# Patient Record
Sex: FEMALE | Race: WHITE | NOT HISPANIC OR LATINO | Employment: OTHER | URBAN - NONMETROPOLITAN AREA
[De-identification: names, ages, dates, MRNs, and addresses within clinical notes are randomized per-mention and may not be internally consistent; named-entity substitution may affect disease eponyms.]

---

## 2017-01-04 ENCOUNTER — HOSPITAL ENCOUNTER (OUTPATIENT)
Dept: PHYSICAL THERAPY | Facility: HOSPITAL | Age: 67
Setting detail: THERAPIES SERIES
Discharge: HOME | End: 2017-01-04

## 2017-01-04 DIAGNOSIS — N81.11 CYSTOCELE, MIDLINE: ICD-10-CM

## 2017-01-04 DIAGNOSIS — N39.3 FEMALE STRESS INCONTINENCE: Primary | ICD-10-CM

## 2017-01-04 PROCEDURE — G8978 MOBILITY CURRENT STATUS: HCPCS | Performed by: PHYSICAL THERAPIST

## 2017-01-04 PROCEDURE — G8979 MOBILITY GOAL STATUS: HCPCS | Performed by: PHYSICAL THERAPIST

## 2017-01-04 PROCEDURE — 97110 THERAPEUTIC EXERCISES: CPT | Performed by: PHYSICAL THERAPIST

## 2017-01-04 NOTE — PROGRESS NOTES
Outpatient Physical Therapy Women's Health Progress Note  [facility]     Patient Name: Cindi Hill  : 1950  MRN: [id number]  Today's Date: 2017        Visit Date: 2017    Visit Dx:    ICD-10-CM ICD-9-CM   1. Female stress incontinence N39.3 625.6   2. Cystocele, midline N81.11 618.01       Patient Active Problem List   Diagnosis   • GERD (gastroesophageal reflux disease)   • Cough   • Deviated nasal septum   • Allergic rhinitis due to pollen   • Sicca laryngitis   • Mixed hyperlipidemia   • Breast cancer   • Osteoarthritis of cervical spine                               PT Assessment/Plan       17 1030          PT Assessment    Functional Limitations Impaired gait;Limitation in home management;Limitations in community activities;Performance in self-care ADL  -KR      Impairments Balance;Gait;Muscle strength;Impaired postural alignment;Endurance;Posture  -KR      Assessment Comments She has partially met her pelvic floor goals. She has done excellent with her supine/sidelying pelvic floor contractions. She is limited with isolation and breathing in sitting. She was unable to be consistent with her HEP the last couple weeks secondary to the holidays. She is reporting less urinary incontience, but still having with activity.   -KR      Please refer to paper survey for additional self-reported information Yes  -KR      Rehab Potential Good  -KR      Patient/caregiver participated in establishment of treatment plan and goals Yes  -KR      Patient would benefit from skilled therapy intervention Yes  -KR      PT Plan    PT Frequency --   1-4 times per ont  -KR      Predicted Duration of Therapy Intervention (days/wks) 2 months  -KR      Planned CPT's? PT THER PROC EA 15 MIN: 02993;PT THER ACT EA 15 MIN: 29949;PT MANUAL THERAPY EA 15 MIN: 71734;PT NEUROMUSC RE-EDUCATION EA 15 MIN: 46437;PT GAIT TRAINING EA 15 MIN: 29696;PT ELECTRICAL STIM UNATTEND: ;PT ELECTRICAL STIM ATTD EA 15 MIN:  74312  -KR      PT Plan Comments We are going to work sitting and try to progress pelvic floor contractions in standing. I want to update her HEP to increase core and hip stregthening, then work towards decreasing frequency here.   -KR        User Key  (r) = Recorded By, (t) = Taken By, (c) = Cosigned By    Initials Name Provider Type    FANTA Pascual, PT Physical Therapist                      Exercises       01/04/17 1030          Subjective Comments    Subjective Comments She reports she has been working on her pelvic foor contraction, but unable to work on the rest secondary to having company in town. She reports decreasing leaking. She repots sometimes difficulty starting flow.   -KR      Subjective Pain    Able to rate subjective pain? yes  -KR      Pre-Treatment Pain Level 0  -KR      Post-Treatment Pain Level 0  -KR      Exercise 1    Exercise Name 1 assessed all goals for re-cert  -KR      Exercise 2    Exercise Name 2 sEMG in sidelying and able to hold 10 sec without crossover. worked ni sitting, but had to cue on breathing.   -KR        User Key  (r) = Recorded By, (t) = Taken By, (c) = Cosigned By    Initials Name Provider Type    FANTA Pascual, PT Physical Therapist                            PT OP Goals       01/04/17 1030          PT Short Term Goals    STG Date to Achieve 01/25/17  -KR      STG 1 Pt will I with HEP.   -KR      STG 1 Progress Ongoing  -KR      STG 1 Progress Comments working pelvic floor, but not consistent with others  -KR      STG 2 Pt will be able to isolate pelvic floor in order to progress with pelvic floor strengthening.   -KR      STG 2 Progress Ongoing;Partially Met  -KR      STG 2 Progress Comments still needing cues for breathing in sitting, able to complete in sidelying  -KR      Long Term Goals    LTG 1 Pt will perform PF conctraction of 3 to 3+/5 or greater in order to prevent leaking with coughing, sneezing and other ADLs.   -KR      LTG 1 Progress  Ongoing;Partially Met  -KR      LTG 1 Progress Comments met in sidelying, working in sitting and will progress towards standing  -KR      LTG 2 Pt will perform PF contraction and hold 5+sec in order to prevent leaking with coughing, sneezing and other ADLs  -KR      LTG 2 Progress Ongoing;Partially Met  -KR      LTG 2 Progress Comments met in sidelying, working in sitting and will progress towards standing  -KR      LTG 3 Pt will perform SLS for 10 sec or greater.  -KR      LTG 3 Progress Ongoing  -KR      LTG 3 Progress Comments not met  -KR      LTG 4 Pt will report using 50% less protective liners improved hygene.   -KR      LTG 4 Progress Ongoing  -KR      LTG 4 Progress Comments not met  -KR      Time Calculation    PT Goal Re-Cert Due Date 02/03/17  -KR        User Key  (r) = Recorded By, (t) = Taken By, (c) = Cosigned By    Initials Name Provider Type    FANTA Pascual PT Physical Therapist                 Therapy Education       01/04/17 1030    Therapy Education    Given HEP;Posture/body mechanics  -KR    Program Reinforced   Pf in sitting with breathing focus  -KR    How Provided Verbal  -KR    Provided to Patient  -KR    Level of Understanding Verbalized  -KR      User Key  (r) = Recorded By, (t) = Taken By, (c) = Cosigned By    Initials Name Provider Type    FANTA Pascual PT Physical Therapist                Outcome Measures       01/04/17 1030          Functional Assessment    Outcome Measure Options Other Outcome Measure   PFIQ-7  -KR        User Key  (r) = Recorded By, (t) = Taken By, (c) = Cosigned By    Initials Name Provider Type    FANTA Pascual PT Physical Therapist            Time Calculation:   Start Time: 1030  Stop Time: 1112  Time Calculation (min): 42 min  Total Timed Code Minutes- PT: 42 minute(s)    Therapy Charges for Today     Code Description Service Date Service Provider Modifiers Qty    44414962089 HC PT MOBILITY CURRENT 1/4/2017 Jeanne Pascual PT GP,   1    00290619558 HC PT MOBILITY PROJECTED 1/4/2017 Jeanne Pascual, PT GP, CI 1    88686182545 HC PT THER PROC EA 15 MIN 1/4/2017 Jeanne Pascual, PT GP 3                    Jeanne Pascual, PT  1/4/2017

## 2017-01-06 ENCOUNTER — OFFICE VISIT (OUTPATIENT)
Dept: FAMILY MEDICINE CLINIC | Facility: CLINIC | Age: 67
End: 2017-01-06

## 2017-01-06 VITALS
DIASTOLIC BLOOD PRESSURE: 74 MMHG | TEMPERATURE: 98.1 F | HEART RATE: 85 BPM | SYSTOLIC BLOOD PRESSURE: 126 MMHG | BODY MASS INDEX: 29.72 KG/M2 | HEIGHT: 61 IN | OXYGEN SATURATION: 98 % | WEIGHT: 157.4 LBS

## 2017-01-06 DIAGNOSIS — H60.93 OTITIS EXTERNA OF BOTH EARS, UNSPECIFIED CHRONICITY, UNSPECIFIED TYPE: Primary | ICD-10-CM

## 2017-01-06 PROCEDURE — 69210 REMOVE IMPACTED EAR WAX UNI: CPT | Performed by: FAMILY MEDICINE

## 2017-01-06 NOTE — MR AVS SNAPSHOT
Cindi Hill   1/6/2017 2:30 PM   Office Visit    Dept Phone:  [phone]   Encounter #:  [id number]    Provider:  Jose Wolff MD   Department:  [facility]                Your Full Care Plan              Today's Medication Changes          These changes are accurate as of: 1/6/17  2:41 PM.  If you have any questions, ask your nurse or doctor.               Stop taking medication(s)listed here:     guaiFENesin 600 MG 12 hr tablet   Commonly known as:  MUCINEX   Stopped by:  Jose Wolff MD           montelukast 10 MG tablet   Commonly known as:  SINGULAIR   Stopped by:  Jose Wolff MD           olopatadine 0.6 % solution nasal solution   Commonly known as:  PATANASE   Stopped by:  Jose Wolff MD                      Your Updated Medication List          This list is accurate as of: 1/6/17  2:41 PM.  Always use your most recent med list.                aspirin 81 MG tablet       benzonatate 200 MG capsule   Commonly known as:  TESSALON       CALCIUM 600 600 MG tablet   Generic drug:  calcium carbonate       fluticasone 50 MCG/ACT nasal spray   Commonly known as:  FLONASE   2 sprays into each nostril daily.       losartan-hydrochlorothiazide 50-12.5 MG per tablet   Commonly known as:  HYZAAR       lovastatin 10 MG tablet   Commonly known as:  MEVACOR       omeprazole 20 MG capsule   Commonly known as:  priLOSEC       Vitamin D (Cholecalciferol) 1000 UNITS capsule               You Were Diagnosed With        Codes Comments    Otitis externa of both ears, unspecified chronicity, unspecified type    -  Primary ICD-10-CM: H60.93  ICD-9-CM: 380.10       Instructions     None    Patient Instructions History      Upcoming Appointments     Visit Type Date Time Department    OFFICE VISIT 1/6/2017  2:30 PM [facility]    NEW PATIENT 1/10/2017 10:15 AM [facility]    TREATMENT - PELVIC FLOOR 1/11/2017 11:15 AM   [facility] OP PHYS [facility]    TREATMENT - PELVIC FLOOR 2017 11:15 AM  [facility] OP PHYS [facility]    TREATMENT - PELVIC FLOOR 2017 11:15 AM  [facility] OP PHYS [facility]    FOLLOW UP 2017  1:30 PM [facility]    TREATMENT - PELVIC FLOOR 2017 11:15 AM  [facility] OP PHYS [facility]    OFFICE VISIT 2/10/2017 10:00 AM [facility]      MyChart Signup     Our records indicate that you have declined [facility] signup. If you would like to sign up for [employer], please email [email or web address] or call [phone] to obtain an activation code.             Other Info from Your Visit           Your Appointments     Justen 10, 2017 10:15 AM CST   New Patient with Anuj Johnson MD   [facility] (--)    [address]  [facility] [address] [phone]           Bring all previous medical records and films, along with current medications and insurance information.            2017 11:15 AM CST   Treatment - Pelvic Floor with Jeanne Pascual, PT   [facility] OP PHYS [facility] ([place])    [address] [facility] [place] [id number] [phone]            2017 11:15 AM CST   Treatment - Pelvic Floor with Jeanne Pascual, PT   [facility] OP [facility] ([place])    [address] [place] [id number] [phone]            2017 11:15 AM CST   Treatment - Pelvic Floor with Jeanne Pascual, PT   [facility] OP PHYS [facility] ([place])    [address] [facility] [place] [address]   [phone]            2017  1:30 PM CST   Follow Up with CRISTIANE Aguilar   [facility] (--)    [address]   [address]  [place] [address] [phone]           Arrive 15 minutes prior to appointment.              Allergies     Lisinopril  Cough    Lortab [Hydrocodone-acetaminophen]  Nausea Only    Percocet [Oxycodone-acetaminophen]  Nausea Only      Reason for Visit     Cerumen Impaction Wants ears irrigated     "Numbness Started approx. one month ago      Vital Signs     Blood Pressure Pulse Temperature Height Weight Last Menstrual Period    126/74 85 98.1 °F (36.7 °C) 61\" (154.9 cm) 157 lb 6.4 oz (71.4 kg) (LMP Unknown)    Oxygen Saturation Body Mass Index Smoking Status             98% 29.74 kg/m2 Never Smoker         Problems and Diagnoses Noted     Bilateral external ear infections    -  Primary        "

## 2017-01-06 NOTE — PROGRESS NOTES
Procedure   Ear Cerumen Removal Instrumentation  Date/Time: 1/6/2017 2:41 PM  Performed by: BERNA DOMINGUEZ  Authorized by: BERNA DOMINGUEZ  Local anesthetic: none  Location details: bilateral ears  Procedure type: curette  Patient sedated: no  Patient tolerance: Patient tolerated the procedure well with no immediate complications  Comments: Wax removed both ears without complications

## 2017-01-10 ENCOUNTER — OFFICE VISIT (OUTPATIENT)
Dept: CARDIOLOGY | Facility: CLINIC | Age: 67
End: 2017-01-10

## 2017-01-10 VITALS
SYSTOLIC BLOOD PRESSURE: 128 MMHG | HEIGHT: 61 IN | DIASTOLIC BLOOD PRESSURE: 70 MMHG | HEART RATE: 76 BPM | BODY MASS INDEX: 29.45 KG/M2 | WEIGHT: 156 LBS

## 2017-01-10 DIAGNOSIS — I10 ESSENTIAL HYPERTENSION: ICD-10-CM

## 2017-01-10 DIAGNOSIS — I44.7 LBBB (LEFT BUNDLE BRANCH BLOCK): Primary | ICD-10-CM

## 2017-01-10 DIAGNOSIS — Z01.810 PREOP CARDIOVASCULAR EXAM: ICD-10-CM

## 2017-01-10 DIAGNOSIS — I20.8 ANGINAL EQUIVALENT (HCC): ICD-10-CM

## 2017-01-10 PROBLEM — R06.09 DYSPNEA ON EXERTION: Status: ACTIVE | Noted: 2017-01-10

## 2017-01-10 PROCEDURE — 99214 OFFICE O/P EST MOD 30 MIN: CPT | Performed by: INTERNAL MEDICINE

## 2017-01-10 PROCEDURE — 93000 ELECTROCARDIOGRAM COMPLETE: CPT | Performed by: INTERNAL MEDICINE

## 2017-01-10 NOTE — LETTER
January 10, 2017     Jose Wolff MD  [address] [facility] [address] [facility] [address]    Patient: Cindi iHll   YOB: 1950   Date of Visit: 1/10/2017       Dear Dr. Mariella MD:    Thank you for referring Cindi Hill to me for evaluation. Below are the relevant portions of my assessment and plan of care.    If you have questions, please do not hesitate to call me. I look forward to following Cindi along with you.         Sincerely,        Anuj Johnson MD        CC: No Recipients  Anuj Johnson MD  1/10/2017 11:36 AM  Signed  Cindi Hill  [id number]  1950  66 y.o.  female    Referring Provider: Jose Wolff MD    Reason for  Visit: Preop CV exam for hysterectomy and bladder sling       Overall doing well  Denies any chest pain. Has exertional shortness of breath  Compliant with medications    History of present illness:  Cindi Hill is a 66 y.o. yo female with history of preop CV exam who presents today for   Chief Complaint   Patient presents with   • Surgical Clearance     NEW PT-ABNORMAL EKG   .    History  Past Medical History   Diagnosis Date   • Arthritis    • Breast cancer      1997, right breast mastectomy   • Drug therapy    • GERD (gastroesophageal reflux disease)    • Hypercholesteremia    • KARUNA (stress urinary incontinence, female)    • Urge incontinence    • Uterovaginal prolapse    • Vaginal atrophy    ,   Past Surgical History   Procedure Laterality Date   • Rotator cuff repair Right 2012   • Mastectomy Bilateral    • Colonoscopy  2014   • Laminectomy  01/2016   • Breast biopsy     ,   Family History   Problem Relation Age of Onset   • Dementia Mother    • Heart failure Mother    • Cancer Father      liver   • Cancer Brother      thyroid   ,   Social History   Substance Use Topics   • Smoking status: Never Smoker   • Smokeless tobacco: Never Used   • Alcohol use No   ,     Medications  Current Outpatient Prescriptions   Medication Sig Dispense Refill   •  "aspirin 81 MG tablet Take 81 mg by mouth daily.     • benzonatate (TESSALON) 200 MG capsule Take 200 mg by mouth As Needed for cough.     • calcium carbonate (CALCIUM 600) 600 MG tablet Take 600 mg by mouth daily.     • fluticasone (FLONASE) 50 MCG/ACT nasal spray 2 sprays into each nostril daily. 1 bottle 6   • losartan-hydrochlorothiazide (HYZAAR) 50-12.5 MG per tablet Take 1 tablet by mouth daily.     • lovastatin (MEVACOR) 10 MG tablet Take 10 mg by mouth every night.     • omeprazole (PriLOSEC) 20 MG capsule Take 20 mg by mouth daily.     • Vitamin D, Cholecalciferol, 1000 UNITS capsule Take 2,000 Units by mouth Daily.       No current facility-administered medications for this visit.        Allergies:  Lisinopril; Lortab [hydrocodone-acetaminophen]; and Percocet [oxycodone-acetaminophen]    Review of Systems  Review of Systems   Constitution: Negative.   HENT: Negative.    Eyes: Negative.    Cardiovascular: Positive for dyspnea on exertion. Negative for chest pain, claudication, cyanosis, irregular heartbeat, leg swelling, near-syncope, orthopnea, palpitations, paroxysmal nocturnal dyspnea and syncope.   Respiratory: Negative.    Endocrine: Negative.    Hematologic/Lymphatic: Negative.    Skin: Negative.    Gastrointestinal: Negative for anorexia.   Genitourinary: Negative.    Neurological: Negative.    Psychiatric/Behavioral: Negative.        Objective     Physical Exam:  Visit Vitals   • /70   • Pulse 76   • Ht 61\" (154.9 cm)   • Wt 156 lb (70.8 kg)   • LMP  (LMP Unknown)   • BMI 29.48 kg/m2     Physical Exam   Constitutional: She appears well-developed.   HENT:   Head: Normocephalic.   Neck: Normal carotid pulses and no JVD present. No tracheal tenderness present. Carotid bruit is not present. No tracheal deviation and no edema present.   Cardiovascular: Regular rhythm, normal heart sounds and normal pulses.    Pulmonary/Chest: Effort normal. No stridor.   Abdominal: Soft.   Neurological: She is " alert. She has normal strength. No cranial nerve deficit or sensory deficit.   Skin: Skin is warm.   Psychiatric: She has a normal mood and affect. Her speech is normal and behavior is normal.       Results Review:       ECG 12 Lead  Date/Time: 1/10/2017 11:32 AM  Performed by: DALE PRICE  Authorized by: DALE PRICE   Comparison: not compared with previous ECG   Rhythm: sinus rhythm  Conduction: complete LBBB  Clinical impression: abnormal ECG            Assessment/Plan   Patient Active Problem List   Diagnosis   • GERD (gastroesophageal reflux disease)   • Cough   • Deviated nasal septum   • Allergic rhinitis due to pollen   • Sicca laryngitis   • Mixed hyperlipidemia   • Breast cancer   • Osteoarthritis of cervical spine   • Preop cardiovascular exam   • LBBB (left bundle branch block)   • Dyspnea on exertion       Stable doing well. No exertional chest pain or excessive dyspnea. No palpitations. No significant pedal edema. Compliant with medications and diet. Latest labs and medications reviewed.    Plan:  Lexiscan   Close follow up with you as scheduled.  Intensive factor modifications.  See order list.    Counseled regarding disease appropriate diet, fluid, caffeine, stimulants and sodium intake as well as importance of compliance to diet, exercise and regular follow up.    No Follow-up on file.            '

## 2017-01-10 NOTE — MR AVS SNAPSHOT
Cindi Hill   1/10/2017 10:15 AM   Office Visit    Dept Phone:  [phone]   Encounter #:  [id number]    Provider:  Anuj Johnson MD   Department:  [facility]                Your Full Care Plan              Your Updated Medication List          This list is accurate as of: 1/10/17 11:42 AM.  Always use your most recent med list.                aspirin 81 MG tablet       benzonatate 200 MG capsule   Commonly known as:  TESSALON       CALCIUM 600 600 MG tablet   Generic drug:  calcium carbonate       fluticasone 50 MCG/ACT nasal spray   Commonly known as:  FLONASE   2 sprays into each nostril daily.       losartan-hydrochlorothiazide 50-12.5 MG per tablet   Commonly known as:  HYZAAR       lovastatin 10 MG tablet   Commonly known as:  MEVACOR       omeprazole 20 MG capsule   Commonly known as:  priLOSEC       Vitamin D (Cholecalciferol) 1000 UNITS capsule               We Performed the Following     ECG 12 Lead       You Were Diagnosed With        Codes Comments    LBBB (left bundle branch block)    -  Primary ICD-10-CM: I44.7  ICD-9-CM: 426.3     Preop cardiovascular exam     ICD-10-CM: Z01.810  ICD-9-CM: V72.81     Essential hypertension     ICD-10-CM: I10  ICD-9-CM: 401.9     Anginal equivalent     ICD-10-CM: I20.8  ICD-9-CM: 413.9       Instructions     None    Patient Instructions History      Upcoming Appointments     Visit Type Date Time Department    NEW PATIENT 1/10/2017 10:15 AM [facility]    TREATMENT - PELVIC FLOOR 1/11/2017 11:15 AM  [facility] OP PHYS [facility]    TREATMENT - PELVIC FLOOR 1/18/2017 11:15 AM  [facility] OP PHYS [facility]    TREATMENT - PELVIC FLOOR 1/25/2017 11:15 AM [facility] OP PHYS [facility]    FOLLOW UP 1/27/2017  1:30 PM [facility]    TREATMENT - PELVIC FLOOR 2/1/2017 11:15 AM [facility] OP PHYS [facility]    OFFICE VISIT 2/10/2017 10:00 AM [facility]    FOLLOW UP 2/10/2017  8:00 AM [facility]      MyChart Signup     Our records  "ind[facility] that you have [facility]. If you would like to sign up for [employer], please email [email or web address] or call [phone] to obtain an activation code.             Other Info from Your Visit           Your Appointments     2017 11:15 AM CST   Treatment - Pelvic Floor with Jeanne Pascual, PT   [facility] OP [facility]YS [facility] ([place])    [address] [place] [address] [phone]            2017 11:15 AM CST   Treatment - Pelvic Floor with Jeanne Pascual, PT   [facility] OP PH[facility] ([place])    [address] [place] [address] [phone]            2017 11:15 AM CST   Treatment - Pelvic Floor with Jeanne Pascual, PT   [facility] OP PH[facility] ([place])    [address] [place] [address] [phone]            2017  1:30 PM CST   Follow Up with CRISTIANE Aguilar   [facility] (--)    [address] [place] [address]   [address]  [facility] [address] [phone]           Arrive 15 minutes prior to appointment.            2017 11:15 AM CST   Treatment - Pelvic Floor with Jeanne Pascual, PT   [facility] OP PH[facility] ([place])    [address] [place] [address] [phone]              Allergies     Lisinopril  Cough    Lortab [Hydrocodone-acetaminophen]  Nausea Only    Percocet [Oxycodone-acetaminophen]  Nausea Only      Reason for Visit     Surgical Clearance NEW PT-ABNORMAL EKG      Vital Signs     Blood Pressure Pulse Height Weight Last Menstrual Period Body Mass Index    128/70 76 61\" (154.9 cm) 156 lb (70.8 kg) (LMP Unknown) 29.48 kg/m2    Smoking Status                   Never Smoker           Problems and Diagnoses Noted     Breathlessness on exertion    LBBB (left bundle branch block)    Pre-operative cardiovascular examination    High blood pressure        Anginal equivalent          Results     ECG 12 Lead               "

## 2017-01-10 NOTE — PROGRESS NOTES
Cindi Hill  [id number]  1950  66 y.o.  female    Referring Provider: Jose Wolff MD    Reason for  Visit: Preop CV exam for hysterectomy and bladder sling       Overall doing well  Denies any chest pain. Has exertional shortness of breath  Compliant with medications    History of present illness:  Cindi Hill is a 66 y.o. yo female with history of preop CV exam who presents today for   Chief Complaint   Patient presents with   • Surgical Clearance     NEW PT-ABNORMAL EKG   .    History  Past Medical History   Diagnosis Date   • Arthritis    • Breast cancer      1997, right breast mastectomy   • Drug therapy    • GERD (gastroesophageal reflux disease)    • Hypercholesteremia    • KARUNA (stress urinary incontinence, female)    • Urge incontinence    • Uterovaginal prolapse    • Vaginal atrophy    ,   Past Surgical History   Procedure Laterality Date   • Rotator cuff repair Right 2012   • Mastectomy Bilateral    • Colonoscopy  2014   • Laminectomy  01/2016   • Breast biopsy     ,   Family History   Problem Relation Age of Onset   • Dementia Mother    • Heart failure Mother    • Cancer Father      liver   • Cancer Brother      thyroid   ,   Social History   Substance Use Topics   • Smoking status: Never Smoker   • Smokeless tobacco: Never Used   • Alcohol use No   ,     Medications  Current Outpatient Prescriptions   Medication Sig Dispense Refill   • aspirin 81 MG tablet Take 81 mg by mouth daily.     • benzonatate (TESSALON) 200 MG capsule Take 200 mg by mouth As Needed for cough.     • calcium carbonate (CALCIUM 600) 600 MG tablet Take 600 mg by mouth daily.     • fluticasone (FLONASE) 50 MCG/ACT nasal spray 2 sprays into each nostril daily. 1 bottle 6   • losartan-hydrochlorothiazide (HYZAAR) 50-12.5 MG per tablet Take 1 tablet by mouth daily.     • lovastatin (MEVACOR) 10 MG tablet Take 10 mg by mouth every night.     • omeprazole (PriLOSEC) 20 MG capsule Take 20 mg by mouth daily.     •  "Vitamin D, Cholecalciferol, 1000 UNITS capsule Take 2,000 Units by mouth Daily.       No current facility-administered medications for this visit.        Allergies:  Lisinopril; Lortab [hydrocodone-acetaminophen]; and Percocet [oxycodone-acetaminophen]    Review of Systems  Review of Systems   Constitution: Negative.   HENT: Negative.    Eyes: Negative.    Cardiovascular: Positive for dyspnea on exertion. Negative for chest pain, claudication, cyanosis, irregular heartbeat, leg swelling, near-syncope, orthopnea, palpitations, paroxysmal nocturnal dyspnea and syncope.   Respiratory: Negative.    Endocrine: Negative.    Hematologic/Lymphatic: Negative.    Skin: Negative.    Gastrointestinal: Negative for anorexia.   Genitourinary: Negative.    Neurological: Negative.    Psychiatric/Behavioral: Negative.        Objective     Physical Exam:  Visit Vitals   • /70   • Pulse 76   • Ht 61\" (154.9 cm)   • Wt 156 lb (70.8 kg)   • LMP  (LMP Unknown)   • BMI 29.48 kg/m2     Physical Exam   Constitutional: She appears well-developed.   HENT:   Head: Normocephalic.   Neck: Normal carotid pulses and no JVD present. No tracheal tenderness present. Carotid bruit is not present. No tracheal deviation and no edema present.   Cardiovascular: Regular rhythm, normal heart sounds and normal pulses.    Pulmonary/Chest: Effort normal. No stridor.   Abdominal: Soft.   Neurological: She is alert. She has normal strength. No cranial nerve deficit or sensory deficit.   Skin: Skin is warm.   Psychiatric: She has a normal mood and affect. Her speech is normal and behavior is normal.       Results Review:       ECG 12 Lead  Date/Time: 1/10/2017 11:32 AM  Performed by: DALE PRICE  Authorized by: DALE PRICE   Comparison: not compared with previous ECG   Rhythm: sinus rhythm  Conduction: complete LBBB  Clinical impression: abnormal ECG            Assessment/Plan   Patient Active Problem List   Diagnosis   • GERD (gastroesophageal reflux " disease)   • Cough   • Deviated nasal septum   • Allergic rhinitis due to pollen   • Sicca laryngitis   • Mixed hyperlipidemia   • Breast cancer   • Osteoarthritis of cervical spine   • Preop cardiovascular exam   • LBBB (left bundle branch block)   • Dyspnea on exertion       Stable doing well. No exertional chest pain or excessive dyspnea. No palpitations. No significant pedal edema. Compliant with medications and diet. Latest labs and medications reviewed.    Plan:  Lexiscan   Close follow up with you as scheduled.  Intensive factor modifications.  See order list.    Counseled regarding disease appropriate diet, fluid, caffeine, stimulants and sodium intake as well as importance of compliance to diet, exercise and regular follow up.    No Follow-up on file.            '

## 2017-01-11 ENCOUNTER — HOSPITAL ENCOUNTER (OUTPATIENT)
Dept: PHYSICAL THERAPY | Facility: HOSPITAL | Age: 67
Setting detail: THERAPIES SERIES
Discharge: HOME | End: 2017-01-11

## 2017-01-11 ENCOUNTER — TELEPHONE (OUTPATIENT)
Dept: CARDIOLOGY | Facility: CLINIC | Age: 67
End: 2017-01-11

## 2017-01-11 DIAGNOSIS — N39.3 FEMALE STRESS INCONTINENCE: Primary | ICD-10-CM

## 2017-01-11 DIAGNOSIS — N81.11 CYSTOCELE, MIDLINE: ICD-10-CM

## 2017-01-11 PROCEDURE — 97110 THERAPEUTIC EXERCISES: CPT | Performed by: PHYSICAL THERAPIST

## 2017-01-11 NOTE — PROGRESS NOTES
Outpatient Physical Therapy Women's Health Treatment Note  [facility]     Patient Name: Cindi Hill  : 1950  MRN: [id number]  Today's Date: 2017        Visit Date: 2017    Visit Dx:    ICD-10-CM ICD-9-CM   1. Female stress incontinence N39.3 625.6   2. Cystocele, midline N81.11 618.01       Patient Active Problem List   Diagnosis   • GERD (gastroesophageal reflux disease)   • Cough   • Deviated nasal septum   • Allergic rhinitis due to pollen   • Sicca laryngitis   • Mixed hyperlipidemia   • Breast cancer   • Osteoarthritis of cervical spine   • Preop cardiovascular exam   • LBBB (left bundle branch block)   • Dyspnea on exertion                               PT Assessment/Plan       17 1108          PT Assessment    Assessment Comments Her breathing with sitting pelvic floor conctractions has improved, but she had increased glut activation, continuing to cause urinary incontience with activity. I updated her HEP to progress hip/core strength.   -KR      PT Plan    PT Plan Comments Assess pelvlic floor contractions in sitting and progress to standing, as toleated. Continue to work core/hip strengthening.   -KR        User Key  (r) = Recorded By, (t) = Taken By, (c) = Cosigned By    Initials Name Provider Type    FANTA Psacual, PT Physical Therapist                      Exercises       17 1108          Subjective Comments    Subjective Comments She reports working on her pelvic floor contractions in sitting.   -KR      Subjective Pain    Able to rate subjective pain? yes  -KR      Pre-Treatment Pain Level 0  -KR      Post-Treatment Pain Level 0  -KR      Exercise 1    Exercise Name 1 bridge with red TB   -KR      Sets 1 2  -KR      Reps 1 10  -KR      Exercise 2    Exercise Name 2 PPT   -KR      Sets 2 2  -KR      Reps 2 10  -KR      Time (Seconds) 2 5  -KR      Exercise 3    Exercise Name 3 sEMG sitting, needed cues to isolate PF.   -KR        User Key  (r) = Recorded  By, (t) = Taken By, (c) = Cosigned By    Initials Name Provider Type    FANTA Pascual, PT Physical Therapist                            PT OP Goals       01/11/17 1108 01/04/17 1030       PT Short Term Goals    STG Date to Achieve 01/25/17  -KR 01/25/17  -KR     STG 1 Pt will I with HEP.   -KR Pt will I with HEP.   -KR     STG 1 Progress Ongoing  -KR Ongoing  -KR     STG 1 Progress Comments updated with PPT and bridge with TB   -KR working pelvic floor, but not consistent with others  -KR     STG 2 Pt will be able to isolate pelvic floor in order to progress with pelvic floor strengthening.   -KR Pt will be able to isolate pelvic floor in order to progress with pelvic floor strengthening.   -KR     STG 2 Progress Ongoing;Partially Met  -KR Ongoing;Partially Met  -KR     STG 2 Progress Comments increased glut activation with sitting pelvic floor contraction; improved with breathing.   -KR still needing cues for breathing in sitting, able to complete in sidelying  -KR     Long Term Goals    LTG 1 Pt will perform PF conctraction of 3 to 3+/5 or greater in order to prevent leaking with coughing, sneezing and other ADLs.   -KR Pt will perform PF conctraction of 3 to 3+/5 or greater in order to prevent leaking with coughing, sneezing and other ADLs.   -KR     LTG 1 Progress Ongoing;Partially Met  -KR Ongoing;Partially Met  -KR     LTG 1 Progress Comments  met in sidelying, working in sitting and will progress towards standing  -KR     LTG 2 Pt will perform PF contraction and hold 5+sec in order to prevent leaking with coughing, sneezing and other ADLs  -KR Pt will perform PF contraction and hold 5+sec in order to prevent leaking with coughing, sneezing and other ADLs  -KR     LTG 2 Progress Ongoing;Partially Met  -KR Ongoing;Partially Met  -KR     LTG 2 Progress Comments  met in sidelying, working in sitting and will progress towards standing  -KR     LTG 3 Pt will perform SLS for 10 sec or greater.  -KR Pt will  perform SLS for 10 sec or greater.  -KR     LTG 3 Progress Ongoing  -KR Ongoing  -KR     LTG 3 Progress Comments  not met  -KR     LTG 4 Pt will report using 50% less protective liners improved hygene.   -KR Pt will report using 50% less protective liners improved hygene.   -KR     LTG 4 Progress Ongoing  -KR Ongoing  -KR     LTG 4 Progress Comments  not met  -KR     Time Calculation    PT Goal Re-Cert Due Date 02/03/17  -KR 02/03/17  -KR       User Key  (r) = Recorded By, (t) = Taken By, (c) = Cosigned By    Initials Name Provider Type    FANTA Pascual PT Physical Therapist                 Therapy Education       01/11/17 1108    Therapy Education    Given HEP;Symptoms/condition management;Posture/body mechanics  -KR    Program New   PPT and bridge with band  -KR    How Provided Verbal  -KR    Provided to Patient  -KR    Level of Understanding Verbalized  -KR      User Key  (r) = Recorded By, (t) = Taken By, (c) = Cosigned By    Initials Name Provider Type    FANTA Pascual PT Physical Therapist                Time Calculation:   Start Time: 1108  Stop Time: 1155  Time Calculation (min): 47 min  Total Timed Code Minutes- PT: 47 minute(s)    Therapy Charges for Today     Code Description Service Date Service Provider Modifiers Qty    52874993186 HC PT THER PROC EA 15 MIN 1/11/2017 Jeanne Pascual, PT GP 3                    Jeanne Pascual, PT  1/11/2017

## 2017-01-18 ENCOUNTER — HOSPITAL ENCOUNTER (OUTPATIENT)
Dept: PHYSICAL THERAPY | Facility: HOSPITAL | Age: 67
Setting detail: THERAPIES SERIES
Discharge: HOME | End: 2017-01-18

## 2017-01-18 DIAGNOSIS — N39.3 FEMALE STRESS INCONTINENCE: Primary | ICD-10-CM

## 2017-01-18 DIAGNOSIS — N81.11 CYSTOCELE, MIDLINE: ICD-10-CM

## 2017-01-18 PROCEDURE — 97110 THERAPEUTIC EXERCISES: CPT | Performed by: PHYSICAL THERAPIST

## 2017-01-18 NOTE — PROGRESS NOTES
Outpatient Physical Therapy Women's Health Treatment Note  [facility]     Patient Name: Cindi Hill  : 1950  MRN: [id number]  Today's Date: 2017        Visit Date: 2017    Visit Dx:    ICD-10-CM ICD-9-CM   1. Female stress incontinence N39.3 625.6   2. Cystocele, midline N81.11 618.01       Patient Active Problem List   Diagnosis   • GERD (gastroesophageal reflux disease)   • Cough   • Deviated nasal septum   • Allergic rhinitis due to pollen   • Sicca laryngitis   • Mixed hyperlipidemia   • Breast cancer   • Osteoarthritis of cervical spine   • Preop cardiovascular exam   • LBBB (left bundle branch block)   • Dyspnea on exertion                               PT Assessment/Plan       17 1113          PT Assessment    Assessment Comments Her pelvic floor strength and endurance has improved with sitting. She still needed minimal cues with breathing. She is still reporting decreased strength when coughing/sneezing causing leaking and pelvic organ prolapse  -KR      PT Plan    PT Plan Comments Progress pelvic floor contraction in standing and progress as tolearted with this.   -KR        User Key  (r) = Recorded By, (t) = Taken By, (c) = Cosigned By    Initials Name Provider Type    FANTA Pascual, PT Physical Therapist                      Exercises       17 1113          Subjective Comments    Subjective Comments She reports feeling as if she is candice at her gluts and mostly legs with pelvic floor contraction in sitting.   -KR      Subjective Pain    Able to rate subjective pain? yes  -KR      Pre-Treatment Pain Level 0  -KR      Post-Treatment Pain Level 0  -KR      Exercise 1    Exercise Name 1 sEMG in sitting; 6.0 mV and able to hold 8+ seeconds. minimal cues for breathing   -KR      Cueing 1 Verbal   visual  -KR      Exercise 2    Exercise Name 2 PPT with mini bridge   -KR      Cueing 2 Verbal  -KR      Sets 2 2  -KR      Reps 2 15  -KR      Exercise 3     Exercise Name 3 bridge on SB   -KR      Sets 3 2  -KR      Reps 3 10  -KR      Exercise 4    Exercise Name 4 sidelying hip abd/exy  -KR      Sets 4 2  -KR      Reps 4 10  -KR        User Key  (r) = Recorded By, (t) = Taken By, (c) = Cosigned By    Initials Name Provider Type    FANTA Pascual, PT Physical Therapist                            PT OP Goals       01/18/17 1113 01/11/17 1108       PT Short Term Goals    STG Date to Achieve 01/25/17  -KR 01/25/17  -KR     STG 1 Pt will I with HEP.   -KR Pt will I with HEP.   -KR     STG 1 Progress Ongoing  -KR Ongoing  -KR     STG 1 Progress Comments  updated with PPT and bridge with TB   -KR     STG 2 Pt will be able to isolate pelvic floor in order to progress with pelvic floor strengthening.   -KR Pt will be able to isolate pelvic floor in order to progress with pelvic floor strengthening.   -KR     STG 2 Progress Ongoing;Partially Met  -KR Ongoing;Partially Met  -KR     STG 2 Progress Comments able to isolate, but needing cues for breathing today.   -KR increased glut activation with sitting pelvic floor contraction; improved with breathing.   -KR     Long Term Goals    LTG 1 Pt will perform PF conctraction of 3 to 3+/5 or greater in order to prevent leaking with coughing, sneezing and other ADLs.   -KR Pt will perform PF conctraction of 3 to 3+/5 or greater in order to prevent leaking with coughing, sneezing and other ADLs.   -KR     LTG 1 Progress Ongoing;Partially Met  -KR Ongoing;Partially Met  -KR     LTG 1 Progress Comments 6.0 mV or higher with holding 8+sec in sitting.   -KR      LTG 2 Pt will perform PF contraction and hold 5+sec in order to prevent leaking with coughing, sneezing and other ADLs  -KR Pt will perform PF contraction and hold 5+sec in order to prevent leaking with coughing, sneezing and other ADLs  -KR     LTG 2 Progress Ongoing;Partially Met  -KR Ongoing;Partially Met  -KR     LTG 3 Pt will perform SLS for 10 sec or greater.  -KR Pt will  perform SLS for 10 sec or greater.  -KR     LTG 3 Progress Ongoing  -KR Ongoing  -KR     LTG 4 Pt will report using 50% less protective liners improved hygene.   -KR Pt will report using 50% less protective liners improved hygene.   -KR     LTG 4 Progress Ongoing  -KR Ongoing  -KR     Time Calculation    PT Goal Re-Cert Due Date 02/03/17  -KR 02/03/17  -KR       User Key  (r) = Recorded By, (t) = Taken By, (c) = Cosigned By    Initials Name Provider Type    FANTA Pascual PT Physical Therapist                 Therapy Education       01/18/17 1113    Therapy Education    Given HEP;Posture/body mechanics   sitting holding 10 seconds working on breathing  -KR    Program Reinforced  -KR    How Provided Verbal  -KR    Provided to Patient  -KR    Level of Understanding Verbalized  -KR      User Key  (r) = Recorded By, (t) = Taken By, (c) = Cosigned By    Initials Name Provider Type    FANTA Pascual PT Physical Therapist                Time Calculation:   Start Time: 1113  Stop Time: 1201  Time Calculation (min): 48 min  Total Timed Code Minutes- PT: 45 minute(s)    Therapy Charges for Today     Code Description Service Date Service Provider Modifiers Qty    14870226115 HC PT THER PROC EA 15 MIN 1/18/2017 Jeanne Pascual, PT GP 3                    Jeanne Pascual PT  1/18/2017

## 2017-01-19 ENCOUNTER — HOSPITAL ENCOUNTER (OUTPATIENT)
Dept: CARDIOLOGY | Facility: HOSPITAL | Age: 67
Discharge: HOME | End: 2017-01-19
Attending: INTERNAL MEDICINE

## 2017-01-19 ENCOUNTER — HOSPITAL ENCOUNTER (OUTPATIENT)
Dept: CARDIOLOGY | Facility: HOSPITAL | Age: 67
Discharge: HOME | End: 2017-01-19
Attending: INTERNAL MEDICINE | Admitting: INTERNAL MEDICINE

## 2017-01-19 VITALS — DIASTOLIC BLOOD PRESSURE: 62 MMHG | SYSTOLIC BLOOD PRESSURE: 124 MMHG | HEART RATE: 73 BPM

## 2017-01-19 DIAGNOSIS — I20.8 ANGINAL EQUIVALENT (HCC): ICD-10-CM

## 2017-01-19 PROCEDURE — 93018 CV STRESS TEST I&R ONLY: CPT | Performed by: INTERNAL MEDICINE

## 2017-01-19 PROCEDURE — 93017 CV STRESS TEST TRACING ONLY: CPT

## 2017-01-19 PROCEDURE — 25010000002 REGADENOSON 0.4 MG/5ML SOLUTION: Performed by: INTERNAL MEDICINE

## 2017-01-19 PROCEDURE — 0 TECHNETIUM SESTAMIBI: Performed by: INTERNAL MEDICINE

## 2017-01-19 PROCEDURE — 78452 HT MUSCLE IMAGE SPECT MULT: CPT | Performed by: INTERNAL MEDICINE

## 2017-01-19 PROCEDURE — A9500 TC99M SESTAMIBI: HCPCS | Performed by: INTERNAL MEDICINE

## 2017-01-19 PROCEDURE — 78452 HT MUSCLE IMAGE SPECT MULT: CPT

## 2017-01-19 RX ADMIN — Medication 1 DOSE: at 08:38

## 2017-01-19 RX ADMIN — REGADENOSON 0.4 MG: 0.08 INJECTION, SOLUTION INTRAVENOUS at 10:19

## 2017-01-22 LAB
BH CV STRESS COMMENTS STAGE 1: NORMAL
BH CV STRESS DOSE REGADENOSON STAGE 1: 0.4
BH CV STRESS DURATION MIN STAGE 1: 0
BH CV STRESS DURATION SEC STAGE 1: 10
BH CV STRESS PROTOCOL 1: NORMAL
BH CV STRESS RECOVERY BP: NORMAL MMHG
BH CV STRESS RECOVERY HR: 86 BPM
BH CV STRESS STAGE 1: 1
LV EF NUC BP: 69 %
MAXIMAL PREDICTED HEART RATE: 154 BPM
PERCENT MAX PREDICTED HR: 72.73 %
STRESS BASELINE BP: NORMAL MMHG
STRESS BASELINE HR: 73 BPM
STRESS PERCENT HR: 86 %
STRESS POST EXERCISE DUR SEC: 10 SEC
STRESS POST PEAK BP: NORMAL MMHG
STRESS POST PEAK HR: 112 BPM
STRESS TARGET HR: 131 BPM

## 2017-01-25 ENCOUNTER — HOSPITAL ENCOUNTER (OUTPATIENT)
Dept: PHYSICAL THERAPY | Facility: HOSPITAL | Age: 67
Setting detail: THERAPIES SERIES
Discharge: HOME | End: 2017-01-25

## 2017-01-25 DIAGNOSIS — N39.3 FEMALE STRESS INCONTINENCE: Primary | ICD-10-CM

## 2017-01-25 DIAGNOSIS — N81.11 CYSTOCELE, MIDLINE: ICD-10-CM

## 2017-01-25 PROCEDURE — 97110 THERAPEUTIC EXERCISES: CPT | Performed by: PHYSICAL THERAPIST

## 2017-01-25 NOTE — PROGRESS NOTES
Outpatient Physical Therapy Women's Health Progress Note  [facility]     Patient Name: Cindi Hill  : 1950  MRN: [id number]  Today's Date: 2017        Visit Date: 2017    Visit Dx:    ICD-10-CM ICD-9-CM   1. Female stress incontinence N39.3 625.6   2. Cystocele, midline N81.11 618.01       Patient Active Problem List   Diagnosis   • GERD (gastroesophageal reflux disease)   • Cough   • Deviated nasal septum   • Allergic rhinitis due to pollen   • Sicca laryngitis   • Mixed hyperlipidemia   • Breast cancer   • Osteoarthritis of cervical spine   • Preop cardiovascular exam   • LBBB (left bundle branch block)   • Dyspnea on exertion                               PT Assessment/Plan       17 1111          PT Assessment    Functional Limitations Limitation in home management;Limitations in community activities;Performance in self-care ADL  -KR      Impairments Balance;Gait;Endurance;Muscle strength  -KR      Assessment Comments She is going to be out of town for the next couple weeks. I updated her HEP to continue to work on pelvic floor endurance. I have progressed to standing activities, but she is still lacking endurance. She is still having urinary incontience, especially stress. Overall, she has made improvements. She has met one and working well towards others.   -KR      Please refer to paper survey for additional self-reported information Yes  -KR      Rehab Potential Good  -KR      Patient/caregiver participated in establishment of treatment plan and goals Yes  -KR      Patient would benefit from skilled therapy intervention Yes  -KR      PT Plan    PT Frequency --   1-4 times per month  -KR      Predicted Duration of Therapy Intervention (days/wks) 2 months  -KR      Planned CPT's? PT THER ACT EA 15 MIN: 00093;PT THER PROC EA 15 MIN: 59031;PT MANUAL THERAPY EA 15 MIN: 51099  -KR      PT Plan Comments We will assess pelvic floor contraction in statnding and look towards discharge  soon.   -KR        User Key  (r) = Recorded By, (t) = Taken By, (c) = Cosigned By    Initials Name Provider Type    FANTA Pascual, PT Physical Therapist                      Exercises       01/25/17 1111          Subjective Comments    Subjective Comments She reports L lower back/hip was hurting after leaving here and sore for a couple days, but went away. She reports still leaking some with coughing, sneezing or urgency, but overal better. She reports now just struggling with starting the flow, but not when it is urgent to go. She reports not feeling like she is always emptying either. Getting up 1-2 times per night to use the restroom.   -KR      Subjective Pain    Able to rate subjective pain? yes  -KR      Pre-Treatment Pain Level 0  -KR      Post-Treatment Pain Level 0  -KR      Exercise 1    Exercise Name 1 sEMG sitting 15mV and able to hold 8-10 sec, breathing was improved; then in standing able to hold 2-3 sec   -KR      Exercise 2    Exercise Name 2 PPT with mini bridge and green TB around  knees s  -KR      Sets 2 3  -KR      Reps 2 10  -KR        User Key  (r) = Recorded By, (t) = Taken By, (c) = Cosigned By    Initials Name Provider Type    FANTA Pascual, PT Physical Therapist                            PT OP Goals       01/25/17 1111 01/18/17 1113       PT Short Term Goals    STG Date to Achieve 02/15/17  -KR 01/25/17  -KR     STG 1 Pt will I with HEP.   -KR Pt will I with HEP.   -KR     STG 1 Progress Ongoing  -KR Ongoing  -KR     STG 1 Progress Comments discussed progression and continuation while out of town  -KR      STG 2 Pt will be able to isolate pelvic floor in order to progress with pelvic floor strengthening.   -KR Pt will be able to isolate pelvic floor in order to progress with pelvic floor strengthening.   -KR     STG 2 Progress Met  -KR Ongoing;Partially Met  -KR     STG 2 Progress Comments  able to isolate, but needing cues for breathing today.   -KR     Long Term Goals     LTG Date to Achieve 03/08/17  -KR      LTG 1 Pt will perform PF conctraction of 3 to 3+/5 or greater in order to prevent leaking with coughing, sneezing and other ADLs.   -KR Pt will perform PF conctraction of 3 to 3+/5 or greater in order to prevent leaking with coughing, sneezing and other ADLs.   -KR     LTG 1 Progress Ongoing;Partially Met  -KR Ongoing;Partially Met  -KR     LTG 1 Progress Comments 15V in sitting and able to hold 10 sec; only holding 2-3 seconds in standing  -KR 6.0 mV or higher with holding 8+sec in sitting.   -KR     LTG 2 Pt will perform PF contraction and hold 5+sec in order to prevent leaking with coughing, sneezing and other ADLs  -KR Pt will perform PF contraction and hold 5+sec in order to prevent leaking with coughing, sneezing and other ADLs  -KR     LTG 2 Progress Ongoing;Partially Met  -KR Ongoing;Partially Met  -KR     LTG 2 Progress Comments met in sitting, unable in standing  -KR      LTG 3 Pt will perform SLS for 10 sec or greater.  -KR Pt will perform SLS for 10 sec or greater.  -KR     LTG 3 Progress Ongoing  -KR Ongoing  -KR     LTG 3 Progress Comments 15 sec R 7 sec L   -KR      LTG 4 Pt will report using 50% less protective liners improved hygene.   -KR Pt will report using 50% less protective liners improved hygene.   -KR     LTG 4 Progress Ongoing  -KR Ongoing  -KR     LTG 4 Progress Comments still leaking with coughing, sneezing  -KR      Time Calculation    PT Goal Re-Cert Due Date 02/24/17  -KR 02/03/17  -KR       User Key  (r) = Recorded By, (t) = Taken By, (c) = Cosigned By    Initials Name Provider Type    FANTA Pascual PT Physical Therapist                 Therapy Education       01/25/17 1111    Therapy Education    Given HEP;Symptoms/condition management;Posture/body mechanics  -KR    Program Reinforced  -KR    How Provided Verbal  -KR    Provided to Patient  -KR    Level of Understanding Verbalized;Demonstrated;Teach back education performed  -KR       User Key  (r) = Recorded By, (t) = Taken By, (c) = Cosigned By    Initials Name Provider Type    FANTA Pascual, PT Physical Therapist                Time Calculation:   Start Time: 1111  Stop Time: 1200  Time Calculation (min): 49 min  Total Timed Code Minutes- PT: 45 minute(s)    Therapy Charges for Today     Code Description Service Date Service Provider Modifiers Qty    36395505444 HC PT THER PROC EA 15 MIN 1/25/2017 Jeanne Pascual, PT GP 3                    Jeanne Pascual, PT  1/25/2017

## 2017-02-01 ENCOUNTER — APPOINTMENT (OUTPATIENT)
Dept: PHYSICAL THERAPY | Facility: HOSPITAL | Age: 67
End: 2017-02-01

## 2017-02-09 ENCOUNTER — HOSPITAL ENCOUNTER (OUTPATIENT)
Dept: PHYSICAL THERAPY | Facility: HOSPITAL | Age: 67
Setting detail: THERAPIES SERIES
Discharge: HOME | End: 2017-02-09

## 2017-02-09 DIAGNOSIS — N39.3 FEMALE STRESS INCONTINENCE: Primary | ICD-10-CM

## 2017-02-09 DIAGNOSIS — N81.11 CYSTOCELE, MIDLINE: ICD-10-CM

## 2017-02-09 PROCEDURE — G8979 MOBILITY GOAL STATUS: HCPCS | Performed by: PHYSICAL THERAPIST

## 2017-02-09 PROCEDURE — 97110 THERAPEUTIC EXERCISES: CPT | Performed by: PHYSICAL THERAPIST

## 2017-02-09 PROCEDURE — G8980 MOBILITY D/C STATUS: HCPCS | Performed by: PHYSICAL THERAPIST

## 2017-02-09 NOTE — THERAPY DISCHARGE NOTE
Outpatient Physical Therapy Women's Health Treatment Note/Discharge Summary   [place]     Patient Name: Cindi Hill  : 1950  MRN: [id number]  Today's Date: 2017        Visit Date: 2017    Visit Dx:    ICD-10-CM ICD-9-CM   1. Female stress incontinence N39.3 625.6   2. Cystocele, midline N81.11 618.01       Patient Active Problem List   Diagnosis   • GERD (gastroesophageal reflux disease)   • Cough   • Deviated nasal septum   • Allergic rhinitis due to pollen   • Sicca laryngitis   • Mixed hyperlipidemia   • Breast cancer   • Osteoarthritis of cervical spine   • Preop cardiovascular exam   • LBBB (left bundle branch block)   • Dyspnea on exertion                               PT Assessment/Plan       17 0940          PT Assessment    Assessment Comments Pt has met 5 goals and progressing well towards other. She only had one urinary incontience episod in the last two weeks. Her pelvic floor strength and endurance has improved greatly. She has an updated HEP and we have discussed progression at home. She is to call with any questions or concerns.   -KR      PT Plan    PT Plan Comments We will discharge her for now. She is to progress with her pelvic floor contractions, specifically with stading activity.   -KR        User Key  (r) = Recorded By, (t) = Taken By, (c) = Cosigned By    Initials Name Provider Type    FANTA Pascual, PT Physical Therapist                    Exercises       17 0940          Subjective Comments    Subjective Comments She reports not doing the exercises as much as she should while on vacation. Only had 1 incontience episode in last 2 weeks.   -KR      Subjective Pain    Able to rate subjective pain? yes  -KR      Pre-Treatment Pain Level 0  -KR      Post-Treatment Pain Level 0  -KR      Exercise 1    Exercise Name 1 sEMG sitting 5-7 mV holding 10 sec; standing 10-15 mv holding 5-8 sec  -KR      Cueing 1 Verbal   visual   -KR      Exercise 2     Exercise Name 2 discussed progression at home and working pelvic floor contractions in standing with activity.   -KR        User Key  (r) = Recorded By, (t) = Taken By, (c) = Cosigned By    Initials Name Provider Type    FANTA Pascual PT Physical Therapist                            PT OP Goals       02/09/17 0940          PT Short Term Goals    STG Date to Achieve 02/15/17  -KR      STG 1 Pt will I with HEP.   -KR      STG 1 Progress Met  -KR      STG 2 Pt will be able to isolate pelvic floor in order to progress with pelvic floor strengthening.   -KR      STG 2 Progress Met  -KR      Long Term Goals    LTG Date to Achieve 03/08/17  -KR      LTG 1 Pt will perform PF conctraction of 3 to 3+/5 or greater in order to prevent leaking with coughing, sneezing and other ADLs.   -KR      LTG 1 Progress Met  -KR      LTG 2 Pt will perform PF contraction and hold 5+sec in order to prevent leaking with coughing, sneezing and other ADLs  -KR      LTG 2 Progress Met  -KR      LTG 3 Pt will perform SLS for 10 sec or greater.  -KR      LTG 3 Progress Met  -KR      LTG 4 Pt will report using 50% less protective liners improved hygene.   -KR      LTG 4 Progress Partially Met  -KR      LTG 4 Progress Comments only one incontience issue in last two weeks.   -KR      Time Calculation    PT Goal Re-Cert Due Date 02/24/17  -KR        User Key  (r) = Recorded By, (t) = Taken By, (c) = Cosigned By    Initials Name Provider Type    FANTA Pascual PT Physical Therapist                 Therapy Education       02/09/17 0940    Therapy Education    Given HEP;Symptoms/condition management  -KR    Program Reinforced  -KR    How Provided Verbal;Demonstration  -KR    Provided to Patient  -KR    Level of Understanding Teach back education performed;Verbalized;Demonstrated  -KR      User Key  (r) = Recorded By, (t) = Taken By, (c) = Cosigned By    Initials Name Provider Type    FANTA Pascual PT Physical Therapist               Time Calculation:   Start Time: 0940  Stop Time: 1028  Time Calculation (min): 48 min  Total Timed Code Minutes- PT: 40 minute(s)    Therapy Charges for Today     Code Description Service Date Service Provider Modifiers Qty    42049596095 HC PT MOBILITY PROJECTED 2/9/2017 Jeanne Pascual, PT GP, CI 1    87887311614 HC PT MOBILITY DISCHARGE 2/9/2017 Jeanne Pascual, PT GP, CI 1    07282713088 HC PT THER PROC EA 15 MIN 2/9/2017 Jeanne Pascual, PT GP 3          PT G-Codes  PT Professional Judgement Used?: Yes  Functional Limitation: Mobility: Walking and moving around  Mobility: Walking and Moving Around Goal Status (): At least 1 percent but less than 20 percent impaired, limited or restricted  Mobility: Walking and Moving Around Discharge Status (): At least 1 percent but less than 20 percent impaired, limited or restricted           Jeanne Pascual, PT  2/9/2017

## 2017-02-10 ENCOUNTER — OFFICE VISIT (OUTPATIENT)
Dept: CARDIOLOGY | Facility: CLINIC | Age: 67
End: 2017-02-10

## 2017-02-10 ENCOUNTER — OFFICE VISIT (OUTPATIENT)
Dept: OBSTETRICS AND GYNECOLOGY | Facility: CLINIC | Age: 67
End: 2017-02-10

## 2017-02-10 VITALS
DIASTOLIC BLOOD PRESSURE: 82 MMHG | HEIGHT: 61 IN | BODY MASS INDEX: 29.27 KG/M2 | SYSTOLIC BLOOD PRESSURE: 126 MMHG | WEIGHT: 155 LBS

## 2017-02-10 VITALS
WEIGHT: 159 LBS | SYSTOLIC BLOOD PRESSURE: 118 MMHG | DIASTOLIC BLOOD PRESSURE: 78 MMHG | OXYGEN SATURATION: 95 % | HEIGHT: 62 IN | BODY MASS INDEX: 29.26 KG/M2 | HEART RATE: 89 BPM

## 2017-02-10 DIAGNOSIS — R39.15 URINARY URGENCY: Primary | ICD-10-CM

## 2017-02-10 DIAGNOSIS — R06.09 DYSPNEA ON EXERTION: ICD-10-CM

## 2017-02-10 DIAGNOSIS — I44.7 LBBB (LEFT BUNDLE BRANCH BLOCK): ICD-10-CM

## 2017-02-10 DIAGNOSIS — Z01.810 PREOP CARDIOVASCULAR EXAM: ICD-10-CM

## 2017-02-10 DIAGNOSIS — I25.9 ISCHEMIC HEART DISEASE: ICD-10-CM

## 2017-02-10 DIAGNOSIS — M47.812 OSTEOARTHRITIS OF CERVICAL SPINE, UNSPECIFIED SPINAL OSTEOARTHRITIS COMPLICATION STATUS: ICD-10-CM

## 2017-02-10 DIAGNOSIS — N81.11 MIDLINE CYSTOCELE: ICD-10-CM

## 2017-02-10 DIAGNOSIS — E78.2 MIXED HYPERLIPIDEMIA: Primary | ICD-10-CM

## 2017-02-10 DIAGNOSIS — Z78.9 NONSMOKER: ICD-10-CM

## 2017-02-10 DIAGNOSIS — R35.0 FREQUENCY OF MICTURITION: ICD-10-CM

## 2017-02-10 DIAGNOSIS — K21.9 GASTROESOPHAGEAL REFLUX DISEASE, ESOPHAGITIS PRESENCE NOT SPECIFIED: ICD-10-CM

## 2017-02-10 PROCEDURE — 99213 OFFICE O/P EST LOW 20 MIN: CPT | Performed by: OBSTETRICS & GYNECOLOGY

## 2017-02-10 PROCEDURE — 99214 OFFICE O/P EST MOD 30 MIN: CPT | Performed by: INTERNAL MEDICINE

## 2017-02-10 RX ORDER — SODIUM CHLORIDE 9 MG/ML
3 INJECTION, SOLUTION INTRAVENOUS ONCE
Status: CANCELLED | OUTPATIENT
Start: 2017-02-10 | End: 2017-02-10

## 2017-02-10 RX ORDER — LOSARTAN POTASSIUM 50 MG/1
50 TABLET ORAL DAILY
COMMUNITY
Start: 2016-12-02 | End: 2017-09-25 | Stop reason: SDUPTHER

## 2017-02-10 RX ORDER — SODIUM CHLORIDE 0.9 % (FLUSH) 0.9 %
1-10 SYRINGE (ML) INJECTION AS NEEDED
Status: CANCELLED | OUTPATIENT
Start: 2017-02-10

## 2017-02-10 NOTE — PROGRESS NOTES
Cindi Hill  [id number]  1950  66 y.o.  female    Referring Provider: Veronica Denis MD    Reason for Follow-up Visit: Preop CV exam for bladder surgery    Moderate chest pain  Moderate shortness of breath  Compliant with medications    History of present illness:  Cindi Hill is a 66 y.o. yo female with history of preop CV exam with moderate shortness of breath and chest pain who presents today for   Chief Complaint   Patient presents with   • Follow-up     2 week  f/u on dar    .    History  Past Medical History   Diagnosis Date   • Arthritis    • Breast cancer      1997, right breast mastectomy   • Drug therapy    • GERD (gastroesophageal reflux disease)    • Hypercholesteremia    • KARUNA (stress urinary incontinence, female)    • Urge incontinence    • Uterovaginal prolapse    • Vaginal atrophy    ,   Past Surgical History   Procedure Laterality Date   • Rotator cuff repair Right 2012   • Mastectomy Bilateral    • Colonoscopy  2014   • Laminectomy  01/2016   • Breast biopsy     ,   Family History   Problem Relation Age of Onset   • Dementia Mother    • Heart failure Mother    • Cancer Father      liver   • Cancer Brother      thyroid   ,   Social History   Substance Use Topics   • Smoking status: Never Smoker   • Smokeless tobacco: Never Used   • Alcohol use No   ,     Medications  Current Outpatient Prescriptions   Medication Sig Dispense Refill   • aspirin 81 MG tablet Take 81 mg by mouth daily.     • benzonatate (TESSALON) 200 MG capsule Take 200 mg by mouth As Needed for cough.     • fluticasone (FLONASE) 50 MCG/ACT nasal spray 2 sprays into each nostril daily. 1 bottle 6   • losartan (COZAAR) 50 MG tablet      • lovastatin (MEVACOR) 10 MG tablet Take 10 mg by mouth every night.     • omeprazole (PriLOSEC) 20 MG capsule Take 20 mg by mouth daily.     • Vitamin D, Cholecalciferol, 1000 UNITS capsule Take 2,000 Units by mouth Daily.     • calcium carbonate (CALCIUM 600) 600 MG tablet Take 600 mg  "by mouth daily.     • losartan-hydrochlorothiazide (HYZAAR) 50-12.5 MG per tablet Take 1 tablet by mouth daily.       No current facility-administered medications for this visit.        Allergies:  Lisinopril; Lortab [hydrocodone-acetaminophen]; and Percocet [oxycodone-acetaminophen]    Review of Systems  Review of Systems   Constitution: Negative.   HENT: Negative.    Eyes: Negative.    Cardiovascular: Positive for chest pain and dyspnea on exertion. Negative for claudication, cyanosis, irregular heartbeat, leg swelling, near-syncope, orthopnea, palpitations, paroxysmal nocturnal dyspnea and syncope.   Respiratory: Negative.    Endocrine: Negative.    Hematologic/Lymphatic: Negative.    Skin: Negative.    Gastrointestinal: Negative for anorexia.   Genitourinary: Negative.    Neurological: Negative.    Psychiatric/Behavioral: Negative.        Objective     Physical Exam:  Visit Vitals   • /78 (BP Location: Left arm, Patient Position: Sitting, Cuff Size: Adult)   • Pulse 89   • Ht 61.5\" (156.2 cm)   • Wt 159 lb (72.1 kg)   • LMP  (LMP Unknown)   • SpO2 95%   • BMI 29.56 kg/m2     Physical Exam   Constitutional: She appears well-developed.   HENT:   Head: Normocephalic.   Neck: Normal carotid pulses and no JVD present. No tracheal tenderness present. Carotid bruit is not present. No tracheal deviation and no edema present.   Cardiovascular: Regular rhythm, normal heart sounds and normal pulses.    Pulmonary/Chest: Effort normal. No stridor.   Abdominal: Soft.   Neurological: She is alert. She has normal strength. No cranial nerve deficit or sensory deficit.   Skin: Skin is warm.   Psychiatric: She has a normal mood and affect. Her speech is normal and behavior is normal.       Results Review:     Procedures    Assessment/Plan   Patient Active Problem List   Diagnosis   • GERD (gastroesophageal reflux disease)   • Cough   • Deviated nasal septum   • Allergic rhinitis due to pollen   • Sicca laryngitis   • Mixed " hyperlipidemia   • Breast cancer   • Osteoarthritis of cervical spine   • Preop cardiovascular exam   • LBBB (left bundle branch block)   • Dyspnea on exertion       No palpitations. No significant pedal edema. Compliant with medications and diet. Latest labs and medications reviewed.  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  Intensive factor modifications.  See order list.    Counseled regarding disease appropriate diet, fluid, caffeine, stimulants and sodium intake as well as importance of compliance to diet, exercise and regular follow up.  Avoid NSAIDS and COX2 inhibitors. Use Acetaminophen PRN.    Return in about 3 months (around 5/10/2017).

## 2017-02-10 NOTE — PROGRESS NOTES
"Subjective   Chief Complaint   Patient presents with   • Bladder Prolapse     Pt here to discuss her physical therapy she had for her prolapse     Cindi Hill is a 66 y.o. year old .  No LMP recorded (lmp unknown). Patient is postmenopausal.  She presents to be seen for prolapse follow-up.  Yesterday was her last appointment and she has been doing pelvic PT for past two months at [facility].  The patient does not feel like PT was very helpful.  She still reports incomplete bladder emptying    The following portions of the patient's history were reviewed and updated as appropriate:current medications, allergies and past surgical history    Smoking status: Never Smoker                                                              Smokeless status: Never Used                        Review of Systems + incomplete emptying, + frequency, + prolapse pressure.             Neg chest pain, neg SOB, neg abd pain, neg vaginal bleeding, neg discharge      Objective   Visit Vitals   • /82   • Ht 61\" (154.9 cm)   • Wt 155 lb (70.3 kg)   • LMP  (LMP Unknown)   • Breastfeeding No   • BMI 29.29 kg/m2       Physical Exam Pelvic exam not repeated today.    No acute distress    Normal ROM and ambulation    Normal respiratory efforts    Abd soft/non-tender    No edema    Lab Review   No data reviewed    Imaging   No data reviewed     Assessment & Plan    Abnormal urodynamic testing: Patient with significantly abnormal urodynamic testing.  The patient feels a sensation of fullness at only 22 mL's of filling.  The patient has a strong urge to void far below a volume that would be normal.  I feel that this contributes to the patient's urinary frequency and urgency much more than her small to moderate cystocele.  The patient has 30 done pelvic physical therapy, and found to be minimally helpful.  Several months ago, we also discussed at great length bladder retraining.  The patient at that time was instructed to keep a voiding diary " and used timed voids to help to space out the frequency of her bathroom visits.  In order to get around this, the patient simply drank less so that she would not have to wait as often.  I have concerns that reduction of the patient's cystocele might further restrict the compliance of her bladder, and potentially make her bladder dysfunction worse.  For this reason I will refer the patient to a uro-gynecologist for an expert opinion.    Frequency:  The patient reports frequent voiding, often multiple times in an hour.  On urodynamic testing, this appears to be more a function of the patient's poor bladder compliance then to detrusor overactivity.    Cystocele:  The patient does have a very small to moderate cystocele, but her exam does not appear consistent with a cystocele that would cause incomplete emptying.  In addition, as described above, the patient's bladder compliance is abnormally small.  The concern is that an anterior repair to reduce the patient's cystocele defect could actually reduce her bladder compliance even further.  Uterine prolapse: Mild prolapse of the uterus is present on exam    This note was electronically signed.    Veronica Denis MD  February 10, 2017  10:28 AM    Total time spent today with iCndi  was 25 minutes (level 2).  Greater than 50% of the time was spent coordinating care, answering her questions and counseling regarding pathophysiology of her presenting problem along with plans for any diagnositc work-up and treatment.

## 2017-02-17 ENCOUNTER — HOSPITAL ENCOUNTER (OUTPATIENT)
Dept: GENERAL RADIOLOGY | Facility: HOSPITAL | Age: 67
Discharge: HOME | End: 2017-02-17

## 2017-02-17 ENCOUNTER — HOSPITAL ENCOUNTER (OUTPATIENT)
Dept: CARDIOLOGY | Facility: HOSPITAL | Age: 67
Discharge: HOME | End: 2017-02-17
Attending: INTERNAL MEDICINE | Admitting: INTERNAL MEDICINE

## 2017-02-17 ENCOUNTER — HOSPITAL ENCOUNTER (OUTPATIENT)
Dept: CARDIOLOGY | Facility: HOSPITAL | Age: 67
Discharge: HOME | End: 2017-02-17

## 2017-02-17 VITALS
HEIGHT: 61 IN | DIASTOLIC BLOOD PRESSURE: 67 MMHG | WEIGHT: 155 LBS | BODY MASS INDEX: 29.27 KG/M2 | SYSTOLIC BLOOD PRESSURE: 108 MMHG

## 2017-02-17 DIAGNOSIS — I25.9 ISCHEMIC HEART DISEASE: ICD-10-CM

## 2017-02-17 DIAGNOSIS — R06.09 DYSPNEA ON EXERTION: ICD-10-CM

## 2017-02-17 LAB
ALBUMIN SERPL-MCNC: 4.6 G/DL (ref 3.5–5)
ALBUMIN/GLOB SERPL: 1.5 G/DL (ref 1.1–2.5)
ALP SERPL-CCNC: 96 U/L (ref 24–120)
ALT SERPL W P-5'-P-CCNC: 37 U/L (ref 0–54)
ANION GAP SERPL CALCULATED.3IONS-SCNC: 12 MMOL/L (ref 4–13)
AST SERPL-CCNC: 30 U/L (ref 7–45)
BASOPHILS # BLD AUTO: 0.01 10*3/MM3 (ref 0–0.2)
BASOPHILS NFR BLD AUTO: 0.2 % (ref 0–2)
BILIRUB SERPL-MCNC: 0.4 MG/DL (ref 0.1–1)
BUN BLD-MCNC: 16 MG/DL (ref 5–21)
BUN/CREAT SERPL: 23.5 (ref 7–25)
CALCIUM SPEC-SCNC: 10 MG/DL (ref 8.4–10.4)
CHLORIDE SERPL-SCNC: 102 MMOL/L (ref 98–110)
CO2 SERPL-SCNC: 31 MMOL/L (ref 24–31)
CREAT BLD-MCNC: 0.68 MG/DL (ref 0.5–1.4)
DEPRECATED RDW RBC AUTO: 41.9 FL (ref 40–54)
EOSINOPHIL # BLD AUTO: 0.28 10*3/MM3 (ref 0–0.7)
EOSINOPHIL NFR BLD AUTO: 6 % (ref 0–4)
ERYTHROCYTE [DISTWIDTH] IN BLOOD BY AUTOMATED COUNT: 13.2 % (ref 12–15)
GFR SERPL CREATININE-BSD FRML MDRD: 87 ML/MIN/1.73
GLOBULIN UR ELPH-MCNC: 3.1 GM/DL
GLUCOSE BLD-MCNC: 73 MG/DL (ref 70–100)
HCT VFR BLD AUTO: 41.1 % (ref 37–47)
HGB BLD-MCNC: 13.4 G/DL (ref 12–16)
IMM GRANULOCYTES # BLD: 0.01 10*3/MM3 (ref 0–0.03)
IMM GRANULOCYTES NFR BLD: 0.2 % (ref 0–5)
INR PPP: 0.93 (ref 0.91–1.09)
LYMPHOCYTES # BLD AUTO: 1.07 10*3/MM3 (ref 0.72–4.86)
LYMPHOCYTES NFR BLD AUTO: 23.1 % (ref 15–45)
MCH RBC QN AUTO: 28.6 PG (ref 28–32)
MCHC RBC AUTO-ENTMCNC: 32.6 G/DL (ref 33–36)
MCV RBC AUTO: 87.6 FL (ref 82–98)
MONOCYTES # BLD AUTO: 0.36 10*3/MM3 (ref 0.19–1.3)
MONOCYTES NFR BLD AUTO: 7.8 % (ref 4–12)
NEUTROPHILS # BLD AUTO: 2.91 10*3/MM3 (ref 1.87–8.4)
NEUTROPHILS NFR BLD AUTO: 62.7 % (ref 39–78)
PLATELET # BLD AUTO: 209 10*3/MM3 (ref 130–400)
PMV BLD AUTO: 8.9 FL (ref 6–12)
POTASSIUM BLD-SCNC: 4 MMOL/L (ref 3.5–5.3)
PROT SERPL-MCNC: 7.7 G/DL (ref 6.3–8.7)
PROTHROMBIN TIME: 12.7 SECONDS (ref 11.9–14.6)
RBC # BLD AUTO: 4.69 10*6/MM3 (ref 4.2–5.4)
SODIUM BLD-SCNC: 145 MMOL/L (ref 135–145)
WBC NRBC COR # BLD: 4.64 10*3/MM3 (ref 4.8–10.8)

## 2017-02-17 PROCEDURE — 93306 TTE W/DOPPLER COMPLETE: CPT | Performed by: INTERNAL MEDICINE

## 2017-02-17 PROCEDURE — 36415 COLL VENOUS BLD VENIPUNCTURE: CPT

## 2017-02-17 PROCEDURE — 85610 PROTHROMBIN TIME: CPT | Performed by: INTERNAL MEDICINE

## 2017-02-17 PROCEDURE — 85025 COMPLETE CBC W/AUTO DIFF WBC: CPT | Performed by: INTERNAL MEDICINE

## 2017-02-17 PROCEDURE — 80053 COMPREHEN METABOLIC PANEL: CPT | Performed by: INTERNAL MEDICINE

## 2017-02-17 PROCEDURE — 93306 TTE W/DOPPLER COMPLETE: CPT

## 2017-02-17 PROCEDURE — 71020 HC CHEST PA AND LATERAL: CPT

## 2017-02-19 LAB
BH CV ECHO MEAS - AI DEC SLOPE: 77.3 CM/SEC^2
BH CV ECHO MEAS - AI MAX PG: 45.2 MMHG
BH CV ECHO MEAS - AI MAX VEL: 336 CM/SEC
BH CV ECHO MEAS - AI P1/2T: 1274 MSEC
BH CV ECHO MEAS - AO MAX PG (FULL): 7.7 MMHG
BH CV ECHO MEAS - AO MAX PG: 11 MMHG
BH CV ECHO MEAS - AO MEAN PG (FULL): 3 MMHG
BH CV ECHO MEAS - AO MEAN PG: 5 MMHG
BH CV ECHO MEAS - AO ROOT AREA: 10.8 CM^2
BH CV ECHO MEAS - AO ROOT DIAM: 3.7 CM
BH CV ECHO MEAS - AO V2 MAX: 166 CM/SEC
BH CV ECHO MEAS - AO V2 MEAN: 101 CM/SEC
BH CV ECHO MEAS - AO V2 VTI: 32.8 CM
BH CV ECHO MEAS - AVA(I,A): 1.7 CM^2
BH CV ECHO MEAS - AVA(I,D): 1.7 CM^2
BH CV ECHO MEAS - AVA(V,A): 1.7 CM^2
BH CV ECHO MEAS - AVA(V,D): 1.7 CM^2
BH CV ECHO MEAS - CONTRAST EF 4CH: 56.5 ML/M^2
BH CV ECHO MEAS - EDV(CUBED): 65 ML
BH CV ECHO MEAS - EDV(MOD-SP4): 72 ML
BH CV ECHO MEAS - EDV(TEICH): 70.8 ML
BH CV ECHO MEAS - EF(CUBED): 70.4 %
BH CV ECHO MEAS - EF(MOD-SP4): 56.5 %
BH CV ECHO MEAS - EF(TEICH): 62.6 %
BH CV ECHO MEAS - ESV(CUBED): 19.2 ML
BH CV ECHO MEAS - ESV(MOD-SP4): 31.3 ML
BH CV ECHO MEAS - ESV(TEICH): 26.5 ML
BH CV ECHO MEAS - FS: 33.3 %
BH CV ECHO MEAS - IVS/LVPW: 1.1
BH CV ECHO MEAS - IVSD: 0.97 CM
BH CV ECHO MEAS - LA DIMENSION: 3 CM
BH CV ECHO MEAS - LA/AO: 0.81
BH CV ECHO MEAS - LAT PEAK E' VEL: 12 CM/SEC
BH CV ECHO MEAS - LV MASS(C)D: 116.8 GRAMS
BH CV ECHO MEAS - LV MAX PG: 3.4 MMHG
BH CV ECHO MEAS - LV MEAN PG: 2 MMHG
BH CV ECHO MEAS - LV V1 MAX: 91.6 CM/SEC
BH CV ECHO MEAS - LV V1 MEAN: 56.6 CM/SEC
BH CV ECHO MEAS - LV V1 VTI: 17.9 CM
BH CV ECHO MEAS - LVIDD: 4 CM
BH CV ECHO MEAS - LVIDS: 2.7 CM
BH CV ECHO MEAS - LVLD AP4: 7.8 CM
BH CV ECHO MEAS - LVLS AP4: 6.9 CM
BH CV ECHO MEAS - LVOT AREA (M): 3.1 CM^2
BH CV ECHO MEAS - LVOT AREA: 3.1 CM^2
BH CV ECHO MEAS - LVOT DIAM: 2 CM
BH CV ECHO MEAS - LVPWD: 0.9 CM
BH CV ECHO MEAS - MV A MAX VEL: 117 CM/SEC
BH CV ECHO MEAS - MV DEC TIME: 0.11 SEC
BH CV ECHO MEAS - MV E MAX VEL: 52.4 CM/SEC
BH CV ECHO MEAS - MV E/A: 0.45
BH CV ECHO MEAS - RAP SYSTOLE: 5 MMHG
BH CV ECHO MEAS - RVSP: 23.3 MMHG
BH CV ECHO MEAS - SV(AO): 352.7 ML
BH CV ECHO MEAS - SV(CUBED): 45.7 ML
BH CV ECHO MEAS - SV(LVOT): 56.2 ML
BH CV ECHO MEAS - SV(MOD-SP4): 40.7 ML
BH CV ECHO MEAS - SV(TEICH): 44.3 ML
BH CV ECHO MEAS - TR MAX VEL: 214 CM/SEC
E/E' RATIO: 12
LEFT ATRIUM VOLUME: 36 CM3
LV EF 2D ECHO EST: 55 %

## 2017-02-20 ENCOUNTER — OFFICE VISIT (OUTPATIENT)
Dept: OTOLARYNGOLOGY | Facility: CLINIC | Age: 67
End: 2017-02-20

## 2017-02-20 VITALS
HEIGHT: 61 IN | TEMPERATURE: 97.7 F | BODY MASS INDEX: 28.7 KG/M2 | DIASTOLIC BLOOD PRESSURE: 76 MMHG | SYSTOLIC BLOOD PRESSURE: 110 MMHG | HEART RATE: 80 BPM | WEIGHT: 152 LBS

## 2017-02-20 DIAGNOSIS — K21.9 GASTROESOPHAGEAL REFLUX DISEASE, ESOPHAGITIS PRESENCE NOT SPECIFIED: ICD-10-CM

## 2017-02-20 DIAGNOSIS — R05.9 COUGH: Primary | ICD-10-CM

## 2017-02-20 DIAGNOSIS — R06.09 DYSPNEA ON EXERTION: ICD-10-CM

## 2017-02-20 DIAGNOSIS — J37.0 SICCA LARYNGITIS: ICD-10-CM

## 2017-02-20 DIAGNOSIS — J30.1 ALLERGIC RHINITIS DUE TO POLLEN, UNSPECIFIED RHINITIS SEASONALITY: ICD-10-CM

## 2017-02-20 DIAGNOSIS — J34.2 DEVIATED NASAL SEPTUM: ICD-10-CM

## 2017-02-20 DIAGNOSIS — I44.7 LBBB (LEFT BUNDLE BRANCH BLOCK): ICD-10-CM

## 2017-02-20 PROCEDURE — 99213 OFFICE O/P EST LOW 20 MIN: CPT | Performed by: PHYSICIAN ASSISTANT

## 2017-02-20 RX ORDER — GUAIFENESIN 600 MG/1
1200 TABLET, EXTENDED RELEASE ORAL AS NEEDED
COMMUNITY
End: 2020-11-30

## 2017-02-20 NOTE — PATIENT INSTRUCTIONS
Think patient needs a bronchoscopy due to the possibility of Nonasthmatic eosinophilic bronchitis and a noted elevated eosinophil count on CBC. Advised to follow-up with Dr. Anderson after cardiac ablation.

## 2017-02-20 NOTE — ASSESSMENT & PLAN NOTE
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 Anderson for possible bronchoscopy.

## 2017-02-20 NOTE — PROGRESS NOTES
Patient Care Team:  Jose Wolff MD as PCP - General (Family Medicine)  Jose Wolff MD as PCP - Family Medicine  BAR Wong APRN John Randall Resser, MD as Consulting Physician (Otolaryngology)  Jose Wolff MD as Referring Physician (Family Medicine)  Anuj Johnson MD as Cardiologist (Cardiology)    Chief Complaint   Patient presents with   • Follow-up     cough, throat        Subjective     Cindi Hill is a 66 y.o. female who presents for evaluation.  Cough   This is a chronic problem. The current episode started more than 1 year ago. The problem has been unchanged. The problem occurs constantly. The cough is non-productive. Associated symptoms include shortness of breath. Pertinent negatives include no chest pain, chills, ear congestion, ear pain, fever, headaches, heartburn, hemoptysis, myalgias, nasal congestion, postnasal drip, rash, rhinorrhea, sore throat, sweats, weight loss or wheezing. The symptoms are aggravated by pollens and fumes. She has tried a beta-agonist inhaler, ipratropium inhaler, leukotriene antagonists and steroid inhaler for the symptoms. The treatment provided no relief. Her past medical history is significant for environmental allergies. There is no history of asthma, bronchiectasis, bronchitis, COPD, emphysema or pneumonia.     The patient has been scoped in the past and has been taking Flonase, Mucinex, and Prilosec daily, nothing has resolved the cough.     Review of Systems  Review of Systems   Constitutional: Negative for activity change, appetite change, chills, diaphoresis, fatigue, fever, unexpected weight change and weight loss.   HENT: Positive for congestion. Negative for dental problem, drooling, ear discharge, ear pain, facial swelling, hearing loss, mouth sores, nosebleeds, postnasal drip, rhinorrhea, sinus pressure, sneezing, sore throat, tinnitus, trouble swallowing and voice change.    Eyes: Negative.     Respiratory: Positive for cough and shortness of breath. Negative for hemoptysis and wheezing.    Cardiovascular: Negative.  Negative for chest pain.   Gastrointestinal: Negative.  Negative for heartburn.   Endocrine: Negative.    Musculoskeletal: Negative for myalgias.   Skin: Negative.  Negative for rash.   Allergic/Immunologic: Positive for environmental allergies. Negative for food allergies and immunocompromised state.   Neurological: Negative.  Negative for headaches.   Hematological: Negative.    Psychiatric/Behavioral: Negative.        History  Past Medical History   Diagnosis Date   • Arthritis    • Breast cancer      1997, right breast mastectomy   • Drug therapy    • GERD (gastroesophageal reflux disease)    • Hypercholesteremia    • KARUNA (stress urinary incontinence, female)    • Urge incontinence    • Uterovaginal prolapse    • Vaginal atrophy      Past Surgical History   Procedure Laterality Date   • Rotator cuff repair Right 2012   • Mastectomy Bilateral    • Colonoscopy  2014   • Laminectomy  01/2016   • Breast biopsy       Family History   Problem Relation Age of Onset   • Dementia Mother    • Heart failure Mother    • Cancer Father      liver   • Cancer Brother      thyroid     Social History   Substance Use Topics   • Smoking status: Never Smoker   • Smokeless tobacco: Never Used   • Alcohol use No       Current Outpatient Prescriptions:   •  aspirin 81 MG tablet, Take 81 mg by mouth daily., Disp: , Rfl:   •  benzonatate (TESSALON) 200 MG capsule, Take 200 mg by mouth As Needed for cough., Disp: , Rfl:   •  calcium carbonate (CALCIUM 600) 600 MG tablet, Take 600 mg by mouth daily., Disp: , Rfl:   •  fluticasone (FLONASE) 50 MCG/ACT nasal spray, 2 sprays into each nostril daily., Disp: 1 bottle, Rfl: 6  •  guaiFENesin (MUCINEX) 600 MG 12 hr tablet, Take 1,200 mg by mouth 2 (Two) Times a Day., Disp: , Rfl:   •  losartan (COZAAR) 50 MG tablet, , Disp: , Rfl:   •  lovastatin (MEVACOR) 10 MG tablet,  Take 10 mg by mouth every night., Disp: , Rfl:   •  omeprazole (PriLOSEC) 20 MG capsule, Take 20 mg by mouth daily., Disp: , Rfl:   •  Vitamin D, Cholecalciferol, 1000 UNITS capsule, Take 2,000 Units by mouth Daily., Disp: , Rfl:   Allergies:  Lisinopril; Lortab [hydrocodone-acetaminophen]; and Percocet [oxycodone-acetaminophen]    Objective     Vital Signs  Temp:  [97.7 °F (36.5 °C)] 97.7 °F (36.5 °C)  Heart Rate:  [80] 80  BP: (110)/(76) 110/76    Physical Exam:  Physical Exam   Constitutional: She is oriented to person, place, and time. She appears well-developed and well-nourished. No distress.   HENT:   Head: Normocephalic and atraumatic.   Right Ear: Hearing, tympanic membrane, external ear and ear canal normal.   Left Ear: Hearing, tympanic membrane, external ear and ear canal normal.   Nose: Nose normal. No mucosal edema, rhinorrhea, nasal deformity or septal deviation.   Mouth/Throat: Uvula is midline, oropharynx is clear and moist and mucous membranes are normal. No oral lesions. No trismus in the jaw. No dental abscesses or uvula swelling. No oropharyngeal exudate, posterior oropharyngeal edema, posterior oropharyngeal erythema or tonsillar abscesses.   Eyes: Conjunctivae and EOM are normal. Pupils are equal, round, and reactive to light. No scleral icterus.   Pulmonary/Chest: Effort normal.   Neurological: She is alert and oriented to person, place, and time. No cranial nerve deficit.   Skin: Skin is warm and dry. No rash noted. She is not diaphoretic. No erythema. No pallor.   Psychiatric: She has a normal mood and affect. Her behavior is normal. Judgment and thought content normal.   Vitals reviewed.      Results Review:   I reviewed the patient's new clinical results.    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 Anderson for possible bronchoscopy.         Relevant Medications    guaiFENesin (MUCINEX) 600 MG 12 hr tablet    Deviated nasal septum    Allergic rhinitis due to pollen    Relevant Medications    guaiFENesin (MUCINEX) 600 MG 12 hr tablet    Sicca laryngitis    Relevant Medications    guaiFENesin (MUCINEX) 600 MG 12 hr tablet    Dyspnea on exertion       Digestive    GERD (gastroesophageal reflux disease)          My findings and recommendations were discussed and questions were answered. Advised patient to follow-up with Dr. Anderson after Cardiac ablation with Dr. Johnson. Continue medications as directed.  Will consider lab work to evaluate for other autoimmune causes of cough if no diagnosis or improvement made at follow-up.    Return in about 6 months (around 8/20/2017) for Recheck cough.    CRISTIANE Aguilar  02/20/17  2:22 PM

## 2017-02-28 ENCOUNTER — HOSPITAL ENCOUNTER (OUTPATIENT)
Facility: HOSPITAL | Age: 67
Setting detail: HOSPITAL OUTPATIENT SURGERY
Discharge: HOME | End: 2017-02-28
Attending: INTERNAL MEDICINE | Admitting: INTERNAL MEDICINE

## 2017-02-28 VITALS
TEMPERATURE: 97.8 F | DIASTOLIC BLOOD PRESSURE: 78 MMHG | RESPIRATION RATE: 18 BRPM | BODY MASS INDEX: 29.83 KG/M2 | WEIGHT: 158 LBS | SYSTOLIC BLOOD PRESSURE: 135 MMHG | OXYGEN SATURATION: 100 % | HEART RATE: 74 BPM | HEIGHT: 61 IN

## 2017-02-28 DIAGNOSIS — R06.09 DYSPNEA ON EXERTION: ICD-10-CM

## 2017-02-28 PROCEDURE — C1894 INTRO/SHEATH, NON-LASER: HCPCS | Performed by: INTERNAL MEDICINE

## 2017-02-28 PROCEDURE — C1769 GUIDE WIRE: HCPCS | Performed by: INTERNAL MEDICINE

## 2017-02-28 PROCEDURE — 93458 L HRT ARTERY/VENTRICLE ANGIO: CPT | Performed by: INTERNAL MEDICINE

## 2017-02-28 PROCEDURE — 0 IOPAMIDOL PER 1 ML: Performed by: INTERNAL MEDICINE

## 2017-02-28 PROCEDURE — 25010000002 DIPHENHYDRAMINE PER 50 MG: Performed by: INTERNAL MEDICINE

## 2017-02-28 PROCEDURE — 25010000002 MIDAZOLAM PER 1 MG: Performed by: INTERNAL MEDICINE

## 2017-02-28 PROCEDURE — C1760 CLOSURE DEV, VASC: HCPCS | Performed by: INTERNAL MEDICINE

## 2017-02-28 PROCEDURE — S0260 H&P FOR SURGERY: HCPCS | Performed by: INTERNAL MEDICINE

## 2017-02-28 PROCEDURE — 25010000002 FENTANYL CITRATE (PF) 100 MCG/2ML SOLUTION: Performed by: INTERNAL MEDICINE

## 2017-02-28 RX ORDER — SODIUM CHLORIDE 9 MG/ML
100 INJECTION, SOLUTION INTRAVENOUS CONTINUOUS
Status: DISCONTINUED | OUTPATIENT
Start: 2017-02-28 | End: 2017-02-28 | Stop reason: HOSPADM

## 2017-02-28 RX ORDER — LIDOCAINE HYDROCHLORIDE 20 MG/ML
INJECTION, SOLUTION INFILTRATION; PERINEURAL AS NEEDED
Status: DISCONTINUED | OUTPATIENT
Start: 2017-02-28 | End: 2017-02-28 | Stop reason: HOSPADM

## 2017-02-28 RX ORDER — FENTANYL CITRATE 50 UG/ML
INJECTION, SOLUTION INTRAMUSCULAR; INTRAVENOUS AS NEEDED
Status: DISCONTINUED | OUTPATIENT
Start: 2017-02-28 | End: 2017-02-28 | Stop reason: HOSPADM

## 2017-02-28 RX ORDER — CETIRIZINE HYDROCHLORIDE 10 MG/1
10 TABLET ORAL AS NEEDED
COMMUNITY

## 2017-02-28 RX ORDER — SODIUM CHLORIDE 0.9 % (FLUSH) 0.9 %
1-10 SYRINGE (ML) INJECTION AS NEEDED
Status: DISCONTINUED | OUTPATIENT
Start: 2017-02-28 | End: 2017-02-28 | Stop reason: HOSPADM

## 2017-02-28 RX ORDER — DIPHENHYDRAMINE HYDROCHLORIDE 50 MG/ML
INJECTION INTRAMUSCULAR; INTRAVENOUS AS NEEDED
Status: DISCONTINUED | OUTPATIENT
Start: 2017-02-28 | End: 2017-02-28 | Stop reason: HOSPADM

## 2017-02-28 RX ORDER — MIDAZOLAM HYDROCHLORIDE 1 MG/ML
INJECTION INTRAMUSCULAR; INTRAVENOUS AS NEEDED
Status: DISCONTINUED | OUTPATIENT
Start: 2017-02-28 | End: 2017-02-28 | Stop reason: HOSPADM

## 2017-03-01 NOTE — H&P
Moderate chest pain  Moderate shortness of breath  Compliant with medications      History of present illness: Cindi Hill is a 66 y.o. yo female with history of preop CV exam with moderate shortness of breath and chest pain who presents today for           Chief Complaint   Patient presents with   • Follow-up         2 week f/u on dar    .      History         Medical History               Past Medical History   Diagnosis Date   • Arthritis      • Breast cancer            1997, right breast mastectomy   • Drug therapy      • GERD (gastroesophageal reflux disease)      • Hypercholesteremia      • KARUNA (stress urinary incontinence, female)      • Urge incontinence      • Uterovaginal prolapse      • Vaginal atrophy          ,          Surgical History               Past Surgical History   Procedure Laterality Date   • Rotator cuff repair Right 2012   • Mastectomy Bilateral      • Colonoscopy    2014   • Laminectomy    01/2016   • Breast biopsy             ,               Family History   Problem Relation Age of Onset   • Dementia Mother      • Heart failure Mother      • Cancer Father            liver   • Cancer Brother            thyroid   ,           Social History   Substance Use Topics   • Smoking status: Never Smoker   • Smokeless tobacco: Never Used   • Alcohol use No   ,       Medications         Current Medications                 Current Outpatient Prescriptions   Medication Sig Dispense Refill   • aspirin 81 MG tablet Take 81 mg by mouth daily.         • benzonatate (TESSALON) 200 MG capsule Take 200 mg by mouth As Needed for cough.         • fluticasone (FLONASE) 50 MCG/ACT nasal spray 2 sprays into each nostril daily. 1 bottle 6   • losartan (COZAAR) 50 MG tablet            • lovastatin (MEVACOR) 10 MG tablet Take 10 mg by mouth every night.         • omeprazole (PriLOSEC) 20 MG capsule Take 20 mg by mouth daily.         • Vitamin D, Cholecalciferol, 1000 UNITS capsule Take 2,000 Units by mouth Daily.  "        • calcium carbonate (CALCIUM 600) 600 MG tablet Take 600 mg by mouth daily.         • losartan-hydrochlorothiazide (HYZAAR) 50-12.5 MG per tablet Take 1 tablet by mouth daily.             No current facility-administered medications for this visit.               Allergies: Lisinopril; Lortab [hydrocodone-acetaminophen]; and Percocet [oxycodone-acetaminophen]      Review of Systems  Review of Systems   Constitution: Negative.   HENT: Negative.   Eyes: Negative.   Cardiovascular: Positive for chest pain and dyspnea on exertion. Negative for claudication, cyanosis, irregular heartbeat, leg swelling, near-syncope, orthopnea, palpitations, paroxysmal nocturnal dyspnea and syncope.   Respiratory: Negative.   Endocrine: Negative.   Hematologic/Lymphatic: Negative.   Skin: Negative.   Gastrointestinal: Negative for anorexia.   Genitourinary: Negative.   Neurological: Negative.   Psychiatric/Behavioral: Negative.           Objective          Physical Exam:          Visit Vitals   • /78 (BP Location: Left arm, Patient Position: Sitting, Cuff Size: Adult)   • Pulse 89   • Ht 61.5\" (156.2 cm)   • Wt 159 lb (72.1 kg)   • LMP (LMP Unknown)   • SpO2 95%   • BMI 29.56 kg/m2       Physical Exam   Constitutional: She appears well-developed.   HENT:   Head: Normocephalic.   Neck: Normal carotid pulses and no JVD present. No tracheal tenderness present. Carotid bruit is not present. No tracheal deviation and no edema present.   Cardiovascular: Regular rhythm, normal heart sounds and normal pulses.   Pulmonary/Chest: Effort normal. No stridor.   Abdominal: Soft.   Neurological: She is alert. She has normal strength. No cranial nerve deficit or sensory deficit.   Skin: Skin is warm.   Psychiatric: She has a normal mood and affect. Her speech is normal and behavior is normal.           Results Review:      Procedures      Assessment/Plan          Patient Active Problem List   Diagnosis   • GERD (gastroesophageal reflux " disease)   • Cough   • Deviated nasal septum   • Allergic rhinitis due to pollen   • Sicca laryngitis   • Mixed hyperlipidemia   • Breast cancer   • Osteoarthritis of cervical spine   • Preop cardiovascular exam   • LBBB (left bundle branch block)   • Dyspnea on exertion           No palpitations. No significant pedal edema. Compliant with medications and diet. Latest labs and medications reviewed.  Nuclear stress test shows moderate anterior infarction with small ischemia      Plan:  Suggest cardiac cath as moderate anterior infarction with small ischemia  Will get echo  Consider drug eluting stent as can wait for bladder surgery  Risks, benefits and alternatives explained. The patient understands and wishes to proceed.

## 2017-04-24 ENCOUNTER — ANESTHESIA EVENT (OUTPATIENT)
Dept: GASTROENTEROLOGY | Facility: HOSPITAL | Age: 67
End: 2017-04-24

## 2017-04-25 RX ORDER — LIDOCAINE HYDROCHLORIDE 20 MG/ML
1 INJECTION, SOLUTION EPIDURAL; INFILTRATION; INTRACAUDAL; PERINEURAL ONCE
Status: DISCONTINUED | OUTPATIENT
Start: 2017-04-25 | End: 2017-04-25

## 2017-04-26 ENCOUNTER — ANESTHESIA (OUTPATIENT)
Dept: GASTROENTEROLOGY | Facility: HOSPITAL | Age: 67
End: 2017-04-26

## 2017-04-26 ENCOUNTER — HOSPITAL ENCOUNTER (OUTPATIENT)
Facility: HOSPITAL | Age: 67
Setting detail: HOSPITAL OUTPATIENT SURGERY
Discharge: HOME | End: 2017-04-26
Attending: INTERNAL MEDICINE | Admitting: INTERNAL MEDICINE

## 2017-04-26 VITALS
HEIGHT: 62 IN | SYSTOLIC BLOOD PRESSURE: 123 MMHG | WEIGHT: 158 LBS | HEART RATE: 87 BPM | DIASTOLIC BLOOD PRESSURE: 71 MMHG | BODY MASS INDEX: 29.08 KG/M2 | TEMPERATURE: 97.1 F | RESPIRATION RATE: 17 BRPM | OXYGEN SATURATION: 95 %

## 2017-04-26 DIAGNOSIS — R05.9 COUGH: ICD-10-CM

## 2017-04-26 LAB — KOH PREP NAIL: NORMAL

## 2017-04-26 PROCEDURE — 87205 SMEAR GRAM STAIN: CPT | Performed by: INTERNAL MEDICINE

## 2017-04-26 PROCEDURE — 87070 CULTURE OTHR SPECIMN AEROBIC: CPT | Performed by: INTERNAL MEDICINE

## 2017-04-26 PROCEDURE — 87206 SMEAR FLUORESCENT/ACID STAI: CPT | Performed by: INTERNAL MEDICINE

## 2017-04-26 PROCEDURE — 88104 CYTOPATH FL NONGYN SMEARS: CPT | Performed by: INTERNAL MEDICINE

## 2017-04-26 PROCEDURE — 87116 MYCOBACTERIA CULTURE: CPT | Performed by: INTERNAL MEDICINE

## 2017-04-26 PROCEDURE — 88108 CYTOPATH CONCENTRATE TECH: CPT | Performed by: INTERNAL MEDICINE

## 2017-04-26 PROCEDURE — 87102 FUNGUS ISOLATION CULTURE: CPT | Performed by: INTERNAL MEDICINE

## 2017-04-26 PROCEDURE — 25010000002 PROPOFOL 10 MG/ML EMULSION: Performed by: NURSE ANESTHETIST, CERTIFIED REGISTERED

## 2017-04-26 PROCEDURE — 87220 TISSUE EXAM FOR FUNGI: CPT | Performed by: INTERNAL MEDICINE

## 2017-04-26 RX ORDER — SODIUM CHLORIDE 9 MG/ML
100 INJECTION, SOLUTION INTRAVENOUS CONTINUOUS
Status: DISCONTINUED | OUTPATIENT
Start: 2017-04-26 | End: 2017-04-26 | Stop reason: HOSPADM

## 2017-04-26 RX ORDER — PROPOFOL 10 MG/ML
VIAL (ML) INTRAVENOUS AS NEEDED
Status: DISCONTINUED | OUTPATIENT
Start: 2017-04-26 | End: 2017-04-26 | Stop reason: SURG

## 2017-04-26 RX ORDER — SODIUM CHLORIDE 0.9 % (FLUSH) 0.9 %
1-10 SYRINGE (ML) INJECTION AS NEEDED
Status: DISCONTINUED | OUTPATIENT
Start: 2017-04-26 | End: 2017-04-26 | Stop reason: HOSPADM

## 2017-04-26 RX ORDER — LIDOCAINE HYDROCHLORIDE 20 MG/ML
INJECTION, SOLUTION INFILTRATION; PERINEURAL AS NEEDED
Status: DISCONTINUED | OUTPATIENT
Start: 2017-04-26 | End: 2017-04-26 | Stop reason: SURG

## 2017-04-26 RX ORDER — LIDOCAINE HYDROCHLORIDE 20 MG/ML
5 INJECTION, SOLUTION EPIDURAL; INFILTRATION; INTRACAUDAL; PERINEURAL
Status: COMPLETED | OUTPATIENT
Start: 2017-04-26 | End: 2017-04-26

## 2017-04-26 RX ADMIN — SODIUM CHLORIDE 100 ML/HR: 9 INJECTION, SOLUTION INTRAVENOUS at 12:37

## 2017-04-26 RX ADMIN — PROPOFOL 50 MG: 10 INJECTION, EMULSION INTRAVENOUS at 13:08

## 2017-04-26 RX ADMIN — LIDOCAINE HYDROCHLORIDE 5 ML: 20 INJECTION, SOLUTION EPIDURAL; INFILTRATION; INTRACAUDAL; PERINEURAL at 12:43

## 2017-04-26 RX ADMIN — LIDOCAINE HYDROCHLORIDE 40 MG: 20 INJECTION, SOLUTION INFILTRATION; PERINEURAL at 13:08

## 2017-04-26 RX ADMIN — PROPOFOL 50 MG: 10 INJECTION, EMULSION INTRAVENOUS at 13:10

## 2017-04-26 NOTE — OP NOTE
Bronchoscopy Procedure Note    Date of Operation: 04/26/17    Pre-op Diagnosis: Cough    Post-op Diagnosis: Excessive dynamic airway collapse    Surgeon: Adi Anderson MD    Anesthesia:Monitor Anesthesia Care. Aerosolized 4% lidocaine, lidocaine gel to nares.  Topical 2% lidocaine to vocal cords and central airways via bronchoscope.    Operation: Flexible fiberoptic bronchoscopy, diagnostic without c-arm.  Bronchoscopy, Diagnostic was performed    Findings: vocal cords normal.  Excessive collapsibility of membranous trachea and lobar airways    Specimen: bronchial wash    Estimated Blood Loss: Minimal, from mild epistaxis in left naris, not from lower airways.    Complications: none    Indications and History:  The patient is a 66 y.o.  female with Cough.  The risks, benefits, complications, treatment options and expected outcomes were discussed with the patient.  The possibilities of reaction to medication, pulmonary aspiration, perforation of a viscus, bleeding, failure to diagnose a condition and creating a complication requiring transfusion or operation were discussed with the patient who freely signed the consent.  Time out was taken prior to the procedure.    Description of Procedure:    After the induction of topical nasopharyngeal anesthesia, the patient was positioned supine and the bronchoscope was passed through the left naris.  The nasal airway was narrow. The vocal cords were visualized and 2% plain lidocaine was topically placed onto the cords. The cords were normal. The scope was then passed into the trachea. 1% plain lidocaine 5 ml was used topically on the zenia.      The trachea, mainstem bronchi, RUL, RML, Bronchus Intermedius, RLL, RAMESH, RAMESH upper division and lingula, and LLL, and all primary segmental airways were examined and were normal except as described below:    Endobronchial findings: airway anatomy normal, but with excessive dynamic airway collapse visualized in trachea, mainstem  bronchi, lobar bronchi.  No other endobronchial lesions.  Washes collected.    The Patient was taken to the Endoscopy Recovery area in satisfactory condition.      Adi Anderson MD at 1:19 PM, 4/26/2017

## 2017-04-26 NOTE — PLAN OF CARE
Problem: Bronchoscopy (Adult)  Goal: Signs and Symptoms of Listed Potential Problems Will be Absent or Manageable (Bronchoscopy)  Outcome: Outcome(s) achieved Date Met:  04/26/17 04/26/17 1335   Bronchoscopy   Problems Assessed (Bronchoscopy) all   Problems Present (Bronchoscopy) none

## 2017-04-26 NOTE — ANESTHESIA POSTPROCEDURE EVALUATION
Patient: Cindi Hill    Procedure Summary     Date Anesthesia Start Anesthesia Stop Room / Location    04/26/17 1303 1324 [facility] ENDOSCOPY 4 / [facility]       Procedure Diagnosis Surgeon Provider    BRONCHOSCOPY BIOPSY WITH ANESTHESIA (N/A Bronchus) (R05 COUGH) MD Verónica Rodriguez, SHASHA          Anesthesia Type: general  Last vitals  BP      Temp      Pulse     Resp      SpO2        Post Anesthesia Care and Evaluation    Patient location during evaluation: PHASE II  Patient participation: complete - patient participated  Level of consciousness: awake and alert  Pain management: adequate  Airway patency: patent  Anesthetic complications: No anesthetic complications  PONV Status: none  Cardiovascular status: acceptable  Respiratory status: acceptable  Hydration status: acceptable

## 2017-04-26 NOTE — ANESTHESIA PREPROCEDURE EVALUATION
Anesthesia Evaluation     Patient summary reviewed and Nursing notes reviewed   history of anesthetic complications: PONV  NPO Status: > 8 hours   Airway   Mallampati: I  TM distance: >3 FB  Neck ROM: full  no difficulty expected  Dental      Pulmonary - negative pulmonary ROS    breath sounds clear to auscultation    ROS comment: Chronic cough, bronch to eval. Negative egd  Cardiovascular   Exercise tolerance: good (4-7 METS)    Rhythm: regular  Rate: normal    (+) hypertension,     ROS comment: Pt with h/o LBBB, +nuclear stress test, negative heart cath two month ago    Neuro/Psych- negative ROS  (-) seizures, neuromuscular disease, TIA, CVA  GI/Hepatic/Renal/Endo    (+)  GERD,   (-) liver disease, renal disease, diabetes    Musculoskeletal     Abdominal    Substance History - negative use     OB/GYN negative ob/gyn ROS         Other   (+) arthritis                                 Anesthesia Plan    ASA 2     general     intravenous induction   Anesthetic plan and risks discussed with patient.

## 2017-04-26 NOTE — PLAN OF CARE
Problem: Patient Care Overview (Adult)  Goal: Plan of Care Review  Outcome: Outcome(s) achieved Date Met:  04/26/17 04/26/17 7070   Coping/Psychosocial Response Interventions   Plan Of Care Reviewed With patient;spouse   Patient Care Overview   Progress improving   Outcome Evaluation   Outcome Summary/Follow up Plan discharge criteria met

## 2017-04-27 LAB
CYTO UR: NORMAL
LAB AP CASE REPORT: NORMAL
LAB AP CLINICAL INFORMATION: NORMAL
Lab: NORMAL
PATH REPORT.FINAL DX SPEC: NORMAL
PATH REPORT.GROSS SPEC: NORMAL

## 2017-04-28 LAB
BACTERIA SPEC RESP CULT: NORMAL
GRAM STN SPEC: NORMAL

## 2017-05-12 ENCOUNTER — OFFICE VISIT (OUTPATIENT)
Dept: CARDIOLOGY | Facility: CLINIC | Age: 67
End: 2017-05-12

## 2017-05-12 VITALS
WEIGHT: 160 LBS | BODY MASS INDEX: 29.44 KG/M2 | HEART RATE: 78 BPM | HEIGHT: 62 IN | DIASTOLIC BLOOD PRESSURE: 72 MMHG | SYSTOLIC BLOOD PRESSURE: 110 MMHG | OXYGEN SATURATION: 97 %

## 2017-05-12 DIAGNOSIS — Z01.810 PREOP CARDIOVASCULAR EXAM: ICD-10-CM

## 2017-05-12 DIAGNOSIS — R06.09 DYSPNEA ON EXERTION: ICD-10-CM

## 2017-05-12 DIAGNOSIS — I44.7 LBBB (LEFT BUNDLE BRANCH BLOCK): ICD-10-CM

## 2017-05-12 DIAGNOSIS — E78.2 MIXED HYPERLIPIDEMIA: Primary | ICD-10-CM

## 2017-05-12 PROCEDURE — 99214 OFFICE O/P EST MOD 30 MIN: CPT | Performed by: INTERNAL MEDICINE

## 2017-05-12 RX ORDER — CONJUGATED ESTROGENS 0.62 MG/G
CREAM VAGINAL
COMMUNITY
Start: 2017-03-30

## 2017-06-07 LAB
FUNGUS WND CULT: ABNORMAL
FUNGUS WND CULT: ABNORMAL

## 2017-06-27 LAB
MYCOBACTERIUM SPEC CULT: ABNORMAL
NIGHT BLUE STAIN TISS: ABNORMAL
NIGHT BLUE STAIN TISS: ABNORMAL

## 2017-08-22 RX ORDER — LOVASTATIN 10 MG/1
10 TABLET ORAL DAILY
Qty: 90 TABLET | Refills: 0 | Status: SHIPPED | OUTPATIENT
Start: 2017-08-22 | End: 2017-08-22 | Stop reason: SDUPTHER

## 2017-08-22 RX ORDER — LOVASTATIN 10 MG/1
10 TABLET ORAL NIGHTLY
Qty: 90 TABLET | Refills: 1 | Status: SHIPPED | OUTPATIENT
Start: 2017-08-22 | End: 2018-02-15 | Stop reason: SDUPTHER

## 2017-09-25 ENCOUNTER — OFFICE VISIT (OUTPATIENT)
Dept: FAMILY MEDICINE CLINIC | Facility: CLINIC | Age: 67
End: 2017-09-25

## 2017-09-25 VITALS
DIASTOLIC BLOOD PRESSURE: 64 MMHG | HEART RATE: 78 BPM | WEIGHT: 163.2 LBS | TEMPERATURE: 98.3 F | SYSTOLIC BLOOD PRESSURE: 122 MMHG | BODY MASS INDEX: 30.03 KG/M2 | OXYGEN SATURATION: 97 % | HEIGHT: 62 IN

## 2017-09-25 DIAGNOSIS — M54.42 ACUTE LEFT-SIDED LOW BACK PAIN WITH LEFT-SIDED SCIATICA: Primary | ICD-10-CM

## 2017-09-25 DIAGNOSIS — H61.20 WAX IN EAR: ICD-10-CM

## 2017-09-25 PROCEDURE — 96372 THER/PROPH/DIAG INJ SC/IM: CPT | Performed by: FAMILY MEDICINE

## 2017-09-25 PROCEDURE — 99213 OFFICE O/P EST LOW 20 MIN: CPT | Performed by: FAMILY MEDICINE

## 2017-09-25 PROCEDURE — 69209 REMOVE IMPACTED EAR WAX UNI: CPT | Performed by: FAMILY MEDICINE

## 2017-09-25 PROCEDURE — 69210 REMOVE IMPACTED EAR WAX UNI: CPT | Performed by: FAMILY MEDICINE

## 2017-09-25 RX ORDER — CHOLECALCIFEROL (VITAMIN D3) 125 MCG
2000 CAPSULE ORAL DAILY
COMMUNITY
End: 2017-11-06 | Stop reason: DRUGHIGH

## 2017-09-25 RX ORDER — PREDNISONE 20 MG/1
TABLET ORAL
Qty: 15 TABLET | Refills: 0 | Status: SHIPPED | OUTPATIENT
Start: 2017-09-25 | End: 2017-10-24

## 2017-09-25 RX ORDER — DEXTROMETHORPHAN POLISTIREX 30 MG/5ML
30 SUSPENSION ORAL 2 TIMES DAILY
COMMUNITY
End: 2018-05-11

## 2017-09-25 RX ORDER — METHYLPREDNISOLONE ACETATE 40 MG/ML
40 INJECTION, SUSPENSION INTRA-ARTICULAR; INTRALESIONAL; INTRAMUSCULAR; SOFT TISSUE ONCE
Status: COMPLETED | OUTPATIENT
Start: 2017-09-25 | End: 2017-09-25

## 2017-09-25 RX ADMIN — METHYLPREDNISOLONE ACETATE 40 MG: 40 INJECTION, SUSPENSION INTRA-ARTICULAR; INTRALESIONAL; INTRAMUSCULAR; SOFT TISSUE at 15:28

## 2017-09-25 NOTE — PROGRESS NOTES
Subjective   Cindi Hill is a 66 y.o. female.     Back Pain   This is a new problem. The current episode started 1 to 4 weeks ago. The problem occurs daily. The problem has been gradually worsening since onset. The pain is present in the sacro-iliac. The quality of the pain is described as aching. The pain radiates to the left knee. The pain is at a severity of 3/10. The pain is mild. The pain is worse during the day. The symptoms are aggravated by position. Stiffness is present in the morning. Pertinent negatives include no bladder incontinence, bowel incontinence or weakness. Risk factors include history of cancer, menopause, sedentary lifestyle and obesity. She has tried analgesics for the symptoms. The treatment provided mild relief.       The following portions of the patient's history were reviewed and updated as appropriate: allergies, current medications, past family history, past medical history, past social history, past surgical history and problem list.    Review of Systems   Gastrointestinal: Negative for bowel incontinence.   Genitourinary: Negative for bladder incontinence.        History of breast cancer-remote   Musculoskeletal: Positive for back pain.   Neurological: Negative for weakness.        Lumbar radiculopathy on left       Objective   Physical Exam   Constitutional: She is oriented to person, place, and time. She appears well-developed and well-nourished.   Musculoskeletal: She exhibits no edema.   Neurological: She is alert and oriented to person, place, and time. She has normal reflexes. She displays normal reflexes.   Posture straight leg test on left   Psychiatric: She has a normal mood and affect. Her behavior is normal. Judgment and thought content normal.   Nursing note and vitals reviewed.      Assessment/Plan   Cindi was seen today for ear fullness and back pain.    Diagnoses and all orders for this visit:    Acute left-sided low back pain with left-sided sciatica  -      methylPREDNISolone acetate (DEPO-medrol) injection 40 mg; Inject 1 mL into the shoulder, thigh, or buttocks 1 (One) Time.    Other orders  -     predniSONE (DELTASONE) 20 MG tablet; 2 pills on Tuesday and Wednesday morning then 1 pill till medicine gone           Plan-above

## 2017-09-25 NOTE — PROGRESS NOTES
Procedure   Ear Cerumen Removal Instrumentation  Date/Time: 9/25/2017 3:02 PM  Performed by: BERNA DOMINGUEZ  Authorized by: BERNA DOMINGUEZ     Anesthesia:  Local Anesthetic: none  Location details: right ear and left ear  Procedure type: curette    Sedation:  Patient sedated: no  Patient tolerance: Patient tolerated the procedure well with no immediate complications  Comments: Right ear had wax totally removed by me in curettement.  The medical assistant irrigated the left ear and it was removed by the medical assistant and myself.  Patient tolerated the procedure well.  Dry Skin remains on the left side

## 2017-09-26 RX ORDER — LOSARTAN POTASSIUM 50 MG/1
TABLET ORAL
Qty: 90 TABLET | Refills: 0 | Status: SHIPPED | OUTPATIENT
Start: 2017-09-26 | End: 2017-12-26 | Stop reason: SDUPTHER

## 2017-10-23 ENCOUNTER — OFFICE VISIT (OUTPATIENT)
Dept: OTOLARYNGOLOGY | Facility: CLINIC | Age: 67
End: 2017-10-23

## 2017-10-23 VITALS
WEIGHT: 163 LBS | TEMPERATURE: 96.9 F | HEART RATE: 81 BPM | BODY MASS INDEX: 30 KG/M2 | HEIGHT: 62 IN | DIASTOLIC BLOOD PRESSURE: 88 MMHG | SYSTOLIC BLOOD PRESSURE: 134 MMHG

## 2017-10-23 DIAGNOSIS — K21.9 GASTROESOPHAGEAL REFLUX DISEASE, ESOPHAGITIS PRESENCE NOT SPECIFIED: ICD-10-CM

## 2017-10-23 DIAGNOSIS — R05.9 COUGH: Primary | ICD-10-CM

## 2017-10-23 DIAGNOSIS — J34.2 DEVIATED NASAL SEPTUM: ICD-10-CM

## 2017-10-23 DIAGNOSIS — J37.0 SICCA LARYNGITIS: ICD-10-CM

## 2017-10-23 DIAGNOSIS — J30.1 CHRONIC ALLERGIC RHINITIS DUE TO POLLEN, UNSPECIFIED SEASONALITY: ICD-10-CM

## 2017-10-23 DIAGNOSIS — R06.09 DYSPNEA ON EXERTION: ICD-10-CM

## 2017-10-23 PROCEDURE — 99214 OFFICE O/P EST MOD 30 MIN: CPT | Performed by: PHYSICIAN ASSISTANT

## 2017-10-23 NOTE — PATIENT INSTRUCTIONS
Keep follow-up with Dr. Anderson to discuss further treatment options. Advised to use Flonase daily as directed. Follow-up as needed.

## 2017-10-23 NOTE — PROGRESS NOTES
YOB: 1950  Location: [facility]  Location Address: [address] [place] [address], [facility], [address] [place] [address]  Location Phone: [phone]    Chief Complaint   Patient presents with   • Follow-up     cough       History of Present Illness  Cindi Hill is a 66 y.o. female.  Cindi Hill is here for follow up of ENT complaints. The patient has had problems with cough  The symptoms are not localized to a particular location. The patient has had moderate symptoms. The symptoms have been present for the last several years The symptoms are aggravated by  no identifiable factors. The symptoms are improved by steroids.       Past Medical History:   Diagnosis Date   • Arthritis    • Breast cancer     , right breast mastectomy   • Drug therapy    • GERD (gastroesophageal reflux disease)    • Hypercholesteremia    • KARUNA (stress urinary incontinence, female)    • Urge incontinence    • Uterovaginal prolapse    • Vaginal atrophy        Past Surgical History:   Procedure Laterality Date   • BREAST BIOPSY     • BRONCHOSCOPY N/A 2017    Procedure: BRONCHOSCOPY BIOPSY WITH ANESTHESIA;  Surgeon: Adi Anderson MD;  Location: [facility] ENDOSCOPY;  Service:    • CARDIAC CATHETERIZATION Left 2017    Procedure: Cardiac Catheterization/Vascular Study;  Surgeon: Anuj Johnson MD;  Location: [facility] CATH INVASIVE LOCATION;  Service:    • COLONOSCOPY     • LAMINECTOMY  2016   • MASTECTOMY Bilateral    • ROTATOR CUFF REPAIR Right          Current Outpatient Prescriptions:   •  aspirin 81 MG tablet, Take 81 mg by mouth daily., Disp: , Rfl:   •  benzonatate (TESSALON) 200 MG capsule, Take 200 mg by mouth 3 (Three) Times a Day., Disp: , Rfl:   •  calcium carbonate (CALCIUM 600) 600 MG tablet, Take 600 mg by mouth daily., Disp: , Rfl:   •  cetirizine (zyrTEC) 10 MG tablet, Take 10 mg by mouth Daily., Disp: , Rfl:   •  Cholecalciferol (VITAMIN D3) 2000 units tablet, Take 2,000 Units by mouth Daily.,  Disp: , Rfl:   •  dextromethorphan polistirex ER (DELSYM) 30 MG/5ML Suspension Extended Release oral suspension, Take 30 mg by mouth 2 (Two) Times a Day., Disp: , Rfl:   •  fluticasone (FLONASE) 50 MCG/ACT nasal spray, 2 sprays into each nostril daily., Disp: 1 bottle, Rfl: 6  •  guaiFENesin (MUCINEX) 600 MG 12 hr tablet, Take 1,200 mg by mouth 2 (Two) Times a Day., Disp: , Rfl:   •  losartan (COZAAR) 50 MG tablet, TAKE ONE TABLET BY MOUTH EVERY MORNING, Disp: 90 tablet, Rfl: 0  •  lovastatin (MEVACOR) 10 MG tablet, Take 1 tablet by mouth Every Night., Disp: 90 tablet, Rfl: 1  •  omeprazole (PriLOSEC) 20 MG capsule, Take 20 mg by mouth Daily. Patient is taking on Monday, Wednesday, and Friday, Disp: , Rfl:   •  predniSONE (DELTASONE) 20 MG tablet, 2 pills on Tuesday and Wednesday morning then 1 pill till medicine gone, Disp: 15 tablet, Rfl: 0  •  PREMARIN 0.625 MG/GM vaginal cream, , Disp: , Rfl:     Lisinopril; Lortab [hydrocodone-acetaminophen]; and Percocet [oxycodone-acetaminophen]    Family History   Problem Relation Age of Onset   • Dementia Mother    • Heart failure Mother    • Cancer Father      liver   • Cancer Brother      thyroid       Social History     Social History   • Marital status:      Spouse name: N/A   • Number of children: N/A   • Years of education: N/A     Occupational History   • Not on file.     Social History Main Topics   • Smoking status: Never Smoker   • Smokeless tobacco: Never Used   • Alcohol use No   • Drug use: No   • Sexual activity: Defer     Other Topics Concern   • Not on file     Social History Narrative       Review of Systems   Constitutional: Negative for activity change, appetite change, chills, diaphoresis, fatigue, fever and unexpected weight change.   HENT: Negative for congestion, dental problem, drooling, ear discharge, ear pain, facial swelling, hearing loss, mouth sores, nosebleeds, postnasal drip, rhinorrhea, sinus pressure, sneezing, sore throat, tinnitus,  trouble swallowing and voice change.    Eyes: Negative.    Respiratory: Positive for cough.    Cardiovascular: Negative.    Gastrointestinal: Negative.    Endocrine: Negative.    Skin: Negative.    Allergic/Immunologic: Positive for environmental allergies. Negative for food allergies and immunocompromised state.   Neurological: Negative.    Hematological: Negative.    Psychiatric/Behavioral: Negative.        Vitals:    10/23/17 1428   BP: 134/88   Pulse: 81   Temp: 96.9 °F (36.1 °C)       Objective     Physical Exam  CONSTITUTIONAL: well nourished, alert, oriented, in no acute distress     COMMUNICATION AND VOICE: able to communicate normally, normal voice quality    HEAD: normocephalic, no lesions, atraumatic, no tenderness, no masses     FACE: appearance normal, no lesions, no tenderness, no deformities, facial motion symmetric    SALIVARY GLANDS: parotid glands with no tenderness, no swelling, no masses, submandibular glands with normal size, nontender    EYES: ocular motility normal, eyelids normal, orbits normal, no proptosis, conjunctiva normal , pupils equal, round     EARS:  Hearing: response to conversational voice normal bilaterally   External Ears: auricles without lesions  Otoscopic: tympanic membrane appearance normal, no lesions, no perforation, normal mobility, no fluid    NOSE:  External Nose: structure normal, no tenderness on palpation, no nasal discharge, no lesions, no evidence of trauma, nostrils patent   Intranasal Exam: nasal mucosa normal, vestibule within normal limits, inferior turbinate normal, nasal septum midline   Nasopharynx:     ORAL:  Lips: upper and lower lips without lesion   Teeth: dentition within normal limits for age   Gums: gingivae healthy   Oral Mucosa: oral mucosa normal, no mucosal lesions   Floor of Mouth: Warthin’s duct patent, mucosa normal  Tongue: lingual mucosa normal without lesions, normal tongue mobility   Palate: soft and hard palates with normal mucosa and  structure  Oropharynx: oropharyngeal mucosa normal    NECK: neck appearance normal, no mass,  noted without erythema or tenderness    THYROID: no overt thyromegaly, no tenderness, nodules or mass present on palpation, position midline     LYMPH NODES: no lymphadenopathy    CHEST/RESPIRATORY: respiratory effort normal, normal breath sounds     CARDIOVASCULAR: rate and rhythm normal, extremities without cyanosis or edema      NEUROLOGIC/PSYCHIATRIC: oriented to time, place and person, mood normal, affect appropriate, CN II-XII intact grossly    Assessment/Plan   Problems Addressed this Visit        Respiratory    Cough    Deviated nasal septum    Allergic rhinitis due to pollen - Primary    Sicca laryngitis    Dyspnea on exertion       Digestive    GERD (gastroesophageal reflux disease)        * Surgery not found *  No orders of the defined types were placed in this encounter.    Return if symptoms worsen or fail to improve.       Patient Instructions   Keep follow-up with Dr. Anderson to discuss further treatment options. Advised to use Flonase daily as directed. Follow-up as needed.

## 2017-10-24 ENCOUNTER — OFFICE VISIT (OUTPATIENT)
Dept: FAMILY MEDICINE CLINIC | Facility: CLINIC | Age: 67
End: 2017-10-24

## 2017-10-24 VITALS
HEART RATE: 64 BPM | OXYGEN SATURATION: 97 % | HEIGHT: 62 IN | SYSTOLIC BLOOD PRESSURE: 124 MMHG | WEIGHT: 162.4 LBS | DIASTOLIC BLOOD PRESSURE: 64 MMHG | BODY MASS INDEX: 29.88 KG/M2 | TEMPERATURE: 98.5 F

## 2017-10-24 DIAGNOSIS — M54.50 ACUTE LEFT-SIDED LOW BACK PAIN WITHOUT SCIATICA: Primary | ICD-10-CM

## 2017-10-24 DIAGNOSIS — Z23 NEED FOR INFLUENZA VACCINATION: ICD-10-CM

## 2017-10-24 PROCEDURE — 90662 IIV NO PRSV INCREASED AG IM: CPT | Performed by: FAMILY MEDICINE

## 2017-10-24 PROCEDURE — 99213 OFFICE O/P EST LOW 20 MIN: CPT | Performed by: FAMILY MEDICINE

## 2017-10-24 PROCEDURE — G0008 ADMIN INFLUENZA VIRUS VAC: HCPCS | Performed by: FAMILY MEDICINE

## 2017-10-24 NOTE — PROGRESS NOTES
Subjective   Cindi Hill is a 66 y.o. female.     Back Pain   This is a new problem. The current episode started more than 1 month ago. The problem occurs intermittently. The problem has been gradually improving since onset. The pain is present in the sacro-iliac. The quality of the pain is described as aching and burning. The pain does not radiate. The pain is at a severity of 2/10. The pain is mild. The pain is worse during the day. The symptoms are aggravated by position. Stiffness is present in the morning. Pertinent negatives include no bladder incontinence, bowel incontinence, leg pain or numbness.       The following portions of the patient's history were reviewed and updated as appropriate: allergies, current medications, past family history, past medical history, past social history, past surgical history and problem list.    Review of Systems   Gastrointestinal: Negative for bowel incontinence.   Genitourinary: Negative for bladder incontinence.   Musculoskeletal: Positive for back pain.   Neurological: Negative for numbness.       Objective   Physical Exam   Constitutional: She is oriented to person, place, and time. She appears well-developed and well-nourished.   Musculoskeletal: She exhibits tenderness.   Neurological: She is alert and oriented to person, place, and time. She displays normal reflexes.   Skin: Skin is warm.   Psychiatric: She has a normal mood and affect. Her behavior is normal.   Nursing note and vitals reviewed.      Assessment/Plan   There are no diagnoses linked to this encounter.          plan-flu shot and back school PT

## 2017-10-31 ENCOUNTER — HOSPITAL ENCOUNTER (OUTPATIENT)
Dept: PHYSICAL THERAPY | Facility: HOSPITAL | Age: 67
Setting detail: THERAPIES SERIES
Discharge: HOME | End: 2017-10-31

## 2017-10-31 DIAGNOSIS — M54.50 ACUTE LEFT-SIDED LOW BACK PAIN WITHOUT SCIATICA: Primary | ICD-10-CM

## 2017-10-31 PROCEDURE — 97161 PT EVAL LOW COMPLEX 20 MIN: CPT

## 2017-10-31 PROCEDURE — G8978 MOBILITY CURRENT STATUS: HCPCS

## 2017-10-31 PROCEDURE — G8979 MOBILITY GOAL STATUS: HCPCS

## 2017-10-31 PROCEDURE — 97110 THERAPEUTIC EXERCISES: CPT

## 2017-11-03 ENCOUNTER — OFFICE VISIT (OUTPATIENT)
Dept: FAMILY MEDICINE CLINIC | Facility: CLINIC | Age: 67
End: 2017-11-03

## 2017-11-03 VITALS
HEIGHT: 62 IN | WEIGHT: 162.8 LBS | OXYGEN SATURATION: 97 % | SYSTOLIC BLOOD PRESSURE: 128 MMHG | DIASTOLIC BLOOD PRESSURE: 72 MMHG | TEMPERATURE: 98.4 F | HEART RATE: 77 BPM | BODY MASS INDEX: 29.96 KG/M2

## 2017-11-03 DIAGNOSIS — Z23 NEED FOR PROPHYLACTIC VACCINATION WITH COMBINED DIPHTHERIA-TETANUS-PERTUSSIS (DTP) VACCINE: ICD-10-CM

## 2017-11-03 DIAGNOSIS — Z72.89 OTHER PROBLEMS RELATED TO LIFESTYLE: ICD-10-CM

## 2017-11-03 DIAGNOSIS — Z00.00 ANNUAL PHYSICAL EXAM: Primary | ICD-10-CM

## 2017-11-03 DIAGNOSIS — E55.9 VITAMIN D INSUFFICIENCY: ICD-10-CM

## 2017-11-03 DIAGNOSIS — R53.83 FATIGUE, UNSPECIFIED TYPE: ICD-10-CM

## 2017-11-03 DIAGNOSIS — Z12.12 SCREENING FOR MALIGNANT NEOPLASM OF THE RECTUM: ICD-10-CM

## 2017-11-03 DIAGNOSIS — E78.2 MIXED HYPERLIPIDEMIA: ICD-10-CM

## 2017-11-03 LAB
BILIRUB BLD-MCNC: NEGATIVE MG/DL
CLARITY, POC: CLEAR
COLOR UR: YELLOW
GLUCOSE UR STRIP-MCNC: NEGATIVE MG/DL
KETONES UR QL: NEGATIVE
LEUKOCYTE EST, POC: ABNORMAL
NITRITE UR-MCNC: NEGATIVE MG/ML
PH UR: 7 [PH] (ref 5–8)
PROT UR STRIP-MCNC: NEGATIVE MG/DL
RBC # UR STRIP: ABNORMAL /UL
SP GR UR: 1.01 (ref 1–1.03)
UROBILINOGEN UR QL: NORMAL

## 2017-11-03 PROCEDURE — 90715 TDAP VACCINE 7 YRS/> IM: CPT | Performed by: FAMILY MEDICINE

## 2017-11-03 PROCEDURE — 81003 URINALYSIS AUTO W/O SCOPE: CPT | Performed by: FAMILY MEDICINE

## 2017-11-03 PROCEDURE — 90471 IMMUNIZATION ADMIN: CPT | Performed by: FAMILY MEDICINE

## 2017-11-03 PROCEDURE — G0439 PPPS, SUBSEQ VISIT: HCPCS | Performed by: FAMILY MEDICINE

## 2017-11-03 NOTE — PROGRESS NOTES
QUICK REFERENCE INFORMATION:  The ABCs of the Annual Wellness Visit    Subsequent Medicare Wellness Visit    HEALTH RISK ASSESSMENT    1950    Recent Hospitalizations:  No hospitalization(s) within the last year..        Current Medical Providers:  Patient Care Team:  Jose Wolff MD as PCP - General (Family Medicine)  Jose Wolff MD as PCP - Family Medicine  Micki Jones, BAR Jones, BAR Dixon MD as Consulting Physician (Otolaryngology)  Jose Wolff MD as Referring Physician (Family Medicine)  Anuj Johnson MD as Cardiologist (Cardiology)        Smoking Status:  History   Smoking Status   • Never Smoker   Smokeless Tobacco   • Never Used       Alcohol Consumption:  History   Alcohol Use No       Depression Screen:   PHQ-2/PHQ-9 Depression Screening 11/3/2017   Little interest or pleasure in doing things 0   Feeling down, depressed, or hopeless 0   Total Score 0       Health Habits and Functional and Cognitive Screening:  Functional & Cognitive Status 11/3/2017   Do you have difficulty preparing food and eating? No   Do you have difficulty bathing yourself, getting dressed or grooming yourself? No   Do you have difficulty using the toilet? No   Do you have difficulty moving around from place to place? No   Do you have trouble with steps or getting out of a bed or a chair? No   In the past year have you fallen or experienced a near fall? No   Current Diet Unhealthy Diet   Dental Exam Up to date   Eye Exam Not up to date   Exercise (times per week) 0 times per week   Current Exercise Activities Include None   Do you need help using the phone?  No   Are you deaf or do you have serious difficulty hearing?  No   Do you need help with transportation? No   Do you need help shopping? No   Do you need help preparing meals?  No   Do you need help with housework?  No   Do you need help with laundry? No   Do you need help taking your medications? No   Do you need  "help managing money? No   Have you felt unusual stress, anger or loneliness in the last month? No   Who do you live with? Spouse   If you need help, do you have trouble finding someone available to you? No   Have you been bothered in the last four weeks by sexual problems? No   Do you have difficulty concentrating, remembering or making decisions? No   -- The Timed Up and Go (TUG) Test (CDC Version)                  - Testing directions adhered to:                                  1) Patients wears regular footwear and may use walking aid(s) if    needed.                                  2) Patient to sit back in standard arm chair and identify a line 10 feet    away from patient on floor.                                  3) Test time begins at \"Go\" and stops when patient reseated in arm    chair.                    - Instructions read aloud (and demonstrated as applicable) to patient:                                      When I say \"Go,\" I want you to:                                    1) Stand up from the chair.                                  2) Walk to the line on the floor (10 feet away) at your normal pace.                                  3) Turn.                                  4) Walk back to the chair at your normal pace.                                  5) Sit down again.                    - Result: 3                    - Observations during test:Normal gait              Does the patient have evidence of cognitive impairment? No    Aspirin use counseling: Taking ASA appropriately as indicated      Recent Lab Results:  CMP:  Lab Results   Component Value Date    GLU 88 11/02/2016    BUN 16 02/17/2017    CREATININE 0.68 02/17/2017    EGFRIFNONA 87 02/17/2017    EGFRIFAFRI 111 11/02/2016    BCR 23.5 02/17/2017     02/17/2017    K 4.0 02/17/2017    CO2 31.0 02/17/2017    CALCIUM 10.0 02/17/2017    PROTENTOTREF 7.6 11/02/2016    ALBUMIN 4.60 02/17/2017    LABGLOBREF 3.1 11/02/2016    LABIL2 1.5 " 02/17/2017    BILITOT 0.4 02/17/2017    ALKPHOS 96 02/17/2017    AST 30 02/17/2017    ALT 37 02/17/2017     Lipid Panel:  Lab Results   Component Value Date    TRIG 104 11/02/2016    HDL 54 11/02/2016    VLDL 20.8 11/02/2016     HbA1c:       Visual Acuity:  Right eye 20/50 and Left eye 20/20 w/o glasses.    Age-appropriate Screening Schedule:  Refer to the list below for future screening recommendations based on patient's age, sex and/or medical conditions. Orders for these recommended tests are listed in the plan section. The patient has been provided with a written plan.    Health Maintenance   Topic Date Due   • TDAP/TD VACCINES (1 - Tdap) 12/16/1969   • ZOSTER VACCINE  09/13/2016   • PNEUMOCOCCAL VACCINES (65+ LOW/MEDIUM RISK) (2 of 2 - PPSV23) 11/02/2017   • LIPID PANEL  11/02/2017   • MAMMOGRAM  09/26/2018   • COLONOSCOPY  09/23/2019   • INFLUENZA VACCINE  Completed        Subjective   History of Present Illness    Cindi Hill is a 66 y.o. female who presents for an Subsequent Wellness Visit.    The following portions of the patient's history were reviewed and updated as appropriate: allergies, current medications, past family history, past medical history, past social history, past surgical history and problem list.    Outpatient Medications Prior to Visit   Medication Sig Dispense Refill   • aspirin 81 MG tablet Take 81 mg by mouth daily.     • benzonatate (TESSALON) 200 MG capsule Take 200 mg by mouth 3 (Three) Times a Day.     • calcium carbonate (CALCIUM 600) 600 MG tablet Take 600 mg by mouth daily.     • cetirizine (zyrTEC) 10 MG tablet Take 10 mg by mouth Daily.     • Cholecalciferol (VITAMIN D3) 2000 units tablet Take 2,000 Units by mouth Daily.     • dextromethorphan polistirex ER (DELSYM) 30 MG/5ML Suspension Extended Release oral suspension Take 30 mg by mouth 2 (Two) Times a Day.     • fluticasone (FLONASE) 50 MCG/ACT nasal spray 2 sprays into each nostril daily. 1 bottle 6   • guaiFENesin  (MUCINEX) 600 MG 12 hr tablet Take 1,200 mg by mouth As Needed.     • losartan (COZAAR) 50 MG tablet TAKE ONE TABLET BY MOUTH EVERY MORNING 90 tablet 0   • lovastatin (MEVACOR) 10 MG tablet Take 1 tablet by mouth Every Night. 90 tablet 1   • omeprazole (PriLOSEC) 20 MG capsule Take 20 mg by mouth Daily. Patient is taking on Monday, Wednesday, and Friday     • PREMARIN 0.625 MG/GM vaginal cream        No facility-administered medications prior to visit.        Patient Active Problem List   Diagnosis   • GERD (gastroesophageal reflux disease)   • Cough   • Deviated nasal septum   • Allergic rhinitis due to pollen   • Sicca laryngitis   • Mixed hyperlipidemia   • Breast cancer   • Osteoarthritis of cervical spine   • Preop cardiovascular exam   • LBBB (left bundle branch block)   • Dyspnea on exertion   • Acute left-sided low back pain without sciatica       Advance Care Planning:  has NO advance directive - information provided to the patient today    Identification of Risk Factors:  Risk factors include: weight , unhealthy diet, cardiovascular risk, inactivity and increased fall risk.    Review of Systems   Cardiovascular: Negative for chest pain and leg swelling.   Neurological:        Discussed memory loss prevention i.e. healthy brain issues   All other systems reviewed and are negative.      Compared to one year ago, the patient feels her physical health is the same.  Compared to one year ago, the patient feels her mental health is the same.    Objective     Physical Exam   Constitutional: She is oriented to person, place, and time. She appears well-developed and well-nourished.   HENT:   Right Ear: External ear normal.   Mouth/Throat: Oropharynx is clear and moist.   Eyes: EOM are normal. Pupils are equal, round, and reactive to light.   Neck: No thyromegaly present.   Good carotid pulses   Cardiovascular: Normal rate and regular rhythm.    Pulmonary/Chest: Effort normal and breath sounds normal.   Breast exam  "by another provider   Abdominal: Soft. Bowel sounds are normal. She exhibits no distension and no mass. No hernia.   Genitourinary:   Genitourinary Comments: GYN checkups by another provider   Musculoskeletal: She exhibits no edema.   Lymphadenopathy:     She has no cervical adenopathy.   Neurological: She is alert and oriented to person, place, and time.   Skin: Skin is warm and dry.   Psychiatric: She has a normal mood and affect. Her behavior is normal. Judgment and thought content normal.   Nursing note and vitals reviewed.      Vitals:    11/03/17 0830   BP: 128/72   BP Location: Left arm   Patient Position: Sitting   Cuff Size: Adult   Pulse: 77   Temp: 98.4 °F (36.9 °C)   TempSrc: Oral   SpO2: 97%   Weight: 162 lb 12.8 oz (73.8 kg)   Height: 62\" (157.5 cm)   PainSc: 0-No pain       Body mass index is 29.78 kg/(m^2).  Discussed the patient's BMI with her. The BMI is above average; no BMI management plan is appropriate..    Assessment/Plan   Patient Self-Management and Personalized Health Advice  The patient has been provided with information about: diet, exercise, weight management and fall prevention and preventive services including:   · Advance directive, Colorectal cancer screening, fecal occult blood test, Fall Risk assessment done, Fall Risk plan of care done, TdaP vaccine.    Visit Diagnoses:    ICD-10-CM ICD-9-CM   1. Annual physical exam Z00.00 V70.0   2. Fatigue, unspecified type R53.83 780.79   3. Mixed hyperlipidemia E78.2 272.2   4. Vitamin D insufficiency E55.9 268.9   5. Screening for malignant neoplasm of the rectum Z12.12 V76.41   6. Other problems related to lifestyle Z72.89 V69.8       No orders of the defined types were placed in this encounter.      Outpatient Encounter Prescriptions as of 11/3/2017   Medication Sig Dispense Refill   • aspirin 81 MG tablet Take 81 mg by mouth daily.     • benzonatate (TESSALON) 200 MG capsule Take 200 mg by mouth 3 (Three) Times a Day.     • calcium " carbonate (CALCIUM 600) 600 MG tablet Take 600 mg by mouth daily.     • cetirizine (zyrTEC) 10 MG tablet Take 10 mg by mouth Daily.     • Cholecalciferol (VITAMIN D3) 2000 units tablet Take 2,000 Units by mouth Daily.     • dextromethorphan polistirex ER (DELSYM) 30 MG/5ML Suspension Extended Release oral suspension Take 30 mg by mouth 2 (Two) Times a Day.     • fluticasone (FLONASE) 50 MCG/ACT nasal spray 2 sprays into each nostril daily. 1 bottle 6   • guaiFENesin (MUCINEX) 600 MG 12 hr tablet Take 1,200 mg by mouth As Needed.     • losartan (COZAAR) 50 MG tablet TAKE ONE TABLET BY MOUTH EVERY MORNING 90 tablet 0   • lovastatin (MEVACOR) 10 MG tablet Take 1 tablet by mouth Every Night. 90 tablet 1   • omeprazole (PriLOSEC) 20 MG capsule Take 20 mg by mouth Daily. Patient is taking on Monday, Wednesday, and Friday     • PREMARIN 0.625 MG/GM vaginal cream        No facility-administered encounter medications on file as of 11/3/2017.        Reviewed use of high risk medication in the elderly: yes  Reviewed for potential of harmful drug interactions in the elderly: yes    Follow Up:  Return in 6 months (on 5/3/2018).     An After Visit Summary and PPPS with all of these plans were given to the patient.       plan continue seeing specialist and above

## 2017-11-03 NOTE — PATIENT INSTRUCTIONS
Exercising to Stay Healthy  Exercising regularly is important. It has many health benefits, such as:  · Improving your overall fitness, flexibility, and endurance.  · Increasing your bone density.  · Helping with weight control.  · Decreasing your body fat.  · Increasing your muscle strength.  · Reducing stress and tension.  · Improving your overall health.  In order to become healthy and stay healthy, it is recommended that you do moderate-intensity and vigorous-intensity exercise. You can tell that you are exercising at a moderate intensity if you have a higher heart rate and faster breathing, but you are still able to hold a conversation. You can tell that you are exercising at a vigorous intensity if you are breathing much harder and faster and cannot hold a conversation while exercising.  HOW OFTEN SHOULD I EXERCISE?  Choose an activity that you enjoy and set realistic goals. Your health care provider can help you to make an activity plan that works for you. Exercise regularly as directed by your health care provider. This may include:   · Doing resistance training twice each week, such as:    Push-ups.    Sit-ups.    Lifting weights.    Using resistance bands.  · Doing a given intensity of exercise for a given amount of time. Choose from these options:    150 minutes of moderate-intensity exercise every week.    75 minutes of vigorous-intensity exercise every week.    A mix of moderate-intensity and vigorous-intensity exercise every week.  Children, pregnant women, people who are out of shape, people who are overweight, and older adults may need to consult a health care provider for individual recommendations. If you have any sort of medical condition, be sure to consult your health care provider before starting a new exercise program.   WHAT ARE SOME EXERCISE IDEAS?  Some moderate-intensity exercise ideas include:   · Walking at a rate of 1 mile in 15  minutes.  · Biking.  · Hiking.  · Golfing.  · Dancing.  Some vigorous-intensity exercise ideas include:   · Walking at a rate of at least 4.5 miles per hour.  · Jogging or running at a rate of 5 miles per hour.  · Biking at a rate of at least 10 miles per hour.  · Lap swimming.  · Roller-skating or in-line skating.  · Cross-country skiing.  · Vigorous competitive sports, such as football, basketball, and soccer.  · Jumping rope.  · Aerobic dancing.  WHAT ARE SOME EVERYDAY ACTIVITIES THAT CAN HELP ME TO GET EXERCISE?  · Yard work, such as:    Pushing a .    Raking and bagging leaves.  · Washing and waxing your car.  · Pushing a stroller.  · Shoveling snow.  · Gardening.  · Washing windows or floors.  HOW CAN I BE MORE ACTIVE IN MY DAY-TO-DAY ACTIVITIES?  · Use the stairs instead of the elevator.  · Take a walk during your lunch break.  · If you drive, park your car farther away from work or school.  · If you take public transportation, get off one stop early and walk the rest of the way.  · Make all of your phone calls while standing up and walking around.  · Get up, stretch, and walk around every 30 minutes throughout the day.  WHAT GUIDELINES SHOULD I FOLLOW WHILE EXERCISING?  · Do not exercise so much that you hurt yourself, feel dizzy, or get very short of breath.  · Consult your health care provider before starting a new exercise program.  · Wear comfortable clothes and shoes with good support.  · Drink plenty of water while you exercise to prevent dehydration or heat stroke. Body water is lost during exercise and must be replaced.  · Work out until you breathe faster and your heart beats faster.     This information is not intended to replace advice given to you by your health care provider. Make sure you discuss any questions you have with your health care provider.     Document Released: 01/20/2012 Document Revised: 01/08/2016 Document Reviewed: 05/21/2015  [employer] Patient Education  ©2017 [employer]    Fall Prevention in the Home   Falls can cause injuries and can affect people from all age groups. There are many simple things that you can do to make your home safe and to help prevent falls.  WHAT CAN I DO ON THE OUTSIDE OF MY HOME?  · Regularly repair the edges of walkways and driveways and fix any cracks.  · Remove high doorway thresholds.  · Trim any shrubbery on the main path into your home.  · Use bright outdoor lighting.  · Clear walkways of debris and clutter, including tools and rocks.  · Regularly check that handrails are securely fastened and in good repair. Both sides of any steps should have handrails.  · Install guardrails along the edges of any raised decks or porches.  · Have leaves, snow, and ice cleared regularly.  · Use sand or salt on walkways during winter months.  · In the garage, clean up any spills right away, including grease or oil spills.  WHAT CAN I DO IN THE BATHROOM?  · Use night lights.  · Install grab bars by the toilet and in the tub and shower. Do not use towel bars as grab bars.  · Use non-skid mats or decals on the floor of the tub or shower.  · If you need to sit down while you are in the shower, use a plastic, non-slip stool.  · Keep the floor dry. Immediately clean up any water that spills on the floor.  · Remove soap buildup in the tub or shower on a regular basis.  · Attach bath mats securely with double-sided non-slip rug tape.  · Remove throw rugs and other tripping hazards from the floor.  WHAT CAN I DO IN THE BEDROOM?  · Use night lights.  · Make sure that a bedside light is easy to reach.  · Do not use oversized bedding that drapes onto the floor.  · Have a firm chair that has side arms to use for getting dressed.  · Remove throw rugs and other tripping hazards from the floor.  WHAT CAN I DO IN THE KITCHEN?   · Clean up any spills right away.  · Avoid walking on wet floors.  · Place frequently used items in easy-to-reach places.  · If you need to  reach for something above you, use a sturdy step stool that has a grab bar.  · Keep electrical cables out of the way.  · Do not use floor polish or wax that makes floors slippery. If you have to use wax, make sure that it is non-skid floor wax.  · Remove throw rugs and other tripping hazards from the floor.  WHAT CAN I DO IN THE STAIRWAYS?  · Do not leave any items on the stairs.  · Make sure that there are handrails on both sides of the stairs. Fix handrails that are broken or loose. Make sure that handrails are as long as the stairways.  · Check any carpeting to make sure that it is firmly attached to the stairs. Fix any carpet that is loose or worn.  · Avoid having throw rugs at the top or bottom of stairways, or secure the rugs with carpet tape to prevent them from moving.  · Make sure that you have a light switch at the top of the stairs and the bottom of the stairs. If you do not have them, have them installed.  WHAT ARE SOME OTHER FALL PREVENTION TIPS?  · Wear closed-toe shoes that fit well and support your feet. Wear shoes that have rubber soles or low heels.  · When you use a stepladder, make sure that it is completely opened and that the sides are firmly locked. Have someone hold the ladder while you are using it. Do not climb a closed stepladder.  · Add color or contrast paint or tape to grab bars and handrails in your home. Place contrasting color strips on the first and last steps.  · Use mobility aids as needed, such as canes, walkers, scooters, and crutches.  · Turn on lights if it is dark. Replace any light bulbs that burn out.  · Set up furniture so that there are clear paths. Keep the furniture in the same spot.  · Fix any uneven floor surfaces.  · Choose a carpet design that does not hide the edge of steps of a stairway.  · Be aware of any and all pets.  · Review your medicines with your healthcare provider. Some medicines can cause dizziness or changes in blood pressure, which increase your risk  of falling.  Talk with your health care provider about other ways that you can decrease your risk of falls. This may include working with a physical therapist or  to improve your strength, balance, and endurance.     This information is not intended to replace advice given to you by your health care provider. Make sure you discuss any questions you have with your health care provider.     Document Released: 2003 Document Revised: 04/10/2017 Document Reviewed: 2016  [employer] Interactive Patient Education © [employer]    Medicare Wellness  Personal Prevention Plan of Service     Date of Office Visit:  2017  Encounter Provider:  Jose Wolff MD  Place of Service:  [facility]  Patient Name: Cindi Hill  :  1950    As part of the Medicare Wellness portion of your visit today, we are providing you with this personalized preventive plan of services (PPPS). This plan is based upon recommendations of the [place] Preventive Services Task Force (USPSTF) and the Advisory Committee on Immunization Practices (ACIP).    This lists the preventive care services that should be considered, and provides dates of when you are due. Items listed as completed are up-to-date and do not require any further intervention.    Health Maintenance   Topic Date Due   • TDAP/TD VACCINES (1 - Tdap) 1969   • HEPATITIS C SCREENING  2016   • ZOSTER VACCINE  2016   • MEDICARE ANNUAL WELLNESS  2017   • PNEUMOCOCCAL VACCINES (65+ LOW/MEDIUM RISK) (2 of 2 - PPSV23) 2017   • LIPID PANEL  2017   • MAMMOGRAM  2018   • COLONOSCOPY  2019   • INFLUENZA VACCINE  Completed       No orders of the defined types were placed in this encounter.      Return in 6 months (on 5/3/2018).

## 2017-11-04 LAB
25(OH)D3+25(OH)D2 SERPL-MCNC: 37.2 NG/ML (ref 30–100)
ALBUMIN SERPL-MCNC: 4.7 G/DL (ref 3.5–5)
ALBUMIN/GLOB SERPL: 1.6 G/DL (ref 1.1–2.5)
ALP SERPL-CCNC: 90 U/L (ref 24–120)
ALT SERPL-CCNC: 35 U/L (ref 0–54)
AST SERPL-CCNC: 24 U/L (ref 7–45)
BASOPHILS # BLD AUTO: 0.02 10*3/MM3 (ref 0–0.2)
BASOPHILS NFR BLD AUTO: 0.4 % (ref 0–2)
BILIRUB SERPL-MCNC: 0.4 MG/DL (ref 0.1–1)
BUN SERPL-MCNC: 14 MG/DL (ref 5–21)
BUN/CREAT SERPL: 19.4 (ref 7–25)
CALCIUM SERPL-MCNC: 10 MG/DL (ref 8.4–10.4)
CHLORIDE SERPL-SCNC: 100 MMOL/L (ref 98–110)
CHOLEST SERPL-MCNC: 186 MG/DL (ref 130–200)
CO2 SERPL-SCNC: 29 MMOL/L (ref 24–31)
CREAT SERPL-MCNC: 0.72 MG/DL (ref 0.5–1.4)
EOSINOPHIL # BLD AUTO: 0.27 10*3/MM3 (ref 0–0.7)
EOSINOPHIL NFR BLD AUTO: 4.9 % (ref 0–4)
ERYTHROCYTE [DISTWIDTH] IN BLOOD BY AUTOMATED COUNT: 13.7 % (ref 12–15)
GFR SERPLBLD CREATININE-BSD FMLA CKD-EPI: 81 ML/MIN/1.73
GFR SERPLBLD CREATININE-BSD FMLA CKD-EPI: 98 ML/MIN/1.73
GLOBULIN SER CALC-MCNC: 2.9 GM/DL
GLUCOSE SERPL-MCNC: 93 MG/DL (ref 70–100)
HCT VFR BLD AUTO: 42.6 % (ref 37–47)
HCV AB S/CO SERPL IA: <0.1 S/CO RATIO (ref 0–0.9)
HDLC SERPL-MCNC: 65 MG/DL
HGB BLD-MCNC: 13.8 G/DL (ref 12–16)
IMM GRANULOCYTES # BLD: 0.01 10*3/MM3 (ref 0–0.03)
IMM GRANULOCYTES NFR BLD: 0.2 % (ref 0–5)
LDLC SERPL CALC-MCNC: 102 MG/DL (ref 0–99)
LYMPHOCYTES # BLD AUTO: 1.46 10*3/MM3 (ref 0.72–4.86)
LYMPHOCYTES NFR BLD AUTO: 26.4 % (ref 15–45)
MCH RBC QN AUTO: 29.7 PG (ref 28–32)
MCHC RBC AUTO-ENTMCNC: 32.4 G/DL (ref 33–36)
MCV RBC AUTO: 91.8 FL (ref 82–98)
MONOCYTES # BLD AUTO: 0.48 10*3/MM3 (ref 0.19–1.3)
MONOCYTES NFR BLD AUTO: 8.7 % (ref 4–12)
NEUTROPHILS # BLD AUTO: 3.3 10*3/MM3 (ref 1.87–8.4)
NEUTROPHILS NFR BLD AUTO: 59.4 % (ref 39–78)
PLATELET # BLD AUTO: 245 10*3/MM3 (ref 130–400)
POTASSIUM SERPL-SCNC: 4.4 MMOL/L (ref 3.5–5.3)
PROT SERPL-MCNC: 7.6 G/DL (ref 6.3–8.7)
RBC # BLD AUTO: 4.64 10*6/MM3 (ref 4.2–5.4)
SODIUM SERPL-SCNC: 141 MMOL/L (ref 135–145)
T4 FREE SERPL-MCNC: 1.24 NG/DL (ref 0.78–2.19)
TRIGL SERPL-MCNC: 95 MG/DL (ref 0–149)
TSH SERPL DL<=0.005 MIU/L-ACNC: 2.26 MIU/ML (ref 0.47–4.68)
VLDLC SERPL CALC-MCNC: 19 MG/DL
WBC # BLD AUTO: 5.54 10*3/MM3 (ref 4.8–10.8)

## 2017-11-06 ENCOUNTER — HOSPITAL ENCOUNTER (OUTPATIENT)
Dept: PHYSICAL THERAPY | Facility: HOSPITAL | Age: 67
Setting detail: THERAPIES SERIES
Discharge: HOME | End: 2017-11-06

## 2017-11-06 ENCOUNTER — TELEPHONE (OUTPATIENT)
Dept: FAMILY MEDICINE CLINIC | Facility: CLINIC | Age: 67
End: 2017-11-06

## 2017-11-06 DIAGNOSIS — M54.50 ACUTE LEFT-SIDED LOW BACK PAIN WITHOUT SCIATICA: Primary | ICD-10-CM

## 2017-11-06 PROCEDURE — 97110 THERAPEUTIC EXERCISES: CPT

## 2017-11-06 NOTE — TELEPHONE ENCOUNTER
----- Message from Jose Wolff MD sent at 11/6/2017  6:55 AM CST -----  Labs acceptable exception needs vitamin D 2000 international units total a day

## 2017-11-06 NOTE — THERAPY TREATMENT NOTE
Outpatient Physical Therapy Ortho Treatment Note  [facility]     Patient Name: Cindi Hill  : 1950  MRN: [id number]  Today's Date: 2017       Subjective Improvement:  Attendance:   Approved: Medical nec   MD Follow up: 11/3/17   Date: 17    Visit Date: 2017    Visit Dx:    ICD-10-CM ICD-9-CM   1. Acute left-sided low back pain without sciatica M54.5 724.2       Patient Active Problem List   Diagnosis   • GERD (gastroesophageal reflux disease)   • Cough   • Deviated nasal septum   • Allergic rhinitis due to pollen   • Sicca laryngitis   • Mixed hyperlipidemia   • Breast cancer   • Osteoarthritis of cervical spine   • Preop cardiovascular exam   • LBBB (left bundle branch block)   • Dyspnea on exertion   • Acute left-sided low back pain without sciatica        Past Medical History:   Diagnosis Date   • Acute left-sided low back pain without sciatica 10/24/2017   • Arthritis    • Breast cancer     , right breast mastectomy   • Drug therapy    • GERD (gastroesophageal reflux disease)    • Hypercholesteremia    • KARUNA (stress urinary incontinence, female)    • Urge incontinence    • Uterovaginal prolapse    • Vaginal atrophy         Past Surgical History:   Procedure Laterality Date   • BREAST BIOPSY     • BRONCHOSCOPY N/A 2017    Procedure: BRONCHOSCOPY BIOPSY WITH ANESTHESIA;  Surgeon: Adi Anderson MD;  Location:  [facility];  Service:    • CARDIAC CATHETERIZATION Left 2017    Procedure: Cardiac Catheterization/Vascular Study;  Surgeon: Anuj Johnson MD;  Location: [facility] CATH INVASIVE LOCATION;  Service:    • COLONOSCOPY     • LAMINECTOMY  2016   • MASTECTOMY Bilateral    • ROTATOR CUFF REPAIR Right              PT Ortho       17 1200    Subjective Comments    Subjective Comments Patient reports she doesn't know if she is doing the core exercises correctly. States she can't feel anything when she does it.  -NH    Precautions  and Contraindications    Precautions/Limitations no known precautions/limitations  -NH    Precautions Hx breast cancer  -NH      User Key  (r) = Recorded By, (t) = Taken By, (c) = Cosigned By    Initials Name Provider Type    NH Samara Santamaria, PT Physical Therapist                            PT Assessment/Plan       11/06/17 1200       PT Assessment    Functional Limitations Limitation in home management;Limitations in community activities;Performance in leisure activities  -NH     Impairments Endurance;Joint mobility;Muscle strength;Pain;Poor body mechanics;Posture  -NH     Assessment Comments Patient responded fair to TA isometric education. Utilized stabilizer in order to give patient tactile and visual cues with TA isometric. Able to isolate TA slightly when PT palpating but patient cont to state she didn't feel it. Patient required cuing to ensure she wasn't performing pelvic tilt rather than TA iso. Patient challenged by additional exercises and education on pelvic neutral in sitting position.  -NH     Rehab Potential Good  -NH     Patient/caregiver participated in establishment of treatment plan and goals Yes  -NH     Patient would benefit from skilled therapy intervention Yes  -NH     PT Plan    PT Frequency 2x/week  -NH     Predicted Duration of Therapy Intervention (days/wks) 4-6 weeks  -NH     PT Plan Comments Cont TA education with stabilizer cuff  -NH       User Key  (r) = Recorded By, (t) = Taken By, (c) = Cosigned By    Initials Name Provider Type    NH Samara Santamaria, PT Physical Therapist                    Exercises       11/06/17 1200          Subjective Comments    Subjective Comments Patient reports she doesn't know if she is doing the core exercises correctly. States she can't feel anything when she does it.  -NH      Aquatics    Aquatics performed? No  -NH      Exercise 1    Exercise Name 1 TA education  -NH      Cueing 1 Tactile;Verbal  -NH      Time (Minutes) 1 6 min  -NH      Additional  Comments with stabilizer cuff  -NH      Exercise 2    Exercise Name 2 Supine piriformis stretch  -NH      Sets 2 3  -NH      Time (Seconds) 2 30 sec  -NH      Exercise 3    Exercise Name 3 Pelvic tilt  -NH      Reps 3 15  -NH      Exercise 4    Exercise Name 4 LTR feet on ball  -NH      Sets 4 2  -NH      Reps 4 10  -NH      Exercise 5    Exercise Name 5 PN LAQ  -NH      Sets 5 2  -NH      Reps 5 10  -NH      Exercise 6    Exercise Name 6 PN Seated March  -NH      Sets 6 2  -NH      Reps 6 10  -NH      Exercise 7    Exercise Name 7 HL TA with hip add  -NH      Sets 7 2  -NH      Reps 7 10  -NH        User Key  (r) = Recorded By, (t) = Taken By, (c) = Cosigned By    Initials Name Provider Type    NH Samara Santamaria, PT Physical Therapist                               PT OP Goals       11/06/17 1200       PT Short Term Goals    STG Date to Achieve 11/21/17  -NH     STG 1 IND and compliant with HEP  -NH     STG 2 Patient will be able to perform TA iso for 10 seconds without cuing  -NH     STG 3 Patient will increase lumbar flexion to 4 cm fingertip to floor  -NH     STG 4 Patient will have 4-/5 B hip flexor strength  -NH     STG 5 Patient will increase L hip abduction strength to 4-/5  -NH     Long Term Goals    LTG Date to Achieve 12/12/17  -NH     LTG 1 Patient will reduce Modified OSWESTRY to less than 10%  -NH     LTG 2 Patient will demonstrate proper lifting technique floor to waist 15#  -NH     LTG 3 Patient will increase B hip flexor strength to 4/5  -NH     LTG 4 Patient will increase L hip abd strength to 4/5  -NH     LTG 5 Patient be able to tolerate 45 minute treatment session with no greater than 2 point increase in pain on VAS.  -NH     Time Calculation    PT Goal Re-Cert Due Date 11/21/17  -NH       User Key  (r) = Recorded By, (t) = Taken By, (c) = Cosigned By    Initials Name Provider Type    NH Samara Santamaria, PT Physical Therapist                Therapy Education       11/06/17 1200          Therapy  Education    Given HEP;Symptoms/condition management;Pain management;Posture/body mechanics  -NH      Program Progressed  -NH      How Provided Verbal;Demonstration;Written  -NH      Provided to Patient  -NH      Level of Understanding Verbalized;Demonstrated  -NH        User Key  (r) = Recorded By, (t) = Taken By, (c) = Cosigned By    Initials Name Provider Type    NH Samara Santamaria, PT Physical Therapist                Time Calculation:   Start Time: 1105  Stop Time: 1200  Time Calculation (min): 55 min  Total Timed Code Minutes- PT: 55 minute(s)    Therapy Charges for Today     Code Description Service Date Service Provider Modifiers Qty    29428683189  PT THER PROC EA 15 MIN 11/6/2017 Samara Santamaria, PT GP 4                    Samara Santamaria, PT  11/6/2017

## 2017-11-08 ENCOUNTER — OFFICE VISIT (OUTPATIENT)
Dept: OBSTETRICS AND GYNECOLOGY | Facility: CLINIC | Age: 67
End: 2017-11-08

## 2017-11-08 VITALS
BODY MASS INDEX: 29.44 KG/M2 | WEIGHT: 160 LBS | HEIGHT: 62 IN | SYSTOLIC BLOOD PRESSURE: 120 MMHG | DIASTOLIC BLOOD PRESSURE: 74 MMHG

## 2017-11-08 DIAGNOSIS — N81.4 UTERINE PROLAPSE: ICD-10-CM

## 2017-11-08 DIAGNOSIS — N81.11 CYSTOCELE, MIDLINE: ICD-10-CM

## 2017-11-08 DIAGNOSIS — N81.6 RECTOCELE: ICD-10-CM

## 2017-11-08 DIAGNOSIS — Z01.419 WELL WOMAN EXAM WITH ROUTINE GYNECOLOGICAL EXAM: ICD-10-CM

## 2017-11-08 DIAGNOSIS — Z85.3 PERSONAL HISTORY OF BREAST CANCER: ICD-10-CM

## 2017-11-08 DIAGNOSIS — Z12.31 ENCOUNTER FOR SCREENING MAMMOGRAM FOR MALIGNANT NEOPLASM OF BREAST: Primary | ICD-10-CM

## 2017-11-08 PROCEDURE — G0101 CA SCREEN;PELVIC/BREAST EXAM: HCPCS | Performed by: NURSE PRACTITIONER

## 2017-11-08 NOTE — PROGRESS NOTES
Cindi Hill is a 66 y.o.      Chief Complaint   Patient presents with   • Gynecologic Exam     Pateint here for yearly exam. Last exam was done on 16 and was normal.  Patient needs order mammogram order sent to [facility] - Patient is in for annual exam.    She is going to have pelvic prolaps repair surgery with Dr. Rodriguez in a few months.      The following portions of the patient's history were reviewed and updated as appropriate:vital signs, allergies, current medications, past family history, past medical history, past social history, past surgical history and problem list.      Current Outpatient Prescriptions:   •  aspirin 81 MG tablet, Take 81 mg by mouth daily., Disp: , Rfl:   •  benzonatate (TESSALON) 200 MG capsule, Take 200 mg by mouth 3 (Three) Times a Day., Disp: , Rfl:   •  calcium carbonate (CALCIUM 600) 600 MG tablet, Take 600 mg by mouth daily., Disp: , Rfl:   •  cetirizine (zyrTEC) 10 MG tablet, Take 10 mg by mouth Daily., Disp: , Rfl:   •  Cholecalciferol 4000 units capsule, Take 4,000 Units by mouth Daily., Disp: , Rfl:   •  dextromethorphan polistirex ER (DELSYM) 30 MG/5ML Suspension Extended Release oral suspension, Take 30 mg by mouth 2 (Two) Times a Day., Disp: , Rfl:   •  fluticasone (FLONASE) 50 MCG/ACT nasal spray, 2 sprays into each nostril daily., Disp: 1 bottle, Rfl: 6  •  guaiFENesin (MUCINEX) 600 MG 12 hr tablet, Take 1,200 mg by mouth As Needed., Disp: , Rfl:   •  losartan (COZAAR) 50 MG tablet, TAKE ONE TABLET BY MOUTH EVERY MORNING, Disp: 90 tablet, Rfl: 0  •  lovastatin (MEVACOR) 10 MG tablet, Take 1 tablet by mouth Every Night., Disp: 90 tablet, Rfl: 1  •  omeprazole (PriLOSEC) 20 MG capsule, Take 20 mg by mouth Daily. Patient is taking on Monday, Wednesday, and Friday, Disp: , Rfl:   •  PREMARIN 0.625 MG/GM vaginal cream, , Disp: , Rfl:     Review of Systems   Constitutional: Negative for activity change, chills and fatigue.   HENT: Negative for  "congestion, drooling, facial swelling, nosebleeds, rhinorrhea and sneezing.    Eyes: Negative for photophobia, pain and discharge.   Respiratory: Negative for apnea, choking and shortness of breath.    Cardiovascular: Negative for chest pain and palpitations.        Patient has left bundle branch block   Gastrointestinal: Negative for abdominal distention, abdominal pain (patient has right epigastric discomfort after eating), anal bleeding, constipation and nausea.   Endocrine: Negative for cold intolerance and polydipsia.   Genitourinary: Positive for difficulty urinating (has to sit for several minutes), frequency and urgency. Negative for dysuria and hematuria.   Musculoskeletal: Positive for arthralgias, back pain, myalgias and neck pain. Negative for joint swelling.   Skin: Negative for color change.   Allergic/Immunologic: Negative for environmental allergies and food allergies.   Neurological: Negative for dizziness, tremors, speech difficulty, numbness and headaches.   Psychiatric/Behavioral: Negative for agitation, confusion and dysphoric mood. The patient is not hyperactive.      Breast ROS: negative    Objective      /74  Ht 62\" (157.5 cm)  Wt 160 lb (72.6 kg)  LMP  (LMP Unknown)  BMI 29.26 kg/m2      Physical Exam   Constitutional: She is oriented to person, place, and time. She appears well-developed and well-nourished.   Left bundle branch block    HENT:   Head: Normocephalic and atraumatic.   Eyes: Conjunctivae are normal. Right eye exhibits no discharge. Left eye exhibits no discharge. No scleral icterus.   Neck: Normal range of motion. Neck supple. No tracheal deviation present. No thyromegaly present.   Cardiovascular: Normal rate and regular rhythm.  Exam reveals no friction rub.    No murmur heard.  Uterovaginal prolaps  Paravaginal weakness   Pulmonary/Chest: Effort normal and breath sounds normal. No respiratory distress. She has no wheezes. She has no rales. Left breast exhibits no " inverted nipple, no mass, no nipple discharge, no skin change and no tenderness.   Right breast absent  Breast Can 15 years ago   Abdominal: Soft. She exhibits no distension and no mass. There is no tenderness. There is no guarding. Hernia confirmed negative in the right inguinal area and confirmed negative in the left inguinal area.   Genitourinary: Rectal exam shows no mass. There is no rash, tenderness or lesion on the right labia. There is no rash, tenderness or lesion on the left labia. Uterus is not deviated, not fixed and not tender. Cervix exhibits no motion tenderness, no discharge and no friability. Right adnexum displays no mass and no tenderness. Left adnexum displays no mass and no tenderness. No erythema in the vagina. No foreign body in the vagina. No signs of injury around the vagina. No vaginal discharge found.   Genitourinary Comments: B/S/U  Without lesions  Urethra  Normal  Perineum  Normal  Urethral meatus  Normal  Vagina  Cystocele    Rectocele   Paravaginal weakness  Uterine prolaps  Rectum  No masses  Bladder  nontender   Neurological: She is alert and oriented to person, place, and time.   Skin: Skin is warm and dry. No erythema.   Psychiatric: She has a normal mood and affect. Her behavior is normal. Judgment and thought content normal. Her mood appears not anxious. Her affect is not blunt, not labile and not inappropriate. She does not exhibit a depressed mood.   Nursing note and vitals reviewed.       Assessment/Plan         Encounter for screening mammogram for malignant neoplasm of breast  -     Mammo Screening Digital Tomosynthesis Bilateral With CAD    Well woman exam with routine gynecological exam    Cystocele, midline    Rectocele    Uterine prolapse    Personal history of breast cancer  PATIENT WILL SEE DR. BARR IN A FEW MONTHS FOR PELVIC PROLAPS REPAIR    Patient is counseled re: BSE, diet, exercise, mammogram, calcium and Vit. D3          Sabiha Pang  APRN  11/8/2017

## 2017-11-10 ENCOUNTER — HOSPITAL ENCOUNTER (OUTPATIENT)
Dept: PHYSICAL THERAPY | Facility: HOSPITAL | Age: 67
Setting detail: THERAPIES SERIES
Discharge: HOME | End: 2017-11-10

## 2017-11-10 DIAGNOSIS — N39.3 FEMALE STRESS INCONTINENCE: ICD-10-CM

## 2017-11-10 DIAGNOSIS — N81.11 CYSTOCELE, MIDLINE: ICD-10-CM

## 2017-11-10 DIAGNOSIS — M54.50 ACUTE LEFT-SIDED LOW BACK PAIN WITHOUT SCIATICA: Primary | ICD-10-CM

## 2017-11-10 PROCEDURE — 97110 THERAPEUTIC EXERCISES: CPT

## 2017-11-10 NOTE — THERAPY TREATMENT NOTE
"    Outpatient Physical Therapy Ortho Treatment Note  [facility]     Patient Name: Cindi Hill  : 1950  MRN: [id number]  Today's Date: 11/10/2017      Visit Date: 11/10/2017     Subjective Improvement: \"hard to tell\"       Attendance: 3/3   Approved: Medical Necessity            MD follow up: Ask next visit  RC date: 17           Visit Dx:    ICD-10-CM ICD-9-CM   1. Acute left-sided low back pain without sciatica M54.5 724.2   2. Female stress incontinence N39.3 625.6   3. Cystocele, midline N81.11 618.01       Patient Active Problem List   Diagnosis   • GERD (gastroesophageal reflux disease)   • Cough   • Deviated nasal septum   • Allergic rhinitis due to pollen   • Sicca laryngitis   • Mixed hyperlipidemia   • Breast cancer   • Osteoarthritis of cervical spine   • Preop cardiovascular exam   • LBBB (left bundle branch block)   • Dyspnea on exertion   • Acute left-sided low back pain without sciatica        Past Medical History:   Diagnosis Date   • Acute left-sided low back pain without sciatica 10/24/2017   • Arthritis    • Breast cancer     , right breast mastectomy   • Drug therapy    • GERD (gastroesophageal reflux disease)    • Hypercholesteremia    • KARUNA (stress urinary incontinence, female)    • Urge incontinence    • Uterovaginal prolapse    • Vaginal atrophy         Past Surgical History:   Procedure Laterality Date   • BREAST BIOPSY     • BRONCHOSCOPY N/A 2017    Procedure: BRONCHOSCOPY BIOPSY WITH ANESTHESIA;  Surgeon: Adi Anderson MD;  Location:  [facility];  Service:    • CARDIAC CATHETERIZATION Left 2017    Procedure: Cardiac Catheterization/Vascular Study;  Surgeon: Anuj Johnson MD;  Location:  [facility] CATH INVASIVE LOCATION;  Service:    • COLONOSCOPY     • LAMINECTOMY  2016   • MASTECTOMY Bilateral    • ROTATOR CUFF REPAIR Right 2012             PT Ortho       11/10/17 0900    Subjective Comments    Subjective Comments Not having a " "lot of pain, but \"I can tell it is there.\" It 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   -MB    Precautions and Contraindications    Precautions/Limitations no known precautions/limitations  -MB    Precautions Hx breast cancer  -MB    Subjective Pain    Able to rate subjective pain? yes  -MB    Pre-Treatment Pain Level 1  -MB    Post-Treatment Pain Level 1  -MB      User Key  (r) = Recorded By, (t) = Taken By, (c) = Cosigned By    Initials Name Provider Type    SEVEN Spivey PTA Physical Therapy Assistant                            PT Assessment/Plan       11/10/17 1000       PT Assessment    Functional Limitations Limitation in home management;Limitations in community activities;Performance in leisure activities  -MB     Impairments Endurance;Joint mobility;Muscle strength;Pain;Poor body mechanics;Posture  -MB     Assessment Comments Improved performance with TA stabilizer today. Pt does have a hard time recruiting TA muscles to complete strong contraction.   -MB     Rehab Potential Good  -MB     Patient/caregiver participated in establishment of treatment plan and goals Yes  -MB     Patient would benefit from skilled therapy intervention Yes  -MB     PT Plan    PT Frequency 2x/week  -MB     Predicted Duration of Therapy Intervention (days/wks) 4-6 weeks  -MB     PT Plan Comments Continue stabilizer, TA education.  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   -MB      Subjective Pain    Able to rate subjective pain? yes  -MB      Pre-Treatment Pain Level 1  -MB      Post-Treatment Pain Level 1  -MB      Aquatics    Aquatics performed? No  -MB      Exercise 1    Exercise " "Name 1 PRO II ROM/End  -MB      Time (Minutes) 1 8  -MB      Exercise 2    Exercise Name 2 Supine piriformis stretch  -MB      Sets 2 3  -MB      Time (Seconds) 2 30 sec  -MB      Exercise 3    Exercise Name 3 SKTC S  -MB      Reps 3 3  -MB      Time (Seconds) 3 30  -MB      Exercise 4    Exercise Name 4 LTR feet on ball  -MB      Sets 4 2  -MB      Reps 4 10  -MB      Exercise 5    Exercise Name 5 TA education with stabilizer  -MB      Time (Minutes) 5 5'  -MB      Exercise 6    Exercise Name 6 Hooklying Clamshells + TA  -MB      Sets 6 2  -MB      Reps 6 10  -MB      Additional Comments Red  -MB      Exercise 7    Exercise Name 7 HL TA with hip add  -MB      Sets 7 2  -MB      Reps 7 10  -MB      Time (Seconds) 7 5\" hold  -MB        User Key  (r) = Recorded By, (t) = Taken By, (c) = Cosigned By    Initials Name Provider Type    SEVEN Spivey, PTA Physical Therapy Assistant                               PT OP Goals       11/10/17 1000       PT Short Term Goals    STG Date to Achieve 11/21/17  -MB     STG 1 IND and compliant with HEP  -MB     STG 2 Patient will be able to perform TA iso for 10 seconds without cuing  -MB     STG 3 Patient will increase lumbar flexion to 4 cm fingertip to floor  -MB     STG 4 Patient will have 4-/5 B hip flexor strength  -MB     STG 5 Patient will increase L hip abduction strength to 4-/5  -MB     Long Term Goals    LTG Date to Achieve 12/12/17  -MB     LTG 1 Patient will reduce Modified OSWESTRY to less than 10%  -MB     LTG 2 Patient will demonstrate proper lifting technique floor to waist 15#  -MB     LTG 3 Patient will increase B hip flexor strength to 4/5  -MB     LTG 4 Patient will increase L hip abd strength to 4/5  -MB     LTG 5 Patient be able to tolerate 45 minute treatment session with no greater than 2 point increase in pain on VAS.  -MB     Time Calculation    PT Goal Re-Cert Due Date 11/21/17  -MB       User Key  (r) = Recorded By, (t) = Taken By, (c) = Cosigned By "    Initials Name Provider Type    SEVEN Spivey PTA Physical Therapy Assistant                Therapy Education       11/10/17 1000          Therapy Education    Given HEP;Symptoms/condition management;Pain management;Posture/body mechanics  -MB      Program Progressed  -MB      How Provided Verbal;Demonstration;Written  -MB      Provided to Patient  -MB      Level of Understanding Verbalized;Demonstrated  -MB        User Key  (r) = Recorded By, (t) = Taken By, (c) = Cosigned By    Initials Name Provider Type    SEVEN Spivey PTA Physical Therapy Assistant                Time Calculation:   Start Time: 0933  Stop Time: 1018  Time Calculation (min): 45 min  Total Timed Code Minutes- PT: 45 minute(s)    Therapy Charges for Today     Code Description Service Date Service Provider Modifiers Qty    32702866158 HC PT THER PROC EA 15 MIN 11/10/2017 Kaushal Spivey PTA GP 3                    Kaushal Spivey PTA  11/10/2017

## 2017-11-14 ENCOUNTER — HOSPITAL ENCOUNTER (OUTPATIENT)
Dept: PHYSICAL THERAPY | Facility: HOSPITAL | Age: 67
Setting detail: THERAPIES SERIES
Discharge: HOME | End: 2017-11-14

## 2017-11-14 DIAGNOSIS — N39.3 FEMALE STRESS INCONTINENCE: ICD-10-CM

## 2017-11-14 DIAGNOSIS — N81.11 CYSTOCELE, MIDLINE: ICD-10-CM

## 2017-11-14 DIAGNOSIS — M54.50 ACUTE LEFT-SIDED LOW BACK PAIN WITHOUT SCIATICA: Primary | ICD-10-CM

## 2017-11-14 PROCEDURE — 97110 THERAPEUTIC EXERCISES: CPT

## 2017-11-14 NOTE — THERAPY TREATMENT NOTE
"    Outpatient Physical Therapy Ortho Treatment Note  [facility]  Savanah Reyna, PTA  17  11:07 AM       Patient Name: Cindi Hill  : 1950  MRN: [id number]  Today's Date: 2017      Visit Date: 2017    Subjective Improvement: \"hard to tell\"       Attendance:    Approved: medical necessity           MD follow up: ?            date: 17         Visit Dx:    ICD-10-CM ICD-9-CM   1. Acute left-sided low back pain without sciatica M54.5 724.2   2. Female stress incontinence N39.3 625.6   3. Cystocele, midline N81.11 618.01       Patient Active Problem List   Diagnosis   • GERD (gastroesophageal reflux disease)   • Cough   • Deviated nasal septum   • Allergic rhinitis due to pollen   • Sicca laryngitis   • Mixed hyperlipidemia   • Breast cancer   • Osteoarthritis of cervical spine   • Preop cardiovascular exam   • LBBB (left bundle branch block)   • Dyspnea on exertion   • Acute left-sided low back pain without sciatica        Past Medical History:   Diagnosis Date   • Acute left-sided low back pain without sciatica 10/24/2017   • Arthritis    • Breast cancer     , right breast mastectomy   • Drug therapy    • GERD (gastroesophageal reflux disease)    • Hypercholesteremia    • KARUNA (stress urinary incontinence, female)    • Urge incontinence    • Uterovaginal prolapse    • Vaginal atrophy         Past Surgical History:   Procedure Laterality Date   • BREAST BIOPSY     • BRONCHOSCOPY N/A 2017    Procedure: BRONCHOSCOPY BIOPSY WITH ANESTHESIA;  Surgeon: Adi Anderson MD;  Location:  [facility];  Service:    • CARDIAC CATHETERIZATION Left 2017    Procedure: Cardiac Catheterization/Vascular Study;  Surgeon: Anuj Johnson MD;  Location: [facility] CATH INVASIVE LOCATION;  Service:    • COLONOSCOPY     • LAMINECTOMY  2016   • MASTECTOMY Bilateral    • ROTATOR CUFF REPAIR Right 2012             PT Ortho       17 1000    Subjective " Comments    Subjective Comments pt states that since MD has gave her a steroid shot the back pain is a lot better,  -ANTONIA    Precautions and Contraindications    Precautions/Limitations no known precautions/limitations  -ANTONIA    Precautions Hx breast cancer  -ANTONIA    Subjective Pain    Able to rate subjective pain? yes  -ANTONIA    Pre-Treatment Pain Level 2  -ANTONIA      User Key  (r) = Recorded By, (t) = Taken By, (c) = Cosigned By    Initials Name Provider Type    ANTONIA Reyna PTA Physical Therapy Assistant                            PT Assessment/Plan       11/14/17 1000       PT Assessment    Functional Limitations Limitation in home management;Limitations in community activities;Performance in leisure activities  -ANTONIA     Impairments Endurance;Joint mobility;Muscle strength;Pain;Poor body mechanics;Posture  -ANTONIA     Assessment Comments pt able to acheive good TA contraction with minimal movement on the cuff stabilizer. Some pain noted in L leg with HS S and with pifiromis S  -ANTONIA     Rehab Potential Good  -ANTONIA     Patient/caregiver participated in establishment of treatment plan and goals Yes  -ANTONIA     Patient would benefit from skilled therapy intervention Yes  -ANTONIA     PT Plan    PT Frequency 2x/week  -ANTONIA     Predicted Duration of Therapy Intervention (days/wks) 4-6 weeks  -ANTONIA     PT Plan Comments Possible dynadisc  -ANTONIA       User Key  (r) = Recorded By, (t) = Taken By, (c) = Cosigned By    Initials Name Provider Type    ANTONIA Reyna PTA Physical Therapy Assistant                    Exercises       11/14/17 1000          Subjective Comments    Subjective Comments pt states that since MD has gave her a steroid shot the back pain is a lot better,  -ANTONIA      Subjective Pain    Able to rate subjective pain? yes  -ANTONIA      Pre-Treatment Pain Level 2  -ANTONIA      Post-Treatment Pain Level 3  -ANTONIA      Aquatics    Aquatics performed? No  -ANTONIA      Exercise 1    Exercise Name 1 Recumbant bike  -ANTONIA      Time (Minutes) 1 8  min  -ANTONIA      Additional Comments L2.0  -ANTONIA      Exercise 2    Exercise Name 2 TA education  -ANTONIA      Time (Minutes) 2 5 min  -ANTONIA      Additional Comments w/ stabilizer  -ANTONIA      Exercise 3    Exercise Name 3 Supine piriformis S  -ANTONIA      Reps 3 3  -ANTONIA      Time (Seconds) 3 30 sec hold  -ANTONIA      Exercise 4    Exercise Name 4 DKTC on physioball  -ANTONIA      Reps 4 3  -ANTONIA      Time (Seconds) 4 30 sec hold  -ANTONIA      Exercise 5    Exercise Name 5 LTR feet on pball  -ANTONIA      Sets 5 2  -ANTONIA      Reps 5 10  -ANTONIA      Exercise 6    Exercise Name 6 Hooklying Clamshells + TA  -ANTONIA      Sets 6 2  -ANTONIA      Reps 6 10  -ANTONIA      Additional Comments red  -ANTONIA      Exercise 7    Exercise Name 7 Hip add squeezes + TA  -ANTONIA      Sets 7 2  -ANTONIA      Reps 7 10  -ANTONIA      Time (Seconds) 7 5 sec hold  -ANTONIA        User Key  (r) = Recorded By, (t) = Taken By, (c) = Cosigned By    Initials Name Provider Type    ANTONIA Reyna, PTA Physical Therapy Assistant                               PT OP Goals       11/14/17 1000       PT Short Term Goals    STG Date to Achieve 11/21/17  -ANTONIA     STG 1 IND and compliant with HEP  -ANTONIA     STG 1 Progress Ongoing  -ANTONIA     STG 2 Patient will be able to perform TA iso for 10 seconds without cuing  -     STG 2 Progress Ongoing  -ANTONIA     STG 3 Patient will increase lumbar flexion to 4 cm fingertip to floor  -     STG 3 Progress Ongoing  -ANTONIA     STG 4 Patient will have 4-/5 B hip flexor strength  -     STG 4 Progress Ongoing  -ANTONIA     STG 5 Patient will increase L hip abduction strength to 4-/5  -ANTONIA     STG 5 Progress Ongoing  -     Long Term Goals    LTG Date to Achieve 12/12/17  -ANTONIA     LTG 1 Patient will reduce Modified OSWESTRY to less than 10%  -     LTG 1 Progress Ongoing  -ANTONIA     LTG 2 Patient will demonstrate proper lifting technique floor to waist 15#  -     LTG 2 Progress Ongoing  -ANTONIA     LTG 3 Patient will increase B hip flexor strength to 4/5  -ANTONIA     LTG 3 Progress Ongoing  -     LTG 4  Patient will increase L hip abd strength to 4/5  -ANTONIA     LTG 4 Progress Ongoing  -ANTONIA     LTG 5 Patient be able to tolerate 45 minute treatment session with no greater than 2 point increase in pain on VAS.  -ANTONIA     LTG 5 Progress Ongoing  -ANTONIA     Time Calculation    PT Goal Re-Cert Due Date 11/21/17  -ANTONIA       User Key  (r) = Recorded By, (t) = Taken By, (c) = Cosigned By    Initials Name Provider Type    ANTONIA Reyna PTA Physical Therapy Assistant                Therapy Education       11/14/17 1000          Therapy Education    Given HEP;Symptoms/condition management;Pain management;Posture/body mechanics  -ANTONIA      Program Progressed  -ANTONIA      How Provided Verbal;Demonstration;Written  -ANTONIA      Provided to Patient  -ANTONIA      Level of Understanding Verbalized;Demonstrated  -ANTONIA        User Key  (r) = Recorded By, (t) = Taken By, (c) = Cosigned By    Initials Name Provider Type    ANTONIA Reyna PTA Physical Therapy Assistant                Time Calculation:   Start Time: 1015  Stop Time: 1100  Time Calculation (min): 45 min  Total Timed Code Minutes- PT: 45 minute(s)    Therapy Charges for Today     Code Description Service Date Service Provider Modifiers Qty    78554375260  PT THER PROC EA 15 MIN 11/14/2017 Savanah Reyna PTA GP 3                    Savanah Reyna PTA  11/14/2017

## 2017-11-15 ENCOUNTER — HOSPITAL ENCOUNTER (OUTPATIENT)
Dept: MAMMOGRAPHY | Facility: HOSPITAL | Age: 67
Discharge: HOME | End: 2017-11-15
Admitting: NURSE PRACTITIONER

## 2017-11-15 PROCEDURE — G0202 SCR MAMMO BI INCL CAD: HCPCS

## 2017-11-15 PROCEDURE — 77063 BREAST TOMOSYNTHESIS BI: CPT

## 2017-11-17 ENCOUNTER — HOSPITAL ENCOUNTER (OUTPATIENT)
Dept: PHYSICAL THERAPY | Facility: HOSPITAL | Age: 67
Setting detail: THERAPIES SERIES
Discharge: HOME | End: 2017-11-17

## 2017-11-17 ENCOUNTER — APPOINTMENT (OUTPATIENT)
Dept: PHYSICAL THERAPY | Facility: HOSPITAL | Age: 67
End: 2017-11-17

## 2017-11-17 DIAGNOSIS — M54.50 ACUTE LEFT-SIDED LOW BACK PAIN WITHOUT SCIATICA: Primary | ICD-10-CM

## 2017-11-17 DIAGNOSIS — N39.3 FEMALE STRESS INCONTINENCE: ICD-10-CM

## 2017-11-17 DIAGNOSIS — N81.11 CYSTOCELE, MIDLINE: ICD-10-CM

## 2017-11-17 PROCEDURE — 97110 THERAPEUTIC EXERCISES: CPT

## 2017-11-17 NOTE — THERAPY TREATMENT NOTE
"    Outpatient Physical Therapy Ortho Treatment Note  [facility] [place]  Savanah Reyna, PTA  17  11:17 AM       Patient Name: Cnidi Hill  : 1950  MRN: [id number]  Today's Date: 2017      Visit Date: 2017    Subjective Improvement: \"hard to say\"      Attendance:    Approved: medical necessity           MD follow up: ?            date: 17         Visit Dx:    ICD-10-CM ICD-9-CM   1. Acute left-sided low back pain without sciatica M54.5 724.2   2. Cystocele, midline N81.11 618.01   3. Female stress incontinence N39.3 625.6       Patient Active Problem List   Diagnosis   • GERD (gastroesophageal reflux disease)   • Cough   • Deviated nasal septum   • Allergic rhinitis due to pollen   • Sicca laryngitis   • Mixed hyperlipidemia   • Breast cancer   • Osteoarthritis of cervical spine   • Preop cardiovascular exam   • LBBB (left bundle branch block)   • Dyspnea on exertion   • Acute left-sided low back pain without sciatica        Past Medical History:   Diagnosis Date   • Acute left-sided low back pain without sciatica 10/24/2017   • Arthritis    • Breast cancer     , right breast mastectomy   • Drug therapy    • GERD (gastroesophageal reflux disease)    • Hypercholesteremia    • KARUNA (stress urinary incontinence, female)    • Urge incontinence    • Uterovaginal prolapse    • Vaginal atrophy         Past Surgical History:   Procedure Laterality Date   • BRONCHOSCOPY N/A 2017    Procedure: BRONCHOSCOPY BIOPSY WITH ANESTHESIA;  Surgeon: Adi Anderson MD;  Location:  [facility];  Service:    • CARDIAC CATHETERIZATION Left 2017    Procedure: Cardiac Catheterization/Vascular Study;  Surgeon: Anuj Johnson MD;  Location: [facility] CATH INVASIVE LOCATION;  Service:    • COLONOSCOPY     • LAMINECTOMY  2016   • MASTECTOMY Right    • ROTATOR CUFF REPAIR Right              PT Ortho       17 1000    Precautions and Contraindications "    Precautions/Limitations no known precautions/limitations  -ANTONIA    Precautions Hx breast cancer  -ANTONIA    Subjective Pain    Able to rate subjective pain? yes  -ANTONIA    Post-Treatment Pain Level 4  -      User Key  (r) = Recorded By, (t) = Taken By, (c) = Cosigned By    Initials Name Provider Type    ANTONIA Reyna PTA Physical Therapy Assistant                            PT Assessment/Plan       11/17/17 1000       PT Assessment    Functional Limitations Limitation in home management;Limitations in community activities;Performance in leisure activities  -     Impairments Endurance;Joint mobility;Muscle strength;Pain;Poor body mechanics;Posture  -ANTONIA     Assessment Comments pt did have some difficulty with TA  contraction plus hip add squeezes. pt was wanting to tilt her pelvis and flatten her back out but with multiple cueing contraction did get better.   -ANTONIA     Rehab Potential Good  -ANTONIA     Patient/caregiver participated in establishment of treatment plan and goals Yes  -ANTONIA     Patient would benefit from skilled therapy intervention Yes  -ANTONIA     PT Plan    PT Frequency 2x/week  -ANTONIA     Predicted Duration of Therapy Intervention (days/wks) 4-6 weeks  -ANTONIA     PT Plan Comments Seated sciatic nerve glide next  -       User Key  (r) = Recorded By, (t) = Taken By, (c) = Cosigned By    Initials Name Provider Type    ANTONIA Reyna PTA Physical Therapy Assistant                Modalities       11/17/17 1000          Moist Heat    MH Applied Yes  -ANTONIA      Location Lower back  -ANTONIA      Rx Minutes 10 mins  -ANTONIA      MH S/P Rx Yes  -ANTONIA        User Key  (r) = Recorded By, (t) = Taken By, (c) = Cosigned By    Initials Name Provider Type    ANTONIA Reyna PTA Physical Therapy Assistant                Exercises       11/17/17 1000          Subjective Comments    Subjective Comments pt reports that after she left therapy last visit- she went to [facility] and by the time that she went to [facility] and by the  time she got her groceries she was in a lot of pain. States that her pain was right on both of her hip bones.  -ANTONIA      Subjective Pain    Able to rate subjective pain? yes  -ANTONIA      Pre-Treatment Pain Level 2  -ANTONIA      Post-Treatment Pain Level 4  -ANTONIA      Aquatics    Aquatics performed? No  -ANTONIA      Exercise 1    Exercise Name 1 PRO II for ROM  -ANTONIA      Time (Minutes) 1 10 min  -ANTONIA      Additional Comments L3.0  -ANTONIA      Exercise 2    Exercise Name 2 HS S  -ANTONIA      Reps 2 2  -ANTONIA      Time (Seconds) 2 30 sec hold  -ANTONIA      Exercise 3    Exercise Name 3 Dynadisc: TA + march  -ANTONIA      Sets 3 2  -ANTONIA      Reps 3 10  -ANTONIA      Exercise 4    Exercise Name 4 Dynadisc: TA + LAQ  -ANTONIA      Sets 4 2  -ANTONIA      Reps 4 10  -ANTONIA      Exercise 5    Exercise Name 5 DKTC S w/ physioball  -ANTONIA      Sets 5 2  -ANTONIA      Reps 5 10  -ANTONIA      Exercise 6    Exercise Name 6 Piriformis S  -ANTONIA      Reps 6 3  -ANTONIA      Time (Seconds) 6 30 sec hold  -ANTONIA      Exercise 7    Exercise Name 7 LTR on physioball  -ANTONIA      Sets 7 2  -ANTONIA      Reps 7 10  -ANTONIA      Time (Seconds) 7 5 sec hold  -ANTONIA      Exercise 8    Exercise Name 8 Hip add squeeze + TA  -ANTONIA      Sets 8 2  -ANTONIA      Reps 8 10  -ANTONIA      Exercise 9    Exercise Name 9 HL clams w/ tband  -ANTONIA      Sets 9 2  -ANTONIA      Reps 9 10  -ANTONIA      Additional Comments red  -ANTONIA        User Key  (r) = Recorded By, (t) = Taken By, (c) = Cosigned By    Initials Name Provider Type    ANTONIA Reyna, PTA Physical Therapy Assistant                               PT OP Goals       11/17/17 1000       PT Short Term Goals    STG Date to Achieve 11/21/17  -ANTONIA     STG 1 IND and compliant with HEP  -ANTONIA     STG 1 Progress Ongoing  -ANTONIA     STG 2 Patient will be able to perform TA iso for 10 seconds without cuing  -ANTONIA     STG 2 Progress Ongoing  -ANTONIA     STG 3 Patient will increase lumbar flexion to 4 cm fingertip to floor  -ANTONIA     STG 3 Progress Ongoing  -ANTONIA     STG 4 Patient will have 4-/5 B hip flexor strength  -ANTONIA      STG 4 Progress Ongoing  -ANTONIA     STG 5 Patient will increase L hip abduction strength to 4-/5  -ANTONIA     STG 5 Progress Ongoing  -ANTONIA     Long Term Goals    LTG Date to Achieve 12/12/17  -ANTONIA     LTG 1 Patient will reduce Modified OSWESTRY to less than 10%  -     LTG 1 Progress Ongoing  -ANTONIA     LTG 2 Patient will demonstrate proper lifting technique floor to waist 15#  -ANTONIA     LTG 2 Progress Ongoing  -ANTONIA     LTG 3 Patient will increase B hip flexor strength to 4/5  -ANTONIA     LTG 3 Progress Ongoing  -ANTONIA     LTG 4 Patient will increase L hip abd strength to 4/5  -ANTONIA     LTG 4 Progress Ongoing  -ANTONIA     LTG 5 Patient be able to tolerate 45 minute treatment session with no greater than 2 point increase in pain on VAS.  -     LTG 5 Progress Ongoing  -ANTONIA     Time Calculation    PT Goal Re-Cert Due Date 11/21/17  -ANTONIA       User Key  (r) = Recorded By, (t) = Taken By, (c) = Cosigned By    Initials Name Provider Type    ANTONIA Reyna PTA Physical Therapy Assistant                Therapy Education       11/17/17 1000          Therapy Education    Given HEP;Symptoms/condition management;Pain management;Posture/body mechanics  -ANTONIA      Program Progressed  -ANTONIA      How Provided Verbal;Demonstration;Written  -ANTONIA      Provided to Patient  -ANTONIA      Level of Understanding Verbalized;Demonstrated  -ANTONIA        User Key  (r) = Recorded By, (t) = Taken By, (c) = Cosigned By    Initials Name Provider Type    ANTONIA Reyna PTA Physical Therapy Assistant                Time Calculation:   Start Time: 1015  Stop Time: 1115  Time Calculation (min): 60 min  Total Timed Code Minutes- PT: 45 minute(s)    Therapy Charges for Today     Code Description Service Date Service Provider Modifiers Qty    57461807331 HC PT THER PROC EA 15 MIN 11/17/2017 Savanah Reyna PTA GP 3    21219033146 HC PT THER SUPP EA 15 MIN 11/17/2017 Savanah Reyna PTA GP 1                    Savanah Reyna PTA  11/17/2017

## 2017-11-22 ENCOUNTER — HOSPITAL ENCOUNTER (OUTPATIENT)
Dept: PHYSICAL THERAPY | Facility: HOSPITAL | Age: 67
Setting detail: THERAPIES SERIES
Discharge: HOME | End: 2017-11-22

## 2017-11-22 DIAGNOSIS — M54.50 ACUTE LEFT-SIDED LOW BACK PAIN WITHOUT SCIATICA: Primary | ICD-10-CM

## 2017-11-22 PROCEDURE — G8979 MOBILITY GOAL STATUS: HCPCS

## 2017-11-22 PROCEDURE — 97110 THERAPEUTIC EXERCISES: CPT

## 2017-11-22 PROCEDURE — G8978 MOBILITY CURRENT STATUS: HCPCS

## 2017-11-22 NOTE — THERAPY PROGRESS REPORT/RE-CERT
"    Outpatient Physical Therapy Ortho Progress Note  [facility]     Patient Name: Cindi Hill  : 1950  MRN: [id number]  Today's Date: 2017      Subjective Improvement: \"hard to say\"      Attendance:    Approved: medical necessity           MD follow up: prn         RC date: 17       Visit Date: 2017    Patient Active Problem List   Diagnosis   • GERD (gastroesophageal reflux disease)   • Cough   • Deviated nasal septum   • Allergic rhinitis due to pollen   • Sicca laryngitis   • Mixed hyperlipidemia   • Breast cancer   • Osteoarthritis of cervical spine   • Preop cardiovascular exam   • LBBB (left bundle branch block)   • Dyspnea on exertion   • Acute left-sided low back pain without sciatica        Past Medical History:   Diagnosis Date   • Acute left-sided low back pain without sciatica 10/24/2017   • Arthritis    • Breast cancer     , right breast mastectomy   • Drug therapy    • GERD (gastroesophageal reflux disease)    • Hypercholesteremia    • KARUNA (stress urinary incontinence, female)    • Urge incontinence    • Uterovaginal prolapse    • Vaginal atrophy         Past Surgical History:   Procedure Laterality Date   • BRONCHOSCOPY N/A 2017    Procedure: BRONCHOSCOPY BIOPSY WITH ANESTHESIA;  Surgeon: Adi Anderson MD;  Location:  [facility];  Service:    • CARDIAC CATHETERIZATION Left 2017    Procedure: Cardiac Catheterization/Vascular Study;  Surgeon: Anuj Johnson MD;  Location: [facility] CATH INVASIVE LOCATION;  Service:    • COLONOSCOPY     • LAMINECTOMY  2016   • MASTECTOMY Right    • ROTATOR CUFF REPAIR Right        Visit Dx:     ICD-10-CM ICD-9-CM   1. Acute left-sided low back pain without sciatica M54.5 724.2                 PT Ortho       17 0900    Precautions and Contraindications    Precautions/Limitations no known precautions/limitations  -NH    Precautions Hx breast cancer  -NH    Subjective Pain    Able " to rate subjective pain? yes  -NH    Pre-Treatment Pain Level 4  -NH    Neural Tension Signs- Lower Quarter Clearing    SLR Bilateral:;Negative  -NH    Prone knee flexion Bilateral:;Negative  -NH    Lumbar ROM Screen- Lower Quarter Clearing    Lumbar Flexion Impaired   8 inch to fingertip to floor  -NH    Lumbar Extension Normal  -NH    Lumbar Lateral Flexion Normal  -NH    SI/Hip Screen- Lower Quarter Clearing    Yuni's/Anuj's test Bilateral:;Positive  -NH    Left Hip    Hip Flexion Gross Movement (3+/5) fair plus  -NH    Hip Extension Gross Movement (4-/5) good minus  -NH    Hip ABduction Gross Movement (4-/5) good minus  -NH    Hip ADduction Gross Movement (4/5) good  -NH    Right Hip    Hip Flexion Gross Movement (4-/5) good minus  -NH    Hip Extension Gross Movement (4/5) good  -NH    Hip ABduction Gross Movement (4/5) good  -NH    Hip ADduction Gross Movement (4/5) good  -NH    Left Knee    Knee Extension Gross Movement (4/5) good  -NH    Knee Flexion Gross Movement (4/5) good  -NH    Right Knee    Knee Extension Gross Movement (4/5) good  -NH    Knee Flexion Gross Movement (4/5) good  -NH    Lower Extremity Flexibility    Hamstrings Bilateral:;Mildly limited  -NH    Hip Flexors Bilateral:;Mildly limited  -NH    Quadriceps Bilateral:;Mildly limited  -NH      User Key  (r) = Recorded By, (t) = Taken By, (c) = Cosigned By    Initials Name Provider Type    NH Samara Santamaria, PT Physical Therapist                            Therapy Education       11/22/17 0900          Therapy Education    Given HEP;Symptoms/condition management;Pain management;Posture/body mechanics  -NH      Program Progressed  -NH      How Provided Verbal;Demonstration;Written  -NH      Provided to Patient  -NH      Level of Understanding Verbalized;Demonstrated  -NH        User Key  (r) = Recorded By, (t) = Taken By, (c) = Cosigned By    Initials Name Provider Type    NH Samara Santamaria PT Physical Therapist                PT OP Goals        11/22/17 1000 11/22/17 0900    PT Short Term Goals    STG Date to Achieve  11/21/17  -NH    STG 1  IND and compliant with HEP  -NH    STG 1 Progress  Met  -NH    STG 2  Patient will be able to perform TA iso for 10 seconds without cuing  -NH    STG 2 Progress  Progressing  -NH    STG 3  Patient will increase lumbar flexion to 4 cm fingertip to floor  -NH    STG 3 Progress  Ongoing  -NH    STG 4  Patient will have 4-/5 B hip flexor strength  -NH    STG 4 Progress  Progressing  -NH    STG 5  Patient will increase L hip abduction strength to 4-/5  -NH    STG 5 Progress  Met  -NH    Long Term Goals    LTG Date to Achieve  12/12/17  -NH    LTG 1  Patient will reduce Modified OSWESTRY to less than 10%  -NH    LTG 1 Progress  Ongoing  -NH    LTG 2  Patient will demonstrate proper lifting technique floor to waist 15#  -NH    LTG 2 Progress  Ongoing  -NH    LTG 3  Patient will increase B hip flexor strength to 4/5  -NH    LTG 3 Progress  Ongoing  -NH    LTG 4  Patient will increase L hip abd strength to 4/5  -NH    LTG 4 Progress  Ongoing  -NH    LTG 5  Patient be able to tolerate 45 minute treatment session with no greater than 2 point increase in pain on VAS.  -NH    LTG 5 Progress  Ongoing  -NH    Time Calculation    PT Goal Re-Cert Due Date 12/13/17  -NH       User Key  (r) = Recorded By, (t) = Taken By, (c) = Cosigned By    Initials Name Provider Type    NH Samara Santamaria, PT Physical Therapist                PT Assessment/Plan       11/22/17 1700       PT Assessment    Functional Limitations Limitation in home management;Limitations in community activities;Performance in leisure activities  -NH     Impairments Endurance;Joint mobility;Muscle strength;Pain;Poor body mechanics;Posture  -NH     Assessment Comments Patient progressing slowly with physical therapy due to pain levels. Patient has difficulty with transverse ab exercises that may be involved with Hx of pelvic floor dysfunction. Patient's radicular  symptosm occur primarily with seated or flexed position so therapy is now trialing Cleopatra extension based exercises to relieve. Patient would benefit from continued skilled physical therapy to address deficit and return to PLOF.  -NH     Rehab Potential Good  -NH     Patient/caregiver participated in establishment of treatment plan and goals Yes  -NH     Patient would benefit from skilled therapy intervention Yes  -NH     PT Plan    PT Frequency 2x/week  -NH     Predicted Duration of Therapy Intervention (days/wks) 4 weeks  -NH     PT Plan Comments F/U result of Cleopatra extension exercises for radicular relief.  -NH       User Key  (r) = Recorded By, (t) = Taken By, (c) = Cosigned By    Initials Name Provider Type    NH Samara Santamaria, PT Physical Therapist                  Exercises       11/22/17 0900          Subjective Comments    Subjective Comments Patient reports she has been doing a lot of activity lately to get ready for the holidays and that has made her pain worse. It is hard to  whether or not therapy is helping because of that. States the pain is always worse when sitting or driving. States the pain has gotten worse in the front of her hips  -NH      Subjective Pain    Able to rate subjective pain? yes  -NH      Pre-Treatment Pain Level 4  -NH      Aquatics    Aquatics performed? No  -NH      Exercise 1    Exercise Name 1 Prone on elbows  -NH      Time (Minutes) 1 1 min  -NH      Additional Comments Instructed to trial at home and monitor radicular pain  -NH      Exercise 2    Exercise Name 2 Prone quad stretch with strap  -NH      Sets 2 2  -NH      Time (Seconds) 2 30  -NH      Exercise 3    Exercise Name 3 Piriformis S- Mod HL push and pull knee  -NH      Sets 3 2  -NH      Time (Seconds) 3 30  -NH      Additional Comments each way  -NH      Exercise 4    Exercise Name 4 TA with march  -NH      Sets 4 2  -NH      Reps 4 10  -NH      Exercise 5    Exercise Name 5 TA with LAQ  -NH      Sets 5  2  -NH      Reps 5 10  -NH      Exercise 6    Exercise Name 6 TA with hip add supine  -NH      Sets 6 2  -NH      Reps 6 10  -NH        User Key  (r) = Recorded By, (t) = Taken By, (c) = Cosigned By    Initials Name Provider Type    NH Samara Santamaria, PT Physical Therapist                              Outcome Measures       11/22/17 0900          Modified Oswestry    Modified Oswestry Score/Comments 26%  -NH      Functional Assessment    Outcome Measure Options Modifed Owestry  -NH        User Key  (r) = Recorded By, (t) = Taken By, (c) = Cosigned By    Initials Name Provider Type    NH Samara Santamaria, PT Physical Therapist            Time Calculation:   Start Time: 0930  Stop Time: 1020  Time Calculation (min): 50 min  Total Timed Code Minutes- PT: 50 minute(s)     Therapy Charges for Today     Code Description Service Date Service Provider Modifiers Qty    22744594850 HC PT MOBILITY CURRENT 11/22/2017 Samara Santamaria, PT GP, CK 1    24485610824 HC PT MOBILITY PROJECTED 11/22/2017 Samara Santamaria, PT GP, CI 1    56065619682 HC PT THER PROC EA 15 MIN 11/22/2017 Samara Santamaria, PT GP 3          PT G-Codes  PT Professional Judgement Used?: Yes  Outcome Measure Options: Modifed Owestry  Score: 26%  Functional Limitation: Mobility: Walking and moving around  Mobility: Walking and Moving Around Current Status (): At least 40 percent but less than 60 percent impaired, limited or restricted  Mobility: Walking and Moving Around Goal Status (): At least 1 percent but less than 20 percent impaired, limited or restricted         Samara Santamaria, PT  11/22/2017

## 2017-11-27 ENCOUNTER — HOSPITAL ENCOUNTER (OUTPATIENT)
Dept: PHYSICAL THERAPY | Facility: HOSPITAL | Age: 67
Setting detail: THERAPIES SERIES
Discharge: HOME | End: 2017-11-27

## 2017-11-27 DIAGNOSIS — N81.11 CYSTOCELE, MIDLINE: ICD-10-CM

## 2017-11-27 DIAGNOSIS — M54.50 ACUTE LEFT-SIDED LOW BACK PAIN WITHOUT SCIATICA: Primary | ICD-10-CM

## 2017-11-27 DIAGNOSIS — N39.3 FEMALE STRESS INCONTINENCE: ICD-10-CM

## 2017-11-27 PROCEDURE — 97110 THERAPEUTIC EXERCISES: CPT

## 2017-11-27 NOTE — THERAPY TREATMENT NOTE
"    Outpatient Physical Therapy Ortho Treatment Note  [facility]  Savanah Reyna, PTA  17  4:57 PM       Patient Name: Cindi Hill  : 1950  MRN: [id number]  Today's Date: 2017      Visit Date: 2017    Subjective Improvement: \"hard to say\"       Attendance:    Approved: medical necessity           MD follow up: PRN            date: 17         Visit Dx:    ICD-10-CM ICD-9-CM   1. Acute left-sided low back pain without sciatica M54.5 724.2   2. Cystocele, midline N81.11 618.01   3. Female stress incontinence N39.3 625.6       Patient Active Problem List   Diagnosis   • GERD (gastroesophageal reflux disease)   • Cough   • Deviated nasal septum   • Allergic rhinitis due to pollen   • Sicca laryngitis   • Mixed hyperlipidemia   • Breast cancer   • Osteoarthritis of cervical spine   • Preop cardiovascular exam   • LBBB (left bundle branch block)   • Dyspnea on exertion   • Acute left-sided low back pain without sciatica        Past Medical History:   Diagnosis Date   • Acute left-sided low back pain without sciatica 10/24/2017   • Arthritis    • Breast cancer     , right breast mastectomy   • Drug therapy    • GERD (gastroesophageal reflux disease)    • Hypercholesteremia    • KARUNA (stress urinary incontinence, female)    • Urge incontinence    • Uterovaginal prolapse    • Vaginal atrophy         Past Surgical History:   Procedure Laterality Date   • BRONCHOSCOPY N/A 2017    Procedure: BRONCHOSCOPY BIOPSY WITH ANESTHESIA;  Surgeon: Adi Anderson MD;  Location:  [facility];  Service:    • CARDIAC CATHETERIZATION Left 2017    Procedure: Cardiac Catheterization/Vascular Study;  Surgeon: Anuj Johnson MD;  Location: [facility] CATH INVASIVE LOCATION;  Service:    • COLONOSCOPY     • LAMINECTOMY  2016   • MASTECTOMY Right    • ROTATOR CUFF REPAIR Right              PT Ortho       17 1600    Precautions and Contraindications "    Precautions/Limitations no known precautions/limitations  -ANTONIA    Precautions Hx breast cancer  -ANTONIA    Subjective Pain    Post-Treatment Pain Level 4  -ANTONIA      User Key  (r) = Recorded By, (t) = Taken By, (c) = Cosigned By    Initials Name Provider Type    ANTONIA Reyna PTA Physical Therapy Assistant                            PT Assessment/Plan       11/27/17 1600       PT Assessment    Functional Limitations Limitation in home management;Limitations in community activities;Performance in leisure activities  -ANTONIA     Impairments Endurance;Joint mobility;Muscle strength;Pain;Poor body mechanics;Posture  -ANTONIA     Assessment Comments pts R ASIS down and long. corrected with HS MET. Groin pain improved post MET.   -ANTONIA     Rehab Potential Good  -ANTONIA     Patient/caregiver participated in establishment of treatment plan and goals Yes  -ANTONIA     Patient would benefit from skilled therapy intervention Yes  -ANTONIA     PT Plan    PT Frequency 2x/week  -ANTONIA     Predicted Duration of Therapy Intervention (days/wks) 4 weeks  -ANTONIA     PT Plan Comments Possible ST hip abd. Monitor alignment  -ANTONIA       User Key  (r) = Recorded By, (t) = Taken By, (c) = Cosigned By    Initials Name Provider Type    ANTONIA Reyna PTA Physical Therapy Assistant                    Exercises       11/27/17 1600          Subjective Comments    Subjective Comments pt reports that most of her pain is now in her R hip/groin. pt states that she was out shopping and it felt like her leg gave out on her and she was barely able to walk. The more that she kept trying to walk the better it got.   -ANTONIA      Subjective Pain    Able to rate subjective pain? yes  -ANTONIA      Pre-Treatment Pain Level 4   R hip  -ANTONIA      Post-Treatment Pain Level 4  -ANTONIA      Aquatics    Aquatics performed? No  -ANTONIA      Exercise 1    Exercise Name 1 HS S  -ANTONIA      Reps 1 3  -ANTONIA      Time (Seconds) 1 30 sec hold  -ANTONIA      Exercise 2    Exercise Name 2 Alignment  -ANTONIA      Exercise 3     Exercise Name 3 Supine piriformis S  -ANTONIA      Reps 3 2  -ANTONIA      Time (Seconds) 3 30 sec hold  -ANTONIA      Exercise 4    Exercise Name 4 Prone quad S  -ANTONIA      Reps 4 3  -ANTONIA      Time (Seconds) 4 30 sec hold  -ANTONIA      Exercise 5    Exercise Name 5 Prone hip flexor S  -ANTONIA      Reps 5 3  -ANTONIA      Time (Seconds) 5 30 sec hold  -ANTONIA      Exercise 6    Exercise Name 6 MAXIMINO   -ANTONIA      Reps 6 10  -ANTONIA      Time (Seconds) 6 10 sec hold  -ANTONIA      Exercise 7    Exercise Name 7 Dynadisc: TA + march  -ANTONIA      Sets 7 2  -ANTONIA      Reps 7 10  -ANTONIA      Exercise 8    Exercise Name 8 Dynadisc: TA + LAQ  -ANTONIA      Sets 8 2  -ANTONIA      Reps 8 10  -ANTONIA      Exercise 9    Exercise Name 9 Bridge + hip add squeeze  -ANTONIA      Sets 9 2  -ANTONIA      Reps 9 10  -ANTONIA      Time (Seconds) 9 5 sec hold  -ANTONIA        User Key  (r) = Recorded By, (t) = Taken By, (c) = Cosigned By    Initials Name Provider Type    ANTONIA Reyna, PTA Physical Therapy Assistant                               PT OP Goals       11/27/17 1600       PT Short Term Goals    STG Date to Achieve 11/21/17  -ANTONIA     STG 1 IND and compliant with HEP  -ANTONIA     STG 1 Progress Met  -     STG 2 Patient will be able to perform TA iso for 10 seconds without cuing  -     STG 2 Progress Progressing  -     STG 3 Patient will increase lumbar flexion to 4 cm fingertip to floor  -     STG 3 Progress Ongoing  -     STG 4 Patient will have 4-/5 B hip flexor strength  -     STG 4 Progress Progressing  -     STG 5 Patient will increase L hip abduction strength to 4-/5  -ANTONIA     STG 5 Progress Met  -     Long Term Goals    LTG Date to Achieve 12/12/17  -ANTONIA     LTG 1 Patient will reduce Modified OSWESTRY to less than 10%  -     LTG 1 Progress Ongoing  -ANTONIA     LTG 2 Patient will demonstrate proper lifting technique floor to waist 15#  -     LTG 2 Progress Ongoing  -     LTG 3 Patient will increase B hip flexor strength to 4/5  -ANTONIA     LTG 3 Progress Ongoing  -     LTG 4 Patient will  increase L hip abd strength to 4/5  -ANTONIA     LTG 4 Progress Ongoing  -ANTONIA     LTG 5 Patient be able to tolerate 45 minute treatment session with no greater than 2 point increase in pain on VAS.  -ANTONIA     LTG 5 Progress Ongoing  -ANTONIA     Time Calculation    PT Goal Re-Cert Due Date 12/13/17  -ANTONIA       User Key  (r) = Recorded By, (t) = Taken By, (c) = Cosigned By    Initials Name Provider Type    ANTONIA Reyna PTA Physical Therapy Assistant                Therapy Education       11/27/17 1600          Therapy Education    Given HEP;Symptoms/condition management;Pain management;Posture/body mechanics  -ANTONIA      Program Progressed  -ANTONIA      How Provided Verbal;Demonstration;Written  -ANTONIA      Provided to Patient  -ANTONIA      Level of Understanding Verbalized;Demonstrated  -ANTONIA        User Key  (r) = Recorded By, (t) = Taken By, (c) = Cosigned By    Initials Name Provider Type    ANTONIA Reyna PTA Physical Therapy Assistant                Time Calculation:   Start Time: 1600  Stop Time: 1640  Time Calculation (min): 40 min  Total Timed Code Minutes- PT: 40 minute(s)    Therapy Charges for Today     Code Description Service Date Service Provider Modifiers Qty    80361836325  PT THER PROC EA 15 MIN 11/27/2017 Savanah Reyna PTA GP 3                    Savanah Reyna PTA  11/27/2017

## 2017-11-30 ENCOUNTER — APPOINTMENT (OUTPATIENT)
Dept: PHYSICAL THERAPY | Facility: HOSPITAL | Age: 67
End: 2017-11-30

## 2017-12-04 ENCOUNTER — HOSPITAL ENCOUNTER (OUTPATIENT)
Dept: PHYSICAL THERAPY | Facility: HOSPITAL | Age: 67
Setting detail: THERAPIES SERIES
Discharge: HOME | End: 2017-12-04

## 2017-12-04 DIAGNOSIS — M54.50 ACUTE LEFT-SIDED LOW BACK PAIN WITHOUT SCIATICA: Primary | ICD-10-CM

## 2017-12-04 PROCEDURE — 97110 THERAPEUTIC EXERCISES: CPT | Performed by: PHYSICAL THERAPIST

## 2017-12-04 NOTE — PROGRESS NOTES
Outpatient Physical Therapy Ortho Treatment Note  [facility]     Patient Name: Cindi Hill  : 1950  MRN: [id number]  Today's Date: 2017      Visit Date: 2017      Attendance 8   Authorized 10   Pre Rx pain 5   Post Rx pain 6   % improvement None   MD follow up PRN   Recert date 17           Visit Dx:    ICD-10-CM ICD-9-CM   1. Acute left-sided low back pain without sciatica M54.5 724.2       Patient Active Problem List   Diagnosis   • GERD (gastroesophageal reflux disease)   • Cough   • Deviated nasal septum   • Allergic rhinitis due to pollen   • Sicca laryngitis   • Mixed hyperlipidemia   • Breast cancer   • Osteoarthritis of cervical spine   • Preop cardiovascular exam   • LBBB (left bundle branch block)   • Dyspnea on exertion   • Acute left-sided low back pain without sciatica        Past Medical History:   Diagnosis Date   • Acute left-sided low back pain without sciatica 10/24/2017   • Arthritis    • Breast cancer     , right breast mastectomy   • Drug therapy    • GERD (gastroesophageal reflux disease)    • Hypercholesteremia    • KARUNA (stress urinary incontinence, female)    • Urge incontinence    • Uterovaginal prolapse    • Vaginal atrophy         Past Surgical History:   Procedure Laterality Date   • BRONCHOSCOPY N/A 2017    Procedure: BRONCHOSCOPY BIOPSY WITH ANESTHESIA;  Surgeon: Adi Anderson MD;  Location:  [facility];  Service:    • CARDIAC CATHETERIZATION Left 2017    Procedure: Cardiac Catheterization/Vascular Study;  Surgeon: Anuj Johnson MD;  Location: [facility] CATH INVASIVE LOCATION;  Service:    • COLONOSCOPY     • LAMINECTOMY  2016   • MASTECTOMY Right    • ROTATOR CUFF REPAIR Right              PT Ortho       17 1000    Precautions and Contraindications    Precautions/Limitations no known precautions/limitations  -DD    Precautions Hx breast cancer  -DD    Subjective Pain    Post-Treatment Pain Level 5  -DD     Posture/Observations    Posture/Observations Comments Left LLD, sacral base rotated.  -DD    Neural Tension Signs- Lower Quarter Clearing    SLR Bilateral:;Negative  -DD      User Key  (r) = Recorded By, (t) = Taken By, (c) = Cosigned By    Initials Name Provider Type    MEMO Love, PT Physical Therapist                            PT Assessment/Plan       12/04/17 1205       PT Assessment    Functional Limitations Impaired gait;Limitations in community activities;Performance in self-care ADL;Limitation in home management  -DD     Impairments Gait;Endurance;Pain;Muscle strength;Posture;Poor body mechanics;Range of motion  -DD     Assessment Comments Pt has biomechanical and deg changes in her spine. Core stabilization is best option of care  -DD     Please refer to paper survey for additional self-reported information No  -DD     Patient would benefit from skilled therapy intervention Yes  -DD     PT Plan    PT Frequency 2x/week  -DD     Predicted Duration of Therapy Intervention (days/wks) 2 weeks  -DD     PT Plan Comments Progress as able , [facility].  -DD       User Key  (r) = Recorded By, (t) = Taken By, (c) = Cosigned By    Initials Name Provider Type    MEMO Love, PT Physical Therapist                    Exercises       12/04/17 1000          Subjective Comments    Subjective Comments  needs a double bypass, she was at hospital with  him all last week and no exercises were done.  -DD      Subjective Pain    Able to rate subjective pain? yes  -DD      Pre-Treatment Pain Level 4  -DD      Post-Treatment Pain Level 5  -DD      Exercise 1    Exercise Name 1 Alignment  -DD      Additional Comments ME for Left < R LLD and post L sacral base  -DD      Exercise 2    Exercise Name 2 HLBKLL  -DD      Reps 2 20  -DD      Exercise 3    Exercise Name 3 trans abdominals with iso add  -DD      Reps 3 10  -DD      Additional Comments reviewd  -DD      Exercise 4    Exercise Name 4  Prone heel squeeze  -DD      Reps 4 20  -DD      Time (Seconds) 4 3  -DD      Exercise 5    Exercise Name 5 Pron Dble TKE with glute set  -DD      Reps 5 20  -DD      Time (Seconds) 5 3  -DD      Exercise 6    Exercise Name 6 Prone IR/ ER  -DD      Reps 6 20  -DD      Additional Comments yellow  -DD      Exercise 7    Exercise Name 7 Dynadisc: TA + march  -DD      Sets 7 2  -DD      Reps 7 10  -DD      Exercise 8    Exercise Name 8 Dynadisc: TA + LAQ  -DD      Sets 8 2  -DD      Reps 8 10  -DD      Exercise 9    Exercise Name 9 Seated hamstring stretch  -DD      Reps 9 3  -DD      Time (Seconds) 9 30  -DD        User Key  (r) = Recorded By, (t) = Taken By, (c) = Cosigned By    Initials Name Provider Type    DD Ivana Love, PT Physical Therapist                               PT OP Goals       12/04/17 1000       PT Short Term Goals    STG Date to Achieve 11/21/17  -DD     STG 1 IND and compliant with HEP  -DD     STG 1 Progress Met  -DD     STG 2 Patient will be able to perform TA iso for 10 seconds without cuing  -DD     STG 2 Progress Progressing  -DD     STG 3 Patient will increase lumbar flexion to 4 cm fingertip to floor  -DD     STG 3 Progress Ongoing  -DD     STG 4 Patient will have 4-/5 B hip flexor strength  -DD     STG 4 Progress Progressing  -DD     STG 5 Patient will increase L hip abduction strength to 4-/5  -DD     STG 5 Progress Met  -DD     Long Term Goals    LTG Date to Achieve 12/12/17  -DD     LTG 1 Patient will reduce Modified OSWESTRY to less than 10%  -DD     LTG 1 Progress Ongoing  -DD     LTG 2 Patient will demonstrate proper lifting technique floor to waist 15#  -DD     LTG 2 Progress Ongoing  -DD     LTG 3 Patient will increase B hip flexor strength to 4/5  -DD     LTG 3 Progress Ongoing  -DD     LTG 4 Patient will increase L hip abd strength to 4/5  -DD     LTG 4 Progress Ongoing  -DD     LTG 5 Patient be able to tolerate 45 minute treatment session with no greater than 2 point  increase in pain on VAS.  -DD     LTG 5 Progress Ongoing  -DD       User Key  (r) = Recorded By, (t) = Taken By, (c) = Cosigned By    Initials Name Provider Type    DD Ivana Love, PT Physical Therapist                    Time Calculation:   Start Time: 1012  Stop Time: 1055  Time Calculation (min): 43 min  Total Timed Code Minutes- PT: 43 minute(s)    Therapy Charges for Today     Code Description Service Date Service Provider Modifiers Qty    39245559060  PT THER PROC EA 15 MIN 12/4/2017 Ivana Love, PT GP 3                    Ivana Love, PT, ATC, DPT  12/4/2017

## 2017-12-06 ENCOUNTER — HOSPITAL ENCOUNTER (OUTPATIENT)
Dept: PHYSICAL THERAPY | Facility: HOSPITAL | Age: 67
Setting detail: THERAPIES SERIES
Discharge: HOME | End: 2017-12-06

## 2017-12-06 DIAGNOSIS — N81.11 CYSTOCELE, MIDLINE: ICD-10-CM

## 2017-12-06 DIAGNOSIS — N39.3 FEMALE STRESS INCONTINENCE: ICD-10-CM

## 2017-12-06 DIAGNOSIS — M54.50 ACUTE LEFT-SIDED LOW BACK PAIN WITHOUT SCIATICA: Primary | ICD-10-CM

## 2017-12-06 PROCEDURE — 97110 THERAPEUTIC EXERCISES: CPT

## 2017-12-06 NOTE — THERAPY DISCHARGE NOTE
Outpatient Physical Therapy Ortho Treatment Note/Discharge Summary  [facility], [place], operated by [facility]  Savanah Reyna, PTA  17  10:21 AM       Patient Name: Cindi Hill  : 1950  MRN: [id number]  Today's Date: 2017      Visit Date: 2017    Subjective Improvement: none       Attendance:   Approved: medicare MD follow up: PRN            date: N/A         Visit Dx:    ICD-10-CM ICD-9-CM   1. Acute left-sided low back pain without sciatica M54.5 724.2   2. Cystocele, midline N81.11 618.01   3. Female stress incontinence N39.3 625.6       Patient Active Problem List   Diagnosis   • GERD (gastroesophageal reflux disease)   • Cough   • Deviated nasal septum   • Allergic rhinitis due to pollen   • Sicca laryngitis   • Mixed hyperlipidemia   • Breast cancer   • Osteoarthritis of cervical spine   • Preop cardiovascular exam   • LBBB (left bundle branch block)   • Dyspnea on exertion   • Acute left-sided low back pain without sciatica        Past Medical History:   Diagnosis Date   • Acute left-sided low back pain without sciatica 10/24/2017   • Arthritis    • Breast cancer     , right breast mastectomy   • Drug therapy    • GERD (gastroesophageal reflux disease)    • Hypercholesteremia    • KARUNA (stress urinary incontinence, female)    • Urge incontinence    • Uterovaginal prolapse    • Vaginal atrophy         Past Surgical History:   Procedure Laterality Date   • BRONCHOSCOPY N/A 2017    Procedure: BRONCHOSCOPY BIOPSY WITH ANESTHESIA;  Surgeon: Adi Anderson MD;  Location:  [facility];  Service:    • CARDIAC CATHETERIZATION Left 2017    Procedure: Cardiac Catheterization/Vascular Study;  Surgeon: nAuj Johnson MD;  Location: [facility] CATH INVASIVE LOCATION;  Service:    • COLONOSCOPY     • LAMINECTOMY  2016   • MASTECTOMY Right    • ROTATOR CUFF REPAIR Right              PT Ortho       17 1000    Precautions and Contraindications     Precautions/Limitations no known precautions/limitations  -DD    Precautions Hx breast cancer  -DD    Subjective Pain    Post-Treatment Pain Level 5  -DD    Posture/Observations    Posture/Observations Comments Left LLD, sacral base rotated.  -DD    Neural Tension Signs- Lower Quarter Clearing    SLR Bilateral:;Negative  -DD      User Key  (r) = Recorded By, (t) = Taken By, (c) = Cosigned By    Initials Name Provider Type    MEMO Lvoe, PT Physical Therapist                            PT Assessment/Plan       12/06/17 1000       PT Assessment    Functional Limitations Impaired gait;Limitations in community activities;Performance in self-care ADL;Limitation in home management  -ANTONIA     Impairments Gait;Endurance;Pain;Muscle strength;Posture;Poor body mechanics;Range of motion  -ANTONIA     Assessment Comments pt wishes to be D/C to I management due to medical issues arrising. pt verbalized understanding of all HEP.  -ANTONIA     Rehab Potential Good  -ANTONIA     Patient/caregiver participated in establishment of treatment plan and goals Yes  -ANTONIA     Patient would benefit from skilled therapy intervention Yes  -ANTONIA     PT Plan    PT Plan Comments D/C per pt request due to husbands medical issues at this time. Free 30 day fitness and HEP established  -ANTONIA       User Key  (r) = Recorded By, (t) = Taken By, (c) = Cosigned By    Initials Name Provider Type    ANTONIA Reyna PTA Physical Therapy Assistant                    Exercises       12/06/17 0900          Subjective Comments    Subjective Comments pt wishes to be D/C due to medical issues with .   -ANTONIA      Subjective Pain    Able to rate subjective pain? yes  -ANTONIA      Pre-Treatment Pain Level 2  -ANTONIA      Post-Treatment Pain Level 2  -ANTONIA      Aquatics    Aquatics performed? No  -ANTONIA      Exercise 1    Exercise Name 1 Seated HS S  -ANTONIA      Reps 1 3  -ANTONIA      Time (Seconds) 1 30 sec hold  -ANTONIA      Exercise 2    Exercise Name 2 Piriformis S  -ANTONIA      Reps 2 3   -ANTONIA      Time (Seconds) 2 30 sec hold  -ANOTNIA      Exercise 3    Exercise Name 3 Dynadisc: TA + laq  -ANTONIA      Sets 3 2  -ANTONIA      Reps 3 10  -ANTONIA      Exercise 4    Exercise Name 4 Dyandisc: TA + march  -ANTONIA      Sets 4 2  -ANTONIA      Reps 4 10  -ANTONIA      Exercise 5    Exercise Name 5 Seated Sciatic nerve glide  -ANTONIA      Reps 5 10  -ANTONIA      Additional Comments w/ ankle pums  -ANTONIA      Exercise 6    Exercise Name 6 Prone heel squeezes  -ANTONIA      Sets 6 2  -ANTONIA      Reps 6 10  -ANTONIA      Exercise 7    Exercise Name 7 Prone IR/ER  -ANTONIA      Sets 7 2  -ANTONIA      Reps 7 10  -ANTONIA      Additional Comments yellow  -ANTONIA        User Key  (r) = Recorded By, (t) = Taken By, (c) = Cosigned By    Initials Name Provider Type    ANTONIA Reyna, PTA Physical Therapy Assistant                               PT OP Goals       12/06/17 0900       PT Short Term Goals    STG Date to Achieve 11/21/17  -ANTONIA     STG 1 IND and compliant with HEP  -     STG 1 Progress Met  -ANTONIA     STG 2 Patient will be able to perform TA iso for 10 seconds without cuing  -     STG 2 Progress Met  -ANTONIA     STG 3 Patient will increase lumbar flexion to 4 cm fingertip to floor  -     STG 3 Progress Not Met  -ANTONIA     STG 4 Patient will have 4-/5 B hip flexor strength  -     STG 4 Progress Met  -ANTONIA     STG 5 Patient will increase L hip abduction strength to 4-/5  -ANTONIA     STG 5 Progress Met  -ANTONIA     Long Term Goals    LTG Date to Achieve 12/12/17  -ANTONIA     LTG 1 Patient will reduce Modified OSWESTRY to less than 10%  -     LTG 1 Progress Not Met  -ANTONIA     LTG 2 Patient will demonstrate proper lifting technique floor to waist 15#  -ANTONIA     LTG 2 Progress Not Met  -ANTONIA     LTG 3 Patient will increase B hip flexor strength to 4/5  -ANTONIA     LTG 3 Progress Not Met  -ANTONIA     LTG 4 Patient will increase L hip abd strength to 4/5  -ANTONIA     LTG 4 Progress Met  -ANTONIA     LTG 5 Patient be able to tolerate 45 minute treatment session with no greater than 2 point increase in pain on VAS.  -ANTONIA      LTG 5 Progress Met  -ANTONIA       User Key  (r) = Recorded By, (t) = Taken By, (c) = Cosigned By    Initials Name Provider Type    ANTONIA Reyna PTA Physical Therapy Assistant                Therapy Education       12/06/17 1000          Therapy Education    Given HEP;Symptoms/condition management;Pain management;Posture/body mechanics  -ANTONIA      Program Reinforced  -ANTONIA      How Provided Verbal;Demonstration;Written  -ANTONIA      Provided to Patient  -ANTONIA      Level of Understanding Verbalized;Demonstrated  -ANTONIA        User Key  (r) = Recorded By, (t) = Taken By, (c) = Cosigned By    Initials Name Provider Type    ANTONIA Reyna PTA Physical Therapy Assistant                Outcome Measures       12/06/17 1000          Modified Oswestry    Modified Oswestry Score/Comments 26%  -ANTONIA      Functional Assessment    Outcome Measure Options Modifed Owestry  -ANTONIA        User Key  (r) = Recorded By, (t) = Taken By, (c) = Cosigned By    Initials Name Provider Type    ANTONIA Reyna PTA Physical Therapy Assistant            Time Calculation:   Start Time: 0930  Stop Time: 1015  Time Calculation (min): 45 min  Total Timed Code Minutes- PT: 45 minute(s)    Therapy Charges for Today     Code Description Service Date Service Provider Modifiers Qty    74771667081 HC PT THER PROC EA 15 MIN 12/6/2017 Savanah Reyna PTA GP 3          PT G-Codes  Outcome Measure Options: Modifed Owestry     OP PT Discharge Summary  Date of Discharge: 12/06/17  Reason for Discharge:  (per pt request)  Outcomes Achieved: Patient able to partially acheive established goals  Discharge Destination: Home with home program  Discharge Instructions: Establised home program as well as free 30 day fitness membership      Savanah Reyna PTA  12/6/2017

## 2017-12-11 ENCOUNTER — APPOINTMENT (OUTPATIENT)
Dept: PHYSICAL THERAPY | Facility: HOSPITAL | Age: 67
End: 2017-12-11

## 2017-12-13 ENCOUNTER — APPOINTMENT (OUTPATIENT)
Dept: PHYSICAL THERAPY | Facility: HOSPITAL | Age: 67
End: 2017-12-13

## 2017-12-26 RX ORDER — LOSARTAN POTASSIUM 50 MG/1
TABLET ORAL
Qty: 90 TABLET | Refills: 0 | Status: SHIPPED | OUTPATIENT
Start: 2017-12-26 | End: 2018-03-13 | Stop reason: SDUPTHER

## 2018-02-07 DIAGNOSIS — E78.00 HYPERCHOLESTEREMIA: Primary | ICD-10-CM

## 2018-02-09 ENCOUNTER — OFFICE VISIT (OUTPATIENT)
Dept: CARDIAC REHAB | Facility: HOSPITAL | Age: 68
End: 2018-02-09

## 2018-02-09 DIAGNOSIS — E78.00 HYPERCHOLESTEREMIA: Primary | ICD-10-CM

## 2018-02-12 ENCOUNTER — OFFICE VISIT (OUTPATIENT)
Dept: CARDIAC REHAB | Facility: HOSPITAL | Age: 68
End: 2018-02-12

## 2018-02-12 DIAGNOSIS — E78.00 HYPERCHOLESTEREMIA: Primary | ICD-10-CM

## 2018-02-14 ENCOUNTER — OFFICE VISIT (OUTPATIENT)
Dept: CARDIAC REHAB | Facility: HOSPITAL | Age: 68
End: 2018-02-14

## 2018-02-14 DIAGNOSIS — E78.00 HYPERCHOLESTEREMIA: Primary | ICD-10-CM

## 2018-02-15 RX ORDER — LOVASTATIN 10 MG/1
TABLET ORAL
Qty: 90 TABLET | Refills: 2 | Status: SHIPPED | OUTPATIENT
Start: 2018-02-15 | End: 2018-11-27 | Stop reason: SDUPTHER

## 2018-02-16 ENCOUNTER — OFFICE VISIT (OUTPATIENT)
Dept: CARDIAC REHAB | Facility: HOSPITAL | Age: 68
End: 2018-02-16

## 2018-02-16 DIAGNOSIS — E78.00 HYPERCHOLESTEREMIA: Primary | ICD-10-CM

## 2018-02-16 NOTE — PROGRESS NOTES
09/05/19        [place] [address]      Dear Stevan Conrad,    [facility] records indicate that you have outstanding lab work and or testing that was ordered for you and has not yet been completed:  Orders Placed This Encounter     H.  P Phase III visit

## 2018-02-19 ENCOUNTER — OFFICE VISIT (OUTPATIENT)
Dept: CARDIAC REHAB | Facility: HOSPITAL | Age: 68
End: 2018-02-19

## 2018-02-19 DIAGNOSIS — E78.00 HYPERCHOLESTEREMIA: Primary | ICD-10-CM

## 2018-02-21 ENCOUNTER — OFFICE VISIT (OUTPATIENT)
Dept: CARDIAC REHAB | Facility: HOSPITAL | Age: 68
End: 2018-02-21

## 2018-02-21 DIAGNOSIS — E78.00 HYPERCHOLESTEREMIA: Primary | ICD-10-CM

## 2018-02-23 ENCOUNTER — OFFICE VISIT (OUTPATIENT)
Dept: CARDIAC REHAB | Facility: HOSPITAL | Age: 68
End: 2018-02-23

## 2018-02-23 DIAGNOSIS — E78.00 HYPERCHOLESTEREMIA: Primary | ICD-10-CM

## 2018-02-26 ENCOUNTER — OFFICE VISIT (OUTPATIENT)
Dept: CARDIAC REHAB | Facility: HOSPITAL | Age: 68
End: 2018-02-26

## 2018-02-26 DIAGNOSIS — E78.00 HYPERCHOLESTEREMIA: Primary | ICD-10-CM

## 2018-02-28 ENCOUNTER — OFFICE VISIT (OUTPATIENT)
Dept: CARDIAC REHAB | Facility: HOSPITAL | Age: 68
End: 2018-02-28

## 2018-02-28 DIAGNOSIS — E78.00 HYPERCHOLESTEREMIA: Primary | ICD-10-CM

## 2018-03-02 ENCOUNTER — OFFICE VISIT (OUTPATIENT)
Dept: CARDIAC REHAB | Facility: HOSPITAL | Age: 68
End: 2018-03-02

## 2018-03-02 DIAGNOSIS — E78.00 HYPERCHOLESTEREMIA: Primary | ICD-10-CM

## 2018-03-05 ENCOUNTER — OFFICE VISIT (OUTPATIENT)
Dept: CARDIAC REHAB | Facility: HOSPITAL | Age: 68
End: 2018-03-05

## 2018-03-05 DIAGNOSIS — E78.00 HYPERCHOLESTEREMIA: Primary | ICD-10-CM

## 2018-03-07 ENCOUNTER — OFFICE VISIT (OUTPATIENT)
Dept: CARDIAC REHAB | Facility: HOSPITAL | Age: 68
End: 2018-03-07

## 2018-03-07 DIAGNOSIS — E78.00 HYPERCHOLESTEREMIA: Primary | ICD-10-CM

## 2018-03-09 ENCOUNTER — OFFICE VISIT (OUTPATIENT)
Dept: CARDIAC REHAB | Facility: HOSPITAL | Age: 68
End: 2018-03-09

## 2018-03-09 DIAGNOSIS — E78.00 HYPERCHOLESTEREMIA: Primary | ICD-10-CM

## 2018-03-12 ENCOUNTER — OFFICE VISIT (OUTPATIENT)
Dept: CARDIAC REHAB | Facility: HOSPITAL | Age: 68
End: 2018-03-12

## 2018-03-12 DIAGNOSIS — E78.00 HYPERCHOLESTEREMIA: Primary | ICD-10-CM

## 2018-03-13 RX ORDER — LOSARTAN POTASSIUM 50 MG/1
TABLET ORAL
Qty: 90 TABLET | Refills: 2 | Status: SHIPPED | OUTPATIENT
Start: 2018-03-13 | End: 2018-12-14 | Stop reason: SDUPTHER

## 2018-03-14 ENCOUNTER — OFFICE VISIT (OUTPATIENT)
Dept: CARDIAC REHAB | Facility: HOSPITAL | Age: 68
End: 2018-03-14

## 2018-03-14 DIAGNOSIS — E78.00 HYPERCHOLESTEREMIA: Primary | ICD-10-CM

## 2018-03-16 ENCOUNTER — OFFICE VISIT (OUTPATIENT)
Dept: CARDIAC REHAB | Facility: HOSPITAL | Age: 68
End: 2018-03-16

## 2018-03-16 DIAGNOSIS — E78.00 HYPERCHOLESTEREMIA: Primary | ICD-10-CM

## 2018-04-04 ENCOUNTER — OFFICE VISIT (OUTPATIENT)
Dept: CARDIAC REHAB | Facility: HOSPITAL | Age: 68
End: 2018-04-04

## 2018-04-04 DIAGNOSIS — E78.00 HYPERCHOLESTEREMIA: Primary | ICD-10-CM

## 2018-04-09 ENCOUNTER — OFFICE VISIT (OUTPATIENT)
Dept: CARDIAC REHAB | Facility: HOSPITAL | Age: 68
End: 2018-04-09

## 2018-04-09 DIAGNOSIS — E78.00 HYPERCHOLESTEREMIA: Primary | ICD-10-CM

## 2018-04-11 ENCOUNTER — OFFICE VISIT (OUTPATIENT)
Dept: CARDIAC REHAB | Facility: HOSPITAL | Age: 68
End: 2018-04-11

## 2018-04-11 DIAGNOSIS — E78.00 HYPERCHOLESTEREMIA: Primary | ICD-10-CM

## 2018-04-13 ENCOUNTER — OFFICE VISIT (OUTPATIENT)
Dept: CARDIAC REHAB | Facility: HOSPITAL | Age: 68
End: 2018-04-13

## 2018-04-13 DIAGNOSIS — E78.00 HYPERCHOLESTEREMIA: Primary | ICD-10-CM

## 2018-04-16 ENCOUNTER — OFFICE VISIT (OUTPATIENT)
Dept: CARDIAC REHAB | Facility: HOSPITAL | Age: 68
End: 2018-04-16

## 2018-04-16 DIAGNOSIS — E78.00 HYPERCHOLESTEREMIA: Primary | ICD-10-CM

## 2018-04-18 ENCOUNTER — OFFICE VISIT (OUTPATIENT)
Dept: CARDIAC REHAB | Facility: HOSPITAL | Age: 68
End: 2018-04-18

## 2018-04-18 DIAGNOSIS — E78.00 HYPERCHOLESTEREMIA: Primary | ICD-10-CM

## 2018-04-23 ENCOUNTER — OFFICE VISIT (OUTPATIENT)
Dept: CARDIAC REHAB | Facility: HOSPITAL | Age: 68
End: 2018-04-23

## 2018-04-23 DIAGNOSIS — E78.00 HYPERCHOLESTEREMIA: Primary | ICD-10-CM

## 2018-04-27 ENCOUNTER — OFFICE VISIT (OUTPATIENT)
Dept: CARDIAC REHAB | Facility: HOSPITAL | Age: 68
End: 2018-04-27

## 2018-04-27 DIAGNOSIS — E78.00 HYPERCHOLESTEREMIA: Primary | ICD-10-CM

## 2018-04-30 ENCOUNTER — OFFICE VISIT (OUTPATIENT)
Dept: CARDIAC REHAB | Facility: HOSPITAL | Age: 68
End: 2018-04-30

## 2018-04-30 DIAGNOSIS — E78.00 HYPERCHOLESTEREMIA: Primary | ICD-10-CM

## 2018-05-02 ENCOUNTER — OFFICE VISIT (OUTPATIENT)
Dept: CARDIAC REHAB | Facility: HOSPITAL | Age: 68
End: 2018-05-02

## 2018-05-02 DIAGNOSIS — E78.00 HYPERCHOLESTEREMIA: Primary | ICD-10-CM

## 2018-05-11 ENCOUNTER — OFFICE VISIT (OUTPATIENT)
Dept: FAMILY MEDICINE CLINIC | Facility: CLINIC | Age: 68
End: 2018-05-11

## 2018-05-11 VITALS
HEIGHT: 62 IN | TEMPERATURE: 98 F | OXYGEN SATURATION: 96 % | DIASTOLIC BLOOD PRESSURE: 80 MMHG | HEART RATE: 69 BPM | SYSTOLIC BLOOD PRESSURE: 124 MMHG | BODY MASS INDEX: 28.45 KG/M2 | WEIGHT: 154.6 LBS

## 2018-05-11 DIAGNOSIS — E78.2 MIXED HYPERLIPIDEMIA: Primary | ICD-10-CM

## 2018-05-11 PROCEDURE — 99213 OFFICE O/P EST LOW 20 MIN: CPT | Performed by: FAMILY MEDICINE

## 2018-05-11 NOTE — PATIENT INSTRUCTIONS
Mediterranean Diet  A Mediterranean diet refers to food and lifestyle choices that are based on the traditions of countries located on the [place]. This way of eating has been shown to help prevent certain conditions and improve outcomes for people who have chronic diseases, like kidney disease and heart disease.  What are tips for following this plan?  Lifestyle   · Cook and eat meals together with your family, when possible.  · Drink enough fluid to keep your urine clear or pale yellow.  · Be physically active every day. This includes:  ¨ Aerobic exercise like running or swimming.  ¨ Leisure activities like gardening, walking, or housework.  · Get 7-8 hours of sleep each night.  · If recommended by your health care provider, drink red wine in moderation. This means 1 glass a day for nonpregnant women and 2 glasses a day for men. A glass of wine equals 5 oz (150 mL).  Reading food labels   · Check the serving size of packaged foods. For foods such as rice and pasta, the serving size refers to the amount of cooked product, not dry.  · Check the total fat in packaged foods. Avoid foods that have saturated fat or trans fats.  · Check the ingredients list for added sugars, such as corn syrup.  Shopping   · At the grocery store, buy most of your food from the areas near the walls of the store. This includes:  ¨ Fresh fruits and vegetables (produce).  ¨ Grains, beans, nuts, and seeds. Some of these may be available in unpackaged forms or large amounts (in bulk).  ¨ Fresh seafood.  ¨ Poultry and eggs.  ¨ Low-fat dairy products.  · Buy whole ingredients instead of prepackaged foods.  · Buy fresh fruits and vegetables in-season from local farmers markets.  · Buy frozen fruits and vegetables in resealable bags.  · If you do not have access to quality fresh seafood, buy precooked frozen shrimp or canned fish, such as tuna, salmon, or sardines.  · Buy small amounts of raw or cooked vegetables, salads, or olives from  the deli or salad bar at your store.  · Stock your pantry so you always have certain foods on hand, such as olive oil, canned tuna, canned tomatoes, rice, pasta, and beans.  Cooking   · Cook foods with extra-virgin olive oil instead of using butter or other vegetable oils.  · Have meat as a side dish, and have vegetables or grains as your main dish. This means having meat in small portions or adding small amounts of meat to foods like pasta or stew.  · Use beans or vegetables instead of meat in common dishes like chili or lasagna.  · Byars with different cooking methods. Try roasting or broiling vegetables instead of steaming or sautéeing them.  · Add frozen vegetables to soups, stews, pasta, or rice.  · Add nuts or seeds for added healthy fat at each meal. You can add these to yogurt, salads, or vegetable dishes.  · Marinate fish or vegetables using olive oil, lemon juice, garlic, and fresh herbs.  Meal planning   · Plan to eat 1 vegetarian meal one day each week. Try to work up to 2 vegetarian meals, if possible.  · Eat seafood 2 or more times a week.  · Have healthy snacks readily available, such as:  ¨ Vegetable sticks with hummus.  ¨ Greek yogurt.  ¨ Fruit and nut trail mix.  · Eat balanced meals throughout the week. This includes:  ¨ Fruit: 2-3 servings a day  ¨ Vegetables: 4-5 servings a day  ¨ Low-fat dairy: 2 servings a day  ¨ Fish, poultry, or lean meat: 1 serving a day  ¨ Beans and legumes: 2 or more servings a week  ¨ Nuts and seeds: 1-2 servings a day  ¨ Whole grains: 6-8 servings a day  ¨ Extra-virgin olive oil: 3-4 servings a day  · Limit red meat and sweets to only a few servings a month  What are my food choices?  · Mediterranean diet  ¨ Recommended  ¨ Grains: Whole-grain pasta. Brown rice. Bulgar wheat. Polenta. Couscous. Whole-wheat bread. Oatmeal. Quinoa.  ¨ Vegetables: Artichokes. Beets. Broccoli. Cabbage. Carrots. Eggplant. Green beans. Chard. Kale. Spinach. Onions. Leeks. Peas. Squash.  Tomatoes. Peppers. Radishes.  ¨ Fruits: Apples. Apricots. Avocado. Berries. Bananas. Cherries. Dates. Figs. Grapes. Jose. Melon. Oranges. Peaches. Plums. Pomegranate.  ¨ Meats and other protein foods: Beans. Almonds. Sunflower seeds. Pine nuts. Peanuts. Cod. Laona. Scallops. Shrimp. Tuna. Tilapia. Clams. Oysters. Eggs.  ¨ Dairy: Low-fat milk. Cheese. Greek yogurt.  ¨ Beverages: Water. Red wine. Herbal tea.  ¨ Fats and oils: Extra virgin olive oil. Avocado oil. Grape seed oil.  ¨ Sweets and desserts: Greek yogurt with honey. Baked apples. Poached pears. Trail mix.  ¨ Seasoning and other foods: Basil. Cilantro. Coriander. Cumin. Mint. Parsley. Jesús. Rosemary. Tarragon. Garlic. Oregano. Thyme. Pepper. Balsalmic vinegar. Tahini. Hummus. Tomato sauce. Olives. Mushrooms.  ¨ Limit these  ¨ Grains: Prepackaged pasta or rice dishes. Prepackaged cereal with added sugar.  ¨ Vegetables: Deep fried potatoes (french fries).  ¨ Fruits: Fruit canned in syrup.  ¨ Meats and other protein foods: Beef. Pork. Lamb. Poultry with skin. Hot dogs. Musa.  ¨ Dairy: Ice cream. Sour cream. Whole milk.  ¨ Beverages: Juice. Sugar-sweetened soft drinks. Beer. Liquor and spirits.  ¨ Fats and oils: Butter. Canola oil. Vegetable oil. Beef fat (tallow). Lard.  ¨ Sweets and desserts: Cookies. Cakes. Pies. Candy.  ¨ Seasoning and other foods: Mayonnaise. Premade sauces and marinades.  ¨ The items listed may not be a complete list. Talk with your dietitian about what dietary choices are right for you.  Summary  · The Mediterranean diet includes both food and lifestyle choices.  · Eat a variety of fresh fruits and vegetables, beans, nuts, seeds, and whole grains.  · Limit the amount of red meat and sweets that you eat.  · Talk with your health care provider about whether it is safe for you to drink red wine in moderation. This means 1 glass a day for nonpregnant women and 2 glasses a day for men. A glass of wine equals 5 oz (150 mL).  This information  "is not intended to replace advice given to you by your health care provider. Make sure you discuss any questions you have with your health care provider.  Document Released: 08/10/2017 Document Revised: 09/12/2017 Document Reviewed: 08/10/2017  [employer] Interactive Patient Education © 2017 [employer]  Fat and Cholesterol Restricted Diet  Getting too much fat and cholesterol in your diet may cause health problems. Following this diet helps keep your fat and cholesterol at normal levels. This can keep you from getting sick.  What types of fat should I choose?  · Choose monosaturated and polyunsaturated fats. These are found in foods such as olive oil, canola oil, flaxseeds, walnuts, almonds, and seeds.  · Eat more omega-3 fats. Good choices include salmon, mackerel, sardines, tuna, flaxseed oil, and ground flaxseeds.  · Limit saturated fats. These are in animal products such as meats, butter, and cream. They can also be in plant products such as palm oil, palm kernel oil, and coconut oil.  · Avoid foods with partially hydrogenated oils in them. These contain trans fats. Examples of foods that have trans fats are stick margarine, some tub margarines, cookies, crackers, and other baked goods.  What general guidelines do I need to follow?  · Check food labels. Look for the words \"trans fat\" and \"saturated fat.\"  · When preparing a meal:  ¨ Fill half of your plate with vegetables and green salads.  ¨ Fill one fourth of your plate with whole grains. Look for the word \"whole\" as the first word in the ingredient list.  ¨ Fill one fourth of your plate with lean protein foods.  · Eat more foods that have fiber, like apples, carrots, beans, peas, and barley.  · Eat more home-cooked foods. Eat less at restaurants and buffets.  · Limit or avoid alcohol.  · Limit foods high in starch and sugar.  · Limit fried foods.  · Cook foods without frying them. Baking, boiling, grilling, and broiling are all great options.  · Lose weight " if you are overweight. Losing even a small amount of weight can help your overall health. It can also help prevent diseases such as diabetes and heart disease.  What foods can I eat?  Grains   Whole grains, such as whole wheat or whole grain breads, crackers, cereals, and pasta. Unsweetened oatmeal, bulgur, barley, quinoa, or brown rice. Corn or whole wheat flour tortillas.  Vegetables   Fresh or frozen vegetables (raw, steamed, roasted, or grilled). Green salads.  Fruits   All fresh, canned (in natural juice), or frozen fruits.  Meat and Other Protein Products   Ground beef (85% or leaner), grass-fed beef, or beef trimmed of fat. Skinless chicken or turkey. Ground chicken or turkey. Pork trimmed of fat. All fish and seafood. Eggs. Dried beans, peas, or lentils. Unsalted nuts or seeds. Unsalted canned or dry beans.  Dairy   Low-fat dairy products, such as skim or 1% milk, 2% or reduced-fat cheeses, low-fat ricotta or cottage cheese, or plain low-fat yogurt.  Fats and Oils   Tub margarines without trans fats. Light or reduced-fat mayonnaise and salad dressings. Avocado. Olive, canola, sesame, or safflower oils. Natural peanut or almond butter (choose ones without added sugar and oil).  The items listed above may not be a complete list of recommended foods or beverages. Contact your dietitian for more options.   What foods are not recommended?  Grains   White bread. White pasta. White rice. Cornbread. Bagels, pastries, and croissants. Crackers that contain trans fat.  Vegetables   White potatoes. Corn. Creamed or fried vegetables. Vegetables in a cheese sauce.  Fruits   Dried fruits. Canned fruit in light or heavy syrup. Fruit juice.  Meat and Other Protein Products   Fatty cuts of meat. Ribs, chicken wings, cardenas, sausage, bologna, salami, chitterlings, fatback, hot dogs, bratwurst, and packaged luncheon meats. Liver and organ meats.  Dairy   Whole or 2% milk, cream, half-and-half, and cream cheese. Whole milk  cheeses. Whole-fat or sweetened yogurt. Full-fat cheeses. Nondairy creamers and whipped toppings. Processed cheese, cheese spreads, or cheese curds.  Sweets and Desserts   Corn syrup, sugars, honey, and molasses. Candy. Jam and jelly. Syrup. Sweetened cereals. Cookies, pies, cakes, donuts, muffins, and ice cream.  Fats and Oils   Butter, stick margarine, lard, shortening, ghee, or cardenas fat. Coconut, palm kernel, or palm oils.  Beverages   Alcohol. Sweetened drinks (such as sodas, lemonade, and fruit drinks or punches).  The items listed above may not be a complete list of foods and beverages to avoid. Contact your dietitian for more information.   This information is not intended to replace advice given to you by your health care provider. Make sure you discuss any questions you have with your health care provider.  Document Released: 06/18/2013 Document Revised: 08/24/2017 Document Reviewed: 03/19/2015  [employer] Interactive Patient Education © 2017 [employer]

## 2018-05-11 NOTE — PROGRESS NOTES
Subjective   Cindi Hill is a 67 y.o. female.     Hyperlipidemia   This is a chronic problem. The current episode started more than 1 year ago. The problem is controlled. There are no known factors aggravating her hyperlipidemia. Pertinent negatives include no chest pain or myalgias. Current antihyperlipidemic treatment includes diet change and statins. The current treatment provides moderate improvement of lipids. Compliance problems include adherence to diet and adherence to exercise.  Risk factors for coronary artery disease include dyslipidemia, family history, post-menopausal and a sedentary lifestyle.       The following portions of the patient's history were reviewed and updated as appropriate: allergies, current medications, past family history, past medical history, past social history, past surgical history and problem list.    Review of Systems   Constitutional: Negative for fever.   Cardiovascular: Negative for chest pain and leg swelling.   Musculoskeletal: Negative for myalgias.       Objective   Physical Exam   Constitutional: She is oriented to person, place, and time.   Overweight   Cardiovascular: Normal rate and regular rhythm.    Pulmonary/Chest: Effort normal and breath sounds normal.   Neurological: She is alert and oriented to person, place, and time.   Skin: Capillary refill takes less than 2 seconds.   Psychiatric: She has a normal mood and affect. Her behavior is normal. Judgment and thought content normal.   Nursing note and vitals reviewed.      Assessment/Plan   Cindi was seen today for follow-up.    Diagnoses and all orders for this visit:    Mixed hyperlipidemia  -     Comprehensive Metabolic Panel  -     Lipid Panel         Plan-above should  had bypass surgery she has been exercising more

## 2018-05-12 LAB
ALBUMIN SERPL-MCNC: 4.5 G/DL (ref 3.5–5)
ALBUMIN/GLOB SERPL: 1.6 G/DL (ref 1.1–2.5)
ALP SERPL-CCNC: 79 U/L (ref 24–120)
ALT SERPL-CCNC: 28 U/L (ref 0–54)
AST SERPL-CCNC: 26 U/L (ref 7–45)
BILIRUB SERPL-MCNC: 0.3 MG/DL (ref 0.1–1)
BUN SERPL-MCNC: 18 MG/DL (ref 5–21)
BUN/CREAT SERPL: 24 (ref 7–25)
CALCIUM SERPL-MCNC: 10.2 MG/DL (ref 8.4–10.4)
CHLORIDE SERPL-SCNC: 102 MMOL/L (ref 98–110)
CHOLEST SERPL-MCNC: 160 MG/DL (ref 130–200)
CO2 SERPL-SCNC: 27 MMOL/L (ref 24–31)
CREAT SERPL-MCNC: 0.75 MG/DL (ref 0.5–1.4)
GFR SERPLBLD CREATININE-BSD FMLA CKD-EPI: 77 ML/MIN/1.73
GFR SERPLBLD CREATININE-BSD FMLA CKD-EPI: 93 ML/MIN/1.73
GLOBULIN SER CALC-MCNC: 2.8 GM/DL
GLUCOSE SERPL-MCNC: 92 MG/DL (ref 70–100)
HDLC SERPL-MCNC: 53 MG/DL
LDLC SERPL CALC-MCNC: 86 MG/DL (ref 0–99)
POTASSIUM SERPL-SCNC: 4.8 MMOL/L (ref 3.5–5.3)
PROT SERPL-MCNC: 7.3 G/DL (ref 6.3–8.7)
SODIUM SERPL-SCNC: 143 MMOL/L (ref 135–145)
TRIGL SERPL-MCNC: 104 MG/DL (ref 0–149)
VLDLC SERPL CALC-MCNC: 20.8 MG/DL

## 2018-05-23 ENCOUNTER — OFFICE VISIT (OUTPATIENT)
Dept: CARDIAC REHAB | Facility: HOSPITAL | Age: 68
End: 2018-05-23

## 2018-05-23 DIAGNOSIS — E78.00 HYPERCHOLESTEREMIA: Primary | ICD-10-CM

## 2018-05-25 ENCOUNTER — OFFICE VISIT (OUTPATIENT)
Dept: CARDIAC REHAB | Facility: HOSPITAL | Age: 68
End: 2018-05-25

## 2018-05-25 DIAGNOSIS — E78.00 HYPERCHOLESTEREMIA: Primary | ICD-10-CM

## 2018-05-30 ENCOUNTER — OFFICE VISIT (OUTPATIENT)
Dept: CARDIAC REHAB | Facility: HOSPITAL | Age: 68
End: 2018-05-30

## 2018-05-30 DIAGNOSIS — E78.00 HYPERCHOLESTEREMIA: Primary | ICD-10-CM

## 2018-06-01 ENCOUNTER — OFFICE VISIT (OUTPATIENT)
Dept: CARDIAC REHAB | Facility: HOSPITAL | Age: 68
End: 2018-06-01

## 2018-06-01 DIAGNOSIS — E78.00 HYPERCHOLESTEREMIA: Primary | ICD-10-CM

## 2018-06-04 ENCOUNTER — OFFICE VISIT (OUTPATIENT)
Dept: CARDIAC REHAB | Facility: HOSPITAL | Age: 68
End: 2018-06-04

## 2018-06-04 DIAGNOSIS — E78.00 HYPERCHOLESTEREMIA: Primary | ICD-10-CM

## 2018-06-06 ENCOUNTER — OFFICE VISIT (OUTPATIENT)
Dept: CARDIAC REHAB | Facility: HOSPITAL | Age: 68
End: 2018-06-06

## 2018-06-06 DIAGNOSIS — E78.00 HYPERCHOLESTEREMIA: Primary | ICD-10-CM

## 2018-06-08 ENCOUNTER — OFFICE VISIT (OUTPATIENT)
Dept: CARDIAC REHAB | Facility: HOSPITAL | Age: 68
End: 2018-06-08

## 2018-06-08 DIAGNOSIS — E78.00 HYPERCHOLESTEREMIA: Primary | ICD-10-CM

## 2018-06-13 ENCOUNTER — APPOINTMENT (OUTPATIENT)
Dept: CARDIAC REHAB | Facility: HOSPITAL | Age: 68
End: 2018-06-13

## 2018-06-15 ENCOUNTER — APPOINTMENT (OUTPATIENT)
Dept: CARDIAC REHAB | Facility: HOSPITAL | Age: 68
End: 2018-06-15

## 2018-06-18 ENCOUNTER — APPOINTMENT (OUTPATIENT)
Dept: CARDIAC REHAB | Facility: HOSPITAL | Age: 68
End: 2018-06-18

## 2018-06-20 ENCOUNTER — APPOINTMENT (OUTPATIENT)
Dept: CARDIAC REHAB | Facility: HOSPITAL | Age: 68
End: 2018-06-20

## 2018-06-22 ENCOUNTER — APPOINTMENT (OUTPATIENT)
Dept: CARDIAC REHAB | Facility: HOSPITAL | Age: 68
End: 2018-06-22

## 2018-06-25 ENCOUNTER — APPOINTMENT (OUTPATIENT)
Dept: CARDIAC REHAB | Facility: HOSPITAL | Age: 68
End: 2018-06-25

## 2018-06-27 ENCOUNTER — APPOINTMENT (OUTPATIENT)
Dept: CARDIAC REHAB | Facility: HOSPITAL | Age: 68
End: 2018-06-27

## 2018-06-29 ENCOUNTER — APPOINTMENT (OUTPATIENT)
Dept: CARDIAC REHAB | Facility: HOSPITAL | Age: 68
End: 2018-06-29

## 2018-07-02 ENCOUNTER — APPOINTMENT (OUTPATIENT)
Dept: CARDIAC REHAB | Facility: HOSPITAL | Age: 68
End: 2018-07-02

## 2018-07-06 ENCOUNTER — APPOINTMENT (OUTPATIENT)
Dept: CARDIAC REHAB | Facility: HOSPITAL | Age: 68
End: 2018-07-06

## 2018-07-09 ENCOUNTER — APPOINTMENT (OUTPATIENT)
Dept: CARDIAC REHAB | Facility: HOSPITAL | Age: 68
End: 2018-07-09

## 2018-07-11 ENCOUNTER — APPOINTMENT (OUTPATIENT)
Dept: CARDIAC REHAB | Facility: HOSPITAL | Age: 68
End: 2018-07-11

## 2018-07-13 ENCOUNTER — APPOINTMENT (OUTPATIENT)
Dept: CARDIAC REHAB | Facility: HOSPITAL | Age: 68
End: 2018-07-13

## 2018-07-16 ENCOUNTER — APPOINTMENT (OUTPATIENT)
Dept: CARDIAC REHAB | Facility: HOSPITAL | Age: 68
End: 2018-07-16

## 2018-07-18 ENCOUNTER — APPOINTMENT (OUTPATIENT)
Dept: CARDIAC REHAB | Facility: HOSPITAL | Age: 68
End: 2018-07-18

## 2018-07-20 ENCOUNTER — APPOINTMENT (OUTPATIENT)
Dept: CARDIAC REHAB | Facility: HOSPITAL | Age: 68
End: 2018-07-20

## 2018-07-23 ENCOUNTER — APPOINTMENT (OUTPATIENT)
Dept: CARDIAC REHAB | Facility: HOSPITAL | Age: 68
End: 2018-07-23

## 2018-07-25 ENCOUNTER — APPOINTMENT (OUTPATIENT)
Dept: CARDIAC REHAB | Facility: HOSPITAL | Age: 68
End: 2018-07-25

## 2018-07-27 ENCOUNTER — APPOINTMENT (OUTPATIENT)
Dept: CARDIAC REHAB | Facility: HOSPITAL | Age: 68
End: 2018-07-27

## 2018-07-30 ENCOUNTER — APPOINTMENT (OUTPATIENT)
Dept: CARDIAC REHAB | Facility: HOSPITAL | Age: 68
End: 2018-07-30

## 2018-10-08 ENCOUNTER — TELEPHONE (OUTPATIENT)
Dept: OBSTETRICS AND GYNECOLOGY | Facility: CLINIC | Age: 68
End: 2018-10-08

## 2018-10-09 ENCOUNTER — DOCUMENTATION (OUTPATIENT)
Dept: MAMMOGRAPHY | Facility: HOSPITAL | Age: 68
End: 2018-10-09

## 2018-10-09 NOTE — PROGRESS NOTES
Voicemail received from Desire Smith MA.  Patient is requesting RX for prosthetic and mastectomy bras.  Spoke with patient over the phone.  She would like the RX mailed to her.  Should read prosthetic and mastectomy bras, with code for breast cancer.  Instructed patient to call [facility] in [place] or [facility] in [place], [place].

## 2018-10-15 NOTE — TELEPHONE ENCOUNTER
Filled out a script for pt to get a protheses and mastectomy bra from [facility] in [place], or [facility] in [facility] [place].

## 2018-11-26 ENCOUNTER — OFFICE VISIT (OUTPATIENT)
Dept: FAMILY MEDICINE CLINIC | Facility: CLINIC | Age: 68
End: 2018-11-26

## 2018-11-26 VITALS
TEMPERATURE: 98.6 F | DIASTOLIC BLOOD PRESSURE: 76 MMHG | SYSTOLIC BLOOD PRESSURE: 126 MMHG | OXYGEN SATURATION: 97 % | WEIGHT: 156.2 LBS | HEART RATE: 94 BPM | HEIGHT: 62 IN | BODY MASS INDEX: 28.74 KG/M2

## 2018-11-26 DIAGNOSIS — Z12.12 SCREENING FOR COLORECTAL CANCER: ICD-10-CM

## 2018-11-26 DIAGNOSIS — I10 HYPERTENSION, UNSPECIFIED TYPE: ICD-10-CM

## 2018-11-26 DIAGNOSIS — Z23 NEED FOR IMMUNIZATION AGAINST INFLUENZA: Primary | ICD-10-CM

## 2018-11-26 DIAGNOSIS — E55.9 VITAMIN D DEFICIENCY: ICD-10-CM

## 2018-11-26 DIAGNOSIS — Z12.11 SCREENING FOR COLORECTAL CANCER: ICD-10-CM

## 2018-11-26 DIAGNOSIS — I20.8 ANGINAL EQUIVALENT (HCC): ICD-10-CM

## 2018-11-26 DIAGNOSIS — R53.83 FATIGUE, UNSPECIFIED TYPE: ICD-10-CM

## 2018-11-26 DIAGNOSIS — C50.919 MALIGNANT NEOPLASM OF FEMALE BREAST, UNSPECIFIED ESTROGEN RECEPTOR STATUS, UNSPECIFIED LATERALITY, UNSPECIFIED SITE OF BREAST (HCC): ICD-10-CM

## 2018-11-26 DIAGNOSIS — E78.2 MIXED HYPERLIPIDEMIA: ICD-10-CM

## 2018-11-26 DIAGNOSIS — Z00.00 ANNUAL PHYSICAL EXAM: ICD-10-CM

## 2018-11-26 PROBLEM — I20.89 ANGINAL EQUIVALENT: Status: ACTIVE | Noted: 2018-11-26

## 2018-11-26 LAB
25(OH)D3+25(OH)D2 SERPL-MCNC: 40.8 NG/ML (ref 30–100)
ALBUMIN SERPL-MCNC: 4.8 G/DL (ref 3.5–5)
ALBUMIN/GLOB SERPL: 1.7 G/DL (ref 1.1–2.5)
ALP SERPL-CCNC: 80 U/L (ref 24–120)
ALT SERPL-CCNC: 38 U/L (ref 0–54)
AST SERPL-CCNC: 25 U/L (ref 7–45)
BASOPHILS # BLD AUTO: 0.02 10*3/MM3 (ref 0–0.2)
BASOPHILS NFR BLD AUTO: 0.5 % (ref 0–2)
BILIRUB BLD-MCNC: NEGATIVE MG/DL
BILIRUB SERPL-MCNC: 0.4 MG/DL (ref 0.1–1)
BUN SERPL-MCNC: 12 MG/DL (ref 5–21)
BUN/CREAT SERPL: 18.8 (ref 7–25)
CALCIUM SERPL-MCNC: 9.7 MG/DL (ref 8.4–10.4)
CHLORIDE SERPL-SCNC: 98 MMOL/L (ref 98–110)
CHOLEST SERPL-MCNC: 163 MG/DL (ref 130–200)
CLARITY, POC: CLEAR
CO2 SERPL-SCNC: 28 MMOL/L (ref 24–31)
COLOR UR: YELLOW
CREAT SERPL-MCNC: 0.64 MG/DL (ref 0.5–1.4)
EOSINOPHIL # BLD AUTO: 0.22 10*3/MM3 (ref 0–0.7)
EOSINOPHIL NFR BLD AUTO: 5.1 % (ref 0–4)
ERYTHROCYTE [DISTWIDTH] IN BLOOD BY AUTOMATED COUNT: 12.4 % (ref 12–15)
GLOBULIN SER CALC-MCNC: 2.8 GM/DL
GLUCOSE SERPL-MCNC: 97 MG/DL (ref 70–100)
GLUCOSE UR STRIP-MCNC: NEGATIVE MG/DL
HCT VFR BLD AUTO: 42.3 % (ref 37–47)
HDLC SERPL-MCNC: 58 MG/DL
HGB BLD-MCNC: 13.9 G/DL (ref 12–16)
IMM GRANULOCYTES # BLD: 0.01 10*3/MM3 (ref 0–0.03)
IMM GRANULOCYTES NFR BLD: 0.2 % (ref 0–5)
KETONES UR QL: NEGATIVE
LDLC SERPL CALC-MCNC: 82 MG/DL (ref 0–99)
LEUKOCYTE EST, POC: ABNORMAL
LYMPHOCYTES # BLD AUTO: 1.25 10*3/MM3 (ref 0.72–4.86)
LYMPHOCYTES NFR BLD AUTO: 28.9 % (ref 15–45)
MCH RBC QN AUTO: 29.4 PG (ref 28–32)
MCHC RBC AUTO-ENTMCNC: 32.9 G/DL (ref 33–36)
MCV RBC AUTO: 89.4 FL (ref 82–98)
MONOCYTES # BLD AUTO: 0.43 10*3/MM3 (ref 0.19–1.3)
MONOCYTES NFR BLD AUTO: 10 % (ref 4–12)
NEUTROPHILS # BLD AUTO: 2.39 10*3/MM3 (ref 1.87–8.4)
NEUTROPHILS NFR BLD AUTO: 55.3 % (ref 39–78)
NITRITE UR-MCNC: NEGATIVE MG/ML
NRBC BLD AUTO-RTO: 0 /100 WBC (ref 0–0)
PH UR: 7.5 [PH] (ref 5–8)
PLATELET # BLD AUTO: 251 10*3/MM3 (ref 130–400)
POTASSIUM SERPL-SCNC: 4.4 MMOL/L (ref 3.5–5.3)
PROT SERPL-MCNC: 7.6 G/DL (ref 6.3–8.7)
PROT UR STRIP-MCNC: NEGATIVE MG/DL
RBC # BLD AUTO: 4.73 10*6/MM3 (ref 4.2–5.4)
RBC # UR STRIP: ABNORMAL /UL
SODIUM SERPL-SCNC: 142 MMOL/L (ref 135–145)
SP GR UR: 1.02 (ref 1–1.03)
T4 FREE SERPL-MCNC: 1.18 NG/DL (ref 0.78–2.19)
TRIGL SERPL-MCNC: 115 MG/DL (ref 0–149)
TSH SERPL DL<=0.005 MIU/L-ACNC: 1.51 MIU/ML (ref 0.47–4.68)
UROBILINOGEN UR QL: NORMAL
VLDLC SERPL CALC-MCNC: 23 MG/DL
WBC # BLD AUTO: 4.32 10*3/MM3 (ref 4.8–10.8)

## 2018-11-26 PROCEDURE — 90662 IIV NO PRSV INCREASED AG IM: CPT | Performed by: FAMILY MEDICINE

## 2018-11-26 PROCEDURE — 81003 URINALYSIS AUTO W/O SCOPE: CPT | Performed by: FAMILY MEDICINE

## 2018-11-26 PROCEDURE — G0008 ADMIN INFLUENZA VIRUS VAC: HCPCS | Performed by: FAMILY MEDICINE

## 2018-11-26 PROCEDURE — G0439 PPPS, SUBSEQ VISIT: HCPCS | Performed by: FAMILY MEDICINE

## 2018-11-26 RX ORDER — BENZONATATE 200 MG/1
200 CAPSULE ORAL 3 TIMES DAILY PRN
COMMUNITY
End: 2020-06-11

## 2018-11-26 RX ORDER — DEXTROMETHORPHAN POLISTIREX 30 MG/5ML
10 SUSPENSION ORAL AS NEEDED
COMMUNITY
End: 2020-06-11

## 2018-11-26 NOTE — PROGRESS NOTES
QUICK REFERENCE INFORMATION:  The ABCs of the Annual Wellness Visit    Subsequent Medicare Wellness Visit    HEALTH RISK ASSESSMENT    1950    Recent Hospitalizations:  No hospitalization(s) within the last year..        Current Medical Providers:  Patient Care Team:  Jose Wolff MD as PCP - General (Family Medicine)  Jose Wolff MD as PCP - Family Medicine  Micki Jones, Micki Sunshine, Tyrell Bal MD as Consulting Physician (Otolaryngology)  Jose Wolff MD as Referring Physician (Family Medicine)  Anuj Johnson MD as Cardiologist (Cardiology)        Smoking Status:  Social History     Tobacco Use   Smoking Status Never Smoker   Smokeless Tobacco Never Used       Alcohol Consumption:  Social History     Substance and Sexual Activity   Alcohol Use No       Depression Screen:   PHQ-2/PHQ-9 Depression Screening 11/26/2018   Little interest or pleasure in doing things 0   Feeling down, depressed, or hopeless 0   Total Score 0       Health Habits and Functional and Cognitive Screening:  Functional & Cognitive Status 11/26/2018   Do you have difficulty preparing food and eating? No   Do you have difficulty bathing yourself, getting dressed or grooming yourself? No   Do you have difficulty using the toilet? No   Do you have difficulty moving around from place to place? No   Do you have trouble with steps or getting out of a bed or a chair? No   In the past year have you fallen or experienced a near fall? No   Current Diet Unhealthy Diet   Dental Exam Up to date   Eye Exam Not up to date   Exercise (times per week) 2 times per week   Current Exercise Activities Include Walking   Do you need help using the phone?  No   Are you deaf or do you have serious difficulty hearing?  No   Do you need help with transportation? No   Do you need help shopping? No   Do you need help preparing meals?  No   Do you need help with housework?  No   Do you need help with  laundry? No   Do you need help taking your medications? No   Do you need help managing money? No   Do you ever drive or ride in a car without wearing a seat belt? No   Have you felt unusual stress, anger or loneliness in the last month? No   Who do you live with? Spouse   If you need help, do you have trouble finding someone available to you? No   Have you been bothered in the last four weeks by sexual problems? No   Do you have difficulty concentrating, remembering or making decisions? No           Does the patient have evidence of cognitive impairment? Yes    Aspirin use counseling: Taking ASA appropriately as indicated      Recent Lab Results:  CMP:  Lab Results   Component Value Date    GLU 92 05/11/2018    BUN 18 05/11/2018    CREATININE 0.75 05/11/2018    EGFRIFNONA 77 05/11/2018    EGFRIFAFRI 93 05/11/2018    BCR 24.0 05/11/2018     05/11/2018    K 4.8 05/11/2018    CO2 27.0 05/11/2018    CALCIUM 10.2 05/11/2018    PROTENTOTREF 7.3 05/11/2018    ALBUMIN 4.50 05/11/2018    LABGLOBREF 2.8 05/11/2018    LABIL2 1.6 05/11/2018    BILITOT 0.3 05/11/2018    ALKPHOS 79 05/11/2018    AST 26 05/11/2018    ALT 28 05/11/2018     Lipid Panel:  Lab Results   Component Value Date    TRIG 104 05/11/2018    HDL 53 05/11/2018    VLDL 20.8 05/11/2018     HbA1c:       Visual Acuity:  No exam data present    Age-appropriate Screening Schedule:  Refer to the list below for future screening recommendations based on patient's age, sex and/or medical conditions. Orders for these recommended tests are listed in the plan section. The patient has been provided with a written plan.    Health Maintenance   Topic Date Due   • INFLUENZA VACCINE  08/01/2018   • ZOSTER VACCINE (2 of 3) 05/01/2020 (Originally 12/29/2017)   • PNEUMOCOCCAL VACCINES (65+ LOW/MEDIUM RISK) (2 of 2 - PPSV23) 11/30/2026 (Originally 11/2/2017)   • LIPID PANEL  05/11/2019   • COLONOSCOPY  09/23/2019   • MAMMOGRAM  11/15/2019   • TDAP/TD VACCINES (2 - Td)  11/03/2027        Subjective   History of Present Illness    Cindi Hill is a 67 y.o. female who presents for an Subsequent Wellness Visit.    The following portions of the patient's history were reviewed and updated as appropriate: allergies, current medications, past family history, past medical history, past social history, past surgical history and problem list.    Outpatient Medications Prior to Visit   Medication Sig Dispense Refill   • aspirin 81 MG tablet Take 81 mg by mouth daily.     • benzonatate (TESSALON) 200 MG capsule Take 200 mg by mouth 3 (Three) Times a Day As Needed for Cough.     • calcium carbonate (CALCIUM 600) 600 MG tablet Take 600 mg by mouth daily.     • cetirizine (zyrTEC) 10 MG tablet Take 10 mg by mouth Daily.     • Cholecalciferol 4000 units capsule Take 4,000 Units by mouth Daily.     • dextromethorphan polistirex ER (DELSYM) 30 MG/5ML Suspension Extended Release oral suspension Take 10 mL by mouth As Needed.     • fluticasone (FLONASE) 50 MCG/ACT nasal spray 2 sprays into each nostril daily. 1 bottle 6   • guaiFENesin (MUCINEX) 600 MG 12 hr tablet Take 1,200 mg by mouth As Needed.     • losartan (COZAAR) 50 MG tablet TAKE ONE TABLET BY MOUTH EVERY DAY IN THE MORNING 90 tablet 2   • lovastatin (MEVACOR) 10 MG tablet TAKE ONE TABLET BY MOUTH EVERY DAY 90 tablet 2   • omeprazole (PriLOSEC) 20 MG capsule Take 20 mg by mouth Daily. Patient is taking on Monday, Wednesday, and Friday     • PREMARIN 0.625 MG/GM vaginal cream        No facility-administered medications prior to visit.        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    Review of Systems   Respiratory: Positive for cough.    Cardiovascular: Negative for chest pain and leg swelling.   Genitourinary:        Getting ready for ROCKY/BSO anterior and posterior repair in January   All other systems reviewed and are negative.      Compared to one year ago, the patient feels her physical health is worse.  Compared to one year ago, the patient feels her mental health is worse.    Objective     Physical Exam   Constitutional: She is oriented to person, place, and time.   Overweight   HENT:   Right Ear: External ear normal.   Left Ear: External ear normal.   Mouth/Throat: Oropharynx is clear and moist.   Eyes: EOM are normal. Pupils are equal, round, and reactive to light.   Neck: No thyromegaly present.   Good carotid pulses   Cardiovascular: Normal rate and regular rhythm.   Pulmonary/Chest: Effort normal and breath sounds normal.   Abdominal: Soft. Bowel sounds are normal. She exhibits no mass.   Genitourinary:   Genitourinary Comments: Breast and GYN exam by another provider   Musculoskeletal: She exhibits no edema.   Lymphadenopathy:     She has no cervical adenopathy.   Neurological: She is alert and oriented to person, place, and time.   Skin: Skin is warm and dry. Capillary refill takes less than 2 seconds.   Psychiatric: She has a normal mood and affect. Her behavior is normal. Judgment and thought content normal.   Nursing note and vitals reviewed.      Vitals:    11/26/18 0938   BP: 126/76   BP Location: Left arm   Patient Position: Sitting   Cuff Size: Adult   Pulse: 94   Temp: 98.6 °F (37 °C)   TempSrc: Temporal   SpO2: 97%   Weight: 70.9 kg (156 lb 3.2 oz)   Height: 157.5 cm (62.01\")   PainSc: 0-No pain "       Patient's Body mass index is 28.56 kg/m². BMI is above normal parameters. Recommendations include: educational material and exercise counseling.      Assessment/Plan   Patient Self-Management and Personalized Health Advice  The patient has been provided with information about: diet, exercise, weight management and fall prevention and preventive services including:   · Advance directive, Colorectal cancer screening, cologuard test ordered, Fall Risk plan of care done, Influenza vaccine.    Visit Diagnoses:    ICD-10-CM ICD-9-CM   1. Need for immunization against influenza Z23 V04.81   2. Malignant neoplasm of female breast, unspecified estrogen receptor status, unspecified laterality, unspecified site of breast (CMS/[facility]) C50.919 174.9   3. 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 Screening for colorectal cancer Z12.11 V76.51    Z12.12    5. Fatigue, unspecified type R53.83 780.79   6. Mixed hyperlipidemia E78.2 272.2   7. Hypertension, unspecified type I10 401.9   8. Vitamin D deficiency E55.9 268.9   9. Annual physical exam Z00.00 V70.0       Orders Placed This Encounter   Procedures   • Fluzone High Dose =>65Years   • Cologuard - Stool, Per Rectum     Standing Status:   Future     Standing Expiration Date:   11/26/2019   • TSH   • T4, free   • Comprehensive Metabolic Panel   • Lipid Panel   • Vitamin D 25 Hydroxy   • POC Urinalysis Dipstick, Automated   • CBC & Differential     Order Specific Question:   Manual Differential     Answer:   No       Outpatient Encounter Medications as of 11/26/2018   Medication Sig Dispense Refill   • aspirin 81 MG tablet Take 81 mg by mouth daily.     • benzonatate (TESSALON) 200 MG capsule Take 200 mg by mouth 3 (Three) Times a Day As Needed for Cough.     • calcium carbonate (CALCIUM 600) 600 MG tablet Take 600 mg by mouth daily.     • cetirizine (zyrTEC) 10 MG tablet Take 10 mg by mouth Daily.     • Cholecalciferol 4000 units capsule Take 4,000 Units by mouth Daily.      • dextromethorphan polistirex ER (DELSYM) 30 MG/5ML Suspension Extended Release oral suspension Take 10 mL by mouth As Needed.     • fluticasone (FLONASE) 50 MCG/ACT nasal spray 2 sprays into each nostril daily. 1 bottle 6   • guaiFENesin (MUCINEX) 600 MG 12 hr tablet Take 1,200 mg by mouth As Needed.     • losartan (COZAAR) 50 MG tablet TAKE ONE TABLET BY MOUTH EVERY DAY IN THE MORNING 90 tablet 2   • lovastatin (MEVACOR) 10 MG tablet TAKE ONE TABLET BY MOUTH EVERY DAY 90 tablet 2   • omeprazole (PriLOSEC) 20 MG capsule Take 20 mg by mouth Daily. Patient is taking on Monday, Wednesday, and Friday     • PREMARIN 0.625 MG/GM vaginal cream        No facility-administered encounter medications on file as of 11/26/2018.        Reviewed use of high risk medication in the elderly: yes  Reviewed for potential of harmful drug interactions in the elderly: yes    Follow Up:  Return in 6 months (on 5/26/2019).     An After Visit Summary and PPPS with all of these plans were given to the patient.       plan-above + see pulmonologist

## 2018-11-26 NOTE — PATIENT INSTRUCTIONS
Exercising to Stay Healthy  Exercising regularly is important. It has many health benefits, such as:  · Improving your overall fitness, flexibility, and endurance.  · Increasing your bone density.  · Helping with weight control.  · Decreasing your body fat.  · Increasing your muscle strength.  · Reducing stress and tension.  · Improving your overall health.    In order to become healthy and stay healthy, it is recommended that you do moderate-intensity and vigorous-intensity exercise. You can tell that you are exercising at a moderate intensity if you have a higher heart rate and faster breathing, but you are still able to hold a conversation. You can tell that you are exercising at a vigorous intensity if you are breathing much harder and faster and cannot hold a conversation while exercising.  How often should I exercise?  Choose an activity that you enjoy and set realistic goals. Your health care provider can help you to make an activity plan that works for you. Exercise regularly as directed by your health care provider. This may include:  · Doing resistance training twice each week, such as:  ? Push-ups.  ? Sit-ups.  ? Lifting weights.  ? Using resistance bands.  · Doing a given intensity of exercise for a given amount of time. Choose from these options:  ? 150 minutes of moderate-intensity exercise every week.  ? 75 minutes of vigorous-intensity exercise every week.  ? A mix of moderate-intensity and vigorous-intensity exercise every week.    Children, pregnant women, people who are out of shape, people who are overweight, and older adults may need to consult a health care provider for individual recommendations. If you have any sort of medical condition, be sure to consult your health care provider before starting a new exercise program.  What are some exercise ideas?  Some moderate-intensity exercise ideas include:  · Walking at a rate of 1 mile in 15  minutes.  · Biking.  · Hiking.  · Golfing.  · Dancing.    Some vigorous-intensity exercise ideas include:  · Walking at a rate of at least 4.5 miles per hour.  · Jogging or running at a rate of 5 miles per hour.  · Biking at a rate of at least 10 miles per hour.  · Lap swimming.  · Roller-skating or in-line skating.  · Cross-country skiing.  · Vigorous competitive sports, such as football, basketball, and soccer.  · Jumping rope.  · Aerobic dancing.    What are some everyday activities that can help me to get exercise?  · Yard work, such as:  ? Pushing a .  ? Raking and bagging leaves.  · Washing and waxing your car.  · Pushing a stroller.  · Shoveling snow.  · Gardening.  · Washing windows or floors.  How can I be more active in my day-to-day activities?  · Use the stairs instead of the elevator.  · Take a walk during your lunch break.  · If you drive, park your car farther away from work or school.  · If you take public transportation, get off one stop early and walk the rest of the way.  · Make all of your phone calls while standing up and walking around.  · Get up, stretch, and walk around every 30 minutes throughout the day.  What guidelines should I follow while exercising?  · Do not exercise so much that you hurt yourself, feel dizzy, or get very short of breath.  · Consult your health care provider before starting a new exercise program.  · Wear comfortable clothes and shoes with good support.  · Drink plenty of water while you exercise to prevent dehydration or heat stroke. Body water is lost during exercise and must be replaced.  · Work out until you breathe faster and your heart beats faster.  This information is not intended to replace advice given to you by your health care provider. Make sure you discuss any questions you have with your health care provider.  Document Released: 01/20/2012 Document Revised: 05/25/2017 Document Reviewed: 05/21/2015  [employer] Interactive Patient Education © 2018  [employer]    Fall Prevention in the Home  Falls can cause injuries and can affect people from all age groups. There are many simple things that you can do to make your home safe and to help prevent falls.  What can I do on the outside of my home?  · Regularly repair the edges of walkways and driveways and fix any cracks.  · Remove high doorway thresholds.  · Trim any shrubbery on the main path into your home.  · Use bright outdoor lighting.  · Clear walkways of debris and clutter, including tools and rocks.  · Regularly check that handrails are securely fastened and in good repair. Both sides of any steps should have handrails.  · Install guardrails along the edges of any raised decks or porches.  · Have leaves, snow, and ice cleared regularly.  · Use sand or salt on walkways during winter months.  · In the garage, clean up any spills right away, including grease or oil spills.  What can I do in the bathroom?  · Use night lights.  · Install grab bars by the toilet and in the tub and shower. Do not use towel bars as grab bars.  · Use non-skid mats or decals on the floor of the tub or shower.  · If you need to sit down while you are in the shower, use a plastic, non-slip stool.  · Keep the floor dry. Immediately clean up any water that spills on the floor.  · Remove soap buildup in the tub or shower on a regular basis.  · Attach bath mats securely with double-sided non-slip rug tape.  · Remove throw rugs and other tripping hazards from the floor.  What can I do in the bedroom?  · Use night lights.  · Make sure that a bedside light is easy to reach.  · Do not use oversized bedding that drapes onto the floor.  · Have a firm chair that has side arms to use for getting dressed.  · Remove throw rugs and other tripping hazards from the floor.  What can I do in the kitchen?  · Clean up any spills right away.  · Avoid walking on wet floors.  · Place frequently used items in easy-to-reach places.  · If you need to reach  for something above you, use a sturdy step stool that has a grab bar.  · Keep electrical cables out of the way.  · Do not use floor polish or wax that makes floors slippery. If you have to use wax, make sure that it is non-skid floor wax.  · Remove throw rugs and other tripping hazards from the floor.  What can I do in the stairways?  · Do not leave any items on the stairs.  · Make sure that there are handrails on both sides of the stairs. Fix handrails that are broken or loose. Make sure that handrails are as long as the stairways.  · Check any carpeting to make sure that it is firmly attached to the stairs. Fix any carpet that is loose or worn.  · Avoid having throw rugs at the top or bottom of stairways, or secure the rugs with carpet tape to prevent them from moving.  · Make sure that you have a light switch at the top of the stairs and the bottom of the stairs. If you do not have them, have them installed.  What are some other fall prevention tips?  · Wear closed-toe shoes that fit well and support your feet. Wear shoes that have rubber soles or low heels.  · When you use a stepladder, make sure that it is completely opened and that the sides are firmly locked. Have someone hold the ladder while you are using it. Do not climb a closed stepladder.  · Add color or contrast paint or tape to grab bars and handrails in your home. Place contrasting color strips on the first and last steps.  · Use mobility aids as needed, such as canes, walkers, scooters, and crutches.  · Turn on lights if it is dark. Replace any light bulbs that burn out.  · Set up furniture so that there are clear paths. Keep the furniture in the same spot.  · Fix any uneven floor surfaces.  · Choose a carpet design that does not hide the edge of steps of a stairway.  · Be aware of any and all pets.  · Review your medicines with your healthcare provider. Some medicines can cause dizziness or changes in blood pressure, which increase your risk of  falling.  Talk with your health care provider about other ways that you can decrease your risk of falls. This may include working with a physical therapist or  to improve your strength, balance, and endurance.  This information is not intended to replace advice given to you by your health care provider. Make sure you discuss any questions you have with your health care provider.  Document Released: 2003 Document Revised: 2017 Document Reviewed: 2016  [employer] Interactive Patient Education © 2018 [employer]    Medicare Wellness  Personal Prevention Plan of Service     Date of Office Visit:  2018  Encounter Provider:  Jose Wolff MD  Place of Service:  [facility]  Patient Name: Cindi Hill  :  1950    As part of the Medicare Wellness portion of your visit today, we are providing you with this personalized preventive plan of services (PPPS). This plan is based upon recommendations of the [place] Preventive Services Task Force (USPSTF) and the Advisory Committee on Immunization Practices (ACIP).    This lists the preventive care services that should be considered, and provides dates of when you are due. Items listed as completed are up-to-date and do not require any further intervention.    Health Maintenance   Topic Date Due   • ZOSTER VACCINE (2 of 3) 2017   • INFLUENZA VACCINE  2018   • MEDICARE ANNUAL WELLNESS  2018   • PNEUMOCOCCAL VACCINES (65+ LOW/MEDIUM RISK) (2 of 2 - PPSV23) 2026 (Originally 2017)   • LIPID PANEL  2019   • COLONOSCOPY  2019   • MAMMOGRAM  11/15/2019   • TDAP/TD VACCINES (2 - Td) 2027   • HEPATITIS C SCREENING  Completed       No orders of the defined types were placed in this encounter.      Return in 6 months (on 2019).

## 2018-11-28 RX ORDER — LOVASTATIN 10 MG/1
10 TABLET ORAL DAILY
Qty: 90 TABLET | Refills: 3 | Status: SHIPPED | OUTPATIENT
Start: 2018-11-28 | End: 2019-12-09 | Stop reason: SDUPTHER

## 2018-12-17 RX ORDER — LOSARTAN POTASSIUM 50 MG/1
TABLET ORAL
Qty: 90 TABLET | Refills: 3 | Status: SHIPPED | OUTPATIENT
Start: 2018-12-17 | End: 2019-12-17 | Stop reason: SDUPTHER

## 2018-12-26 ENCOUNTER — RESULTS ENCOUNTER (OUTPATIENT)
Dept: FAMILY MEDICINE CLINIC | Facility: CLINIC | Age: 68
End: 2018-12-26

## 2018-12-26 DIAGNOSIS — Z12.12 SCREENING FOR COLORECTAL CANCER: ICD-10-CM

## 2018-12-26 DIAGNOSIS — Z12.11 SCREENING FOR COLORECTAL CANCER: ICD-10-CM

## 2019-01-15 ENCOUNTER — OFFICE VISIT (OUTPATIENT)
Dept: CARDIOLOGY | Facility: CLINIC | Age: 69
End: 2019-01-15

## 2019-01-15 VITALS
OXYGEN SATURATION: 96 % | HEIGHT: 62 IN | BODY MASS INDEX: 29.63 KG/M2 | HEART RATE: 93 BPM | DIASTOLIC BLOOD PRESSURE: 76 MMHG | SYSTOLIC BLOOD PRESSURE: 142 MMHG | WEIGHT: 161 LBS

## 2019-01-15 DIAGNOSIS — Z01.810 PREOP CARDIOVASCULAR EXAM: ICD-10-CM

## 2019-01-15 DIAGNOSIS — J37.0 SICCA LARYNGITIS: ICD-10-CM

## 2019-01-15 DIAGNOSIS — K21.9 GASTROESOPHAGEAL REFLUX DISEASE WITHOUT ESOPHAGITIS: ICD-10-CM

## 2019-01-15 DIAGNOSIS — I44.7 LBBB (LEFT BUNDLE BRANCH BLOCK): Primary | ICD-10-CM

## 2019-01-15 DIAGNOSIS — R06.09 DYSPNEA ON EXERTION: ICD-10-CM

## 2019-01-15 DIAGNOSIS — J34.2 DEVIATED NASAL SEPTUM: ICD-10-CM

## 2019-01-15 DIAGNOSIS — E78.2 MIXED HYPERLIPIDEMIA: ICD-10-CM

## 2019-01-15 PROCEDURE — 99214 OFFICE O/P EST MOD 30 MIN: CPT | Performed by: INTERNAL MEDICINE

## 2019-01-15 NOTE — PROGRESS NOTES
Reason for Follow-up Visit: Preop CV exam for pelvic surgery, hysterectomy and bladder  Dr Zohra Alaniz [place]  left bundle branch block         Subjective    Mild chronic exertional shortness of breath on exertion relieved with rest  No significant cough or wheezing  Going on for several months or longer    No palpitations  No associated chest pain  No significant pedal edema    No fever or chills  No significant expectoration    No hemoptysis  No presyncope or syncope     Joint pain in small, medium and large joints  Chronic low back pain        History of present illness: Cindi Hill is a 66 y.o. yo female with history of preop CV exam with moderate shortness of breath and chest pain who presents today for        Chief Complaint   Patient presents with   • Follow-up       2 week f/u on dar    .     History   Medical History          Past Medical History   Diagnosis Date   • Arthritis     • Breast cancer         1997, right breast mastectomy   • Drug therapy     • GERD (gastroesophageal reflux disease)     • Hypercholesteremia     • KARUNA (stress urinary incontinence, female)     • Urge incontinence     • Uterovaginal prolapse     • Vaginal atrophy        ,    Surgical History          Past Surgical History   Procedure Laterality Date   • Rotator cuff repair Right 2012   • Mastectomy Bilateral     • Colonoscopy   2014   • Laminectomy   01/2016   • Breast biopsy          ,          Family History   Problem Relation Age of Onset   • Dementia Mother     • Heart failure Mother     • Cancer Father         liver   • Cancer Brother         thyroid   ,        Social History   Substance Use Topics   • Smoking status: Never Smoker   • Smokeless tobacco: Never Used   • Alcohol use No   ,      Medications   Current Medications           Current Outpatient Prescriptions   Medication Sig Dispense Refill   • aspirin 81 MG tablet Take 81 mg by mouth daily.       • benzonatate (TESSALON) 200 MG capsule Take 200 mg by mouth As  "Needed for cough.       • fluticasone (FLONASE) 50 MCG/ACT nasal spray 2 sprays into each nostril daily. 1 bottle 6   • losartan (COZAAR) 50 MG tablet         • lovastatin (MEVACOR) 10 MG tablet Take 10 mg by mouth every night.       • omeprazole (PriLOSEC) 20 MG capsule Take 20 mg by mouth daily.       • Vitamin D, Cholecalciferol, 1000 UNITS capsule Take 2,000 Units by mouth Daily.       • calcium carbonate (CALCIUM 600) 600 MG tablet Take 600 mg by mouth daily.       • losartan-hydrochlorothiazide (HYZAAR) 50-12.5 MG per tablet Take 1 tablet by mouth daily.          No current facility-administered medications for this visit.             Allergies: Lisinopril; Lortab [hydrocodone-acetaminophen]; and Percocet [oxycodone-acetaminophen]         Results for orders placed during the hospital encounter of 02/17/17   Adult Transthoracic Echo Complete    Narrative · Left ventricular function is normal. Estimated EF = 55%.  · Left ventricular diastolic dysfunction (grade I) consistent with   impaired relaxation.  · Estimated right ventricular systolic pressure from tricuspid   regurgitation is normal (<35 mmHg).            Review of Systems  Review of Systems   Constitution: Negative.   HENT: Negative.   Eyes: Negative.   Cardiovascular: Positive  dyspnea on exertion. Negative for claudication, cyanosis, irregular heartbeat, leg swelling, near-syncope, orthopnea, palpitations, paroxysmal nocturnal dyspnea and syncope.   Respiratory: Negative.   Endocrine: Negative.   Hematologic/Lymphatic: Negative.   Skin: Negative.   Gastrointestinal: Negative for anorexia.   Genitourinary: Negative.   Neurological: Negative.   Psychiatric/Behavioral: Negative.         Objective        Physical Exam:       Visit Vitals   • /78 (BP Location: Left arm, Patient Position: Sitting, Cuff Size: Adult)   • Pulse 89   • Ht 61.5\" (156.2 cm)   • Wt 159 lb (72.1 kg)   • LMP (LMP Unknown)   • SpO2 95%   • BMI 29.56 kg/m2      Physical Exam "   Constitutional: She appears well-developed.   HENT:   Head: Normocephalic.   Neck: Normal carotid pulses and no JVD present. No tracheal tenderness present. Carotid bruit is not present. No tracheal deviation and no edema present.   Cardiovascular: Regular rhythm, normal heart sounds and normal pulses.   Pulmonary/Chest: Effort normal. No stridor.   Abdominal: Soft.   Neurological: She is alert. She has normal strength. No cranial nerve deficit or sensory deficit.   Skin: Skin is warm.   Psychiatric: She has a normal mood and affect. Her speech is normal and behavior is normal.         Results Review:      Procedures     Assessment/Plan         Patient Active Problem List   Diagnosis   • GERD (gastroesophageal reflux disease)   • Cough   • Deviated nasal septum   • Allergic rhinitis due to pollen   • Sicca laryngitis   • Mixed hyperlipidemia   • Breast cancer   • Osteoarthritis of cervical spine   • Preop cardiovascular exam   • LBBB (left bundle branch block)   • Dyspnea on exertion         No palpitations. No significant pedal edema. Compliant with medications and diet. Latest labs and medications reviewed.  Nuclear stress test shows moderate anterior infarction with small ischemia  Cardiac cath 2/17 normal       Plan        Acceptable cardiovascular risk of planned procedure.  Can proceed with surgery with usual caution and perioperative hemodynamic and cardiac rhythm monitoring.     No additional cardiac testing required at this point in time.          xxxxxxxxxxxxxxxxxxxxxxxxxxxxxxxxxxxxxxxxxxxxxxxxxxxxxxxxxxxxxxxxxxxxxxxxxxxxxxxxxxxxxxxxxxxxxxxxxxxxxxxxxxxxxxxxxxxxxxxxxxxxxxxxxxxxxxxxxxxxxxxxxxxxxxxxxxxxxxxxxxxxxxxxxxxxxxxxxxxxxxxxxxxxxxxxxxxxxxxxxxxxxxxxxxxxxxxxxxxxxxxxxxxxxxxxxxxxxxxxxxxxxxxx  Health maintenance    Low salt/ HTN/ Heart healthy carbohydrate restricted cardiac diet as applicable to this patient's current diagnoses.   This handout has relevant information regarding shopping for food,  "preparing meals, what to eat at restaurants, tracking of food intake, information regarding sodium intake and salt content, how to read food labels, knowing what to eat, tips reagarding physical activity, calorie count and calorie expenditure. What foods to avoid. Information regarding alcoholic drinks along with \"good\" and \"bad\" fats.  Reiterated prior recommendations     The patient is advised to reduce or avoid caffeine or other cardiac stimulants.     The patient was advised that NSAID-type medications have three  very important potential side effects: cardiovascular complications, gastrointestinal irritation including hemorrhage and renal injuries.  Do not use anti-inflammatories such as Motrin/ibuprofen, Alleve/naprosyn, Mobic and like medications Use Tylenol instead.The patient expresses understanding of these issues and questions were answered.   Monitor for any signs of bleeding including red or dark stools. Fall precautions.       Monitor for any signs of bleeding including red or dark stools. Fall precautions.   Patient is asked to monitor BP at home or work, several times per month and return with written values at next office visit.     Advised staying uptodate with immunizations per established standard guidelines.      Offered to give patient  a copy of my notes and relevant tests/ prior ECG etc for the patient to review and follow specific advise and relevant findings if any, prognosis, along with my current and future plans.      Questions were encouraged, asked and answered to the patient's  understanding and satisfaction. Questions if any regarding current medications and side effects, need for refills and importance of compliance to medications stressed.    Reviewed available prior notes, consults, prior visits, laboratory findings, radiology and cardiology relevant reports. Updated chart as applicable. I have reviewed the patient's medical history in detail and updated the computerized patient " record as relevant.      Updated patient regarding any new or relevant abnormalities on review of records or any new findings on physical exam. Mentioned to patient about purpose of visit and desirable health short and long term goals and objectives.        xxxxxxxxxxxxxxxxxxxxxxxxxxxxxxxxxxxxxxxxxxxxxxxxxxxxxxxxxxxxxxxxxxxxxxxxxxxxxxxxxxxxxxxxxxxxxxxxxxxxxxxxxxxxxxxxxxxxxxxxxxxxxxxxxxxxxxxxxxxxxxxxxxxxxxxxxxxxxxxxxxxxxxxxxxxxxxxxxxxxxxxxxxxxxxxxxxxxxxxxxxxxxxxxxxxxxxxxxxxxxxxxxxxxxxxxxxxxxxxxxxxxxxxx      Return in about 6 months (around 7/15/2019).

## 2019-03-14 ENCOUNTER — TRANSCRIBE ORDERS (OUTPATIENT)
Dept: PHYSICAL THERAPY | Facility: HOSPITAL | Age: 69
End: 2019-03-14

## 2019-03-14 DIAGNOSIS — N39.3 STRESS INCONTINENCE: ICD-10-CM

## 2019-03-14 DIAGNOSIS — R15.9 INCONTINENCE OF FECES, UNSPECIFIED FECAL INCONTINENCE TYPE: Primary | ICD-10-CM

## 2019-03-27 ENCOUNTER — APPOINTMENT (OUTPATIENT)
Dept: PHYSICAL THERAPY | Facility: HOSPITAL | Age: 69
End: 2019-03-27

## 2019-04-01 ENCOUNTER — HOSPITAL ENCOUNTER (OUTPATIENT)
Dept: PHYSICAL THERAPY | Facility: HOSPITAL | Age: 69
Setting detail: THERAPIES SERIES
Discharge: HOME | End: 2019-04-01

## 2019-04-01 DIAGNOSIS — N39.41 URGE URINARY INCONTINENCE: ICD-10-CM

## 2019-04-01 DIAGNOSIS — N39.3 FEMALE STRESS INCONTINENCE: Primary | ICD-10-CM

## 2019-04-01 PROCEDURE — 97162 PT EVAL MOD COMPLEX 30 MIN: CPT | Performed by: PHYSICAL THERAPIST

## 2019-04-01 PROCEDURE — 97110 THERAPEUTIC EXERCISES: CPT | Performed by: PHYSICAL THERAPIST

## 2019-04-01 NOTE — THERAPY EVALUATION
Outpatient Physical Therapy Pelvic Health Initial Evaluation  [facility]     Patient Name: Cindi Hill  : 1950  MRN: [id number]  Today's Date: 2019        Visit Date: 2019    Patient Active Problem List   Diagnosis   • GERD (gastroesophageal reflux disease)   • Cough   • Deviated nasal septum   • Allergic rhinitis due to pollen   • Sicca laryngitis   • Mixed hyperlipidemia   • Breast cancer (CMS/[facility])   • Osteoarthritis of cervical spine   • Preop cardiovascular exam   • LBBB (left bundle branch block)   • Dyspnea on exertion   • Acute left-sided low back pain without sciatica   • Anginal equivalent (CMS/HCC)        Past Medical History:   Diagnosis Date   • Acute left-sided low back pain without sciatica 10/24/2017   • Arthritis    • Breast cancer (CMS/[facility])     , right breast mastectomy   • Drug therapy    • GERD (gastroesophageal reflux disease)    • Hypercholesteremia    • KARUNA (stress urinary incontinence, female)    • Urge incontinence    • Uterovaginal prolapse    • Vaginal atrophy         Past Surgical History:   Procedure Laterality Date   • BRONCHOSCOPY N/A 2017    Procedure: BRONCHOSCOPY BIOPSY WITH ANESTHESIA;  Surgeon: Adi Anderson MD;  Location:  [facility] ENDOSCOPY;  Service:    • CARDIAC CATHETERIZATION Left 2017    Procedure: Cardiac Catheterization/Vascular Study;  Surgeon: Anuj Johnson MD;  Location:  [facility] CATH INVASIVE LOCATION;  Service:    • COLONOSCOPY     • LAMINECTOMY  2016   • MASTECTOMY Right    • ROTATOR CUFF REPAIR Right          Visit Dx:    ICD-10-CM ICD-9-CM   1. Female stress incontinence N39.3 625.6   2. 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  She began to experience stress incontinence, 20 years ago diagnosed with prolapsed uterus, and fecal incontinence about 1 year ago.  She underwent a hysterectomy, bladder sling and sphincter repair on 1/22/19.  According to patient she continues to experience urge incontinence which is most prominent issue, going to bathroom once or twice per hour. She continues to complain of stress inctontinence, fecal incontinence has resolved.  She complains of nocturia 1 - 3 times per night.  She wears a pad 24 hours per day, which she changes 2 times per day. She has soaked through her clothes on one occasion since surgery.  She continues to take a stool softener.  She drinks a large cup of decaffenated coffee, mountain dew for lunch and water with dinner.  She drinks 16 oz. of water per day.  She uses artificial sweeteners.    -KR      Previous treatment for THIS PROBLEM  Rehabilitation;Surgery;Medication  -KR      Surgery Date:  01/22/19  -KR      Patient/Caregiver Goals  Improve strength;Know what to do to help the symptoms;Return to prior level of function  -KR      Current Tobacco Use  No  -KR      Smoking Status  No  -KR      Patient's Rating of General Health  Very good  -KR      Occupation/sports/leisure activities  Retired; [employer], Enjoys house boating, yard work, active lifestyle  -KR      Patient seeing anyone else for problem(s)?  Yes  -KR      How has patient tried to help current problem?  Surgery  -KR      Related/Recent Hospitalizations  Yes  -KR      Date of Hospitalization  01/22/19  -KR      Surgery/Hospitalization  one night  -KR      Are you or can you be pregnant  No  -KR         Pain     What Performance Factors Make the Current Problem(s) WORSE?  thinking about going to bathroom or walking by bathroom, sneezing, coughing  -KR      What Performance Factors Make the Current Problem(s) BETTER?  avoid thinking or worrying about going to bathroom  -KR       Is your sleep disturbed?  No  -KR      Is medication used to assist with sleep?  No  -KR      Total hours of sleep per night  7-8 hours  -KR      Difficulties with ADL's?  Lifting restrictions of no greater than 10#, and no bending forward from waist  -KR         Fall Risk Assessment    Any falls in the past year:  No  -KR         Services    Prior Rehab/Home Health Experiences  No  -KR      Are you currently receiving Home Health services  No  -KR      Do you plan to receive Home Health services in the near future  No  -KR         Daily Activities    Primary Language  English  -KR      Are you able to read  Yes  -KR      Are you able to write  Yes  -KR      How does patient learn best?  Listening;Reading;Demonstration;Pictures/Video  -KR      Teaching needs identified  Home Exercise Program;Management of Condition  -KR      Barriers to learning  None  -KR      Pt Participated in POC and Goals  Yes  -KR         Safety    Are you being hurt, hit, or frightened by anyone at home or in your life?  No  -KR      Are you being neglected by a caregiver  No  -KR        User Key  (r) = Recorded By, (t) = Taken By, (c) = Cosigned By    Initials Name Provider Type    Diamond Cleveland, PT DPT Physical Therapist          Pelvic Health     Row Name 04/01/19 0800             Pelvic Floor Muscle    Strength (Right)  0: No contraction  -KR      Strength (Left)  1: Trace  -KR      Symmetry of Sustained Maximal Contraction  Left stronger than right  -KR      Endurance (Ability to Hold Maximal Contraction)  1 sec  -KR      # of Reps of Maximal Contractions while Maintaining Endurance and Strength  1 set  -KR      Fast Contraction (# of 1 sec contractions performed)  -- unable to perform  -KR      Interior Muscle Comments  Patient provided verbal consent for PT to perform an internal pelvic floor assessment. Palpable tenderness from 4 - 8 O'clock on pelvic clock and over internal obturators bilaterally.  -KR         Perineal  Observation    Perineal Observation Performed?  Yes  -KR        User Key  (r) = Recorded By, (t) = Taken By, (c) = Cosigned By    Initials Name Provider Type    Diamond Cleveland PT DPT Physical Therapist        PT Ortho     Row Name 04/01/19 0800       Precautions and Contraindications    Precautions/Limitations  lifting restrictions (specify in comments) >10#  -KR       Subjective Pain    Able to rate subjective pain?  yes  -KR    Pre-Treatment Pain Level  0  -KR    Post-Treatment Pain Level  0  -KR       Posture/Observations    Posture/Observations Comments  Forward head, protracted scapula, right IC and scapula > left, and anterior pelvic tilt  -KR       DTR- Lower Quarter Clearing    Patellar tendon (L2-4)  Bilateral:;2- Normal response  -KR    Achilles tendon (S1-2)  Right:;0- No response;Left:;1- Minimal response  -KR       Myotomal Screen- Lower Quarter Clearing    Hip flexion (L2)  Bilateral:;4+ (Good +)  -KR    Knee extension (L3)  Bilateral:;4+ (Good +)  -KR    Ankle DF (L4)  Bilateral:;4+ (Good +)  -KR    Great toe extension (L5)  WNL;4+ (Good +)  -KR    Ankle PF (S1)  Bilateral:;4+ (Good +)  -KR    Knee flexion (S2)  Bilateral:;4+ (Good +)  -KR       General ROM    GENERAL ROM COMMENTS  Bilateral Hip ER WNL; R IR decreasd by 40%, L IR WNL  -KR      User Key  (r) = Recorded By, (t) = Taken By, (c) = Cosigned By    Initials Name Provider Type    Diamond Cleveland PT DPT Physical Therapist                     PT Assessment/Plan     Row Name 04/01/19 1600          PT Assessment    Functional Limitations  Limitations in community activities;Performance in leisure activities;Performance in sport activities  -KR     Impairments  Impaired muscle endurance;Impaired muscle power;Impaired postural alignment;Muscle strength  -KR     Assessment Comments  Mrs. Hill presents with signs and symptoms consistent with mixed incontinence.  Objective findings reveal decreased strength, timing and endurance of the pelvic  floor musculature.  She is very active and is most concerned about the urge incontinence going to the bathroom once every hour during some parts of the day and up 1 - 3 times per night.  She denies fecal incontinence since the surgery. She is drinking decaffenated tea, coffee and mountain dews and uses artificial sweeteners. Mixed urinary incontinence has been a long term problem for her and although she has some knowledge about how to change she has not incorporated the knowledge into her lifestyle thus far.  -KR     Please refer to paper survey for additional self-reported information  Yes  -KR     Rehab Potential  Good  -KR     Patient/caregiver participated in establishment of treatment plan and goals  Yes  -KR     Patient would benefit from skilled therapy intervention  Yes  -KR        PT Plan    PT Frequency  1x/week  -KR     Predicted Duration of Therapy Intervention (Therapy Eval)  12 weeks  -KR     Planned CPT's?  PT EVAL MOD COMPLELITY: 42378;PT THER PROC EA 15 MIN: 63104;PT THER ACT EA 15 MIN: 60301;PT MANUAL THERAPY EA 15 MIN: 52579;PT NEUROMUSC RE-EDUCATION EA 15 MIN: 47898;PT BIOFEEDBACK ROBERTO/ANO/URETHRAL: 43381  -KR     PT Plan Comments  We will begin with gentle strengthening and bladder training progressing to exercises to increase endurance and timing of pelvic floor musculature to improve function of pelvic floor.   -KR       User Key  (r) = Recorded By, (t) = Taken By, (c) = Cosigned By    Initials Name Provider Type    Diamond Cleveland, PT DPT Physical Therapist            Exercises     Row Name 04/01/19 0800             Subjective Pain    Able to rate subjective pain?  yes  -KR      Pre-Treatment Pain Level  0  -KR      Post-Treatment Pain Level  0  -KR         Total Minutes    16651 - PT Therapeutic Exercise Minutes  20  -KR         Exercise 1    Exercise Name 1  TA activation  -KR      Cueing 1  Verbal;Tactile  -KR      Sets 1  1  -KR      Reps 1  10  -KR      Time 1  6 sec hold  -KR          Exercise 2    Exercise Name 2  Hip Lifts w/TA and PFM activation  -KR      Cueing 2  Verbal  -KR      Sets 2  1  -KR      Reps 2  10  -KR         Exercise 3    Exercise Name 3  Bladder training  -KR        User Key  (r) = Recorded By, (t) = Taken By, (c) = Cosigned By    Initials Name Provider Type    Diamond Cleveland, PT DPT Physical Therapist                      PT OP Goals     Row Name 04/01/19 0800          PT Short Term Goals    STG Date to Achieve  05/01/19  -KR     STG 1  Patient will begin a home exercise program to accelerate the recovery process  -KR     STG 1 Progress  New  -KR     STG 2  Patient will have an increase in levator ani strength to a 3/5 bilaterally  -KR     STG 2 Progress  Met  -KR     STG 3  Patient will gain an understanding of bladder training program  -KR     STG 3 Progress  New  -KR        Long Term Goals    LTG Date to Achieve  06/24/19  -KR     LTG 1  Patient will have an increase in levator ani strength to a 4/5- 4+/5 grade  -KR     LTG 1 Progress  New  -KR     LTG 2  Patient will have 0 - 1 wet episodes in a 2 week period  -KR     LTG 2 Progress  New  -KR     LTG 3  Patient will urinate 7 - 9 times during the day and once at night  -KR     LTG 3 Progress  New  -KR        Time Calculation    PT Goal Re-Cert Due Date  06/30/19  -KR       User Key  (r) = Recorded By, (t) = Taken By, (c) = Cosigned By    Initials Name Provider Type    Diamond Cleveland, PT DPT Physical Therapist          Therapy Education  Education Details: Patient was instructed in HEP of TA activation and hip lifts with TA and PFM activation.  Discussed drinking more water, avoiding artificial sweeteners and eliminating mountain dew from her diet.  Instructed to go to bathroom only when she has the urge.  Given: HEP, Symptoms/condition management  Program: New  How Provided: Verbal, Demonstration, Written  Provided to: Patient  Level of Understanding: Teach back education performed, Verbalized, Demonstrated                Time Calculation:   Start Time: 0800  Stop Time: 0900  Time Calculation (min): 60 min  Total Timed Code Minutes- PT: 20 minute(s)  Therapy Charges for Today     Code Description Service Date Service Provider Modifiers Qty    13759875146 HC PT THER PROC EA 15 MIN 4/1/2019 Diamond Melendez, PT DPT GP 1    32217385948 HC PT EVAL MOD COMPLEXITY 3 4/1/2019 Diamond Melendez, PT DPT GP 1                  Diamond Melendez, PT DPT  4/1/2019

## 2019-04-10 ENCOUNTER — HOSPITAL ENCOUNTER (OUTPATIENT)
Dept: PHYSICAL THERAPY | Facility: HOSPITAL | Age: 69
Setting detail: THERAPIES SERIES
Discharge: HOME | End: 2019-04-10

## 2019-04-10 DIAGNOSIS — N81.11 CYSTOCELE, MIDLINE: ICD-10-CM

## 2019-04-10 DIAGNOSIS — N39.3 FEMALE STRESS INCONTINENCE: Primary | ICD-10-CM

## 2019-04-10 DIAGNOSIS — N39.41 URGE URINARY INCONTINENCE: ICD-10-CM

## 2019-04-10 DIAGNOSIS — M54.50 ACUTE LEFT-SIDED LOW BACK PAIN WITHOUT SCIATICA: ICD-10-CM

## 2019-04-10 PROCEDURE — 97110 THERAPEUTIC EXERCISES: CPT | Performed by: PHYSICAL THERAPIST

## 2019-04-10 NOTE — THERAPY TREATMENT NOTE
Outpatient Physical Therapy Pelvic Health Treatment Note  [facility]     Patient Name: Cindi Hill  : 1950  MRN: [id number]  Today's Date: 4/10/2019        Visit Date: 04/10/2019    Visit Dx:    ICD-10-CM ICD-9-CM   1. Female stress incontinence N39.3 625.6   2. Urge urinary incontinence N39.41 788.31   3. Acute left-sided low back pain without sciatica M54.5 724.2   4. Cystocele, midline N81.11 618.01       Patient Active Problem List   Diagnosis   • GERD (gastroesophageal reflux disease)   • Cough   • Deviated nasal septum   • Allergic rhinitis due to pollen   • Sicca laryngitis   • Mixed hyperlipidemia   • Breast cancer (CMS/[facility])   • Osteoarthritis of cervical spine   • Preop cardiovascular exam   • LBBB (left bundle branch block)   • Dyspnea on exertion   • Acute left-sided low back pain without sciatica   • Anginal equivalent (CMS/[facility])        Pelvic Health     Row Name 04/10/19 1774             Pelvic Floor Muscle    Interior Muscle Comments  Palpable tenderness 4 - 8 O'clock on pelvic clock reduced 80%   -KR        User Key  (r) = Recorded By, (t) = Taken By, (c) = Cosigned By    Initials Name Provider Type    Diamond Cleveland, PT DPT Physical Therapist                       PT Assessment/Plan     Row Name 04/10/19 8310          PT Assessment    Assessment Comments  Mrs. Hill demonstrates improved activation of transverse abdominous muscle, no active contraction left levator ani and trace right levator ani following faciilitation techniques.  Palpable tenderness at 4 - 8 O'clock on pelvic clock has decreased 80%.  -KR        PT Plan    PT Plan Comments  Continue to progress home exercise program to improve strength, timing and endurance of pelvic floor musculature and bladder training.   -KR       User Key  (r) = Recorded By, (t) = Taken By, (c) = Cosigned By    Initials Name Provider Type    Diamond Cleveland, PT DPT Physical Therapist            Exercises     Row Name 04/10/19 8272              Subjective Comments    Subjective Comments  Patient reports no significant changes since last session.  She relates that she has only had one diet soda over the past week and feels that she can contract the lower abdominals better now.    -KR         Subjective Pain    Able to rate subjective pain?  yes  -KR      Pre-Treatment Pain Level  0  -KR      Post-Treatment Pain Level  0  -KR         Total Minutes    82106 - PT Therapeutic Exercise Minutes  40  -KR         Exercise 1    Exercise Name 1  TA activation  -KR      Cueing 1  Verbal;Tactile  -KR      Sets 1  1  -KR      Reps 1  10  -KR      Time 1  6 sec hold  -KR      Additional Comments  Good activation  -KR         Exercise 2    Exercise Name 2  Hip Lifts w/TA and PFM activation  -KR      Cueing 2  Verbal  -KR      Sets 2  1  -KR      Reps 2  10  -KR         Exercise 3    Exercise Name 3  Hip Lifts w/resisted hip abduction, TA and PFM activation  -KR      Cueing 3  Verbal  -KR      Sets 3  1  -KR      Reps 3  10  -KR      Additional Comments  issued blue t-band  -KR         Exercise 4    Exercise Name 4  Isometric Hip Adduction w/ball  -KR      Cueing 4  Verbal  -KR      Sets 4  1  -KR      Reps 4  10  -KR      Time 4  6 sec hold  -KR         Exercise 5    Exercise Name 5  PFM contractions/relaxations  -KR      Cueing 5  Verbal;Tactile  -KR      Sets 5  2  -KR      Reps 5  8  -KR      Additional Comments  quick stretch was utilized to facilitate levator ani contractions  -KR        User Key  (r) = Recorded By, (t) = Taken By, (c) = Cosigned By    Initials Name Provider Type    Diamond Cleveland, PT DPT Physical Therapist                      PT OP Goals     Row Name 04/10/19 1330          PT Short Term Goals    STG Date to Achieve  05/01/19  -KR     STG 1  Patient will begin a home exercise program to accelerate the recovery process  -KR     STG 1 Progress  Met  -KR     STG 2  Patient will have an increase in levator ani strength to a 3/5 bilaterally  -KR      STG 2 Progress  Met  -KR     STG 3  Patient will gain an understanding of bladder training program  -KR     STG 3 Progress  New  -KR        Long Term Goals    LTG Date to Achieve  06/24/19  -KR     LTG 1  Patient will have an increase in levator ani strength to a 4/5- 4+/5 grade  -KR     LTG 1 Progress  New  -KR     LTG 2  Patient will have 0 - 1 wet episodes in a 2 week period  -KR     LTG 2 Progress  New  -KR     LTG 3  Patient will urinate 7 - 9 times during the day and once at night  -KR     LTG 3 Progress  New  -KR        Time Calculation    PT Goal Re-Cert Due Date  06/30/19  -KR       User Key  (r) = Recorded By, (t) = Taken By, (c) = Cosigned By    Initials Name Provider Type    Diamond Cleveland, PT DPT Physical Therapist           Therapy Education  Education Details: Added hip lifts with resisted hip abd blue t-band, isometric hip adduction.  Reviewed knack and bladder training strategy  Given: HEP  Program: Progressed  How Provided: Verbal, Demonstration, Written  Provided to: Patient  Level of Understanding: Verbalized, Demonstrated, Teach back education performed              Time Calculation:   Start Time: 1330  Stop Time: 1410  Time Calculation (min): 40 min  Total Timed Code Minutes- PT: 40 minute(s)  Therapy Charges for Today     Code Description Service Date Service Provider Modifiers Qty    30677119622 HC PT THER PROC EA 15 MIN 4/10/2019 Diamond Melendez, PT DPT GP 3                    Diamond Melendez PT DPT  4/10/2019

## 2019-04-24 ENCOUNTER — HOSPITAL ENCOUNTER (OUTPATIENT)
Dept: PHYSICAL THERAPY | Facility: HOSPITAL | Age: 69
Setting detail: THERAPIES SERIES
Discharge: HOME | End: 2019-04-24

## 2019-04-24 DIAGNOSIS — N81.11 CYSTOCELE, MIDLINE: ICD-10-CM

## 2019-04-24 DIAGNOSIS — N39.3 FEMALE STRESS INCONTINENCE: Primary | ICD-10-CM

## 2019-04-24 DIAGNOSIS — N39.41 URGE URINARY INCONTINENCE: ICD-10-CM

## 2019-04-24 PROCEDURE — 97110 THERAPEUTIC EXERCISES: CPT | Performed by: PHYSICAL THERAPIST

## 2019-04-24 NOTE — THERAPY TREATMENT NOTE
Outpatient Physical Therapy Pelvic Health Treatment Note  [facility]     Patient Name: Cindi Hill  : 1950  MRN: [id number]  Today's Date: 2019        Visit Date: 2019    Visit Dx:    ICD-10-CM ICD-9-CM   1. Female stress incontinence N39.3 625.6   2. Urge urinary incontinence N39.41 788.31   3. Cystocele, midline N81.11 618.01       Patient Active Problem List   Diagnosis   • GERD (gastroesophageal reflux disease)   • Cough   • Deviated nasal septum   • Allergic rhinitis due to pollen   • Sicca laryngitis   • Mixed hyperlipidemia   • Breast cancer (CMS/[facility])   • Osteoarthritis of cervical spine   • Preop cardiovascular exam   • LBBB (left bundle branch block)   • Dyspnea on exertion   • Acute left-sided low back pain without sciatica   • Anginal equivalent (CMS/[facility])        Pelvic Health     Row Name 19 0934             Pelvic Floor Muscle    Strength (Right)  1: Trace  -KR      Strength (Left)  0: No contraction  -KR      Symmetry of Sustained Maximal Contraction  Right stronger than left  -KR      Endurance (Ability to Hold Maximal Contraction)  2 sec  -KR      # of Reps of Maximal Contractions while Maintaining Endurance and Strength  1 set  -KR      Fast Contraction (# of 1 sec contractions performed)  -- Unable to perform  -KR      Interior Muscle Comments  Mild tenderness 4 - 8o'clock pelvic clock  -KR        User Key  (r) = Recorded By, (t) = Taken By, (c) = Cosigned By    Initials Name Provider Type    Diamond Cleveland, PT DPT Physical Therapist                       PT Assessment/Plan     Row Name 19 1200          PT Assessment    Assessment Comments  Mrs. Hill has been traveling for past 2 weeks and has not been performing exercises.  Strength of pelvic floor on R 1/5; L 0/5 today, although facilitation was performed.  Endurance has not increased and she is unable to perform a quick contract/relax.  Nocturia has decreased to 2 times per night.  -KR        PT Plan     PT Plan Comments  Will progress to vaginal weights of patient is agreeable.  Will show pictures at next session and see if she wishes to order them.  They will provide specific facilitation to the pelvic floor as manual faciliatation techniques were not effective today.  -KR       User Key  (r) = Recorded By, (t) = Taken By, (c) = Cosigned By    Initials Name Provider Type    Diamond Cleveland, PT DPT Physical Therapist            Exercises     Row Name 04/24/19 0934             Subjective Comments    Subjective Comments  Patient reports she has been traveling for the past 2 weeks.  She relates that she is only getting up 2 times per night.  She has not had a soft drink or caffeinated coffee since last session.  She admits she has not performed her exercise program well over the past 2 weeks.  -KR         Subjective Pain    Able to rate subjective pain?  yes  -KR      Pre-Treatment Pain Level  0  -KR      Post-Treatment Pain Level  0  -KR         Total Minutes    60302 - PT Therapeutic Exercise Minutes  40  -KR         Exercise 1    Exercise Name 1  TA activation  -KR      Cueing 1  Verbal;Tactile  -KR      Sets 1  1  -KR      Reps 1  10  -KR      Time 1  6 sec hold  -KR         Exercise 2    Exercise Name 2  Hip Lifts w/TA and PFM activation  -KR      Cueing 2  Verbal  -KR      Sets 2  1  -KR      Reps 2  10  -KR         Exercise 3    Exercise Name 3  Hip Lifts w/resisted hip abduction, TA and PFM activation  -KR      Cueing 3  Verbal  -KR      Sets 3  1  -KR      Reps 3  10  -KR         Exercise 4    Exercise Name 4  Isometric Hip Adduction w/ball  -KR      Cueing 4  Verbal  -KR      Sets 4  1  -KR      Reps 4  10  -KR      Time 4  6 sec hold  -KR         Exercise 5    Exercise Name 5  PFM contractions/relaxations  -KR      Cueing 5  Verbal;Tactile  -KR      Sets 5  2  -KR      Reps 5  8  -KR      Additional Comments  quick stretch performed  -KR        User Key  (r) = Recorded By, (t) = Taken By, (c) = Cosigned By     Initials Name Provider Type    Diamond Cleveland, PT DPT Physical Therapist                      PT OP Goals     Row Name 04/24/19 0935          PT Short Term Goals    STG Date to Achieve  05/01/19  -KR     STG 1  Patient will begin a home exercise program to accelerate the recovery process  -KR     STG 1 Progress  Met  -KR     STG 2  Patient will have an increase in levator ani strength to a 3/5 bilaterally  -KR     STG 2 Progress  Ongoing  -KR     STG 2 Progress Comments  Error at last session was marked met   -KR     STG 3  Patient will gain an understanding of bladder training program  -KR     STG 3 Progress  New  -KR        Long Term Goals    LTG Date to Achieve  06/24/19  -KR     LTG 1  Patient will have an increase in levator ani strength to a 4/5- 4+/5 grade  -KR     LTG 1 Progress  Ongoing  -KR     LTG 2  Patient will have 0 - 1 wet episodes in a 2 week period  -KR     LTG 2 Progress  Ongoing  -KR     LTG 3  Patient will urinate 7 - 9 times during the day and once at night  -KR     LTG 3 Progress  Ongoing  -KR       User Key  (r) = Recorded By, (t) = Taken By, (c) = Cosigned By    Initials Name Provider Type    Diamond Cleveland, PT DPT Physical Therapist           Therapy Education  Education Details: Reviewed HEP  Given: HEP  Program: Reinforced  How Provided: Verbal, Demonstration  Provided to: Patient  Level of Understanding: Verbalized, Demonstrated              Time Calculation:      Therapy Charges for Today     Code Description Service Date Service Provider Modifiers Qty    64892165870 HC PT THER PROC EA 15 MIN 4/24/2019 Diamond Melendez, PT DPT GP 3                    Diamond Melendez PT DPT  4/24/2019

## 2019-05-01 ENCOUNTER — HOSPITAL ENCOUNTER (OUTPATIENT)
Dept: PHYSICAL THERAPY | Facility: HOSPITAL | Age: 69
Setting detail: THERAPIES SERIES
Discharge: HOME | End: 2019-05-01

## 2019-05-01 DIAGNOSIS — N39.3 FEMALE STRESS INCONTINENCE: Primary | ICD-10-CM

## 2019-05-01 DIAGNOSIS — N39.41 URGE URINARY INCONTINENCE: ICD-10-CM

## 2019-05-01 DIAGNOSIS — M54.50 ACUTE LEFT-SIDED LOW BACK PAIN WITHOUT SCIATICA: ICD-10-CM

## 2019-05-01 DIAGNOSIS — N81.11 CYSTOCELE, MIDLINE: ICD-10-CM

## 2019-05-01 PROCEDURE — 97110 THERAPEUTIC EXERCISES: CPT | Performed by: PHYSICAL THERAPIST

## 2019-05-01 NOTE — THERAPY TREATMENT NOTE
Outpatient Physical Therapy Pelvic Health Progress Note  [facility]     Patient Name: Cindi Hill  : 1950  MRN: [id number]  Today's Date: 2019        Visit Date: 2019    Visit Dx:    ICD-10-CM ICD-9-CM   1. Female stress incontinence N39.3 625.6   2. Urge urinary incontinence N39.41 788.31   3. Cystocele, midline N81.11 618.01   4. Acute left-sided low back pain without sciatica M54.5 724.2       Patient Active Problem List   Diagnosis   • GERD (gastroesophageal reflux disease)   • Cough   • Deviated nasal septum   • Allergic rhinitis due to pollen   • Sicca laryngitis   • Mixed hyperlipidemia   • Breast cancer (CMS/[facility])   • Osteoarthritis of cervical spine   • Preop cardiovascular exam   • LBBB (left bundle branch block)   • Dyspnea on exertion   • Acute left-sided low back pain without sciatica   • Anginal equivalent (CMS/HCC)        Pelvic Health     Row Name 19 1103             Pelvic Floor Muscle    Strength (Right)  0: No contraction  -KR      Strength (Left)  0: No contraction  -KR      Symmetry of Sustained Maximal Contraction  Symmetrical  -KR      Interior Muscle Comments  extensive overflow from adductors and gluteals  -KR        User Key  (r) = Recorded By, (t) = Taken By, (c) = Cosigned By    Initials Name Provider Type    Diamond Cleveland, PT DPT Physical Therapist        PT Ortho     Row Name 19 1103       Myotomal Screen- Lower Quarter Clearing    Hip flexion (L2)  Bilateral:;4+ (Good +)  -KR    Knee extension (L3)  Bilateral:;4+ (Good +)  -KR    Ankle DF (L4)  Bilateral:;4+ (Good +)  -KR    Great toe extension (L5)  Bilateral:;4+ (Good +)  -KR    Ankle PF (S1)  Bilateral:;4+ (Good +)  -KR    Knee flexion (S2)  Bilateral:;5 (Normal)  -KR       General ROM    GENERAL ROM COMMENTS  R IR decreased 40%  -KR       MMT (Manual Muscle Testing)    General MMT Comments  Left gluteus medius 4+/5; R 5/5  -KR      User Key  (r) = Recorded By, (t) = Taken By, (c) = Cosigned  "By    Initials Name Provider Type    Diamond Cleveland, PT DPT Physical Therapist                     PT Assessment/Plan     Row Name 05/01/19 1100          PT Assessment    Assessment Comments  Mrs. Hill demonstrates some improvement in symptoms including decreased frequency of urination, decreased amount of leakage and decreased nocturia.  She is unable to perform a contraction in the pelvic floor with significant overflow of hip adductors and gluteals.  She may benefit from vaginal weights to provide sensory feedback in the pelvic floor.    -KR        PT Plan    PT Plan Comments  Patient plans to order vaginal weights and is agreeable to trying them.  She was also instructed to perform TA activations in supine, sitting, standing, walking and to sing at a high-pitched voice to facilitate pelvic floor contraction.  -KR       User Key  (r) = Recorded By, (t) = Taken By, (c) = Cosigned By    Initials Name Provider Type    Diamond Cleveland, PT DPT Physical Therapist            Exercises     Row Name 05/01/19 1103             Subjective Comments    Subjective Comments  Patient reports she saw the  Monday and he told her he wanted her to continue PT and try Myrbetric. According to patient, the  told her she had no pelvic muscle control. She relates she has only been up once per night to urinate for the past 3 nights.  She relates low back pain has increased some because she worked in her yard, rating that pain at a \"3\" and \"0\" today.  She reports intermittent pain in the left LE with prolonged sitting.  She relates the amount of leakage has decreased and she changes her pad once per day.  Frequency of urination has decreased during the day also down to 12 times yesterday.  -KR         Subjective Pain    Able to rate subjective pain?  yes  -KR      Pre-Treatment Pain Level  0  -KR      Post-Treatment Pain Level  0  -KR         Total Minutes    26597 - PT Therapeutic Exercise Minutes  45  -KR        User Key  (r) = " Recorded By, (t) = Taken By, (c) = Cosigned By    Initials Name Provider Type    Diamond Cleveland, PT DPT Physical Therapist                      PT OP Goals     Row Name 05/01/19 1103          PT Short Term Goals    STG Date to Achieve  05/01/19  -KR     STG 1  Patient will begin a home exercise program to accelerate the recovery process  -KR     STG 1 Progress  Met  -KR     STG 2  Patient will have an increase in levator ani strength to a 3/5 bilaterally  -KR     STG 2 Progress  Ongoing  -KR     STG 3  Patient will gain an understanding of bladder training program  -KR     STG 3 Progress  Ongoing  -KR        Long Term Goals    LTG Date to Achieve  06/24/19  -KR     LTG 1  Patient will have an increase in levator ani strength to a 4/5- 4+/5 grade  -KR     LTG 1 Progress  Ongoing  -KR     LTG 2  Patient will have 0 - 1 wet episodes in a 2 week period  -KR     LTG 2 Progress  Ongoing  -KR     LTG 3  Patient will urinate 7 - 9 times during the day and once at night  -KR     LTG 3 Progress  Progressing  -KR     LTG 3 Progress Comments  Urinating 12 times during the day and once per night  -KR        Time Calculation    PT Goal Re-Cert Due Date  06/30/19  -KR       User Key  (r) = Recorded By, (t) = Taken By, (c) = Cosigned By    Initials Name Provider Type    Diamond Cleveland, PT DPT Physical Therapist           Therapy Education  Education Details: Perform TA activation in supine, sitting, standing and walking.  Begin singing at high-pitched tones.  Order vaginal weights.   Given: HEP  Program: Progressed, Reinforced  How Provided: Verbal, Demonstration  Provided to: Patient  Level of Understanding: Verbalized, Demonstrated              Time Calculation:   Start Time: 1103  Stop Time: 1148  Time Calculation (min): 45 min  Total Timed Code Minutes- PT: 45 minute(s)  Therapy Charges for Today     Code Description Service Date Service Provider Modifiers Qty    18506522873 HC PT THER PROC EA 15 MIN 5/1/2019 Diamond Melendez, PT  DPT GP 3                    Diamond Melendez, PT DPT  5/1/2019

## 2019-05-08 ENCOUNTER — HOSPITAL ENCOUNTER (OUTPATIENT)
Dept: PHYSICAL THERAPY | Facility: HOSPITAL | Age: 69
Setting detail: THERAPIES SERIES
Discharge: HOME | End: 2019-05-08

## 2019-05-08 DIAGNOSIS — M54.50 ACUTE LEFT-SIDED LOW BACK PAIN WITHOUT SCIATICA: ICD-10-CM

## 2019-05-08 DIAGNOSIS — N81.11 CYSTOCELE, MIDLINE: ICD-10-CM

## 2019-05-08 DIAGNOSIS — N39.3 FEMALE STRESS INCONTINENCE: Primary | ICD-10-CM

## 2019-05-08 DIAGNOSIS — N39.41 URGE URINARY INCONTINENCE: ICD-10-CM

## 2019-05-08 PROCEDURE — 97110 THERAPEUTIC EXERCISES: CPT | Performed by: PHYSICAL THERAPIST

## 2019-05-08 NOTE — THERAPY TREATMENT NOTE
Outpatient Physical Therapy Pelvic Health Treatment Note  [facility]     Patient Name: Cindi Hill  : 1950  MRN: [id number]  Today's Date: 2019        Visit Date: 2019    Visit Dx:    ICD-10-CM ICD-9-CM   1. Female stress incontinence N39.3 625.6   2. Urge urinary incontinence N39.41 788.31   3. Cystocele, midline N81.11 618.01   4. Acute left-sided low back pain without sciatica M54.5 724.2       Patient Active Problem List   Diagnosis   • GERD (gastroesophageal reflux disease)   • Cough   • Deviated nasal septum   • Allergic rhinitis due to pollen   • Sicca laryngitis   • Mixed hyperlipidemia   • Breast cancer (CMS/[facility])   • Osteoarthritis of cervical spine   • Preop cardiovascular exam   • LBBB (left bundle branch block)   • Dyspnea on exertion   • Acute left-sided low back pain without sciatica   • Anginal equivalent (CMS/[facility])        Pelvic Health     Row Name 19 1000             Pelvic Floor Muscle    Strength (Right)  0: No contraction  -KR      Strength (Left)  0: No contraction  -KR      Symmetry of Sustained Maximal Contraction  Symmetrical  -KR      Endurance (Ability to Hold Maximal Contraction)  -- 0 sec  -KR      Interior Muscle Comments  Verbal consent provided by patient to perform internal pelvic floor assessment; significant decreasd in overflow today and normal activation of TA  -KR        User Key  (r) = Recorded By, (t) = Taken By, (c) = Cosigned By    Initials Name Provider Type    Diamond Cleveland, PT DPT Physical Therapist                       PT Assessment/Plan     Row Name 19 1030          PT Assessment    Assessment Comments  Mrs. Hill is not able to perform a trace contraction at this time.  She has been compliant with home exercise program and has been attempting to avoid going to the bathroom if she does not have an urge.  She received pelvic weights yesterday and will attempt to use them today.    -KR        PT Plan    PT Plan Comments  Patient  plans to begin using pelvic weights today. Will reassess at next session.  -KR       User Key  (r) = Recorded By, (t) = Taken By, (c) = Cosigned By    Initials Name Provider Type    Diamond Cleveland, PT DPT Physical Therapist            Exercises     Row Name 05/08/19 1030 05/08/19 1000          Subjective Comments    Subjective Comments  --  Patient reports she has been gong to restroom 12 - 15 times per day since starting Myrbetric.  She continues to get up once per night to urniate. Low back pain has decreased since last session.  -KR        Subjective Pain    Able to rate subjective pain?  --  yes  -KR     Pre-Treatment Pain Level  --  0  -KR     Post-Treatment Pain Level  --  0  -KR        Total Minutes    54353 - PT Therapeutic Exercise Minutes  40  -KR  --  -KR        Exercise 1    Exercise Name 1  --  Isometric hip adduction w/ball  -KR     Cueing 1  --  Verbal  -KR     Sets 1  --  1  -KR     Reps 1  --  10  -KR     Time 1  --  6 sec hold  -KR     Additional Comments  --  TA activation  -KR        Exercise 2    Exercise Name 2  --  Resisted hip abduction w/band  -KR     Cueing 2  --  Verbal  -KR     Sets 2  --  1  -KR     Reps 2  --  10  -KR     Additional Comments  --  TA activation  -KR        Exercise 3    Exercise Name 3  --  Hip lifts w/resisted hip abduction  -KR     Cueing 3  --  Verbal  -KR     Sets 3  --  1  -KR     Reps 3  --  10  -KR     Additional Comments  --  TA activation  -KR        Exercise 4    Exercise Name 4  --  Instruction on how to use pelvic weights  -KR       User Key  (r) = Recorded By, (t) = Taken By, (c) = Cosigned By    Initials Name Provider Type    Diamond Cleveland, PT DPT Physical Therapist                      PT OP Goals     Row Name 05/08/19 1030          PT Short Term Goals    STG Date to Achieve  05/01/19  -KR     STG 1  Patient will begin a home exercise program to accelerate the recovery process  -KR     STG 1 Progress  Met  -KR     STG 2  Patient will have an increase in  levator ani strength to a 3/5 bilaterally  -KR     STG 2 Progress  Ongoing  -KR     STG 3  Patient will gain an understanding of bladder training program  -KR     STG 3 Progress  Progressing  -KR     STG 3 Progress Comments  Understands training but has not put it into place fully  -KR        Long Term Goals    LTG Date to Achieve  06/24/19  -KR     LTG 1  Patient will have an increase in levator ani strength to a 4/5- 4+/5 grade  -KR     LTG 1 Progress  Ongoing  -KR     LTG 2  Patient will have 0 - 1 wet episodes in a 2 week period  -KR     LTG 2 Progress  Ongoing  -KR     LTG 3  Patient will urinate 7 - 9 times during the day and once at night  -KR     LTG 3 Progress  Progressing  -KR        Time Calculation    PT Goal Re-Cert Due Date  06/30/19  -KR       User Key  (r) = Recorded By, (t) = Taken By, (c) = Cosigned By    Initials Name Provider Type    Diamond Cleveland, PT DPT Physical Therapist           Therapy Education  Education Details: Bladder training review and begin use of pelvic weights  Given: Symptoms/condition management, HEP  Program: Reinforced, Progressed  How Provided: Demonstration, Verbal  Provided to: Patient  Level of Understanding: Verbalized, Demonstrated              Time Calculation:   Start Time: 1030  Stop Time: 1110  Time Calculation (min): 40 min  Total Timed Code Minutes- PT: 40 minute(s)  Therapy Charges for Today     Code Description Service Date Service Provider Modifiers Qty    92551067579 HC PT THER PROC EA 15 MIN 5/8/2019 Diamond Melendez, PT DPT GP 3                    Diamond Melendez, PT DPT  5/8/2019

## 2019-05-13 ENCOUNTER — APPOINTMENT (OUTPATIENT)
Dept: PHYSICAL THERAPY | Facility: HOSPITAL | Age: 69
End: 2019-05-13

## 2019-05-23 ENCOUNTER — HOSPITAL ENCOUNTER (OUTPATIENT)
Dept: PHYSICAL THERAPY | Facility: HOSPITAL | Age: 69
Setting detail: THERAPIES SERIES
Discharge: HOME | End: 2019-05-23

## 2019-05-23 DIAGNOSIS — M54.50 ACUTE LEFT-SIDED LOW BACK PAIN WITHOUT SCIATICA: ICD-10-CM

## 2019-05-23 DIAGNOSIS — N39.3 FEMALE STRESS INCONTINENCE: Primary | ICD-10-CM

## 2019-05-23 DIAGNOSIS — N81.11 CYSTOCELE, MIDLINE: ICD-10-CM

## 2019-05-23 DIAGNOSIS — N39.41 URGE URINARY INCONTINENCE: ICD-10-CM

## 2019-05-23 PROCEDURE — 97110 THERAPEUTIC EXERCISES: CPT | Performed by: PHYSICAL THERAPIST

## 2019-05-23 NOTE — THERAPY TREATMENT NOTE
Outpatient Physical Therapy Pelvic Health Treatment Note  [facility]     Patient Name: Cindi Hill  : 1950  MRN: [id number]  Today's Date: 2019        Visit Date: 2019    Visit Dx:    ICD-10-CM ICD-9-CM   1. Female stress incontinence N39.3 625.6   2. Urge urinary incontinence N39.41 788.31   3. Cystocele, midline N81.11 618.01   4. Acute left-sided low back pain without sciatica M54.5 724.2       Patient Active Problem List   Diagnosis   • GERD (gastroesophageal reflux disease)   • Cough   • Deviated nasal septum   • Allergic rhinitis due to pollen   • Sicca laryngitis   • Mixed hyperlipidemia   • Breast cancer (CMS/[facility])   • Osteoarthritis of cervical spine   • Preop cardiovascular exam   • LBBB (left bundle branch block)   • Dyspnea on exertion   • Acute left-sided low back pain without sciatica   • Anginal equivalent (CMS/[facility])        Pelvic Health     Row Name 19 1115             Pelvic Floor Muscle    Strength (Right)  0: No contraction  -KR      Strength (Left)  0: No contraction  -KR      Symmetry of Sustained Maximal Contraction  Symmetrical  -KR      Interior Muscle Comments  Verbal consent provided by patient to perform internal pelvic floor exam.  -KR        User Key  (r) = Recorded By, (t) = Taken By, (c) = Cosigned By    Initials Name Provider Type    Diamond Cleveland, PT DPT Physical Therapist                       PT Assessment/Plan     Row Name 19 1115          PT Assessment    Assessment Comments  Mrs. Hill has attempted to use vaginal weights a couple of times without success, then went on a trip.  She has a 0/5 grade strength of levator ani bilaterally.  Facilitation techniques have been attempted without success.  Urinary urgency and stress incontinence are improving which may be in part due to bladder training and use of Myrbetric.   -KR        PT Plan    PT Plan Comments  Will have patient attempt use of vaginal weights prior to return appointment on  6/12/19.  If she is not able to contract the pelvic floor muscles at that time she will be discharged.  -KR       User Key  (r) = Recorded By, (t) = Taken By, (c) = Cosigned By    Initials Name Provider Type    Diamond Cleveland, PT DPT Physical Therapist            Exercises     Row Name 05/23/19 1118             Subjective Comments    Subjective Comments  Patient reports that she has tried to use the vaginal weights a couple of times, but not successful and went out of town after that.  She relates that she going to urinate about every 2 hours most days and 1 time per night.    -KR         Subjective Pain    Able to rate subjective pain?  yes  -KR      Pre-Treatment Pain Level  0  -KR      Post-Treatment Pain Level  0  -KR         Total Minutes    70706 - PT Therapeutic Exercise Minutes  40  -KR         Exercise 1    Exercise Name 1  Isometric hip adduction w/ball  -KR      Cueing 1  Verbal  -KR      Sets 1  1  -KR      Reps 1  10  -KR      Time 1  6 sec hold  -KR         Exercise 2    Exercise Name 2  Resisted hip abduction w/band  -KR      Cueing 2  Verbal  -KR      Sets 2  1  -KR      Reps 2  10  -KR         Exercise 3    Exercise Name 3  Hip lifts w/resisted hip abduction  -KR      Cueing 3  Verbal  -KR      Sets 3  1  -KR      Reps 3  10  -KR         Exercise 4    Exercise Name 4  Reviewed bladder training, bladder irritants and use of vaginal weightd  -KR        User Key  (r) = Recorded By, (t) = Taken By, (c) = Cosigned By    Initials Name Provider Type    Diamond Cleveland, PT DPT Physical Therapist                      PT OP Goals     Row Name 05/23/19 1119          PT Short Term Goals    STG Date to Achieve  05/01/19  -KR     STG 1  Patient will begin a home exercise program to accelerate the recovery process  -KR     STG 1 Progress  Met  -KR     STG 2  Patient will have an increase in levator ani strength to a 3/5 bilaterally  -KR     STG 2 Progress  Ongoing  -KR     STG 3  Patient will gain an  understanding of bladder training program  -KR     STG 3 Progress  Met  -KR        Long Term Goals    LTG Date to Achieve  06/24/19  -KR     LTG 1  Patient will have an increase in levator ani strength to a 4/5- 4+/5 grade  -KR     LTG 1 Progress  Ongoing  -KR     LTG 2  Patient will have 0 - 1 wet episodes in a 2 week period  -KR     LTG 2 Progress  Ongoing  -KR     LTG 3  Patient will urinate 7 - 9 times during the day and once at night  -KR     LTG 3 Progress  Progressing  -KR     LTG 3 Progress Comments  Now urinating 9 - 10 times per day and 1 time at night  -KR        Time Calculation    PT Goal Re-Cert Due Date  06/30/19  -KR       User Key  (r) = Recorded By, (t) = Taken By, (c) = Cosigned By    Initials Name Provider Type    Diamond Cleveland, PT DPT Physical Therapist           Therapy Education  Education Details: Reviewed use of vaginal weights, bladder training and bladder irritants  Given: HEP, Symptoms/condition management  Program: Reinforced  How Provided: Verbal, Demonstration, Written  Provided to: Patient  Level of Understanding: Verbalized, Demonstrated              Time Calculation:   Start Time: 1115  Stop Time: 1155  Time Calculation (min): 40 min  Total Timed Code Minutes- PT: 40 minute(s)  Therapy Charges for Today     Code Description Service Date Service Provider Modifiers Qty    84301001933  PT THER PROC EA 15 MIN 5/23/2019 Diamond Melendez, PT DPT GP 3                    Diamond Melendez, PT DPT  5/23/2019

## 2019-05-24 ENCOUNTER — OFFICE VISIT (OUTPATIENT)
Dept: FAMILY MEDICINE CLINIC | Facility: CLINIC | Age: 69
End: 2019-05-24

## 2019-05-24 VITALS
DIASTOLIC BLOOD PRESSURE: 78 MMHG | OXYGEN SATURATION: 97 % | BODY MASS INDEX: 29.88 KG/M2 | WEIGHT: 162.4 LBS | SYSTOLIC BLOOD PRESSURE: 131 MMHG | TEMPERATURE: 97.9 F | HEART RATE: 80 BPM | HEIGHT: 62 IN

## 2019-05-24 DIAGNOSIS — I20.8 ANGINAL EQUIVALENT (HCC): ICD-10-CM

## 2019-05-24 DIAGNOSIS — E78.2 MIXED HYPERLIPIDEMIA: Primary | ICD-10-CM

## 2019-05-24 DIAGNOSIS — C50.919 MALIGNANT NEOPLASM OF FEMALE BREAST, UNSPECIFIED ESTROGEN RECEPTOR STATUS, UNSPECIFIED LATERALITY, UNSPECIFIED SITE OF BREAST (HCC): ICD-10-CM

## 2019-05-24 PROCEDURE — 99213 OFFICE O/P EST LOW 20 MIN: CPT | Performed by: FAMILY MEDICINE

## 2019-05-24 NOTE — PROGRESS NOTES
Subjective   Cindi Hill is a 68 y.o. female.     68-year-old female with recent pelvic reconstruction i.e. rectal and bladder for hyperlipidemia      Hyperlipidemia   This is a chronic problem. The current episode started more than 1 year ago. The problem is controlled. Factors aggravating her hyperlipidemia include fatty foods. Pertinent negatives include no chest pain or myalgias. Current antihyperlipidemic treatment includes diet change and statins. The current treatment provides significant improvement of lipids. Compliance problems include adherence to exercise.  Risk factors for coronary artery disease include dyslipidemia, hypertension, post-menopausal and a sedentary lifestyle.        The following portions of the patient's history were reviewed and updated as appropriate: allergies, current medications, past family history, past medical history, past social history, past surgical history and problem list.    Review of Systems   Cardiovascular: Negative for chest pain and leg swelling.   Genitourinary: Positive for difficulty urinating.   Musculoskeletal: Negative for myalgias.       Objective   Physical Exam   Constitutional: She is oriented to person, place, and time.   Overweight   HENT:   Right Ear: External ear normal.   Left Ear: External ear normal.   Cardiovascular: Normal rate and regular rhythm.   Pulmonary/Chest: Effort normal and breath sounds normal.   Musculoskeletal: She exhibits no edema.   Neurological: She is alert and oriented to person, place, and time.   Psychiatric: She has a normal mood and affect. Her behavior is normal. Judgment and thought content normal.   Nursing note and vitals reviewed.      Assessment/Plan   Problems Addressed this Visit        Cardiovascular and Mediastinum    Mixed hyperlipidemia - Primary    Relevant Orders    Comprehensive Metabolic Panel    Lipid Panel    Anginal equivalent (CMS/HCC)       Other    Breast cancer (CMS/HCC)          Plan-above

## 2019-05-25 LAB
ALBUMIN SERPL-MCNC: 4.4 G/DL (ref 3.5–5.2)
ALBUMIN/GLOB SERPL: 1.5 G/DL
ALP SERPL-CCNC: 84 U/L (ref 39–117)
ALT SERPL-CCNC: 16 U/L (ref 1–33)
AST SERPL-CCNC: 17 U/L (ref 1–32)
BILIRUB SERPL-MCNC: 0.3 MG/DL (ref 0.2–1.2)
BUN SERPL-MCNC: 13 MG/DL (ref 8–23)
BUN/CREAT SERPL: 20.3 (ref 7–25)
CALCIUM SERPL-MCNC: 10.5 MG/DL (ref 8.6–10.5)
CHLORIDE SERPL-SCNC: 99 MMOL/L (ref 98–107)
CHOLEST SERPL-MCNC: 191 MG/DL (ref 0–200)
CO2 SERPL-SCNC: 27.3 MMOL/L (ref 22–29)
CREAT SERPL-MCNC: 0.64 MG/DL (ref 0.57–1)
GLOBULIN SER CALC-MCNC: 2.9 GM/DL
GLUCOSE SERPL-MCNC: 88 MG/DL (ref 65–99)
HDLC SERPL-MCNC: 62 MG/DL (ref 40–60)
LDLC SERPL CALC-MCNC: 112 MG/DL (ref 0–100)
POTASSIUM SERPL-SCNC: 4.9 MMOL/L (ref 3.5–5.2)
PROT SERPL-MCNC: 7.3 G/DL (ref 6–8.5)
SODIUM SERPL-SCNC: 142 MMOL/L (ref 136–145)
TRIGL SERPL-MCNC: 85 MG/DL (ref 0–150)
VLDLC SERPL CALC-MCNC: 17 MG/DL

## 2019-05-28 ENCOUNTER — TELEPHONE (OUTPATIENT)
Dept: FAMILY MEDICINE CLINIC | Facility: CLINIC | Age: 69
End: 2019-05-28

## 2019-05-28 NOTE — TELEPHONE ENCOUNTER
Left message for patient call back and let her know her labs results were normal and that we need to repeat lipid panel in 3 months.  No appt needed.

## 2019-05-28 NOTE — TELEPHONE ENCOUNTER
----- Message from Verónica Velazco sent at 5/28/2019  8:34 AM CDT -----      ----- Message -----  From: Brandin, Reflab Results In  Sent: 5/25/2019   2:35 AM  To: Jose Wolff MD

## 2019-05-28 NOTE — TELEPHONE ENCOUNTER
Patient called back and I went over results with her.  She voiced her understanding about repeating in 3 months.

## 2019-05-30 ENCOUNTER — APPOINTMENT (OUTPATIENT)
Dept: PHYSICAL THERAPY | Facility: HOSPITAL | Age: 69
End: 2019-05-30

## 2019-06-05 ENCOUNTER — APPOINTMENT (OUTPATIENT)
Dept: PHYSICAL THERAPY | Facility: HOSPITAL | Age: 69
End: 2019-06-05

## 2019-06-12 ENCOUNTER — HOSPITAL ENCOUNTER (OUTPATIENT)
Dept: PHYSICAL THERAPY | Facility: HOSPITAL | Age: 69
Setting detail: THERAPIES SERIES
Discharge: HOME | End: 2019-06-12

## 2019-06-12 DIAGNOSIS — N39.3 FEMALE STRESS INCONTINENCE: Primary | ICD-10-CM

## 2019-06-12 DIAGNOSIS — N39.41 URGE URINARY INCONTINENCE: ICD-10-CM

## 2019-06-12 DIAGNOSIS — M54.50 ACUTE LEFT-SIDED LOW BACK PAIN WITHOUT SCIATICA: ICD-10-CM

## 2019-06-12 DIAGNOSIS — N81.11 CYSTOCELE, MIDLINE: ICD-10-CM

## 2019-06-12 PROCEDURE — 97110 THERAPEUTIC EXERCISES: CPT | Performed by: PHYSICAL THERAPIST

## 2019-06-12 NOTE — THERAPY DISCHARGE NOTE
Outpatient Physical Therapy Pelvic Health Treatment Note/Discharge Summary  [facility]     Patient Name: Cindi Hill  : 1950  MRN: [id number]  Today's Date: 2019        Visit Date: 2019    Visit Dx:    ICD-10-CM ICD-9-CM   1. Female stress incontinence N39.3 625.6   2. Urge urinary incontinence N39.41 788.31   3. Cystocele, midline N81.11 618.01   4. Acute left-sided low back pain without sciatica M54.5 724.2       Patient Active Problem List   Diagnosis   • GERD (gastroesophageal reflux disease)   • Cough   • Deviated nasal septum   • Allergic rhinitis due to pollen   • Sicca laryngitis   • Mixed hyperlipidemia   • Breast cancer (CMS/HCC)   • Osteoarthritis of cervical spine   • Preop cardiovascular exam   • LBBB (left bundle branch block)   • Dyspnea on exertion   • Acute left-sided low back pain without sciatica   • Anginal equivalent (CMS/HCC)        Pelvic Health     Row Name 19 1120             Pelvic Floor Muscle    Strength (Right)  0: No contraction  -KR      Strength (Left)  0: No contraction  -KR      Symmetry of Sustained Maximal Contraction  Symmetrical  -KR      Internal Pelvic Floor Comments  Verbal consent provided to perform internal pelvic floor assessment.  -KR         Pelvic Floor    Ability to Isolate Contraction of Pelvic Floor  No  -KR      Overflow from Adjacent Muscles  Abductors;Gluteus Shun  -KR         General ROM    GENERAL ROM COMMENTS  R IR decreased 20 -25%  -KR        User Key  (r) = Recorded By, (t) = Taken By, (c) = Cosigned By    Initials Name Provider Type    Diamond Cleveland, PT DPT Physical Therapist                       PT Assessment/Plan     Row Name 19 1120          PT Assessment    Assessment Comments  Mrs. Hill has attempted vaginal weights and is able to keep them in if she pushes the weight up high into the vagina.  She does not demonstrate any trace of a contration in her pelvic floor musculature.    -KR        PT Plan     "PT Plan Comments  Will discharge from PT today.  Patient will return to  next week to discuss a \"bladder pacemaker.\"  -KR       User Key  (r) = Recorded By, (t) = Taken By, (c) = Cosigned By    Initials Name Provider Type    Diamond Cleveland, PT DPT Physical Therapist              Exercises     Row Name 06/12/19 1120             Subjective Comments    Subjective Comments  Patient reports she is now urinating 8 - 10 times per day.  She relates she still has some urge incontiinence.  She relates she has been using the vaginal weights and is able to hold the second weight in, but is not able to tell if she is candice a pelvic floor muscle or not.  She is not getting up , but once per night now.    -KR         Subjective Pain    Able to rate subjective pain?  yes  -KR      Pre-Treatment Pain Level  0  -KR      Post-Treatment Pain Level  0  -KR         Total Minutes    53283 - PT Therapeutic Exercise Minutes  30  -KR         Exercise 1    Exercise Name 1  Reviewed bladder training  -KR        User Key  (r) = Recorded By, (t) = Taken By, (c) = Cosigned By    Initials Name Provider Type    Diamond Cleveland, PT DPT Physical Therapist                      PT OP Goals     Row Name 06/12/19 1120          PT Short Term Goals    STG Date to Achieve  05/01/19  -KR     STG 1  Patient will begin a home exercise program to accelerate the recovery process  -KR     STG 1 Progress  Met  -KR     STG 2  Patient will have an increase in levator ani strength to a 3/5 bilaterally  -KR     STG 2 Progress  Not Met  -KR     STG 3  Patient will gain an understanding of bladder training program  -KR     STG 3 Progress  Met  -KR        Long Term Goals    LTG Date to Achieve  06/24/19  -KR     LTG 1  Patient will have an increase in levator ani strength to a 4/5- 4+/5 grade  -KR     LTG 1 Progress  Not Met  -KR     LTG 2  Patient will have 0 - 1 wet episodes in a 2 week period  -KR     LTG 2 Progress  Not Met  -KR     LTG 3  Patient will " "urinate 7 - 9 times during the day and once at night  -KR     LTG 3 Progress  Partially Met  -KR     LTG 3 Progress Comments  She is now urinating 8 - 10 times per day an once per night  -KR        Time Calculation    PT Goal Re-Cert Due Date  06/30/19  -KR       User Key  (r) = Recorded By, (t) = Taken By, (c) = Cosigned By    Initials Name Provider Type    Diamond Cleveland, PT DPT Physical Therapist           Therapy Education  Education Details: continue bladder training  Given: Symptoms/condition management  Program: Reinforced  How Provided: Verbal  Provided to: Patient  Level of Understanding: Verbalized, Demonstrated            Time Calculation:   Start Time: 1120  Stop Time: 1150  Time Calculation (min): 30 min  Total Timed Code Minutes- PT: 30 minute(s)    Therapy Charges for Today     Code Description Service Date Service Provider Modifiers Qty    42632854085 HC PT THER PROC EA 15 MIN 6/12/2019 Diamond Melendez, PT DPT GP 2               OP PT Discharge Summary  Date of Discharge: 06/12/19  Reason for Discharge: Maximum functional potential achieved  Outcomes Achieved: Refer to plan of care for updates on goals achieved  Discharge Destination: Home with home program  Discharge Instructions/Additional Comments: Patient was not able to achieve an active contraction of pelvic floor musculature with exercise and attempts at vaginal weights.  She will return to  next week to discuss \"bladder pacemaker\"      Diamond Melendez PT DPT  6/12/2019       "

## 2019-06-19 ENCOUNTER — APPOINTMENT (OUTPATIENT)
Dept: PHYSICAL THERAPY | Facility: HOSPITAL | Age: 69
End: 2019-06-19

## 2019-06-26 ENCOUNTER — APPOINTMENT (OUTPATIENT)
Dept: PHYSICAL THERAPY | Facility: HOSPITAL | Age: 69
End: 2019-06-26

## 2019-07-03 ENCOUNTER — APPOINTMENT (OUTPATIENT)
Dept: PHYSICAL THERAPY | Facility: HOSPITAL | Age: 69
End: 2019-07-03

## 2019-07-30 ENCOUNTER — OFFICE VISIT (OUTPATIENT)
Dept: PULMONOLOGY | Facility: CLINIC | Age: 69
End: 2019-07-30

## 2019-07-30 VITALS
DIASTOLIC BLOOD PRESSURE: 76 MMHG | SYSTOLIC BLOOD PRESSURE: 130 MMHG | HEART RATE: 91 BPM | WEIGHT: 164.4 LBS | BODY MASS INDEX: 30.25 KG/M2 | OXYGEN SATURATION: 96 % | HEIGHT: 62 IN

## 2019-07-30 DIAGNOSIS — R05.9 COUGH: Primary | ICD-10-CM

## 2019-07-30 DIAGNOSIS — J30.1 ALLERGIC RHINITIS DUE TO POLLEN, UNSPECIFIED SEASONALITY: ICD-10-CM

## 2019-07-30 PROCEDURE — 94664 DEMO&/EVAL PT USE INHALER: CPT | Performed by: INTERNAL MEDICINE

## 2019-07-30 PROCEDURE — 99213 OFFICE O/P EST LOW 20 MIN: CPT | Performed by: INTERNAL MEDICINE

## 2019-07-30 NOTE — PROGRESS NOTES
Subjective   Cindi Hill is a 68 y.o. female.     Background: Pt with cough, upper airway cough, dynamic airway collapse    Chief Complaint   Patient presents with   • Cough      History of Present Illness   She is not using inhalers because they did not help. She occasionally takes benzonatate and / or delsym.  The meds don't help much.  A bottle of water helps some.  We tried azelastine with not much help.  Benadryl was not tolerable.  Singing and raising voice and Candle odors will set off the cough.  She has had bladder prolapse, sphincter problems and required surgery for repair, felt due to cough.    Medical/Family/Social History   has a past medical history of Acute left-sided low back pain without sciatica (10/24/2017), Arthritis, Breast cancer (CMS/[facility]), Drug therapy (1997), GERD (gastroesophageal reflux disease), Hypercholesteremia, KARUNA (stress urinary incontinence, female), Urge incontinence, Uterovaginal prolapse, and Vaginal atrophy.   has a past surgical history that includes Rotator cuff repair (Right, 2012); Colonoscopy (2014); Laminectomy (01/2016); Cardiac catheterization (Left, 2/28/2017); Bronchoscopy (N/A, 4/26/2017); Mastectomy (Right, 1997); and Hysterectomy.  family history includes Cancer in her brother, father, and paternal grandfather; Dementia in her mother; Diabetes in her maternal grandfather; Heart failure in her mother; No Known Problems in her maternal grandmother and paternal grandmother.   reports that she has never smoked. She has never used smokeless tobacco. She reports that she does not drink alcohol or use drugs.  Allergies   Allergen Reactions   • Lisinopril Cough   • Lortab [Hydrocodone-Acetaminophen] Nausea Only   • Percocet [Oxycodone-Acetaminophen] Nausea Only     Medications    Current Outpatient Medications:   •  aspirin 81 MG tablet, Take 81 mg by mouth daily., Disp: , Rfl:   •  benzonatate (TESSALON) 200 MG capsule, Take 200 mg by mouth 3 (Three) Times a Day As  "Needed for Cough., Disp: , Rfl:   •  calcium carbonate (CALCIUM 600) 600 MG tablet, Take 600 mg by mouth daily., Disp: , Rfl:   •  cetirizine (zyrTEC) 10 MG tablet, Take 10 mg by mouth Daily., Disp: , Rfl:   •  Cholecalciferol 4000 units capsule, Take 4,000 Units by mouth Daily., Disp: , Rfl:   •  dextromethorphan polistirex ER (DELSYM) 30 MG/5ML Suspension Extended Release oral suspension, Take 10 mL by mouth As Needed., Disp: , Rfl:   •  fluticasone (FLONASE) 50 MCG/ACT nasal spray, 2 sprays into each nostril daily., Disp: 1 bottle, Rfl: 6  •  guaiFENesin (MUCINEX) 600 MG 12 hr tablet, Take 1,200 mg by mouth As Needed., Disp: , Rfl:   •  losartan (COZAAR) 50 MG tablet, TAKE ONE TABLET BY MOUTH EVERY DAY IN THE MORNING, Disp: 90 tablet, Rfl: 3  •  lovastatin (MEVACOR) 10 MG tablet, Take 1 tablet by mouth Daily., Disp: 90 tablet, Rfl: 3  •  Mirabegron ER (MYRBETRIQ) 25 MG tablet sustained-release 24 hour 24 hr tablet, Take 25 mg by mouth Daily., Disp: , Rfl:   •  omeprazole (PriLOSEC) 20 MG capsule, Take 20 mg by mouth Daily. Patient is taking on Monday, Wednesday, and Friday, Disp: , Rfl:   •  PREMARIN 0.625 MG/GM vaginal cream, , Disp: , Rfl:     Review of Systems   Constitutional: Negative for chills and fever.   Cardiovascular: Negative for chest pain.   Gastrointestinal: Negative for nausea and vomiting.     Objective   /76   Pulse 91   Ht 157.5 cm (62\")   Wt 74.6 kg (164 lb 6.4 oz)   LMP  (LMP Unknown)   SpO2 96% Comment: Ra  Breastfeeding? No   BMI 30.07 kg/m²   Physical Exam   Constitutional: She appears well-developed. She does not appear ill. No distress.   HENT:   Head: Atraumatic.   Nose: Nose normal.   Eyes: Conjunctivae and EOM are normal. No scleral icterus.   Neck: Neck supple.   Cardiovascular: Normal rate, regular rhythm, S1 normal and S2 normal.   Pulmonary/Chest: Effort normal and breath sounds normal.   Abdominal: Soft. She exhibits no distension. There is no tenderness. "   Musculoskeletal: She exhibits no deformity.   Lymphadenopathy:     She has no cervical adenopathy.   Neurological: She is alert.   Skin: Skin is warm. No rash noted.   Psychiatric: She has a normal mood and affect.     -----------------------------------------------------------------------------------------------  Assessment/Plan   Problem List Items Addressed This Visit        Respiratory    Cough - Primary        Patient's Body mass index is 30.07 kg/m². BMI is above normal parameters. Recommendations include: referral to primary care.      I recommend chlortrimeton tablets hs, and begin trial of spiriva respimat.  We demonstrated proper technique for that device.  Continue zyrtec.    Electronically signed by Adi Anderson MD, 7/30/2019, 2:38 PM

## 2019-08-19 ENCOUNTER — OFFICE VISIT (OUTPATIENT)
Dept: CARDIOLOGY | Facility: CLINIC | Age: 69
End: 2019-08-19

## 2019-08-19 VITALS
BODY MASS INDEX: 29.81 KG/M2 | SYSTOLIC BLOOD PRESSURE: 125 MMHG | OXYGEN SATURATION: 97 % | DIASTOLIC BLOOD PRESSURE: 80 MMHG | HEIGHT: 62 IN | WEIGHT: 162 LBS | HEART RATE: 87 BPM

## 2019-08-19 DIAGNOSIS — R00.2 PALPITATIONS: ICD-10-CM

## 2019-08-19 DIAGNOSIS — M47.812 OSTEOARTHRITIS OF CERVICAL SPINE, UNSPECIFIED SPINAL OSTEOARTHRITIS COMPLICATION STATUS: Primary | ICD-10-CM

## 2019-08-19 DIAGNOSIS — E78.2 MIXED HYPERLIPIDEMIA: ICD-10-CM

## 2019-08-19 DIAGNOSIS — R06.09 DYSPNEA ON EXERTION: ICD-10-CM

## 2019-08-19 DIAGNOSIS — K21.9 GASTROESOPHAGEAL REFLUX DISEASE WITHOUT ESOPHAGITIS: ICD-10-CM

## 2019-08-19 DIAGNOSIS — I44.7 LBBB (LEFT BUNDLE BRANCH BLOCK): ICD-10-CM

## 2019-08-19 PROCEDURE — 99214 OFFICE O/P EST MOD 30 MIN: CPT | Performed by: INTERNAL MEDICINE

## 2019-08-19 NOTE — PROGRESS NOTES
Reason for Follow-up Visit: Here for routine follow up   Now with palpitations  Prior visit for preop CV exam for pelvic surgery, hysterectomy and bladder  Dr Zohra SULLIVAN  left bundle branch block   S/P successful surgery    Subjective    Mild chronic exertional shortness of breath on exertion relieved with rest  No significant cough or wheezing  Going on for several months or longer    Occasional palpitations, once every several weeks lasting for less than 1 minute  No associated symptoms of weakness, chest pain,  shortness of breath    Associated symptoms of dizziness    No associated chest pain  No significant pedal edema    No fever or chills  No significant expectoration    No hemoptysis  No presyncope or syncope     Joint pain in small, medium and large joints  Chronic low back pain    Had pulmonary function tests in past   Sees Dr Anderson       History of present illness: Cindi Hill is a 66 y.o. yo female with history of preop CV exam with moderate shortness of breath and chest pain who presents today for        Chief Complaint   Patient presents with   • Follow-up       2 week f/u on dar    .     History   Medical History          Past Medical History   Diagnosis Date   • Arthritis     • Breast cancer         1997, right breast mastectomy   • Drug therapy     • GERD (gastroesophageal reflux disease)     • Hypercholesteremia     • KARUNA (stress urinary incontinence, female)     • Urge incontinence     • Uterovaginal prolapse     • Vaginal atrophy        ,    Surgical History          Past Surgical History   Procedure Laterality Date   • Rotator cuff repair Right 2012   • Mastectomy Bilateral     • Colonoscopy   2014   • Laminectomy   01/2016   • Breast biopsy          ,          Family History   Problem Relation Age of Onset   • Dementia Mother     • Heart failure Mother     • Cancer Father         liver   • Cancer Brother         thyroid   ,        Social History   Substance Use Topics   • Smoking  "status: Never Smoker   • Smokeless tobacco: Never Used   • Alcohol use No   ,      Medications   Current Medications           Current Outpatient Prescriptions   Medication Sig Dispense Refill   • aspirin 81 MG tablet Take 81 mg by mouth daily.       • benzonatate (TESSALON) 200 MG capsule Take 200 mg by mouth As Needed for cough.       • fluticasone (FLONASE) 50 MCG/ACT nasal spray 2 sprays into each nostril daily. 1 bottle 6   • losartan (COZAAR) 50 MG tablet         • lovastatin (MEVACOR) 10 MG tablet Take 10 mg by mouth every night.       • omeprazole (PriLOSEC) 20 MG capsule Take 20 mg by mouth daily.       • Vitamin D, Cholecalciferol, 1000 UNITS capsule Take 2,000 Units by mouth Daily.       • calcium carbonate (CALCIUM 600) 600 MG tablet Take 600 mg by mouth daily.       • losartan-hydrochlorothiazide (HYZAAR) 50-12.5 MG per tablet Take 1 tablet by mouth daily.          No current facility-administered medications for this visit.             Allergies: Lisinopril; Lortab [hydrocodone-acetaminophen]; and Percocet [oxycodone-acetaminophen]        Review of Systems  Review of Systems   Constitution: Negative.   HENT: Negative.   Eyes: Negative.   Cardiovascular: Positive  dyspnea on exertion. Negative for claudication, cyanosis, irregular heartbeat, leg swelling, near-syncope, orthopnea, palpitations, paroxysmal nocturnal dyspnea and syncope.   Respiratory: Negative.   Endocrine: Negative.   Hematologic/Lymphatic: Negative.   Skin: Negative.   Gastrointestinal: Negative for anorexia.   Genitourinary: Negative.   Neurological: Negative.   Psychiatric/Behavioral: Negative.         Objective        Physical Exam:       Visit Vitals   • /78 (BP Location: Left arm, Patient Position: Sitting, Cuff Size: Adult)   • Pulse 89   • Ht 61.5\" (156.2 cm)   • Wt 159 lb (72.1 kg)   • LMP (LMP Unknown)   • SpO2 95%   • BMI 29.56 kg/m2      Physical Exam   Constitutional: She appears well-developed.   HENT:   Head: " Normocephalic.   Neck: Normal carotid pulses and no JVD present. No tracheal tenderness present. Carotid bruit is not present. No tracheal deviation and no edema present.   Cardiovascular: Regular rhythm, normal heart sounds and normal pulses.   Pulmonary/Chest: Effort normal. No stridor.   Abdominal: Soft.   Neurological: She is alert. She has normal strength. No cranial nerve deficit or sensory deficit.   Skin: Skin is warm.   Psychiatric: She has a normal mood and affect. Her speech is normal and behavior is normal.         Results Review:    Results for orders placed during the hospital encounter of 02/17/17   Adult Transthoracic Echo Complete    Narrative · Left ventricular function is normal. Estimated EF = 55%.  · Left ventricular diastolic dysfunction (grade I) consistent with   impaired relaxation.  · Estimated right ventricular systolic pressure from tricuspid   regurgitation is normal (<35 mmHg).       2017    Conclusion  No stenosis of  epicardial coronary arteries with dominant RCA  Left circumflex arises from RCA and normal  Normal LVEF  LVEDP   9    mm Hg     Plan  Hydration   Observation  Workup for non cardiac chest pain  Acceptable cardiovascular risk of bladder surgery      Procedures     Assessment/Plan         Patient Active Problem List   Diagnosis   • GERD (gastroesophageal reflux disease)   • Cough   • Deviated nasal septum   • Allergic rhinitis due to pollen   • Sicca laryngitis   • Mixed hyperlipidemia   • Breast cancer   • Osteoarthritis of cervical spine   • Preop cardiovascular exam   • LBBB (left bundle branch block)   • Dyspnea on exertion            Plan      Orders Placed This Encounter   Procedures   • Holter Monitor - 72 Hour Up To 21 Days     Standing Status:   Future     Standing Expiration Date:   8/18/2020     Order Specific Question:   Reason for exam?     Answer:   Palpitations         ____________________________________________________________________________________________________________________________________________  Health maintenance and recommendations    Similar recommendations as last visit     Advised staying uptodate with immunizations per established standard guidelines.      Offered to give patient  a copy      Questions were encouraged, asked and answered to the patient's  understanding and satisfaction. Questions if any regarding current medications and side effects, need for refills and importance of compliance to medications stressed.    Reviewed available prior notes, consults, prior visits, laboratory findings, radiology and cardiology relevant reports. Updated chart as applicable. I have reviewed the patient's medical history in detail and updated the computerized patient record as relevant.      Updated patient regarding any new or relevant abnormalities on review of records or any new findings on physical exam. Mentioned to patient about purpose of visit and desirable health short and long term goals and objectives.    Primary to monitor CBC CMP Lipid panel and TSH as applicable    ___________________________________________________________________________________________________________________________________________       Return in about 2 months (around 10/19/2019).

## 2019-10-22 ENCOUNTER — OFFICE VISIT (OUTPATIENT)
Dept: CARDIOLOGY | Facility: CLINIC | Age: 69
End: 2019-10-22

## 2019-10-22 VITALS
BODY MASS INDEX: 30.18 KG/M2 | HEIGHT: 62 IN | WEIGHT: 164 LBS | DIASTOLIC BLOOD PRESSURE: 72 MMHG | SYSTOLIC BLOOD PRESSURE: 114 MMHG | HEART RATE: 87 BPM

## 2019-10-22 DIAGNOSIS — I44.7 LBBB (LEFT BUNDLE BRANCH BLOCK): ICD-10-CM

## 2019-10-22 DIAGNOSIS — E78.2 MIXED HYPERLIPIDEMIA: Primary | ICD-10-CM

## 2019-10-22 DIAGNOSIS — R06.09 DYSPNEA ON EXERTION: ICD-10-CM

## 2019-10-22 DIAGNOSIS — R05.9 COUGH: ICD-10-CM

## 2019-10-22 PROBLEM — Z01.810 PREOP CARDIOVASCULAR EXAM: Status: RESOLVED | Noted: 2017-01-10 | Resolved: 2019-10-22

## 2019-10-22 PROCEDURE — 99214 OFFICE O/P EST MOD 30 MIN: CPT | Performed by: INTERNAL MEDICINE

## 2019-10-22 RX ORDER — ATENOLOL 25 MG/1
25 TABLET ORAL DAILY
Qty: 90 TABLET | Refills: 3 | Status: SHIPPED | OUTPATIENT
Start: 2019-10-22 | End: 2020-11-03

## 2019-10-22 NOTE — PROGRESS NOTES
Reason for Follow-up Visit: Here for routine follow up   Now with palpitations  Prior visit for preop CV exam for pelvic surgery, hysterectomy and bladder  Dr Inderjit SULLIVAN  left bundle branch block   S/P successful surgery  Cardiac workup test results as below   Brief fast SVT as below         Subjective    Mild chronic exertional shortness of breath on exertion relieved with rest  No significant cough or wheezing    Occasional palpitations, once every several weeks lasting for less than 1 minute  No associated symptoms of weakness, chest pain,  shortness of breath    Associated symptoms of dizziness    No associated chest pain  No significant pedal edema    No fever or chills  No significant expectoration    No hemoptysis  No presyncope or syncope     Joint pain in small, medium and large joints  Chronic low back pain    Had pulmonary function tests in past   Sees Dr Anderson       History of present illness: Cindi Hill is a 66 y.o. yo female with history of preop CV exam with moderate shortness of breath and chest pain who presents today for        Chief Complaint   Patient presents with   • Follow-up       2 week f/u on dar    .     History   Medical History          Past Medical History   Diagnosis Date   • Arthritis     • Breast cancer         1997, right breast mastectomy   • Drug therapy     • GERD (gastroesophageal reflux disease)     • Hypercholesteremia     • KARUNA (stress urinary incontinence, female)     • Urge incontinence     • Uterovaginal prolapse     • Vaginal atrophy        ,    Surgical History          Past Surgical History   Procedure Laterality Date   • Rotator cuff repair Right 2012   • Mastectomy Bilateral     • Colonoscopy   2014   • Laminectomy   01/2016   • Breast biopsy          ,          Family History   Problem Relation Age of Onset   • Dementia Mother     • Heart failure Mother     • Cancer Father         liver   • Cancer Brother         thyroid   ,        Social History  "  Substance Use Topics   • Smoking status: Never Smoker   • Smokeless tobacco: Never Used   • Alcohol use No   ,      Medications   Current Medications           Current Outpatient Prescriptions   Medication Sig Dispense Refill   • aspirin 81 MG tablet Take 81 mg by mouth daily.       • benzonatate (TESSALON) 200 MG capsule Take 200 mg by mouth As Needed for cough.       • fluticasone (FLONASE) 50 MCG/ACT nasal spray 2 sprays into each nostril daily. 1 bottle 6   • losartan (COZAAR) 50 MG tablet         • lovastatin (MEVACOR) 10 MG tablet Take 10 mg by mouth every night.       • omeprazole (PriLOSEC) 20 MG capsule Take 20 mg by mouth daily.       • Vitamin D, Cholecalciferol, 1000 UNITS capsule Take 2,000 Units by mouth Daily.       • calcium carbonate (CALCIUM 600) 600 MG tablet Take 600 mg by mouth daily.       • losartan-hydrochlorothiazide (HYZAAR) 50-12.5 MG per tablet Take 1 tablet by mouth daily.          No current facility-administered medications for this visit.             Allergies: Lisinopril; Lortab [hydrocodone-acetaminophen]; and Percocet [oxycodone-acetaminophen]        Review of Systems  Review of Systems   Constitution: Negative.   HENT: Negative.   Eyes: Negative.   Cardiovascular: Positive  dyspnea on exertion. Negative for claudication, cyanosis, irregular heartbeat, leg swelling, near-syncope, orthopnea, palpitations, paroxysmal nocturnal dyspnea and syncope.   Respiratory: Negative.   Endocrine: Negative.   Hematologic/Lymphatic: Negative.   Skin: Negative.   Gastrointestinal: Negative for anorexia.   Genitourinary: Negative.   Neurological: Negative.   Psychiatric/Behavioral: Negative.         Objective        Physical Exam:       Visit Vitals   • /78 (BP Location: Left arm, Patient Position: Sitting, Cuff Size: Adult)   • Pulse 89   • Ht 61.5\" (156.2 cm)   • Wt 159 lb (72.1 kg)   • LMP (LMP Unknown)   • SpO2 95%   • BMI 29.56 kg/m2      Physical Exam   Constitutional: She appears " well-developed.   HENT:   Head: Normocephalic.   Neck: Normal carotid pulses and no JVD present. No tracheal tenderness present. Carotid bruit is not present. No tracheal deviation and no edema present.   Cardiovascular: Regular rhythm, normal heart sounds and normal pulses.   Pulmonary/Chest: Effort normal. No stridor.   Abdominal: Soft.   Neurological: She is alert. She has normal strength. No cranial nerve deficit or sensory deficit.   Skin: Skin is warm.   Psychiatric: She has a normal mood and affect. Her speech is normal and behavior is normal.         Results Review:      Cindi Hill   Holter Monitor 72Hr-21Day (0296T/0298T)   Order# [id number]   Reading physician: Anuj Johnson MD Ordering physician: Anuj Johnson MD Study date: 19   Patient Information     Patient Name  Cindi Hill MRN  [id number] Sex  Female  (Age)  1950 (68 y.o.)   Interpretation Summary        · Average HR: 89. Min HR: 57. Max HR: 226.     · ~14 day monitor ,entire report was reviewed  · The predominant rhythm noted during the testing period was sinus rhythm.  · Rare supraventricular ectopics with an APC burden of: < 1%  · Rare premature ventricular contractions with a PVC burden of: < 1%  · 15 runs of supraventricular tachycardia longest 20 beats at a maximum rate of 226 bpm.  · No significant  ventricular tachy or florentin arrhythmia.  · No correlated arrhythmia  · No significant pauses           Conclusion: Baseline rhythm is sinus.  No significant ectopy  15 runs of supraventricular tachycardia longest 20 beats at a maximum rate of 226 bpm          Results for orders placed during the hospital encounter of 17   Adult Transthoracic Echo Complete    Narrative · Left ventricular function is normal. Estimated EF = 55%.  · Left ventricular diastolic dysfunction (grade I) consistent with   impaired relaxation.  · Estimated right ventricular systolic pressure from tricuspid   regurgitation is normal (<35 mmHg).        2017    Conclusion  No stenosis of  epicardial coronary arteries with dominant RCA  Left circumflex arises from RCA and normal  Normal LVEF  LVEDP   9    mm Hg     Plan  Hydration   Observation  Workup for non cardiac chest pain  Acceptable cardiovascular risk of bladder surgery      Procedures     Assessment/Plan         Patient Active Problem List   Diagnosis   • GERD (gastroesophageal reflux disease)   • Cough   • Deviated nasal septum   • Allergic rhinitis due to pollen   • Sicca laryngitis   • Mixed hyperlipidemia   • Breast cancer   • Osteoarthritis of cervical spine   • LBBB (left bundle branch block)   • Dyspnea on exertion            Plan          Requested Prescriptions     Signed Prescriptions Disp Refills   • atenolol (TENORMIN) 25 MG tablet 90 tablet 3     Sig: Take 1 tablet by mouth Daily.        ____________________________________________________________________________________________________________________________________________  Health maintenance and recommendations    Similar recommendations as last visit     Advised staying uptodate with immunizations per established standard guidelines.      Offered to give patient  a copy      Questions were encouraged, asked and answered to the patient's  understanding and satisfaction. Questions if any regarding current medications and side effects, need for refills and importance of compliance to medications stressed.    Reviewed available prior notes, consults, prior visits, laboratory findings, radiology and cardiology relevant reports. Updated chart as applicable. I have reviewed the patient's medical history in detail and updated the computerized patient record as relevant.      Updated patient regarding any new or relevant abnormalities on review of records or any new findings on physical exam. Mentioned to patient about purpose of visit and desirable health short and long term goals and objectives.    Primary to monitor CBC CMP Lipid panel and TSH  as applicable    ___________________________________________________________________________________________________________________________________________       Return in about 3 months (around 1/22/2020).

## 2019-11-11 ENCOUNTER — OFFICE VISIT (OUTPATIENT)
Dept: OBSTETRICS AND GYNECOLOGY | Facility: CLINIC | Age: 69
End: 2019-11-11

## 2019-11-11 VITALS
SYSTOLIC BLOOD PRESSURE: 138 MMHG | DIASTOLIC BLOOD PRESSURE: 74 MMHG | HEIGHT: 62 IN | WEIGHT: 161 LBS | BODY MASS INDEX: 29.63 KG/M2

## 2019-11-11 DIAGNOSIS — N39.41 URGE INCONTINENCE: ICD-10-CM

## 2019-11-11 DIAGNOSIS — Z12.31 ENCOUNTER FOR SCREENING MAMMOGRAM FOR MALIGNANT NEOPLASM OF BREAST: ICD-10-CM

## 2019-11-11 DIAGNOSIS — Z78.0 MENOPAUSE: ICD-10-CM

## 2019-11-11 DIAGNOSIS — N39.3 SUI (STRESS URINARY INCONTINENCE, FEMALE): ICD-10-CM

## 2019-11-11 DIAGNOSIS — Z85.3 PERSONAL HISTORY OF BREAST CANCER: ICD-10-CM

## 2019-11-11 DIAGNOSIS — Z01.419 WELL WOMAN EXAM WITH ROUTINE GYNECOLOGICAL EXAM: Primary | ICD-10-CM

## 2019-11-11 DIAGNOSIS — Z90.11 HISTORY OF MASTECTOMY, RIGHT: ICD-10-CM

## 2019-11-11 PROCEDURE — G0101 CA SCREEN;PELVIC/BREAST EXAM: HCPCS | Performed by: NURSE PRACTITIONER

## 2019-11-11 NOTE — PROGRESS NOTES
Cindi Hill is a 68 y.o.      Chief Complaint   Patient presents with   • Gynecologic Exam     pt here for annual exam. pt voices no concerns.           HPI -Cindi is in for gyn exam.  She had a hysterectomy, BSO, bladder sling modified, A and P vaginal repair, anal sphincter repair.  She has had pelvic floor physical therapy.The KARUNA is better but the urge incontinence isn't and she is considering an Interstim (Dr. Hooper is her physician). She had right breast cancer over 20 years ago.       The following portions of the patient's history were reviewed and updated as appropriate:vital signs, allergies, current medications, past family history, past medical history, past social history, past surgical history and problem list.      Current Outpatient Medications:   •  aspirin 81 MG tablet, Take 81 mg by mouth daily., Disp: , Rfl:   •  atenolol (TENORMIN) 25 MG tablet, Take 1 tablet by mouth Daily., Disp: 90 tablet, Rfl: 3  •  benzonatate (TESSALON) 200 MG capsule, Take 200 mg by mouth 3 (Three) Times a Day As Needed for Cough., Disp: , Rfl:   •  calcium carbonate (CALCIUM 600) 600 MG tablet, Take 600 mg by mouth daily., Disp: , Rfl:   •  cetirizine (zyrTEC) 10 MG tablet, Take 10 mg by mouth Daily., Disp: , Rfl:   •  Cholecalciferol 4000 units capsule, Take 4,000 Units by mouth Daily., Disp: , Rfl:   •  fluticasone (FLONASE) 50 MCG/ACT nasal spray, 2 sprays into each nostril daily., Disp: 1 bottle, Rfl: 6  •  losartan (COZAAR) 50 MG tablet, TAKE ONE TABLET BY MOUTH EVERY DAY IN THE MORNING, Disp: 90 tablet, Rfl: 3  •  lovastatin (MEVACOR) 10 MG tablet, Take 1 tablet by mouth Daily., Disp: 90 tablet, Rfl: 3  •  Mirabegron ER (MYRBETRIQ) 25 MG tablet sustained-release 24 hour 24 hr tablet, Take 25 mg by mouth Daily., Disp: , Rfl:   •  omeprazole (PriLOSEC) 20 MG capsule, Take 20 mg by mouth Daily. Patient is taking on Monday, Wednesday, and Friday, Disp: , Rfl:   •  PREMARIN 0.625 MG/GM vaginal  "cream, , Disp: , Rfl:   •  tiotropium bromide monohydrate (SPIRIVA RESPIMAT) 2.5 MCG/ACT aerosol solution inhaler, Inhale 2 puffs Daily., Disp: 1 inhaler, Rfl: 0  •  dextromethorphan polistirex ER (DELSYM) 30 MG/5ML Suspension Extended Release oral suspension, Take 10 mL by mouth As Needed., Disp: , Rfl:   •  guaiFENesin (MUCINEX) 600 MG 12 hr tablet, Take 1,200 mg by mouth As Needed., Disp: , Rfl:     Review of Systems   Constitutional: Negative for activity change, diaphoresis and fever.   HENT: Negative for drooling, hearing loss, sinus pressure and swollen glands.    Eyes: Negative for discharge and redness.   Respiratory: Negative for apnea, choking and stridor.    Cardiovascular:        Left bundle branch block   Gastrointestinal: Positive for GERD. Negative for abdominal distention and blood in stool.   Endocrine: Negative for heat intolerance.   Genitourinary: Positive for urinary incontinence (naty and urge). Negative for breast discharge, difficulty urinating and pelvic pain.   Musculoskeletal: Positive for arthralgias and back pain.   Allergic/Immunologic: Negative for immunocompromised state.   Neurological: Negative for syncope, light-headedness, numbness and confusion.   Psychiatric/Behavioral: Negative for agitation, self-injury and depressed mood.     Breast ROS: history of right breast cancer 20 years ago    Objective      /74   Ht 157.5 cm (62\")   Wt 73 kg (161 lb)   LMP  (LMP Unknown)   Breastfeeding? No   BMI 29.45 kg/m²       Physical Exam   Constitutional: She is oriented to person, place, and time. She appears well-developed and well-nourished. No distress.   HENT:   Head: Normocephalic and atraumatic.   Eyes: Right eye exhibits no discharge. Left eye exhibits no discharge.   Neck: Normal range of motion. Neck supple.   Cardiovascular: Normal rate and regular rhythm.   No murmur heard.  Pulmonary/Chest: Effort normal and breath sounds normal. Left breast exhibits no inverted nipple, " no mass and no nipple discharge.   Right breast absent   Abdominal: Soft. She exhibits no distension. There is no tenderness.   Genitourinary: Pelvic exam was performed with patient supine. There is no tenderness or lesion on the right labia. There is no tenderness or lesion on the left labia. Right adnexum displays no tenderness and no fullness. Left adnexum displays no tenderness and no fullness. Vagina exhibits loss of rugae. No tenderness or bleeding in the vagina. No vaginal discharge found.   Genitourinary Comments: Vaginal vault is relaxed and atrophic  BSU negative  Urethral meatus is normal  Bladder nontender   Musculoskeletal: Normal range of motion. She exhibits no edema.   Neurological: She is alert and oriented to person, place, and time.   Skin: Skin is warm and dry.   Psychiatric: She has a normal mood and affect. Her behavior is normal. Judgment normal.   Nursing note and vitals reviewed.       Assessment/Plan     Cindi was seen today for gynecologic exam.    Diagnoses and all orders for this visit:    Well woman exam with routine gynecological exam    Encounter for screening mammogram for malignant neoplasm of breast  -     Mammo screening modified with tomosynthesis left w CAD; Future    Menopause  -     DEXA Bone Density Axial; Future    Personal history of breast cancer  Comments:  right    Urge incontinence  Comments:  recently stopped Myrbetriq      1/2019  had hyst bso, a and p and modified sling    KARUNA (stress urinary incontinence, female)  Comments:  mildly improved  Since surgery    History of mastectomy, right    Patient is considering Interstim as advised by Dr. Hooper      Order for mastectomy bras and prosthesis mailed.      Sabiha Pang, APRN  11/11/2019

## 2019-11-18 ENCOUNTER — HOSPITAL ENCOUNTER (OUTPATIENT)
Dept: MAMMOGRAPHY | Facility: HOSPITAL | Age: 69
Discharge: HOME | End: 2019-11-18
Admitting: NURSE PRACTITIONER

## 2019-11-18 ENCOUNTER — HOSPITAL ENCOUNTER (OUTPATIENT)
Dept: BONE DENSITY | Facility: HOSPITAL | Age: 69
Discharge: HOME | End: 2019-11-18

## 2019-11-18 DIAGNOSIS — Z78.0 MENOPAUSE: ICD-10-CM

## 2019-11-18 DIAGNOSIS — Z12.31 ENCOUNTER FOR SCREENING MAMMOGRAM FOR MALIGNANT NEOPLASM OF BREAST: ICD-10-CM

## 2019-11-18 PROCEDURE — 77080 DXA BONE DENSITY AXIAL: CPT

## 2019-11-18 PROCEDURE — 77063 BREAST TOMOSYNTHESIS BI: CPT

## 2019-11-18 PROCEDURE — 77067 SCR MAMMO BI INCL CAD: CPT

## 2019-11-19 DIAGNOSIS — R92.8 ABNORMAL MAMMOGRAM: ICD-10-CM

## 2019-11-19 DIAGNOSIS — N63.20 BREAST MASS, LEFT: Primary | ICD-10-CM

## 2019-11-26 ENCOUNTER — HOSPITAL ENCOUNTER (OUTPATIENT)
Dept: ULTRASOUND IMAGING | Facility: HOSPITAL | Age: 69
Discharge: HOME | End: 2019-11-26

## 2019-11-26 ENCOUNTER — HOSPITAL ENCOUNTER (OUTPATIENT)
Dept: MAMMOGRAPHY | Facility: HOSPITAL | Age: 69
Discharge: HOME | End: 2019-11-26
Admitting: NURSE PRACTITIONER

## 2019-11-26 DIAGNOSIS — N63.20 BREAST MASS, LEFT: ICD-10-CM

## 2019-11-26 DIAGNOSIS — R92.8 ABNORMAL MAMMOGRAM: ICD-10-CM

## 2019-11-26 PROCEDURE — 76642 ULTRASOUND BREAST LIMITED: CPT

## 2019-11-26 PROCEDURE — 77065 DX MAMMO INCL CAD UNI: CPT

## 2019-11-26 PROCEDURE — G0279 TOMOSYNTHESIS, MAMMO: HCPCS

## 2019-12-04 ENCOUNTER — OFFICE VISIT (OUTPATIENT)
Dept: FAMILY MEDICINE CLINIC | Facility: CLINIC | Age: 69
End: 2019-12-04

## 2019-12-04 VITALS
SYSTOLIC BLOOD PRESSURE: 127 MMHG | HEART RATE: 64 BPM | OXYGEN SATURATION: 99 % | TEMPERATURE: 98 F | BODY MASS INDEX: 29.85 KG/M2 | HEIGHT: 62 IN | DIASTOLIC BLOOD PRESSURE: 80 MMHG | WEIGHT: 162.2 LBS

## 2019-12-04 DIAGNOSIS — I10 HYPERTENSION, UNSPECIFIED TYPE: ICD-10-CM

## 2019-12-04 DIAGNOSIS — R31.9 URINARY TRACT INFECTION WITH HEMATURIA, SITE UNSPECIFIED: ICD-10-CM

## 2019-12-04 DIAGNOSIS — E78.2 MIXED HYPERLIPIDEMIA: Primary | ICD-10-CM

## 2019-12-04 DIAGNOSIS — N39.0 URINARY TRACT INFECTION WITH HEMATURIA, SITE UNSPECIFIED: ICD-10-CM

## 2019-12-04 DIAGNOSIS — Z00.00 ANNUAL PHYSICAL EXAM: ICD-10-CM

## 2019-12-04 DIAGNOSIS — R53.83 FATIGUE, UNSPECIFIED TYPE: ICD-10-CM

## 2019-12-04 DIAGNOSIS — H91.93 BILATERAL HEARING LOSS, UNSPECIFIED HEARING LOSS TYPE: ICD-10-CM

## 2019-12-04 DIAGNOSIS — Z23 NEED FOR IMMUNIZATION AGAINST INFLUENZA: ICD-10-CM

## 2019-12-04 LAB
BILIRUB BLD-MCNC: NEGATIVE MG/DL
CLARITY, POC: ABNORMAL
COLOR UR: YELLOW
GLUCOSE UR STRIP-MCNC: NEGATIVE MG/DL
KETONES UR QL: NEGATIVE
LEUKOCYTE EST, POC: ABNORMAL
NITRITE UR-MCNC: NEGATIVE MG/ML
PH UR: 7 [PH] (ref 5–8)
PROT UR STRIP-MCNC: NEGATIVE MG/DL
RBC # UR STRIP: ABNORMAL /UL
SP GR UR: 1.01 (ref 1–1.03)
UROBILINOGEN UR QL: NORMAL

## 2019-12-04 PROCEDURE — 81003 URINALYSIS AUTO W/O SCOPE: CPT | Performed by: FAMILY MEDICINE

## 2019-12-04 PROCEDURE — G0008 ADMIN INFLUENZA VIRUS VAC: HCPCS | Performed by: FAMILY MEDICINE

## 2019-12-04 PROCEDURE — G0439 PPPS, SUBSEQ VISIT: HCPCS | Performed by: FAMILY MEDICINE

## 2019-12-04 PROCEDURE — 90653 IIV ADJUVANT VACCINE IM: CPT | Performed by: FAMILY MEDICINE

## 2019-12-04 NOTE — PATIENT INSTRUCTIONS
Exercising to Stay Healthy  To become healthy and stay healthy, it is recommended that you do moderate-intensity and vigorous-intensity exercise. You can tell that you are exercising at a moderate intensity if your heart starts beating faster and you start breathing faster but can still hold a conversation. You can tell that you are exercising at a vigorous intensity if you are breathing much harder and faster and cannot hold a conversation while exercising.  Exercising regularly is important. It has many health benefits, such as:  · Improving overall fitness, flexibility, and endurance.  · Increasing bone density.  · Helping with weight control.  · Decreasing body fat.  · Increasing muscle strength.  · Reducing stress and tension.  · Improving overall health.  How often should I exercise?  Choose an activity that you enjoy, and set realistic goals. Your health care provider can help you make an activity plan that works for you.  Exercise regularly as told by your health care provider. This may include:  · Doing strength training two times a week, such as:  ? Lifting weights.  ? Using resistance bands.  ? Push-ups.  ? Sit-ups.  ? Yoga.  · Doing a certain intensity of exercise for a given amount of time. Choose from these options:  ? A total of 150 minutes of moderate-intensity exercise every week.  ? A total of 75 minutes of vigorous-intensity exercise every week.  ? A mix of moderate-intensity and vigorous-intensity exercise every week.  Children, pregnant women, people who have not exercised regularly, people who are overweight, and older adults may need to talk with a health care provider about what activities are safe to do. If you have a medical condition, be sure to talk with your health care provider before you start a new exercise program.  What are some exercise ideas?  Moderate-intensity exercise ideas include:  · Walking 1 mile (1.6 km) in about 15  minutes.  · Biking.  · Hiking.  · Golfing.  · Dancing.  · Water aerobics.  Vigorous-intensity exercise ideas include:  · Walking 4.5 miles (7.2 km) or more in about 1 hour.  · Jogging or running 5 miles (8 km) in about 1 hour.  · Biking 10 miles (16.1 km) or more in about 1 hour.  · Lap swimming.  · Roller-skating or in-line skating.  · Cross-country skiing.  · Vigorous competitive sports, such as football, basketball, and soccer.  · Jumping rope.  · Aerobic dancing.  What are some everyday activities that can help me to get exercise?  · Yard work, such as:  ? Pushing a .  ? Raking and bagging leaves.  · Washing your car.  · Pushing a stroller.  · Shoveling snow.  · Gardening.  · Washing windows or floors.  How can I be more active in my day-to-day activities?  · Use stairs instead of an elevator.  · Take a walk during your lunch break.  · If you drive, park your car farther away from your work or school.  · If you take public transportation, get off one stop early and walk the rest of the way.  · Stand up or walk around during all of your indoor phone calls.  · Get up, stretch, and walk around every 30 minutes throughout the day.  · Enjoy exercise with a friend. Support to continue exercising will help you keep a regular routine of activity.  What guidelines can I follow while exercising?  · Before you start a new exercise program, talk with your health care provider.  · Do not exercise so much that you hurt yourself, feel dizzy, or get very short of breath.  · Wear comfortable clothes and wear shoes with good support.  · Drink plenty of water while you exercise to prevent dehydration or heat stroke.  · Work out until your breathing and your heartbeat get faster.  Where to find more information  · [place] Department of Health and Human Services: www.hhs.gov  · Centers for Disease Control and Prevention (CDC): www.cdc.gov  Summary  · Exercising regularly is important. It will improve your overall fitness,  flexibility, and endurance.  · Regular exercise also will improve your overall health. It can help you control your weight, reduce stress, and improve your bone density.  · Do not exercise so much that you hurt yourself, feel dizzy, or get very short of breath.  · Before you start a new exercise program, talk with your health care provider.  This information is not intended to replace advice given to you by your health care provider. Make sure you discuss any questions you have with your health care provider.  Document Released: 01/20/2012 Document Revised: 11/08/2018 Document Reviewed: 11/08/2018  [employer] Interactive Patient Education © 2019 [employer]      Fall Prevention in the Home, Adult  Falls can cause injuries and can affect people from all age groups. There are many simple things that you can do to make your home safe and to help prevent falls. Ask for help when making these changes, if needed.  What actions can I take to prevent falls?  General instructions  · Use good lighting in all rooms. Replace any light bulbs that burn out.  · Turn on lights if it is dark. Use night-lights.  · Place frequently used items in easy-to-reach places. Lower the shelves around your home if necessary.  · Set up furniture so that there are clear paths around it. Avoid moving your furniture around.  · Remove throw rugs and other tripping hazards from the floor.  · Avoid walking on wet floors.  · Fix any uneven floor surfaces.  · Add color or contrast paint or tape to grab bars and handrails in your home. Place contrasting color strips on the first and last steps of stairways.  · When you use a stepladder, make sure that it is completely opened and that the sides are firmly locked. Have someone hold the ladder while you are using it. Do not climb a closed stepladder.  · Be aware of any and all pets.  What can I do in the bathroom?         · Keep the floor dry. Immediately clean up any water that spills onto the  floor.  · Remove soap buildup in the tub or shower on a regular basis.  · Use non-skid mats or decals on the floor of the tub or shower.  · Attach bath mats securely with double-sided, non-slip rug tape.  · If you need to sit down while you are in the shower, use a plastic, non-slip stool.  · Install grab bars by the toilet and in the tub and shower. Do not use towel bars as grab bars.  What can I do in the bedroom?  · Make sure that a bedside light is easy to reach.  · Do not use oversized bedding that drapes onto the floor.  · Have a firm chair that has side arms to use for getting dressed.  What can I do in the kitchen?  · Clean up any spills right away.  · If you need to reach for something above you, use a sturdy step stool that has a grab bar.  · Keep electrical cables out of the way.  · Do not use floor polish or wax that makes floors slippery. If you must use wax, make sure that it is non-skid floor wax.  What can I do in the stairways?  · Do not leave any items on the stairs.  · Make sure that you have a light switch at the top of the stairs and the bottom of the stairs. Have them installed if you do not have them.  · Make sure that there are handrails on both sides of the stairs. Fix handrails that are broken or loose. Make sure that handrails are as long as the stairways.  · Install non-slip stair treads on all stairs in your home.  · Avoid having throw rugs at the top or bottom of stairways, or secure the rugs with carpet tape to prevent them from moving.  · Choose a carpet design that does not hide the edge of steps on the stairway.  · Check any carpeting to make sure that it is firmly attached to the stairs. Fix any carpet that is loose or worn.  What can I do on the outside of my home?  · Use bright outdoor lighting.  · Regularly repair the edges of walkways and driveways and fix any cracks.  · Remove high doorway thresholds.  · Trim any shrubbery on the main path into your home.  · Regularly check  that handrails are securely fastened and in good repair. Both sides of any steps should have handrails.  · Install guardrails along the edges of any raised decks or porches.  · Clear walkways of debris and clutter, including tools and rocks.  · Have leaves, snow, and ice cleared regularly.  · Use sand or salt on walkways during winter months.  · In the garage, clean up any spills right away, including grease or oil spills.  What other actions can I take?  · Wear closed-toe shoes that fit well and support your feet. Wear shoes that have rubber soles or low heels.  · Use mobility aids as needed, such as canes, walkers, scooters, and crutches.  · Review your medicines with your health care provider. Some medicines can cause dizziness or changes in blood pressure, which increase your risk of falling.  Talk with your health care provider about other ways that you can decrease your risk of falls. This may include working with a physical therapist or  to improve your strength, balance, and endurance.  Where to find more information  · Centers for Disease Control and Prevention, STEADI: https://www.cdc.gov  · National [employer] on Aging: https://ks4vdoz.luisana.nih.gov  Contact a health care provider if:  · You are afraid of falling at home.  · You feel weak, drowsy, or dizzy at home.  · You fall at home.  Summary  · There are many simple things that you can do to make your home safe and to help prevent falls.  · Ways to make your home safe include removing tripping hazards and installing grab bars in the bathroom.  · Ask for help when making these changes in your home.  This information is not intended to replace advice given to you by your health care provider. Make sure you discuss any questions you have with your health care provider.  Document Released: 12/08/2003 Document Revised: 08/02/2018 Document Reviewed: 08/02/2018  [employer] Interactive Patient Education © 2019 [employer]    Medicare Wellness  Personal  Prevention Plan of Service     Date of Office Visit:  2019  Encounter Provider:  Jose Wolff MD  Place of Service:  [facility]  Patient Name: Cindi Hill  :  1950    As part of the Medicare Wellness portion of your visit today, we are providing you with this personalized preventive plan of services (PPPS). This plan is based upon recommendations of the [place] Preventive Services Task Force (USPSTF) and the Advisory Committee on Immunization Practices (ACIP).    This lists the preventive care services that should be considered, and provides dates of when you are due. Items listed as completed are up-to-date and do not require any further intervention.    Health Maintenance   Topic Date Due   • INFLUENZA VACCINE  2019   • COLONOSCOPY  2019   • ZOSTER VACCINE (2 of 3) 2020 (Originally 2017)   • PNEUMOCOCCAL VACCINES (65+ LOW/MEDIUM RISK) (2 of 2 - PPSV23) 2026 (Originally 2017)   • LIPID PANEL  2020   • MEDICARE ANNUAL WELLNESS  2020   • MAMMOGRAM  2021   • TDAP/TD VACCINES (2 - Td) 2027   • HEPATITIS C SCREENING  Completed       No orders of the defined types were placed in this encounter.      Return in 6 months (on 2020).

## 2019-12-04 NOTE — PROGRESS NOTES
The ABCs of the Annual Wellness Visit  Subsequent Medicare Wellness Visit    Chief Complaint   Patient presents with   • Medicare Wellness-subsequent     fasting       Subjective   History of Present Illness:  Cindi Hill is a 68 y.o. female who presents for a Subsequent Medicare Wellness Visit.    HEALTH RISK ASSESSMENT    Recent Hospitalizations:  No hospitalization(s) within the last year.    Current Medical Providers:  Patient Care Team:  Jose Wolff MD as PCP - General (Family Medicine)  Jose Wolff MD as PCP - Family Medicine  Micki Jones APRN Adams, Paige E, APRN Resser, John Randall, MD as Consulting Physician (Otolaryngology)  Jose Wolff MD as Referring Physician (Family Medicine)  Anuj Johnson MD as Cardiologist (Cardiology)  Adi Anderson MD as Consulting Physician (Pulmonary Disease)    Smoking Status:  Social History     Tobacco Use   Smoking Status Never Smoker   Smokeless Tobacco Never Used       Alcohol Consumption:  Social History     Substance and Sexual Activity   Alcohol Use No       Depression Screen:   PHQ-2/PHQ-9 Depression Screening 12/4/2019   Little interest or pleasure in doing things 0   Feeling down, depressed, or hopeless 0   Total Score 0       Fall Risk Screen:  STEADI Fall Risk Assessment was completed, and patient is at LOW risk for falls.Assessment completed on:12/4/2019    Health Habits and Functional and Cognitive Screening:  Functional & Cognitive Status 12/4/2019   Do you have difficulty preparing food and eating? No   Do you have difficulty bathing yourself, getting dressed or grooming yourself? No   Do you have difficulty using the toilet? No   Do you have difficulty moving around from place to place? No   Do you have trouble with steps or getting out of a bed or a chair? No   Current Diet Unhealthy Diet   Dental Exam Up to date   Eye Exam Not up to date   Exercise (times per week) 0 times per week   Current Exercise  Activities Include No Regular Exercise   Do you need help using the phone?  No   Are you deaf or do you have serious difficulty hearing?  No   Do you need help with transportation? No   Do you need help shopping? No   Do you need help preparing meals?  No   Do you need help with housework?  No   Do you need help with laundry? No   Do you need help taking your medications? No   Do you need help managing money? No   Do you ever drive or ride in a car without wearing a seat belt? No   Have you felt unusual stress, anger or loneliness in the last month? No   Who do you live with? Spouse   If you need help, do you have trouble finding someone available to you? No   Have you been bothered in the last four weeks by sexual problems? No   Do you have difficulty concentrating, remembering or making decisions? Yes         Does the patient have evidence of cognitive impairment? Yes    Asprin use counseling:Taking ASA appropriately as indicated    Age-appropriate Screening Schedule:  Refer to the list below for future screening recommendations based on patient's age, sex and/or medical conditions. Orders for these recommended tests are listed in the plan section. The patient has been provided with a written plan.    Health Maintenance   Topic Date Due   • COLONOSCOPY  09/23/2019   • ZOSTER VACCINE (2 of 3) 05/01/2020 (Originally 12/29/2017)   • PNEUMOCOCCAL VACCINES (65+ LOW/MEDIUM RISK) (2 of 2 - PPSV23) 11/30/2026 (Originally 11/2/2017)   • LIPID PANEL  05/24/2020   • MAMMOGRAM  11/26/2021   • TDAP/TD VACCINES (2 - Td) 11/03/2027   • INFLUENZA VACCINE  Completed          The following portions of the patient's history were reviewed and updated as appropriate: allergies, current medications, past family history, past medical history, past social history, past surgical history and problem list.    Outpatient Medications Prior to Visit   Medication Sig Dispense Refill   • aspirin 81 MG tablet Take 81 mg by mouth daily.     •  atenolol (TENORMIN) 25 MG tablet Take 1 tablet by mouth Daily. 90 tablet 3   • benzonatate (TESSALON) 200 MG capsule Take 200 mg by mouth 3 (Three) Times a Day As Needed for Cough.     • calcium carbonate (CALCIUM 600) 600 MG tablet Take 600 mg by mouth daily.     • cetirizine (zyrTEC) 10 MG tablet Take 10 mg by mouth Daily.     • Cholecalciferol 4000 units capsule Take 4,000 Units by mouth Daily.     • fluticasone (FLONASE) 50 MCG/ACT nasal spray 2 sprays into each nostril daily. 1 bottle 6   • losartan (COZAAR) 50 MG tablet TAKE ONE TABLET BY MOUTH EVERY DAY IN THE MORNING 90 tablet 3   • lovastatin (MEVACOR) 10 MG tablet Take 1 tablet by mouth Daily. 90 tablet 3   • omeprazole (PriLOSEC) 20 MG capsule Take 20 mg by mouth Daily. Patient is taking on Monday, Wednesday, and Friday     • PREMARIN 0.625 MG/GM vaginal cream      • dextromethorphan polistirex ER (DELSYM) 30 MG/5ML Suspension Extended Release oral suspension Take 10 mL by mouth As Needed.     • guaiFENesin (MUCINEX) 600 MG 12 hr tablet Take 1,200 mg by mouth As Needed.     • Mirabegron ER (MYRBETRIQ) 25 MG tablet sustained-release 24 hour 24 hr tablet Take 25 mg by mouth Daily.     • tiotropium bromide monohydrate (SPIRIVA RESPIMAT) 2.5 MCG/ACT aerosol solution inhaler Inhale 2 puffs Daily. 1 inhaler 0     No facility-administered medications prior to visit.        Patient Active Problem List   Diagnosis   • GERD (gastroesophageal reflux disease)   • Cough   • Deviated nasal septum   • Allergic rhinitis due to pollen   • Sicca laryngitis   • Mixed hyperlipidemia   • Breast cancer (CMS/HCC)   • Osteoarthritis of cervical spine   • LBBB (left bundle branch block)   • Dyspnea on exertion   • Acute left-sided low back pain without sciatica   • Anginal equivalent (CMS/HCC)       Advanced Care Planning:  Patient does not have an advance directive - information provided to the patient today    Review of Systems   HENT:        Doing well   Respiratory: Positive  "for cough.    Cardiovascular: Negative for chest pain and leg swelling.   Genitourinary: Negative for difficulty urinating.   Musculoskeletal: Positive for neck pain.        Patient having neck pain related to weight and surgical past laminectomy in the neck plus large breast       Compared to one year ago, the patient feels her physical health is the same.  Compared to one year ago, the patient feels her mental health is the same.    Reviewed chart for potential of high risk medication in the elderly: yes  Reviewed chart for potential of harmful drug interactions in the elderly:yes    Objective         Vitals:    12/04/19 0947   BP: 127/80   BP Location: Left arm   Patient Position: Sitting   Cuff Size: Adult   Pulse: 64   Temp: 98 °F (36.7 °C)   TempSrc: Oral   SpO2: 99%   Weight: 73.6 kg (162 lb 3.2 oz)   Height: 156.2 cm (61.5\")   PainSc: 0-No pain       Body mass index is 30.15 kg/m².  Discussed the patient's BMI with her. The BMI is above average; BMI management plan is completed.    Physical Exam   Constitutional: She is oriented to person, place, and time.   Obesity   HENT:   Right Ear: External ear normal.   Left Ear: External ear normal.   Eyes: EOM are normal. Pupils are equal, round, and reactive to light.   Neck: No thyromegaly present.   Good carotid pulses no bruits heard   Cardiovascular: Normal rate and regular rhythm.   Pulmonary/Chest: Effort normal and breath sounds normal.   Exam by another provider   Abdominal: Soft. Bowel sounds are normal.   Genitourinary:   Genitourinary Comments: Surgical past   Musculoskeletal: She exhibits no edema.   Lymphadenopathy:     She has no cervical adenopathy.   Neurological: She is alert and oriented to person, place, and time.   Skin: Skin is warm and dry. Capillary refill takes less than 2 seconds.   Psychiatric: She has a normal mood and affect. Her behavior is normal. Judgment and thought content normal.   Nursing note and vitals reviewed.        "     Assessment/Plan   Medicare Risks and Personalized Health Plan  CMS Preventative Services Quick Reference  Fall Risk    The above risks/problems have been discussed with the patient.  Pertinent information has been shared with the patient in the After Visit Summary.  Follow up plans and orders are seen below in the Assessment/Plan Section.    Diagnoses and all orders for this visit:    1. Mixed hyperlipidemia (Primary)  -     Comprehensive Metabolic Panel  -     Lipid Panel    2. Fatigue, unspecified type  -     TSH  -     T4, free  -     CBC & Differential    3. Hypertension, unspecified type  -     POC Urinalysis Dipstick, Multipro    4. Need for immunization against influenza  -     Fluad Quad >65 years    5. Annual physical exam    6. Bilateral hearing loss, unspecified hearing loss type  -     Ambulatory Referral to ENT (Otolaryngology)    7. Urinary tract infection with hematuria, site unspecified  -     Urine Culture - Urine, Urine, Clean Catch      Follow Up:  Return in 6 months (on 6/4/2020).     An After Visit Summary and PPPS were given to the patient.     Plan-referral for hearing loss-encouraged exercise-continue with the above plan

## 2019-12-05 LAB
ALBUMIN SERPL-MCNC: 4.9 G/DL (ref 3.5–5.2)
ALBUMIN/GLOB SERPL: 2 G/DL
ALP SERPL-CCNC: 79 U/L (ref 39–117)
ALT SERPL-CCNC: 17 U/L (ref 1–33)
AST SERPL-CCNC: 17 U/L (ref 1–32)
BASOPHILS # BLD AUTO: 0.02 10*3/MM3 (ref 0–0.2)
BASOPHILS NFR BLD AUTO: 0.4 % (ref 0–1.5)
BILIRUB SERPL-MCNC: 0.4 MG/DL (ref 0.2–1.2)
BUN SERPL-MCNC: 11 MG/DL (ref 8–23)
BUN/CREAT SERPL: 15.7 (ref 7–25)
CALCIUM SERPL-MCNC: 9.7 MG/DL (ref 8.6–10.5)
CHLORIDE SERPL-SCNC: 99 MMOL/L (ref 98–107)
CHOLEST SERPL-MCNC: 178 MG/DL (ref 0–200)
CO2 SERPL-SCNC: 29.8 MMOL/L (ref 22–29)
CREAT SERPL-MCNC: 0.7 MG/DL (ref 0.57–1)
EOSINOPHIL # BLD AUTO: 0.18 10*3/MM3 (ref 0–0.4)
EOSINOPHIL NFR BLD AUTO: 3.3 % (ref 0.3–6.2)
ERYTHROCYTE [DISTWIDTH] IN BLOOD BY AUTOMATED COUNT: 12.7 % (ref 12.3–15.4)
GLOBULIN SER CALC-MCNC: 2.4 GM/DL
GLUCOSE SERPL-MCNC: 98 MG/DL (ref 65–99)
HCT VFR BLD AUTO: 41.8 % (ref 34–46.6)
HDLC SERPL-MCNC: 54 MG/DL (ref 40–60)
HGB BLD-MCNC: 13.4 G/DL (ref 12–15.9)
IMM GRANULOCYTES # BLD AUTO: 0.01 10*3/MM3 (ref 0–0.05)
IMM GRANULOCYTES NFR BLD AUTO: 0.2 % (ref 0–0.5)
LDLC SERPL CALC-MCNC: 99 MG/DL (ref 0–100)
LYMPHOCYTES # BLD AUTO: 1.29 10*3/MM3 (ref 0.7–3.1)
LYMPHOCYTES NFR BLD AUTO: 23.9 % (ref 19.6–45.3)
MCH RBC QN AUTO: 28.6 PG (ref 26.6–33)
MCHC RBC AUTO-ENTMCNC: 32.1 G/DL (ref 31.5–35.7)
MCV RBC AUTO: 89.1 FL (ref 79–97)
MONOCYTES # BLD AUTO: 0.47 10*3/MM3 (ref 0.1–0.9)
MONOCYTES NFR BLD AUTO: 8.7 % (ref 5–12)
NEUTROPHILS # BLD AUTO: 3.43 10*3/MM3 (ref 1.7–7)
NEUTROPHILS NFR BLD AUTO: 63.5 % (ref 42.7–76)
NRBC BLD AUTO-RTO: 0 /100 WBC (ref 0–0.2)
PLATELET # BLD AUTO: 254 10*3/MM3 (ref 140–450)
POTASSIUM SERPL-SCNC: 4.3 MMOL/L (ref 3.5–5.2)
PROT SERPL-MCNC: 7.3 G/DL (ref 6–8.5)
RBC # BLD AUTO: 4.69 10*6/MM3 (ref 3.77–5.28)
SODIUM SERPL-SCNC: 140 MMOL/L (ref 136–145)
T4 FREE SERPL-MCNC: 1.1 NG/DL (ref 0.93–1.7)
TRIGL SERPL-MCNC: 124 MG/DL (ref 0–150)
TSH SERPL DL<=0.005 MIU/L-ACNC: 2.78 UIU/ML (ref 0.27–4.2)
VLDLC SERPL CALC-MCNC: 24.8 MG/DL
WBC # BLD AUTO: 5.4 10*3/MM3 (ref 3.4–10.8)

## 2019-12-06 LAB
BACTERIA UR CULT: NORMAL
BACTERIA UR CULT: NORMAL

## 2019-12-09 RX ORDER — LOVASTATIN 10 MG/1
TABLET ORAL
Qty: 90 TABLET | Refills: 3 | Status: SHIPPED | OUTPATIENT
Start: 2019-12-09 | End: 2020-12-09

## 2019-12-17 RX ORDER — LOSARTAN POTASSIUM 50 MG/1
TABLET ORAL
Qty: 90 TABLET | Refills: 1 | Status: SHIPPED | OUTPATIENT
Start: 2019-12-17 | End: 2020-06-22 | Stop reason: SDUPTHER

## 2020-01-16 ENCOUNTER — CLINICAL SUPPORT (OUTPATIENT)
Dept: AUDIOLOGY | Facility: CLINIC | Age: 70
End: 2020-01-16

## 2020-01-16 ENCOUNTER — OFFICE VISIT (OUTPATIENT)
Dept: OTOLARYNGOLOGY | Facility: CLINIC | Age: 70
End: 2020-01-16

## 2020-01-16 VITALS — HEIGHT: 62 IN | WEIGHT: 165.8 LBS | BODY MASS INDEX: 30.51 KG/M2 | OXYGEN SATURATION: 97 %

## 2020-01-16 DIAGNOSIS — H90.3 SENSORINEURAL HEARING LOSS OF BOTH EARS: ICD-10-CM

## 2020-01-16 DIAGNOSIS — H93.13 TINNITUS OF BOTH EARS: Primary | ICD-10-CM

## 2020-01-16 DIAGNOSIS — H90.3 SENSORINEURAL HEARING LOSS, BILATERAL: ICD-10-CM

## 2020-01-16 PROCEDURE — 99203 OFFICE O/P NEW LOW 30 MIN: CPT | Performed by: OTOLARYNGOLOGY

## 2020-01-16 NOTE — PROGRESS NOTES
Subjective   Cindi Hill is a 69 y.o. female.     History of Present Illness   Patient is here for evaluation of ringing in her ears.  Says she has had it for years but seems to be getting worse.  Is more noticeable in a quiet environment.  Usually sounds like crickets but sometimes pulsates with her heartbeat.  No otorrhea, no otalgia, no previous otologic surgery.  She had a transient episode of dizziness 2 weeks ago.  No significant noise exposure.  No previous otologic surgery.  No family history of hearing loss.  She does not feel like her hearing is all that decreased.  She says Dr. reynaga has to clean her ears frequently because of wax and dry skin.      The following portions of the patient's history were reviewed and updated as appropriate: allergies, current medications, past family history, past medical history, past social history, past surgical history and problem list.      Cindi Hill reports that she has never smoked. She has never used smokeless tobacco. She reports that she does not drink alcohol or use drugs.  Patient is not a tobacco user and has not been counseled for use of tobacco products    Family History   Problem Relation Age of Onset   • Dementia Mother    • Heart failure Mother    • Cancer Father         liver   • Cancer Brother         thyroid   • No Known Problems Maternal Grandmother    • Diabetes Maternal Grandfather    • No Known Problems Paternal Grandmother    • Cancer Paternal Grandfather    • Breast cancer Neg Hx        Allergies   Allergen Reactions   • Lisinopril Cough   • Lortab [Hydrocodone-Acetaminophen] Nausea Only   • Percocet [Oxycodone-Acetaminophen] Nausea Only         Current Outpatient Medications:   •  aspirin 81 MG tablet, Take 81 mg by mouth daily., Disp: , Rfl:   •  atenolol (TENORMIN) 25 MG tablet, Take 1 tablet by mouth Daily., Disp: 90 tablet, Rfl: 3  •  benzonatate (TESSALON) 200 MG capsule, Take 200 mg by mouth 3 (Three) Times a Day As Needed for  Cough., Disp: , Rfl:   •  calcium carbonate (CALCIUM 600) 600 MG tablet, Take 600 mg by mouth daily., Disp: , Rfl:   •  cetirizine (zyrTEC) 10 MG tablet, Take 10 mg by mouth Daily., Disp: , Rfl:   •  Cholecalciferol 4000 units capsule, Take 4,000 Units by mouth Daily., Disp: , Rfl:   •  dextromethorphan polistirex ER (DELSYM) 30 MG/5ML Suspension Extended Release oral suspension, Take 10 mL by mouth As Needed., Disp: , Rfl:   •  fluticasone (FLONASE) 50 MCG/ACT nasal spray, 2 sprays into each nostril daily., Disp: 1 bottle, Rfl: 6  •  guaiFENesin (MUCINEX) 600 MG 12 hr tablet, Take 1,200 mg by mouth As Needed., Disp: , Rfl:   •  losartan (COZAAR) 50 MG tablet, TAKE ONE TABLET BY MOUTH EVERY DAY IN THE MORNING, Disp: 90 tablet, Rfl: 1  •  lovastatin (MEVACOR) 10 MG tablet, TAKE ONE TABLET BY MOUTH EVERY DAY, Disp: 90 tablet, Rfl: 3  •  omeprazole (PriLOSEC) 20 MG capsule, Take 20 mg by mouth Daily. Patient is taking on Monday, Wednesday, and Friday, Disp: , Rfl:   •  PREMARIN 0.625 MG/GM vaginal cream, , Disp: , Rfl:     Past Medical History:   Diagnosis Date   • Acute left-sided low back pain without sciatica 10/24/2017   • Arthritis    • Breast cancer (CMS/[facility])     1997, right breast mastectomy   • Drug therapy 1997   • GERD (gastroesophageal reflux disease)    • Hypercholesteremia    • KARUNA (stress urinary incontinence, female)    • Urge incontinence    • Uterovaginal prolapse    • Vaginal atrophy        Past Surgical History:   Procedure Laterality Date   • BLADDER SLING MODIFIED, ANTERIOR AND POSTERIOR VAGINAL REPAIR     • BRONCHOSCOPY N/A 4/26/2017    Procedure: BRONCHOSCOPY BIOPSY WITH ANESTHESIA;  Surgeon: Adi Anderson MD;  Location:  [facility];  Service:    • CARDIAC CATHETERIZATION Left 2/28/2017    Procedure: Cardiac Catheterization/Vascular Study;  Surgeon: Anuj Johnson MD;  Location:  [facility]ATH INVASIVE LOCATION;  Service:    • COLONOSCOPY  2014   • HYSTERECTOMY     • HYSTERECTOMY     •  LAMINECTOMY  01/2016   • MASTECTOMY Right 1997   • ROTATOR CUFF REPAIR Right 2012         Review of Systems   HENT: Positive for postnasal drip.    Respiratory: Positive for cough and shortness of breath.    Musculoskeletal: Positive for arthralgias, back pain and neck pain.   Neurological: Positive for dizziness, numbness and headaches.   All other systems reviewed and are negative.          Objective   Physical Exam  General: Well-developed well-nourished female in no acute distress.  Alert and oriented x-3. Head: Normocephalic. Face: Symmetrical strength and appearance. PERRL. EOMI. Voice:Strong. Speech:Fluent  Ears: External ears no deformity, both ear canals show some flaky scaly skin that is cleaned under the microscope.  Beneath this, tympanic membranes intact clear and mobile bilaterally.  Nose: Nares show no discharge mass polyp or purulence.  Boggy mucosa is present.  No gross external deformity.  Septum: Midline  Oral cavity: Lips and gums without lesions.  Tongue and floor of mouth without lesions.  Parotid and submandibular ducts unobstructed.  No mucosal lesions on the buccal mucosa or vestibule of the mouth.  Pharynx: No erythema exudate mass or ulcer  Neck: No lymphadenopathy.  No thyromegaly.  Trachea and larynx midline.  No masses in the parotid or submandibular glands.  Audiogram is obtained and reviewed and shows a mild bilateral sensorineural hearing loss.  Slightly worse on the right than the left.  Tympanograms are type a bilaterally.  Discrimination scores are 100% on the right 96% on the left      Assessment/Plan   Cindi was seen today for hearing loss.    Diagnoses and all orders for this visit:    Tinnitus of both ears    Sensorineural hearing loss of both ears      Gregor: Explained the nature of tinnitus to the patient and its relationship to sensorineural hearing loss.  Advised that no medicine or surgery was likely to improve either her tinnitus or her hearing loss.  She should use  masking noise as needed for the tinnitus.  She should avoid unnecessary exposure to loud noise and use ear protection when unavoidably around loud noise.  At this point she would not be a candidate for hearing aids.  May follow-up with me as needed.

## 2020-01-16 NOTE — PROGRESS NOTES
STANDARD AUDIOMETRIC EVALUATION      Name:  Cindi Hill  :  1950  Age:  69 y.o.  Date of Evaluation:  2020      HISTORY    Reason for visit:  Cindi Hill was seen today for a hearing evaluation at the request of Jose Wolff MD.   She presented with complaints of tinnitus present over the past 3-5 years. Ms. Hill described her tinnitus as crickets chirping and reported that it has recently worsened in intensity. Ms. Hill reported that she feels she has trouble with her hearing sensitivity, because she feels she needs to ask for repetition often. She denied prior ear surgeries and recurrent otitis media. Ms. Hill reported the sudden onset of vertigo approximately three weeks ago. She reported the vertiginous episode lasted approximately three days including true room spinning vertigo when moving accompanied by nausea and pain in her front teeth. She denied noise exposure with the exception of the occasional concert. No other significant audiologic information was reported.       EVALUATION    See Audiogram    RESULTS        Otoscopy and Tympanometry 226 Hz :  Right Ear:  Otoscopy:  Visible ear drum          Tympanometry:  Middle ear function within normal limits    Left Ear:   Otoscopy:  Visible ear drum        Tympanometry:  Middle ear function within normal limits    Test technique:  Standard Audiometry     Pure Tone Audiometry:   Patient responded to pure tones at 10-35 dB for 250-8000 Hz in right ear, and at 5-30 dB for 250-8000 Hz in left ear.       Speech Audiometry:        Right Ear:  Speech Reception Threshold (SRT) was obtained at 10 dBHL                 Speech Discrimination scores were 100% in quiet when words were presented at 50 dBHL       Left Ear:  Speech Reception Threshold (SRT) was obtained at 5 dBHL                 Speech Discrimination scores were 96% in quiet when words were presented at 45 dBHL    Reliability:   excellent    IMPRESSIONS:  1.  Tympanometry results  are consistent with Middle ear function within normal limits in both ears.  2.  Pure tone results are consistent with normal peripheral hearing sensitivity bilaterally with the exception of a mild likely sensorineural hearing loss from 1600-0124 Hz in the right ear and at 8000 Hz in the left ear.      RECOMMENDATIONS:  Patient is seeing the Ear Nose and Throat physician immediately following this examination.  It was a pleasure seeing Cindi Hill in Audiology today.  We would be happy to do further testing or discuss these test as necessary.              This document has been electronically signed by BELTRAN Wade on January 16, 2020 3:02 PM

## 2020-01-20 ENCOUNTER — OFFICE VISIT (OUTPATIENT)
Dept: CARDIOLOGY | Facility: CLINIC | Age: 70
End: 2020-01-20

## 2020-01-20 VITALS
SYSTOLIC BLOOD PRESSURE: 120 MMHG | OXYGEN SATURATION: 95 % | BODY MASS INDEX: 30.36 KG/M2 | WEIGHT: 165 LBS | DIASTOLIC BLOOD PRESSURE: 60 MMHG | HEART RATE: 68 BPM | HEIGHT: 62 IN

## 2020-01-20 DIAGNOSIS — R06.09 DYSPNEA ON EXERTION: ICD-10-CM

## 2020-01-20 DIAGNOSIS — K21.9 GASTROESOPHAGEAL REFLUX DISEASE WITHOUT ESOPHAGITIS: Primary | ICD-10-CM

## 2020-01-20 PROCEDURE — 99213 OFFICE O/P EST LOW 20 MIN: CPT | Performed by: INTERNAL MEDICINE

## 2020-01-20 NOTE — PROGRESS NOTES
Reason for Follow-up Visit: Here for routine follow up   Now with palpitations that is better  Prior visit for preop CV exam for pelvic surgery, hysterectomy and bladder  Dr Inderjit SULLIVAN  left bundle branch block   S/P successful surgery  Cardiac workup test results as below   Brief fast SVT as below         Subjective    Mild chronic exertional shortness of breath on exertion relieved with rest  No significant cough or wheezing    Occasional palpitations now better on beta blocker therapy ,   ]once every several weeks lasting for less than a few seconds  No associated symptoms of weakness, chest pain,  shortness of breath    Associated symptoms of dizziness    No associated chest pain  No significant pedal edema    No fever or chills  No significant expectoration    No hemoptysis  No presyncope or syncope     Joint pain in small, medium and large joints  Chronic low back pain    Had pulmonary function tests in past   Sees Dr Anderson       History of present illness: Cindi Hill is a 66 y.o. yo female with history of preop CV exam with moderate shortness of breath and chest pain who presents today for        Chief Complaint   Patient presents with   • Follow-up       2 week f/u on dar    .     History   Medical History          Past Medical History   Diagnosis Date   • Arthritis     • Breast cancer         1997, right breast mastectomy   • Drug therapy     • GERD (gastroesophageal reflux disease)     • Hypercholesteremia     • KARUNA (stress urinary incontinence, female)     • Urge incontinence     • Uterovaginal prolapse     • Vaginal atrophy        ,    Surgical History          Past Surgical History   Procedure Laterality Date   • Rotator cuff repair Right 2012   • Mastectomy Bilateral     • Colonoscopy   2014   • Laminectomy   01/2016   • Breast biopsy          ,          Family History   Problem Relation Age of Onset   • Dementia Mother     • Heart failure Mother     • Cancer Father         liver   •  "Cancer Brother         thyroid   ,        Social History   Substance Use Topics   • Smoking status: Never Smoker   • Smokeless tobacco: Never Used   • Alcohol use No   ,      Medications   Current Medications           Current Outpatient Prescriptions   Medication Sig Dispense Refill   • aspirin 81 MG tablet Take 81 mg by mouth daily.       • benzonatate (TESSALON) 200 MG capsule Take 200 mg by mouth As Needed for cough.       • fluticasone (FLONASE) 50 MCG/ACT nasal spray 2 sprays into each nostril daily. 1 bottle 6   • losartan (COZAAR) 50 MG tablet         • lovastatin (MEVACOR) 10 MG tablet Take 10 mg by mouth every night.       • omeprazole (PriLOSEC) 20 MG capsule Take 20 mg by mouth daily.       • Vitamin D, Cholecalciferol, 1000 UNITS capsule Take 2,000 Units by mouth Daily.       • calcium carbonate (CALCIUM 600) 600 MG tablet Take 600 mg by mouth daily.       • losartan-hydrochlorothiazide (HYZAAR) 50-12.5 MG per tablet Take 1 tablet by mouth daily.          No current facility-administered medications for this visit.             Allergies: Lisinopril; Lortab [hydrocodone-acetaminophen]; and Percocet [oxycodone-acetaminophen]        Review of Systems  Review of Systems   Constitution: Negative.   HENT: Negative.   Eyes: Negative.   Cardiovascular: Positive  dyspnea on exertion. Negative for claudication, cyanosis, irregular heartbeat, leg swelling, near-syncope, orthopnea, palpitations, paroxysmal nocturnal dyspnea and syncope.   Respiratory: Negative.   Endocrine: Negative.   Hematologic/Lymphatic: Negative.   Skin: Negative.   Gastrointestinal: Negative for anorexia.   Genitourinary: Negative.   Neurological: Negative.   Psychiatric/Behavioral: Negative.         Objective        Physical Exam:       Visit Vitals   • /78 (BP Location: Left arm, Patient Position: Sitting, Cuff Size: Adult)   • Pulse 89   • Ht 61.5\" (156.2 cm)   • Wt 159 lb (72.1 kg)   • LMP (LMP Unknown)   • SpO2 95%   • BMI 29.56 " kg/m2      Physical Exam   Constitutional: She appears well-developed.   HENT:   Head: Normocephalic.   Neck: Normal carotid pulses and no JVD present. No tracheal tenderness present. Carotid bruit is not present. No tracheal deviation and no edema present.   Cardiovascular: Regular rhythm, normal heart sounds and normal pulses.   Pulmonary/Chest: Effort normal. No stridor.   Abdominal: Soft.   Neurological: She is alert. She has normal strength. No cranial nerve deficit or sensory deficit.   Skin: Skin is warm.   Psychiatric: She has a normal mood and affect. Her speech is normal and behavior is normal.         Results Review:      Cindi Hill   Holter Monitor 72Hr-21Day (0296T/0298T)   Order# [id number]   Reading physician: Anuj Johnson MD Ordering physician: Anuj Johnson MD Study date: 19   Patient Information     Patient Name  Cindi Hill MRN  [id number] Sex  Female  (Age)  1950 (68 y.o.)   Interpretation Summary        · Average HR: 89. Min HR: 57. Max HR: 226.     · ~14 day monitor ,entire report was reviewed  · The predominant rhythm noted during the testing period was sinus rhythm.  · Rare supraventricular ectopics with an APC burden of: < 1%  · Rare premature ventricular contractions with a PVC burden of: < 1%  · 15 runs of supraventricular tachycardia longest 20 beats at a maximum rate of 226 bpm.  · No significant  ventricular tachy or florentin arrhythmia.  · No correlated arrhythmia  · No significant pauses           Conclusion: Baseline rhythm is sinus.  No significant ectopy  15 runs of supraventricular tachycardia longest 20 beats at a maximum rate of 226 bpm          Results for orders placed during the hospital encounter of 17   Adult Transthoracic Echo Complete    Narrative · Left ventricular function is normal. Estimated EF = 55%.  · Left ventricular diastolic dysfunction (grade I) consistent with   impaired relaxation.  · Estimated right ventricular systolic pressure  from tricuspid   regurgitation is normal (<35 mmHg).       2017    Conclusion  No stenosis of  epicardial coronary arteries with dominant RCA  Left circumflex arises from RCA and normal  Normal LVEF  LVEDP   9    mm Hg     Plan  Hydration   Observation  Workup for non cardiac chest pain  Acceptable cardiovascular risk of bladder surgery      Procedures     Assessment/Plan         Patient Active Problem List   Diagnosis   • GERD (gastroesophageal reflux disease)   • Cough   • Deviated nasal septum   • Allergic rhinitis due to pollen   • Sicca laryngitis   • Mixed hyperlipidemia   • Breast cancer   • Osteoarthritis of cervical spine   • LBBB (left bundle branch block)   • Dyspnea on exertion            Plan       The current medical regimen is effective;  continue present plan and medications.    Recommend evaluation for obstructive sleep apnea given daytime somnolence and fatigue by primary provider  In addition has body habitus including oropharyngeal crowding increasing the likelihood of obstructive sleep apnea      Discussed with the patient and all questioned fully answered. She will call me if any problems arise.     Discussed results of latest cardiac testing with patient       ____________________________________________________________________________________________________________________________________________  Health maintenance and recommendations    Similar recommendations as last visit     Advised staying uptodate with immunizations per established standard guidelines.      Offered to give patient  a copy      Questions were encouraged, asked and answered to the patient's  understanding and satisfaction. Questions if any regarding current medications and side effects, need for refills and importance of compliance to medications stressed.    Reviewed available prior notes, consults, prior visits, laboratory findings, radiology and cardiology relevant reports. Updated chart as applicable. I have  reviewed the patient's medical history in detail and updated the computerized patient record as relevant.      Updated patient regarding any new or relevant abnormalities on review of records or any new findings on physical exam. Mentioned to patient about purpose of visit and desirable health short and long term goals and objectives.    Primary to monitor CBC CMP Lipid panel and TSH as applicable    ___________________________________________________________________________________________________________________________________________       Return in about 6 months (around 7/20/2020).

## 2020-02-07 DIAGNOSIS — R92.8 ABNORMAL MAMMOGRAM: Primary | ICD-10-CM

## 2020-02-07 NOTE — PROGRESS NOTES
Mammogram indicates the need of left US in May 2020 in followup of an area in the left breast.  Sabiha Pang, APRN

## 2020-02-12 ENCOUNTER — OFFICE VISIT (OUTPATIENT)
Dept: OBSTETRICS AND GYNECOLOGY | Facility: CLINIC | Age: 70
End: 2020-02-12

## 2020-02-12 VITALS
DIASTOLIC BLOOD PRESSURE: 70 MMHG | HEIGHT: 62 IN | BODY MASS INDEX: 30.36 KG/M2 | WEIGHT: 165 LBS | SYSTOLIC BLOOD PRESSURE: 132 MMHG

## 2020-02-12 DIAGNOSIS — R31.29 OTHER MICROSCOPIC HEMATURIA: Primary | ICD-10-CM

## 2020-02-12 DIAGNOSIS — Z78.0 OSTEOPENIA AFTER MENOPAUSE: ICD-10-CM

## 2020-02-12 DIAGNOSIS — M85.80 OSTEOPENIA AFTER MENOPAUSE: ICD-10-CM

## 2020-02-12 PROCEDURE — 99213 OFFICE O/P EST LOW 20 MIN: CPT | Performed by: NURSE PRACTITIONER

## 2020-02-13 LAB
25(OH)D3+25(OH)D2 SERPL-MCNC: 41.2 NG/ML (ref 30–100)
APPEARANCE UR: ABNORMAL
BACTERIA #/AREA URNS HPF: ABNORMAL /HPF
BILIRUB UR QL STRIP: NEGATIVE
CASTS URNS MICRO: ABNORMAL
COLOR UR: YELLOW
EPI CELLS #/AREA URNS HPF: ABNORMAL /HPF
GLUCOSE UR QL: NEGATIVE
HGB UR QL STRIP: ABNORMAL
KETONES UR QL STRIP: NEGATIVE
LEUKOCYTE ESTERASE UR QL STRIP: ABNORMAL
NITRITE UR QL STRIP: NEGATIVE
PH UR STRIP: 6.5 [PH] (ref 5–8)
PROT UR QL STRIP: NEGATIVE
RBC #/AREA URNS HPF: ABNORMAL /HPF
SP GR UR: 1.01 (ref 1–1.03)
UROBILINOGEN UR STRIP-MCNC: ABNORMAL MG/DL
WBC #/AREA URNS HPF: ABNORMAL /HPF

## 2020-03-02 NOTE — PROGRESS NOTES
Cindi Hill is a 69 y.o.      Chief Complaint   Patient presents with   • Follow-up     pt states that she is here to f/u on mammogram and bone density from 2019 after annual exam.           HPI -Cindi is in for  Microscopic urinalysis as she had some blood in her urine a couple of months ago.  She has no dysuria.  She has osteopenia in her hip and needs her vit D checked..  She had a right mastectomy many years ago, her recent left mammogram and US indicates the need of repeat US in 6 months.      The following portions of the patient's history were reviewed and updated as appropriate:vital signs, allergies, current medications, past family history, past medical history, past social history, past surgical history and problem list.      Current Outpatient Medications:   •  aspirin 81 MG tablet, Take 81 mg by mouth daily., Disp: , Rfl:   •  atenolol (TENORMIN) 25 MG tablet, Take 1 tablet by mouth Daily., Disp: 90 tablet, Rfl: 3  •  benzonatate (TESSALON) 200 MG capsule, Take 200 mg by mouth 3 (Three) Times a Day As Needed for Cough., Disp: , Rfl:   •  calcium carbonate (CALCIUM 600) 600 MG tablet, Take 600 mg by mouth daily., Disp: , Rfl:   •  cetirizine (zyrTEC) 10 MG tablet, Take 10 mg by mouth Daily., Disp: , Rfl:   •  Cholecalciferol 4000 units capsule, Take 4,000 Units by mouth Daily., Disp: , Rfl:   •  dextromethorphan polistirex ER (DELSYM) 30 MG/5ML Suspension Extended Release oral suspension, Take 10 mL by mouth As Needed., Disp: , Rfl:   •  fluticasone (FLONASE) 50 MCG/ACT nasal spray, 2 sprays into each nostril daily., Disp: 1 bottle, Rfl: 6  •  guaiFENesin (MUCINEX) 600 MG 12 hr tablet, Take 1,200 mg by mouth As Needed., Disp: , Rfl:   •  losartan (COZAAR) 50 MG tablet, TAKE ONE TABLET BY MOUTH EVERY DAY IN THE MORNING, Disp: 90 tablet, Rfl: 1  •  lovastatin (MEVACOR) 10 MG tablet, TAKE ONE TABLET BY MOUTH EVERY DAY, Disp: 90 tablet, Rfl: 3  •  Mirabegron ER (MYRBETRIQ) 25 MG tablet  "sustained-release 24 hour 24 hr tablet, Take 25 mg by mouth Daily., Disp: , Rfl:   •  omeprazole (PriLOSEC) 20 MG capsule, Take 20 mg by mouth Daily. Patient is taking on Monday, Wednesday, and Friday, Disp: , Rfl:   •  PREMARIN 0.625 MG/GM vaginal cream, , Disp: , Rfl:     Review of Systems   HENT: Negative for dental problem and postnasal drip.    Eyes: Negative for double vision.        Glasses   Respiratory: Positive for cough and shortness of breath (on exertion).    Cardiovascular:        LBBB   Gastrointestinal: Positive for GERD.   Genitourinary: Negative for breast lump, decreased urine volume, dysuria and menstrual problem.   Musculoskeletal: Positive for arthralgias, back pain and bursitis.        Osteopenia in the hip   Neurological: Negative for syncope, facial asymmetry and numbness.   Psychiatric/Behavioral: Negative for agitation, decreased concentration and negative for hyperactivity.     Breast ROS: right mastectomy many years ago due to cancer.    Objective      /70   Ht 157.5 cm (62\")   Wt 74.8 kg (165 lb)   LMP  (LMP Unknown)   Breastfeeding No   BMI 30.18 kg/m²       Physical Exam   Constitutional: She is oriented to person, place, and time. She appears well-developed and well-nourished. No distress.   HENT:   Head: Normocephalic and atraumatic.   Eyes: Right eye exhibits no discharge. Left eye exhibits no discharge.   Neck: Normal range of motion. No thyromegaly present.   Cardiovascular:   No murmur heard.  Pulmonary/Chest: Effort normal. She has no wheezes.   Abdominal: Soft. She exhibits no distension. There is no tenderness.   Musculoskeletal: Normal range of motion.   Neurological: She is alert and oriented to person, place, and time.   Skin: Skin is warm and dry.   Psychiatric: She has a normal mood and affect. Her behavior is normal. Judgment normal.   Nursing note and vitals reviewed.       Assessment/Plan     ASSESSMENT/PLAN    Cindi was seen today for " follow-up.    Diagnoses and all orders for this visit:    Other microscopic hematuria  1-2 RBC's    This is JAZMYN for RBC's  -     Urinalysis With Microscopic - Urine, Clean Catch  -     Vitamin D 25 Hydroxy    Osteopenia after menopause in hip  -     Vitamin D 25 Hydroxy    41      Continue calcium rich diet and do weight bearing exercises.    Other orders  -     Microscopic Examination -   Will repeat DEXA in 2 years and consider RX if loss in hip if it increases.    Counseled that US of the left breast will be repeated in 6 months to re-evaluate the area in the left breast that appears to contain debries.  SHE HAS PERSONAL HISTORY OF RIGHT BREAST CANCER MANY YEARS AGO.  BAR Adams APRN  3/2/2020

## 2020-05-22 ENCOUNTER — HOSPITAL ENCOUNTER (OUTPATIENT)
Dept: ULTRASOUND IMAGING | Facility: HOSPITAL | Age: 70
Discharge: HOME | End: 2020-05-22

## 2020-05-22 ENCOUNTER — HOSPITAL ENCOUNTER (OUTPATIENT)
Dept: MAMMOGRAPHY | Facility: HOSPITAL | Age: 70
Discharge: HOME | End: 2020-05-22
Admitting: NURSE PRACTITIONER

## 2020-05-22 DIAGNOSIS — R92.8 ABNORMAL MAMMOGRAM OF LEFT BREAST: ICD-10-CM

## 2020-05-22 DIAGNOSIS — R92.8 ABNORMAL MAMMOGRAM: ICD-10-CM

## 2020-05-22 PROCEDURE — 76642 ULTRASOUND BREAST LIMITED: CPT

## 2020-05-26 ENCOUNTER — DOCUMENTATION (OUTPATIENT)
Dept: OBSTETRICS AND GYNECOLOGY | Facility: CLINIC | Age: 70
End: 2020-05-26

## 2020-05-26 DIAGNOSIS — R92.8 ABNORMAL MAMMOGRAM: Primary | ICD-10-CM

## 2020-05-26 NOTE — PROGRESS NOTES
Followup left breast US shows mildly dilated duct as before and small simple cyst.  Patient wants a consult with Dr Valdivia due to her history of breast cancer and mastectomy years ago.  Patient is counseled and referral is made.  BAR Adams

## 2020-05-26 NOTE — PROGRESS NOTES
Cindi's six month follow up  Left breast US sable with mildly dilated duct with chani noted, there is a small simple cyst o.5 cm.  Due to her history of mastectomy for right breast cancer, she would like a consult with Dr. Valdivia and this will be arranged.  I counseled the patient.  BAR Adams

## 2020-05-28 ENCOUNTER — OFFICE VISIT (OUTPATIENT)
Dept: SURGERY | Age: 70
End: 2020-05-28
Payer: MEDICARE

## 2020-05-28 ENCOUNTER — OFFICE VISIT (OUTPATIENT)
Dept: PULMONOLOGY | Facility: CLINIC | Age: 70
End: 2020-05-28

## 2020-05-28 VITALS
WEIGHT: 168 LBS | DIASTOLIC BLOOD PRESSURE: 70 MMHG | TEMPERATURE: 98.1 F | BODY MASS INDEX: 30.73 KG/M2 | OXYGEN SATURATION: 95 % | HEART RATE: 74 BPM | SYSTOLIC BLOOD PRESSURE: 120 MMHG

## 2020-05-28 VITALS
WEIGHT: 168 LBS | TEMPERATURE: 97.7 F | SYSTOLIC BLOOD PRESSURE: 140 MMHG | DIASTOLIC BLOOD PRESSURE: 73 MMHG | BODY MASS INDEX: 30.91 KG/M2 | HEART RATE: 69 BPM | HEIGHT: 62 IN

## 2020-05-28 DIAGNOSIS — R06.09 DYSPNEA ON EXERTION: ICD-10-CM

## 2020-05-28 DIAGNOSIS — R05.9 COUGH: Primary | ICD-10-CM

## 2020-05-28 PROCEDURE — 94664 DEMO&/EVAL PT USE INHALER: CPT | Performed by: INTERNAL MEDICINE

## 2020-05-28 PROCEDURE — G8417 CALC BMI ABV UP PARAM F/U: HCPCS | Performed by: PHYSICIAN ASSISTANT

## 2020-05-28 PROCEDURE — G8427 DOCREV CUR MEDS BY ELIG CLIN: HCPCS | Performed by: PHYSICIAN ASSISTANT

## 2020-05-28 PROCEDURE — 99214 OFFICE O/P EST MOD 30 MIN: CPT | Performed by: INTERNAL MEDICINE

## 2020-05-28 PROCEDURE — 1123F ACP DISCUSS/DSCN MKR DOCD: CPT | Performed by: PHYSICIAN ASSISTANT

## 2020-05-28 PROCEDURE — 1036F TOBACCO NON-USER: CPT | Performed by: PHYSICIAN ASSISTANT

## 2020-05-28 PROCEDURE — G8400 PT W/DXA NO RESULTS DOC: HCPCS | Performed by: PHYSICIAN ASSISTANT

## 2020-05-28 PROCEDURE — 4040F PNEUMOC VAC/ADMIN/RCVD: CPT | Performed by: PHYSICIAN ASSISTANT

## 2020-05-28 PROCEDURE — 1090F PRES/ABSN URINE INCON ASSESS: CPT | Performed by: PHYSICIAN ASSISTANT

## 2020-05-28 PROCEDURE — 99203 OFFICE O/P NEW LOW 30 MIN: CPT | Performed by: PHYSICIAN ASSISTANT

## 2020-05-28 PROCEDURE — 3017F COLORECTAL CA SCREEN DOC REV: CPT | Performed by: PHYSICIAN ASSISTANT

## 2020-05-28 RX ORDER — LOVASTATIN 10 MG/1
TABLET ORAL
COMMUNITY
Start: 2019-12-09

## 2020-05-28 RX ORDER — CETIRIZINE HYDROCHLORIDE 10 MG/1
10 TABLET ORAL DAILY
COMMUNITY

## 2020-05-28 RX ORDER — LOSARTAN POTASSIUM 50 MG/1
TABLET ORAL
COMMUNITY
Start: 2019-12-17

## 2020-05-28 RX ORDER — ATENOLOL 25 MG/1
25 TABLET ORAL DAILY
COMMUNITY
Start: 2019-10-22

## 2020-05-28 RX ORDER — MIRABEGRON 25 MG/1
TABLET, FILM COATED, EXTENDED RELEASE ORAL
COMMUNITY
Start: 2020-04-28

## 2020-05-28 RX ORDER — CHOLECALCIFEROL (VITAMIN D3) 1250 MCG
CAPSULE ORAL
COMMUNITY

## 2020-05-28 RX ORDER — CONJUGATED ESTROGENS 0.62 MG/G
CREAM VAGINAL
COMMUNITY
Start: 2017-03-30

## 2020-05-28 RX ORDER — OMEPRAZOLE 20 MG/1
20 CAPSULE, DELAYED RELEASE ORAL DAILY
COMMUNITY

## 2020-05-28 RX ORDER — PHENOL 1.4 %
600 AEROSOL, SPRAY (ML) MUCOUS MEMBRANE DAILY
COMMUNITY

## 2020-05-28 SDOH — HEALTH STABILITY: MENTAL HEALTH: HOW OFTEN DO YOU HAVE A DRINK CONTAINING ALCOHOL?: NEVER

## 2020-05-28 NOTE — PROGRESS NOTES
03/1997    Dr Juanpablo Hinojosa done at [address]  2019    Repair    ROTATOR CUFF REPAIR Right 2012    [place]    VAGINA SURGERY  2019    Vaginal Mesh     Family History   Problem Relation Age of Onset    Liver Cancer Father 68    Thyroid Cancer Brother 72    Prostate Cancer Paternal Grandfather 76    Cancer Maternal Uncle 79        Blood     Social History     Tobacco Use    Smoking status: Never Smoker    Smokeless tobacco: Never Used   Substance Use Topics    Alcohol use: Never     Frequency: Never       Review of Systems   All other systems reviewed and are negative. Objective:   Physical Exam  Constitutional:       Appearance: Normal appearance. HENT:      Head: Normocephalic and atraumatic. Chest:      Breasts:         Right: Absent. Left: Normal. No inverted nipple, mass, skin change or tenderness. Comments: There are no masses or skin changes on her right chest wall. Her mastectomy site is well healed. Skin:     General: Skin is warm and dry. Neurological:      General: No focal deficit present. Mental Status: She is alert and oriented to person, place, and time. Psychiatric:         Mood and Affect: Mood normal.         Behavior: Behavior normal.         Thought Content: Thought content normal.         Judgment: Judgment normal.         Assessment:      Abnormal left US      Plan:      She is concerned about this mass and does not want to continue watching it. I offered her a biopsy and she would like to proceed. I told her mastectomy is definitly an option and we can discuss further after the biopsy. We also discussed genetic testing given her young age when she was diagnosed. This will require US guided mammotome biopsy, which will be done in the hospital under local anesthesia. Further procedures will be done as indicated.     CONSENT:  The risks, benefits and options of biopsy/US were discussed with her including but not limited to bleeding,

## 2020-05-28 NOTE — PROGRESS NOTES
Subjective   Cindi Hill is a 69 y.o. female.     Background: Pt with cough, upper airway cough, dynamic airway collapse    Chief Complaint   Patient presents with   • Cough        History of Present Illness   Spiriva did not help.  She felt it did not get into her lungs and she feels she may have coughed it all the way out.  Cough is very annoying.  Worse in public.  In the past few months she is more short of breath intermittently with light exertion, attributed at first to weight gain.  She is seeing Dr. Johnson and she has had bundle branch block and intermittent tachycardia.  She denies excessive daytime sleepiness but has been recommended for a sleep study.      Medical/Family/Social History   has a past medical history of Acute left-sided low back pain without sciatica (10/24/2017), Arthritis, Breast cancer (CMS/[facility]), Drug therapy (1997), GERD (gastroesophageal reflux disease), Hypercholesteremia, KARUNA (stress urinary incontinence, female), Urge incontinence, Uterovaginal prolapse, and Vaginal atrophy.   has a past surgical history that includes Rotator cuff repair (Right, 2012); Colonoscopy (2014); Laminectomy (01/2016); Cardiac catheterization (Left, 2/28/2017); Bronchoscopy (N/A, 4/26/2017); Hysterectomy; Hysterectomy; bladder sling modified, anterior and posterior vaginal repair; and Mastectomy (Right, 1997).  family history includes Cancer in her brother, father, and paternal grandfather; Dementia in her mother; Diabetes in her maternal grandfather; Heart failure in her mother; No Known Problems in her maternal grandmother and paternal grandmother.   reports that she has never smoked. She has never used smokeless tobacco. She reports that she does not drink alcohol or use drugs.  Allergies   Allergen Reactions   • Lisinopril Cough   • Lortab [Hydrocodone-Acetaminophen] Nausea Only   • Percocet [Oxycodone-Acetaminophen] Nausea Only     Medications    Current Outpatient Medications:   •  aspirin 81 MG  tablet, Take 81 mg by mouth daily., Disp: , Rfl:   •  atenolol (TENORMIN) 25 MG tablet, Take 1 tablet by mouth Daily., Disp: 90 tablet, Rfl: 3  •  benzonatate (TESSALON) 200 MG capsule, Take 200 mg by mouth 3 (Three) Times a Day As Needed for Cough., Disp: , Rfl:   •  calcium carbonate (CALCIUM 600) 600 MG tablet, Take 600 mg by mouth daily., Disp: , Rfl:   •  cetirizine (zyrTEC) 10 MG tablet, Take 10 mg by mouth Daily., Disp: , Rfl:   •  Cholecalciferol 4000 units capsule, Take 4,000 Units by mouth Daily., Disp: , Rfl:   •  fluticasone (FLONASE) 50 MCG/ACT nasal spray, 2 sprays into each nostril daily., Disp: 1 bottle, Rfl: 6  •  guaiFENesin (MUCINEX) 600 MG 12 hr tablet, Take 1,200 mg by mouth As Needed., Disp: , Rfl:   •  losartan (COZAAR) 50 MG tablet, TAKE ONE TABLET BY MOUTH EVERY DAY IN THE MORNING, Disp: 90 tablet, Rfl: 1  •  lovastatin (MEVACOR) 10 MG tablet, TAKE ONE TABLET BY MOUTH EVERY DAY, Disp: 90 tablet, Rfl: 3  •  Mirabegron ER (MYRBETRIQ) 25 MG tablet sustained-release 24 hour 24 hr tablet, Take 25 mg by mouth Daily., Disp: , Rfl:   •  omeprazole (PriLOSEC) 20 MG capsule, Take 20 mg by mouth Daily. Patient is taking on Monday, Wednesday, and Friday, Disp: , Rfl:   •  PREMARIN 0.625 MG/GM vaginal cream, , Disp: , Rfl:   •  dextromethorphan polistirex ER (DELSYM) 30 MG/5ML Suspension Extended Release oral suspension, Take 10 mL by mouth As Needed., Disp: , Rfl:     Review of Systems   Constitutional: Negative for chills and fever.   Cardiovascular: Negative for chest pain.   Gastrointestinal: Negative for nausea and vomiting.     Objective   /70   Pulse 74   Temp 98.1 °F (36.7 °C)   Wt 76.2 kg (168 lb)   LMP  (LMP Unknown)   SpO2 95% Comment: ra  BMI 30.73 kg/m²   Physical Exam   Constitutional: She appears well-developed. She does not appear ill. No distress.   HENT:   Head: Atraumatic.   Nose: Nose normal.   Eyes: Conjunctivae and EOM are normal. No scleral icterus.   Neck: Neck supple.    Cardiovascular: Normal rate, regular rhythm, S1 normal and S2 normal.   Pulmonary/Chest: Effort normal. No stridor. No respiratory distress. She has wheezes (minimal coarse sounding).   Abdominal: Soft. She exhibits no distension. There is no tenderness.   Musculoskeletal: She exhibits no deformity.   Lymphadenopathy:     She has no cervical adenopathy.   Neurological: She is alert.   Skin: Skin is warm. No rash noted.   Psychiatric: She has a normal mood and affect.            -----------------------------------------------------------------------------------------------  Assessment/Plan   Problem List Items Addressed This Visit        Pulmonary Problems    Cough - Primary    Dyspnea on exertion        Patient's Body mass index is 30.73 kg/m². BMI is above normal parameters. Recommendations include: referral to primary care.      She has difficult situation with hyperdynamic airway collapse.  Poor response to any interventions we have done so far.  She has had trouble with the Stiolto device.  We will plan a trial of Anoro.  We gave her a sample of this, demonstrated proper technique.  I suggested she go ahead with a sleep evaluation that was recommended.  she will call if the Anoro helps.  We discussed alternatives for other off label use of other medications such as anticonvulsants which have been used in situations such as hers but she does not feel like the cough is bad enough to warrant that.  Repeat spirometry next.       Electronically signed by Adi Anderson MD, 5/28/2020, 14:46

## 2020-05-31 ENCOUNTER — OFFICE VISIT (OUTPATIENT)
Age: 70
End: 2020-05-31

## 2020-06-02 LAB
REPORT: NORMAL
SARS-COV-2: NOT DETECTED
THIS TEST SENT TO: NORMAL

## 2020-06-04 ENCOUNTER — HOSPITAL ENCOUNTER (OUTPATIENT)
Dept: WOMENS IMAGING | Age: 70
Discharge: HOME | End: 2020-06-04
Payer: MEDICARE

## 2020-06-04 PROCEDURE — 77065 DX MAMMO INCL CAD UNI: CPT

## 2020-06-04 PROCEDURE — 88305 TISSUE EXAM BY PATHOLOGIST: CPT

## 2020-06-04 PROCEDURE — 2720000010 US BREAST BIOPSY W LOC DEVICE 1ST LESION LEFT

## 2020-06-08 ENCOUNTER — TELEPHONE (OUTPATIENT)
Dept: SURGERY | Age: 70
End: 2020-06-08

## 2020-06-11 ENCOUNTER — OFFICE VISIT (OUTPATIENT)
Dept: FAMILY MEDICINE CLINIC | Facility: CLINIC | Age: 70
End: 2020-06-11

## 2020-06-11 VITALS
OXYGEN SATURATION: 96 % | SYSTOLIC BLOOD PRESSURE: 128 MMHG | DIASTOLIC BLOOD PRESSURE: 80 MMHG | WEIGHT: 165 LBS | HEIGHT: 62 IN | HEART RATE: 75 BPM | RESPIRATION RATE: 16 BRPM | TEMPERATURE: 97.6 F | BODY MASS INDEX: 30.36 KG/M2

## 2020-06-11 DIAGNOSIS — I20.8 ANGINAL EQUIVALENT (HCC): ICD-10-CM

## 2020-06-11 DIAGNOSIS — C50.919 MALIGNANT NEOPLASM OF FEMALE BREAST, UNSPECIFIED ESTROGEN RECEPTOR STATUS, UNSPECIFIED LATERALITY, UNSPECIFIED SITE OF BREAST (HCC): ICD-10-CM

## 2020-06-11 DIAGNOSIS — R06.81 APNEA: ICD-10-CM

## 2020-06-11 DIAGNOSIS — E78.2 MIXED HYPERLIPIDEMIA: Primary | ICD-10-CM

## 2020-06-11 PROCEDURE — 99214 OFFICE O/P EST MOD 30 MIN: CPT | Performed by: FAMILY MEDICINE

## 2020-06-11 NOTE — PROGRESS NOTES
Subjective   Cindi Hill is a 69 y.o. female.     69-year-old female with history of hypertension hyperlipidemia-increasing shortness of breath    Hyperlipidemia   This is a chronic problem. The current episode started more than 1 year ago. The problem is controlled. Recent lipid tests were reviewed and are normal. Exacerbating diseases include obesity. Associated symptoms include shortness of breath. Pertinent negatives include no chest pain. Current antihyperlipidemic treatment includes diet change and statins. The current treatment provides moderate improvement of lipids. Compliance problems include adherence to diet and adherence to exercise.  Risk factors for coronary artery disease include dyslipidemia, hypertension, a sedentary lifestyle and post-menopausal.     Vitals:    06/11/20 0929   BP: 128/80   Pulse: 75   Resp: 16   Temp: 97.6 °F (36.4 °C)   SpO2: 96%       The following portions of the patient's history were reviewed and updated as appropriate: allergies, current medications, past family history, past medical history, past social history, past surgical history and problem list.    Review of Systems   Respiratory: Positive for apnea, cough and shortness of breath.         Fatigue snoring gasping-sees pulmonologist for chronic cough-high risk for sleep apnea study advised   Cardiovascular: Negative for chest pain and leg swelling.        Sees cardiologist   Genitourinary: Positive for difficulty urinating.   Musculoskeletal: Positive for arthralgias.       Objective   Physical Exam   Constitutional: She is oriented to person, place, and time.   Cardiovascular: Normal rate and regular rhythm.   Pulmonary/Chest: Effort normal and breath sounds normal.   Musculoskeletal: She exhibits no edema.   Neurological: She is alert and oriented to person, place, and time.   Psychiatric: She has a normal mood and affect. Her behavior is normal. Judgment and thought content normal.   Nursing note and vitals  reviewed.      Patient's Body mass index is 30.18 kg/m². BMI is above normal parameters. Recommendations include: exercise counseling.      Assessment/Plan   Patient Active Problem List   Diagnosis   • GERD (gastroesophageal reflux disease)   • Cough   • Deviated nasal septum   • Allergic rhinitis due to pollen   • Sicca laryngitis   • Mixed hyperlipidemia   • Breast cancer (CMS/HCC)   • Osteoarthritis of cervical spine   • LBBB (left bundle branch block)   • Dyspnea on exertion   • Acute left-sided low back pain without sciatica   • Anginal equivalent (CMS/HCC)     Cindi was seen today for follow-up.    Diagnoses and all orders for this visit:    Mixed hyperlipidemia  -     Comprehensive Metabolic Panel  -     Lipid Panel    Anginal equivalent (CMS/HCC)    Malignant neoplasm of female breast, unspecified estrogen receptor status, unspecified laterality, unspecified site of breast (CMS/HCC)    Apnea  -     Ambulatory Referral to Sleep Medicine       Return if symptoms worsen or fail to improve, for Annual physical.         Plan above plus sleep study-continue seeing pulmonologist and cardiologist  Electronically signed by Jose Wolff MD 06/11/2020

## 2020-06-12 LAB
ALBUMIN SERPL-MCNC: 4.6 G/DL (ref 3.5–5.2)
ALBUMIN/GLOB SERPL: 1.5 G/DL
ALP SERPL-CCNC: 75 U/L (ref 39–117)
ALT SERPL-CCNC: 19 U/L (ref 1–33)
AST SERPL-CCNC: 15 U/L (ref 1–32)
BILIRUB SERPL-MCNC: 0.3 MG/DL (ref 0.2–1.2)
BUN SERPL-MCNC: 15 MG/DL (ref 8–23)
BUN/CREAT SERPL: 19.2 (ref 7–25)
CALCIUM SERPL-MCNC: 10.3 MG/DL (ref 8.6–10.5)
CHLORIDE SERPL-SCNC: 99 MMOL/L (ref 98–107)
CHOLEST SERPL-MCNC: 181 MG/DL (ref 0–200)
CO2 SERPL-SCNC: 28.8 MMOL/L (ref 22–29)
CREAT SERPL-MCNC: 0.78 MG/DL (ref 0.57–1)
GLOBULIN SER CALC-MCNC: 3.1 GM/DL
GLUCOSE SERPL-MCNC: 92 MG/DL (ref 65–99)
HDLC SERPL-MCNC: 49 MG/DL (ref 40–60)
LDLC SERPL CALC-MCNC: 97 MG/DL (ref 0–100)
POTASSIUM SERPL-SCNC: 4.7 MMOL/L (ref 3.5–5.2)
PROT SERPL-MCNC: 7.7 G/DL (ref 6–8.5)
SODIUM SERPL-SCNC: 140 MMOL/L (ref 136–145)
TRIGL SERPL-MCNC: 176 MG/DL (ref 0–150)
VLDLC SERPL CALC-MCNC: 35.2 MG/DL

## 2020-06-18 ENCOUNTER — TELEPHONE (OUTPATIENT)
Dept: SURGERY | Age: 70
End: 2020-06-18

## 2020-06-18 NOTE — TELEPHONE ENCOUNTER
Spoke with pt. She is doing well from her biopsy. We discussed her pathology results. I will plan to see her in November with a left mammogram.  Order placed. Please schedule mammo and follow up appt.

## 2020-06-22 RX ORDER — LOSARTAN POTASSIUM 50 MG/1
TABLET ORAL
Qty: 90 TABLET | Refills: 1 | Status: SHIPPED | OUTPATIENT
Start: 2020-06-22 | End: 2020-12-08 | Stop reason: SDUPTHER

## 2020-09-09 ENCOUNTER — TELEPHONE (OUTPATIENT)
Dept: OBSTETRICS AND GYNECOLOGY | Facility: CLINIC | Age: 70
End: 2020-09-09

## 2020-09-15 DIAGNOSIS — Z90.10 HISTORY OF MASTECTOMY, UNSPECIFIED LATERALITY: Primary | ICD-10-CM

## 2020-09-28 ENCOUNTER — OFFICE VISIT (OUTPATIENT)
Dept: CARDIOLOGY | Facility: CLINIC | Age: 70
End: 2020-09-28

## 2020-09-28 VITALS
SYSTOLIC BLOOD PRESSURE: 100 MMHG | DIASTOLIC BLOOD PRESSURE: 61 MMHG | HEART RATE: 70 BPM | HEIGHT: 62 IN | OXYGEN SATURATION: 94 % | WEIGHT: 161 LBS | BODY MASS INDEX: 29.63 KG/M2

## 2020-09-28 DIAGNOSIS — R06.09 DYSPNEA ON EXERTION: ICD-10-CM

## 2020-09-28 DIAGNOSIS — I44.7 LBBB (LEFT BUNDLE BRANCH BLOCK): Primary | ICD-10-CM

## 2020-09-28 DIAGNOSIS — R07.2 PRECORDIAL CHEST PAIN: ICD-10-CM

## 2020-09-28 DIAGNOSIS — E78.2 MIXED HYPERLIPIDEMIA: ICD-10-CM

## 2020-09-28 DIAGNOSIS — K21.9 GASTROESOPHAGEAL REFLUX DISEASE WITHOUT ESOPHAGITIS: ICD-10-CM

## 2020-09-28 PROCEDURE — 99214 OFFICE O/P EST MOD 30 MIN: CPT | Performed by: INTERNAL MEDICINE

## 2020-09-28 NOTE — PROGRESS NOTES
Reason for Follow-up Visit: Here for routine follow up   Now with palpitations that is lian  left bundle branch block   S/P successful surgery  Cardiac workup test results as below   Brief fast SVT as below   preoperative cardiovascular clearance under general anesthesia for sacral neuromodulator Dr Anibal SULLIVAN     Subjective     Chest pain with exertion, but at times can also occur at rest   moderate substernal,   Feels someone is squeezing her heart  pressure like     Lasts less than 5 minutes  Started 10-12 months ago  Occurs once or twice a week  No associated diaphoresis    No associated nausea  No radiation    Relieved with rest or spontaneously  Not positional    No change with intake of food or antacids  No change with breathing  Mild to moderate associated dyspnea      No associated palpitations  No similar chest pain episodes in the past     Joint pain in small, medium and large joints  Chronic low back pain    Had pulmonary function tests in past   Sees Dr Anderson     Intermittent palpitations, once every several days to several weeks lasting for less than 1 minute  No associated symptoms of dizziness, weakness, chest pain,  shortness of breath    Now better on beta blocker therapy       History of present illness: Cindi Hill is a 66 y.o. yo female with moderate shortness of breath and chest pain who presents today for        Chief Complaint   Patient presents with   • Follow-up       2 week f/u on dar    .     History   Medical History          Past Medical History   Diagnosis Date   • Arthritis     • Breast cancer         1997, right breast mastectomy   • Drug therapy     • GERD (gastroesophageal reflux disease)     • Hypercholesteremia     • KARUNA (stress urinary incontinence, female)     • Urge incontinence     • Uterovaginal prolapse     • Vaginal atrophy        ,    Surgical History          Past Surgical History   Procedure Laterality Date   • Rotator cuff repair Right 2012   • Mastectomy  Bilateral     • Colonoscopy   2014   • Laminectomy   01/2016   • Breast biopsy          ,          Family History   Problem Relation Age of Onset   • Dementia Mother     • Heart failure Mother     • Cancer Father         liver   • Cancer Brother         thyroid   ,        Social History   Substance Use Topics   • Smoking status: Never Smoker   • Smokeless tobacco: Never Used   • Alcohol use No   ,      Medications   Current Medications           Current Outpatient Prescriptions   Medication Sig Dispense Refill   • aspirin 81 MG tablet Take 81 mg by mouth daily.       • benzonatate (TESSALON) 200 MG capsule Take 200 mg by mouth As Needed for cough.       • fluticasone (FLONASE) 50 MCG/ACT nasal spray 2 sprays into each nostril daily. 1 bottle 6   • losartan (COZAAR) 50 MG tablet         • lovastatin (MEVACOR) 10 MG tablet Take 10 mg by mouth every night.       • omeprazole (PriLOSEC) 20 MG capsule Take 20 mg by mouth daily.       • Vitamin D, Cholecalciferol, 1000 UNITS capsule Take 2,000 Units by mouth Daily.       • calcium carbonate (CALCIUM 600) 600 MG tablet Take 600 mg by mouth daily.       • losartan-hydrochlorothiazide (HYZAAR) 50-12.5 MG per tablet Take 1 tablet by mouth daily.          No current facility-administered medications for this visit.             Allergies: Lisinopril; Lortab [hydrocodone-acetaminophen]; and Percocet [oxycodone-acetaminophen]        Review of Systems  Review of Systems   Constitution: Negative.   HENT: Negative.   Eyes: Negative.   Cardiovascular: Positive  dyspnea on exertion. Negative for claudication, cyanosis, irregular heartbeat, leg swelling, near-syncope, orthopnea, palpitations, paroxysmal nocturnal dyspnea and syncope.   Precordial chest pain   Respiratory: Negative.   Endocrine: Negative.   Hematologic/Lymphatic: Negative.   Skin: Negative.   Gastrointestinal: Negative for anorexia.   Genitourinary: Negative.   Neurological: Negative.   Psychiatric/Behavioral: Negative.  "        Objective        Physical Exam:       Visit Vitals   • /78 (BP Location: Left arm, Patient Position: Sitting, Cuff Size: Adult)   • Pulse 89   • Ht 61.5\" (156.2 cm)   • Wt 159 lb (72.1 kg)   • LMP (LMP Unknown)   • SpO2 95%   • BMI 29.56 kg/m2      Physical Exam   Constitutional: She appears well-developed.   HENT:   Head: Normocephalic.   Neck: Normal carotid pulses and no JVD present. No tracheal tenderness present. Carotid bruit is not present. No tracheal deviation and no edema present.   Cardiovascular: Regular rhythm, normal heart sounds and normal pulses.   Pulmonary/Chest: Effort normal. No stridor.   Abdominal: Soft.   Neurological: She is alert. She has normal strength. No cranial nerve deficit or sensory deficit.   Skin: Skin is warm.   Psychiatric: She has a normal mood and affect. Her speech is normal and behavior is normal.         Results Review:    Conclusion  2017  No stenosis of  epicardial coronary arteries with dominant RCA  Left circumflex arises from RCA and normal  Normal LVEF  LVEDP   9    mm Hg     Plan  Hydration   Observation  Workup for non cardiac chest pain  Acceptable cardiovascular risk of bladder surgery            Cindi Hill   Holter Monitor 72Hr-21Day (0296T/0298T)   Order# [id number]   Reading physician: Anuj Johnson MD Ordering physician: Anuj Johnson MD Study date: 19   Patient Information     Patient Name  Cindi Hill MRN  [id number] Sex  Female  (Age)  1950 (68 y.o.)   Interpretation Summary        · Average HR: 89. Min HR: 57. Max HR: 226.     · ~14 day monitor ,entire report was reviewed  · The predominant rhythm noted during the testing period was sinus rhythm.  · Rare supraventricular ectopics with an APC burden of: < 1%  · Rare premature ventricular contractions with a PVC burden of: < 1%  · 15 runs of supraventricular tachycardia longest 20 beats at a maximum rate of 226 bpm.  · No significant  ventricular tachy or florentin " arrhythmia.  · No correlated arrhythmia  · No significant pauses           Conclusion: Baseline rhythm is sinus.  No significant ectopy  15 runs of supraventricular tachycardia longest 20 beats at a maximum rate of 226 bpm          Results for orders placed during the hospital encounter of 02/17/17   Adult Transthoracic Echo Complete    Narrative · Left ventricular function is normal. Estimated EF = 55%.  · Left ventricular diastolic dysfunction (grade I) consistent with   impaired relaxation.  · Estimated right ventricular systolic pressure from tricuspid   regurgitation is normal (<35 mmHg).       2017    Conclusion  No stenosis of  epicardial coronary arteries with dominant RCA  Left circumflex arises from RCA and normal  Normal LVEF  LVEDP   9    mm Hg     Plan  Hydration   Observation  Workup for non cardiac chest pain  Acceptable cardiovascular risk of bladder surgery      Procedures     Assessment/Plan         Patient Active Problem List   Diagnosis   • GERD (gastroesophageal reflux disease)   • Cough   • Deviated nasal septum   • Allergic rhinitis due to pollen   • Sicca laryngitis   • Mixed hyperlipidemia   • Breast cancer   • Osteoarthritis of cervical spine   • LBBB (left bundle branch block)   • Dyspnea on exertion          ____________________________________________________________________________________________________________________________________________  Health maintenance and recommendations  Similar recommendations as last visit     Offered to give patient  a copy      Questions were encouraged, asked and answered to the patient's  understanding and satisfaction. Questions if any regarding current medications and side effects, need for refills and importance of compliance to medications stressed.    Reviewed available prior notes, consults, prior visits, laboratory findings, radiology and cardiology relevant reports. Updated chart as applicable. I have reviewed the patient's medical history in  detail and updated the computerized patient record as relevant.      Updated patient regarding any new or relevant abnormalities on review of records or any new findings on physical exam. Mentioned to patient about purpose of visit and desirable health short and long term goals and objectives.    Primary to monitor CBC CMP Lipid panel and TSH as applicable    ___________________________________________________________________________________________________________________________________________        Plan      Orders Placed This Encounter   Procedures   • CT Angiogram Coronary     Standing Status:   Future     Standing Expiration Date:   9/28/2021        Coronary CT angiography would have the highest predictive value for cardiac events based on calcium scoring as well as high risk negative predictive value if no significant coronary artery disease is identified  This would be the most cost effective test for this patient with intermittent chest pain with multiple risk factors for coronary artery disease  Hopefully his insurance will see the benefit and approved coronary CT angiography     The current medical regimen is effective;  continue present plan and medications.   Continue beta blocker therapy          Return in about 2 months (around 11/28/2020).

## 2020-10-02 ENCOUNTER — HOSPITAL ENCOUNTER (OUTPATIENT)
Dept: CT IMAGING | Facility: HOSPITAL | Age: 70
Discharge: HOME | End: 2020-10-02
Admitting: INTERNAL MEDICINE

## 2020-10-02 VITALS
HEART RATE: 53 BPM | DIASTOLIC BLOOD PRESSURE: 57 MMHG | TEMPERATURE: 97.8 F | HEIGHT: 62 IN | WEIGHT: 160.94 LBS | SYSTOLIC BLOOD PRESSURE: 123 MMHG | OXYGEN SATURATION: 99 % | BODY MASS INDEX: 29.62 KG/M2 | RESPIRATION RATE: 20 BRPM

## 2020-10-02 DIAGNOSIS — R07.2 PRECORDIAL CHEST PAIN: ICD-10-CM

## 2020-10-02 LAB
CREAT SERPL-MCNC: 0.57 MG/DL (ref 0.57–1)
GFR SERPL CREATININE-BSD FRML MDRD: 105 ML/MIN/1.73

## 2020-10-02 PROCEDURE — A9270 NON-COVERED ITEM OR SERVICE: HCPCS

## 2020-10-02 PROCEDURE — 0 IOPAMIDOL PER 1 ML: Performed by: INTERNAL MEDICINE

## 2020-10-02 PROCEDURE — 82565 ASSAY OF CREATININE: CPT | Performed by: INTERNAL MEDICINE

## 2020-10-02 PROCEDURE — 75574 CT ANGIO HRT W/3D IMAGE: CPT | Performed by: INTERNAL MEDICINE

## 2020-10-02 PROCEDURE — 75574 CT ANGIO HRT W/3D IMAGE: CPT

## 2020-10-02 PROCEDURE — 63710000001 NITROGLYCERIN 0.4 MG SUBLINGUAL TABLET 25 EACH BOTTLE

## 2020-10-02 RX ORDER — METOPROLOL TARTRATE 100 MG/1
100 TABLET ORAL ONCE AS NEEDED
Status: CANCELLED | OUTPATIENT
Start: 2020-10-02

## 2020-10-02 RX ORDER — SODIUM CHLORIDE 0.9 % (FLUSH) 0.9 %
3 SYRINGE (ML) INJECTION EVERY 12 HOURS SCHEDULED
Status: CANCELLED | OUTPATIENT
Start: 2020-10-02

## 2020-10-02 RX ORDER — METOPROLOL TARTRATE 50 MG/1
50 TABLET, FILM COATED ORAL ONCE AS NEEDED
Status: CANCELLED | OUTPATIENT
Start: 2020-10-02

## 2020-10-02 RX ORDER — SODIUM CHLORIDE 0.9 % (FLUSH) 0.9 %
10 SYRINGE (ML) INJECTION AS NEEDED
Status: CANCELLED | OUTPATIENT
Start: 2020-10-02

## 2020-10-02 RX ORDER — LIDOCAINE HYDROCHLORIDE 10 MG/ML
5 INJECTION, SOLUTION EPIDURAL; INFILTRATION; INTRACAUDAL; PERINEURAL AS NEEDED
Status: CANCELLED | OUTPATIENT
Start: 2020-10-02

## 2020-10-02 RX ORDER — NITROGLYCERIN 0.4 MG/1
0.4 TABLET SUBLINGUAL
Status: CANCELLED | OUTPATIENT
Start: 2020-10-02 | End: 2020-10-02

## 2020-10-02 RX ORDER — NITROGLYCERIN 0.4 MG/1
TABLET SUBLINGUAL
Status: COMPLETED
Start: 2020-10-02 | End: 2020-10-02

## 2020-10-02 RX ADMIN — NITROGLYCERIN 0.4 MG: 0.4 TABLET SUBLINGUAL at 13:50

## 2020-10-02 RX ADMIN — IOPAMIDOL 75 ML: 755 INJECTION, SOLUTION INTRAVENOUS at 14:00

## 2020-10-05 ENCOUNTER — CLINICAL SUPPORT (OUTPATIENT)
Dept: FAMILY MEDICINE CLINIC | Facility: CLINIC | Age: 70
End: 2020-10-05

## 2020-10-05 DIAGNOSIS — Z01.812 PRE-OPERATIVE LABORATORY EXAMINATION: Primary | ICD-10-CM

## 2020-10-05 NOTE — PROGRESS NOTES
Patient presented for pre op labs, no order just slip from [facility].  Fax results to [phone]

## 2020-10-06 DIAGNOSIS — R07.2 PRECORDIAL CHEST PAIN: Primary | ICD-10-CM

## 2020-10-06 LAB
BUN SERPL-MCNC: 13 MG/DL (ref 8–23)
BUN/CREAT SERPL: 22 (ref 7–25)
CALCIUM SERPL-MCNC: 9.6 MG/DL (ref 8.6–10.5)
CHLORIDE SERPL-SCNC: 102 MMOL/L (ref 98–107)
CO2 SERPL-SCNC: 28.6 MMOL/L (ref 22–29)
CREAT SERPL-MCNC: 0.59 MG/DL (ref 0.57–1)
GLUCOSE SERPL-MCNC: 90 MG/DL (ref 65–99)
POTASSIUM SERPL-SCNC: 4.5 MMOL/L (ref 3.5–5.2)
SODIUM SERPL-SCNC: 141 MMOL/L (ref 136–145)

## 2020-10-08 ENCOUNTER — TELEPHONE (OUTPATIENT)
Dept: CARDIOLOGY | Facility: CLINIC | Age: 70
End: 2020-10-08

## 2020-10-08 NOTE — TELEPHONE ENCOUNTER
----- Message from Anuj Johnson MD sent at 10/6/2020  6:12 PM CDT -----  Regarding: Repeat coronary CT angiography at no charge to the patient  If you could let the patient know that if she would repeat the CT angiography as there is poor opacification of the coronary arteries  No need to repeat the coronary calcium score

## 2020-10-13 ENCOUNTER — TELEPHONE (OUTPATIENT)
Dept: CARDIOLOGY | Facility: CLINIC | Age: 70
End: 2020-10-13

## 2020-10-13 ENCOUNTER — HOSPITAL ENCOUNTER (OUTPATIENT)
Dept: CT IMAGING | Facility: HOSPITAL | Age: 70
Discharge: HOME | End: 2020-10-13
Admitting: INTERNAL MEDICINE

## 2020-10-13 VITALS
RESPIRATION RATE: 18 BRPM | DIASTOLIC BLOOD PRESSURE: 58 MMHG | HEIGHT: 62 IN | BODY MASS INDEX: 28.89 KG/M2 | HEART RATE: 53 BPM | SYSTOLIC BLOOD PRESSURE: 116 MMHG | WEIGHT: 157 LBS | TEMPERATURE: 98.3 F | OXYGEN SATURATION: 99 %

## 2020-10-13 DIAGNOSIS — R07.2 PRECORDIAL CHEST PAIN: ICD-10-CM

## 2020-10-13 LAB
CREAT SERPL-MCNC: 0.58 MG/DL (ref 0.57–1)
GFR SERPL CREATININE-BSD FRML MDRD: 103 ML/MIN/1.73

## 2020-10-13 PROCEDURE — 0 IOPAMIDOL PER 1 ML: Performed by: INTERNAL MEDICINE

## 2020-10-13 PROCEDURE — 75574 CT ANGIO HRT W/3D IMAGE: CPT

## 2020-10-13 PROCEDURE — A9270 NON-COVERED ITEM OR SERVICE: HCPCS

## 2020-10-13 PROCEDURE — 82565 ASSAY OF CREATININE: CPT | Performed by: INTERNAL MEDICINE

## 2020-10-13 PROCEDURE — 63710000001 NITROGLYCERIN 0.4 MG SUBLINGUAL TABLET 25 EACH BOTTLE

## 2020-10-13 PROCEDURE — 75574 CT ANGIO HRT W/3D IMAGE: CPT | Performed by: INTERNAL MEDICINE

## 2020-10-13 RX ORDER — METOPROLOL TARTRATE 100 MG/1
100 TABLET ORAL ONCE AS NEEDED
Status: DISCONTINUED | OUTPATIENT
Start: 2020-10-13 | End: 2020-10-14 | Stop reason: HOSPADM

## 2020-10-13 RX ORDER — METOPROLOL TARTRATE 50 MG/1
50 TABLET, FILM COATED ORAL ONCE AS NEEDED
Status: DISCONTINUED | OUTPATIENT
Start: 2020-10-13 | End: 2020-10-14 | Stop reason: HOSPADM

## 2020-10-13 RX ORDER — SODIUM CHLORIDE 0.9 % (FLUSH) 0.9 %
10 SYRINGE (ML) INJECTION AS NEEDED
Status: DISCONTINUED | OUTPATIENT
Start: 2020-10-13 | End: 2020-10-14 | Stop reason: HOSPADM

## 2020-10-13 RX ORDER — NITROGLYCERIN 0.4 MG/1
0.4 TABLET SUBLINGUAL
Status: COMPLETED | OUTPATIENT
Start: 2020-10-13 | End: 2020-10-13

## 2020-10-13 RX ORDER — NITROGLYCERIN 0.4 MG/1
TABLET SUBLINGUAL
Status: COMPLETED
Start: 2020-10-13 | End: 2020-10-13

## 2020-10-13 RX ORDER — SODIUM CHLORIDE 0.9 % (FLUSH) 0.9 %
3 SYRINGE (ML) INJECTION EVERY 12 HOURS SCHEDULED
Status: DISCONTINUED | OUTPATIENT
Start: 2020-10-13 | End: 2020-10-14 | Stop reason: HOSPADM

## 2020-10-13 RX ORDER — LIDOCAINE HYDROCHLORIDE 10 MG/ML
5 INJECTION, SOLUTION EPIDURAL; INFILTRATION; INTRACAUDAL; PERINEURAL AS NEEDED
Status: DISCONTINUED | OUTPATIENT
Start: 2020-10-13 | End: 2020-10-14 | Stop reason: HOSPADM

## 2020-10-13 RX ADMIN — NITROGLYCERIN 0.4 MG: 0.4 TABLET SUBLINGUAL at 14:23

## 2020-10-13 RX ADMIN — IOPAMIDOL 100 ML: 755 INJECTION, SOLUTION INTRAVENOUS at 14:32

## 2020-10-13 NOTE — TELEPHONE ENCOUNTER
[facility]  IS REQUESTING CLEARANCE FOR [employer] BLADDER NEUROSTIMULATOR . THIS IS NOT YET SCHEDULED.     PT HAS LBBB .     ON ASA 81 MG  - LOV WITH YOU WAS 09/28/20     PLEASE ADVISE

## 2020-11-03 RX ORDER — ATENOLOL 25 MG/1
TABLET ORAL
Qty: 90 TABLET | Refills: 3 | Status: SHIPPED | OUTPATIENT
Start: 2020-11-03 | End: 2021-11-05 | Stop reason: SDUPTHER

## 2020-11-23 ENCOUNTER — OFFICE VISIT (OUTPATIENT)
Dept: SURGERY | Age: 70
End: 2020-11-23
Payer: MEDICARE

## 2020-11-23 ENCOUNTER — HOSPITAL ENCOUNTER (OUTPATIENT)
Dept: WOMENS IMAGING | Age: 70
Discharge: HOME | End: 2020-11-23
Payer: MEDICARE

## 2020-11-23 VITALS
BODY MASS INDEX: 27.42 KG/M2 | HEART RATE: 59 BPM | HEIGHT: 62 IN | TEMPERATURE: 97.7 F | WEIGHT: 149 LBS | DIASTOLIC BLOOD PRESSURE: 73 MMHG | SYSTOLIC BLOOD PRESSURE: 125 MMHG

## 2020-11-23 PROCEDURE — G8484 FLU IMMUNIZE NO ADMIN: HCPCS | Performed by: PHYSICIAN ASSISTANT

## 2020-11-23 PROCEDURE — 4040F PNEUMOC VAC/ADMIN/RCVD: CPT | Performed by: PHYSICIAN ASSISTANT

## 2020-11-23 PROCEDURE — 1090F PRES/ABSN URINE INCON ASSESS: CPT | Performed by: PHYSICIAN ASSISTANT

## 2020-11-23 PROCEDURE — 1123F ACP DISCUSS/DSCN MKR DOCD: CPT | Performed by: PHYSICIAN ASSISTANT

## 2020-11-23 PROCEDURE — G8400 PT W/DXA NO RESULTS DOC: HCPCS | Performed by: PHYSICIAN ASSISTANT

## 2020-11-23 PROCEDURE — 3017F COLORECTAL CA SCREEN DOC REV: CPT | Performed by: PHYSICIAN ASSISTANT

## 2020-11-23 PROCEDURE — G0279 TOMOSYNTHESIS, MAMMO: HCPCS

## 2020-11-23 PROCEDURE — G8417 CALC BMI ABV UP PARAM F/U: HCPCS | Performed by: PHYSICIAN ASSISTANT

## 2020-11-23 PROCEDURE — 1036F TOBACCO NON-USER: CPT | Performed by: PHYSICIAN ASSISTANT

## 2020-11-23 PROCEDURE — G8427 DOCREV CUR MEDS BY ELIG CLIN: HCPCS | Performed by: PHYSICIAN ASSISTANT

## 2020-11-23 PROCEDURE — 99213 OFFICE O/P EST LOW 20 MIN: CPT | Performed by: PHYSICIAN ASSISTANT

## 2020-11-23 NOTE — PROGRESS NOTES
HPI:  Qian Adan is in for 6 month follow-up breast check. She has not noticed any changes in her breast.  She has a history of right breast cancer treated with a modified radical mastectomy when she was 55. Dr. Corey Gupta was her surgeon then. She would also like to discuss prophylactic left mastectomy. She states she has regretted not having both breasts removed. EXAMINATION:  [facility] DIGITAL DIAGNOSTIC UNILATERAL LEFT  11/23/2020 11:02    AM    HISTORY: Benign left breast biopsy    COMPARISON: Previous mammograms dated 9/4/2020 and 11/18/2019 and    11/15/2017    TECHNIQUE:    2-D and 3-D left breast mammograms were obtained. The mammograms are evaluated using computer-aided detection. FINDINGS:    Localization clip in the lateral superior left breast is again    identified. The biopsies appearance essentially unchanged. There are no dominant masses, suspicious calcifications. There are no    other areas of architectural distortion or developing densities. No malignant features observed. Bilateral 6 month follow-up recommended.         Impression    1. Changes from benign left breast biopsy with localization clip. 2. No malignant features. 3. Bilateral 6 month follow-up recommended. 4. ACR category B, benign findings. BREAST EXAM:  On examination, she has fibrocystic changes throughout her remaining left breast.  There are no dominant masses, no skin or nipple changes and no axillary adenopathy bilaterally. I see nothing suspicious for breast cancer. ASSESSMENT:   Personal history of breast cancer             PLAN:  I will plan to see her back in 6 months for physical exam and left mammogram.  We discussed mastectomy and reconstruction at length. I will refer her to [facility] to discuss her options. I told her to call when she is ready to discuss mastectomy with Dr. Corey Gupta and we will get her scheduled.   She will contact me if anything significant changes. 15 minutes spent, which includes face to face with patient, record review, evaluation, planning, and education. I spent over 50% of this visit counseling patient.

## 2020-11-25 PROBLEM — Z90.11 H/O RIGHT MASTECTOMY: Status: ACTIVE | Noted: 2020-11-25

## 2020-11-25 NOTE — PROGRESS NOTES
Subjective:     Encounter Date:11/30/2020      Patient ID: Cindi Hill is a 69 y.o. female   HPI: This patient presents today for routine follow-up of outpatient testing.  CT angiogram of coronary arteries on 10/13/2020 revealed coronary calcium score of 51 with mild focal calcification of the proximal left anterior descending coronary artery without evidence of any obstructive coronary artery disease.  She has a history of breast cancer status post right mastectomy, hypercholesterolemia and GERD.  She reports occasional episodes of fleeting type substernal chest pressure, palpitations and shortness of breath. Patient denies dizziness, syncope, orthopnea, PND, edema or decreased stamina.  Patient denies any signs of bleeding.    Chief Complaint: Routine follow-up  Hyperlipidemia  This is a chronic problem. The current episode started more than 1 year ago. Associated symptoms include chest pain and shortness of breath. Current antihyperlipidemic treatment includes statins. Risk factors for coronary artery disease include dyslipidemia.       Previous Cardiac Testing:  Results for orders placed during the hospital encounter of 02/17/17   Adult Transthoracic Echo Complete    Narrative · Left ventricular function is normal. Estimated EF = 55%.  · Left ventricular diastolic dysfunction (grade I) consistent with   impaired relaxation.  · Estimated right ventricular systolic pressure from tricuspid   regurgitation is normal (<35 mmHg).        Results for orders placed during the hospital encounter of 02/28/17   Cardiac Catheterization/Vascular Study    Narrative Cardiac Catheterization Operative Report    Cindi Hill  [id number]  2/28/2017    Patient was referred for cardiac catheterization . Indications for the   procedure include:  recurrent chest pain, shortness of breath. Abnormal   nuclear stress test with  moderate anterior ischemia      Procedure performed  Left heart cath  Coronary angiography  Right femoral  arteriography  Insertion of 6 Fr Mynx hemostatic closure device with effective hemostasis   and preserved right lower extremity pulses      Procedure Details  The risks, benefits, complications, treatment options, and expected   outcomes were discussed with the patient. The patient and/or family   concurred with the proposed plan, giving informed consent. Patient was   brought to the cath lab after IV hydration was begun and oral   premedication was given. He was further sedated with fentanyl and   midazolam. He was prepped and draped in the usual manner. Using the   modified Seldinger access technique, a 6f [place]ian sheath was placed in the   femoral artery.   A left heart catheterization was done. Angiograms were also done.    Cardiac Catheterization Operative Report      Patient was referred for cardiac catheterization . Indications for the   procedure include: abnormal stress test, chest pain, shortness of breath.     Procedure Details  The risks, benefits, complications, treatment options, and expected   outcomes were discussed with the patient. The patient and/or family   concurred with the proposed plan, giving informed consent. Patient was   brought to the cath lab after IV hydration was begun and oral   premedication was given.     The skin overlying the patient's right femoral artery was prepped and   draped in the usual sterile fashion.  Timeout was taken to confirm the   correct patient and procedure.  Lidocaine was administered for local   anesthesia.  IV Versed and fentanyl were used to achieve conscious   sedation.  Modified Seldinger technique was then used to place a 5 [place]ian   sheath in the right femoral artery    Diagnostic coronary angiography was performed with 5 [place]ian JL4 and 3DRC   coronary catheters.  Coronary angiogram were performed in ELIF and POWER   projection to evaluate the coronary arterial systems.  A left heart   catheterization was done.  After all coronary angiograms and LV gram and    Left heart pressure measurements were obtained, a femoral angiogram was   performed and the arteriotomy was suitable for a closure device.  A 6Fr   Mynx closure device was used to achieve hemostasis.  The patient tolerated   the procedure well, and there were no immediate complications.    Procedural Details: After written and informed consent was obtained, the   patient was brought to the cath lab in a fasting state.  Results:    1. Selective coronary angiography:    Left main coronary artery:  The left main coronary artery arises from the   left coronary cusp and continues as the LAD     left circumflex artery arises from RCA    Left main coronary artery is normal    Left anterior descending artery:  The LAD arises normally from the left   main coronary artery and courses in the anterior interventricular groove   and terminates at the apex. No stenosis noted    Left circumflex:  The left circumflex arises form the right coronary   artery and  is normal.    Right coronary artery:  The RCA arises normally from the right coronary   cusp and is dominant for the posterior circulation.  The RCA is dominant   and normal.    2. Left heart cath: LVEDP    9    mm Hg with no gradient across aortic   valve on pullback.    3. LV Gram in Normal LVEF 55% with no significant mitral regurgitation.    4. Interventions: None    Estimated Blood Loss:  Minimal         Complications:  None; patient tolerated the procedure well.           Disposition: COU            Condition: stable        Conclusion  No stenosis of  epicardial coronary arteries with dominant RCA  Left circumflex arises from RCA and normal  Normal LVEF  LVEDP   9    mm Hg    Plan  Hydration   Observation  Workup for non cardiac chest pain  Acceptable cardiovascular risk of bladder surgery             The following portions of the patient's history were reviewed and updated as appropriate: allergies, current medications, past family history, past medical history, past  social history, past surgical history and problem list.    Allergies   Allergen Reactions   • Lisinopril Cough   • Lortab [Hydrocodone-Acetaminophen] Nausea Only   • Percocet [Oxycodone-Acetaminophen] Nausea Only       Current Outpatient Medications:   •  aspirin 81 MG tablet, Take 81 mg by mouth daily., Disp: , Rfl:   •  atenolol (TENORMIN) 25 MG tablet, TAKE ONE TABLET BY MOUTH EVERY DAY, Disp: 90 tablet, Rfl: 3  •  benzonatate (TESSALON) 200 MG capsule, Take 200 mg by mouth 3 (Three) Times a Day As Needed for Cough., Disp: , Rfl:   •  calcium carbonate (CALCIUM 600) 600 MG tablet, Take 600 mg by mouth daily., Disp: , Rfl:   •  cetirizine (zyrTEC) 10 MG tablet, Take 10 mg by mouth Daily., Disp: , Rfl:   •  Cholecalciferol 4000 units capsule, Take 5,000 Units by mouth Daily., Disp: , Rfl:   •  fluticasone (FLONASE) 50 MCG/ACT nasal spray, 2 sprays into each nostril daily. (Patient taking differently: 2 sprays into the nostril(s) as directed by provider As Needed.), Disp: 1 bottle, Rfl: 6  •  losartan (COZAAR) 50 MG tablet, TAKE ONE TABLET BY MOUTH EVERY DAY IN THE MORNING, Disp: 90 tablet, Rfl: 1  •  lovastatin (MEVACOR) 10 MG tablet, TAKE ONE TABLET BY MOUTH EVERY DAY (Patient taking differently: Every Night.), Disp: 90 tablet, Rfl: 3  •  PREMARIN 0.625 MG/GM vaginal cream, , Disp: , Rfl:   Past Medical History:   Diagnosis Date   • Acute left-sided low back pain without sciatica 10/24/2017   • Arthritis    • Breast cancer (CMS/HCC)     1997, right breast mastectomy   • Drug therapy 1997   • GERD (gastroesophageal reflux disease)    • Hypercholesteremia    • KARUNA (stress urinary incontinence, female)    • Urge incontinence    • Uterovaginal prolapse    • Vaginal atrophy      Past Surgical History:   Procedure Laterality Date   • BLADDER SLING MODIFIED, ANTERIOR AND POSTERIOR VAGINAL REPAIR     • BRONCHOSCOPY N/A 4/26/2017    Procedure: BRONCHOSCOPY BIOPSY WITH ANESTHESIA;  Surgeon: Adi Anderson MD;   Location:  [facility];  Service:    • CARDIAC CATHETERIZATION Left 2/28/2017    Procedure: Cardiac Catheterization/Vascular Study;  Surgeon: Anuj Johnson MD;  Location: [facility] CATH INVASIVE LOCATION;  Service:    • COLONOSCOPY  2014   • HYSTERECTOMY     • HYSTERECTOMY     • LAMINECTOMY  01/2016   • MASTECTOMY Right 1997   • ROTATOR CUFF REPAIR Right 2012     Family History   Problem Relation Age of Onset   • Dementia Mother    • Heart failure Mother    • Cancer Father         liver   • Cancer Brother         thyroid   • No Known Problems Maternal Grandmother    • Diabetes Maternal Grandfather    • No Known Problems Paternal Grandmother    • Cancer Paternal Grandfather    • Breast cancer Neg Hx      Social History     Socioeconomic History   • Marital status:      Spouse name: Not on file   • Number of children: Not on file   • Years of education: Not on file   • Highest education level: Not on file   Tobacco Use   • Smoking status: Never Smoker   • Smokeless tobacco: Never Used   Substance and Sexual Activity   • Alcohol use: No   • Drug use: No   • Sexual activity: Defer       Review of Systems   Constitution: Negative. Negative for chills, decreased appetite, fever, malaise/fatigue, night sweats, weight gain and weight loss.   HENT: Negative.  Negative for nosebleeds.    Eyes: Negative for visual disturbance.   Cardiovascular: Positive for chest pain and palpitations. Negative for dyspnea on exertion, leg swelling, near-syncope, orthopnea, paroxysmal nocturnal dyspnea and syncope.   Respiratory: Positive for shortness of breath. Negative for cough, hemoptysis, snoring and wheezing.    Endocrine: Negative for cold intolerance and heat intolerance.   Hematologic/Lymphatic: Does not bruise/bleed easily.   Skin: Negative for rash.   Musculoskeletal: Negative for back pain and falls.   Gastrointestinal: Negative.  Negative for abdominal pain, change in bowel habit, constipation, diarrhea, dysphagia,  heartburn, nausea and vomiting.   Genitourinary: Negative.  Negative for hematuria.   Neurological: Negative for dizziness, headaches, light-headedness and weakness.   Psychiatric/Behavioral: Negative for altered mental status.   Allergic/Immunologic: Negative for persistent infections.              Objective:     Vitals signs and nursing note reviewed.   Constitutional:       General: Not in acute distress.     Appearance: Normal and healthy appearance. Well-developed, normal weight and not in distress. Not diaphoretic.   Eyes:      General: Lids are normal.         Right eye: No discharge.         Left eye: No discharge.      Conjunctiva/sclera: Conjunctivae normal.      Pupils: Pupils are equal, round, and reactive to light.   HENT:      Head: Normocephalic and atraumatic.      Jaw: There is normal jaw occlusion.      Right Ear: External ear normal.      Left Ear: External ear normal.      Nose: Nose normal.   Neck:      Musculoskeletal: Normal range of motion and neck supple.      Thyroid: No thyromegaly.      Vascular: No carotid bruit, JVD or JVR. JVD normal.      Trachea: Trachea normal. No tracheal deviation.   Pulmonary:      Effort: Pulmonary effort is normal. No respiratory distress.      Breath sounds: Normal breath sounds. No decreased breath sounds. No wheezing. No rhonchi. No rales.   Chest:      Chest wall: Not tender to palpatation.   Cardiovascular:      PMI at left midclavicular line. Normal rate. Regular rhythm. Normal S1. Normal S2.      Murmurs: There is no murmur.      No gallop. No click. No rub.   Pulses:     Intact distal pulses. No decreased pulses.   Edema:     Peripheral edema absent.   Abdominal:      General: Bowel sounds are normal. There is no distension.      Palpations: Abdomen is soft.      Tenderness: There is no abdominal tenderness.   Musculoskeletal: Normal range of motion.         General: No tenderness or deformity.   Skin:     General: Skin is warm and dry.      Coloration:  "Skin is not pale.      Findings: No erythema or rash.   Neurological:      General: No focal deficit present.      Mental Status: Alert, oriented to person, place, and time and oriented to person, place and time.   Psychiatric:         Attention and Perception: Attention and perception normal.         Mood and Affect: Mood and affect normal.         Speech: Speech normal.         Behavior: Behavior normal.         Thought Content: Thought content normal.         Cognition and Memory: Cognition and memory normal.         Judgment: Judgment normal.           Procedures  /66   Pulse 56   Ht 154.9 cm (61\")   Wt 67.1 kg (148 lb)   LMP  (LMP Unknown)   SpO2 98%   BMI 27.96 kg/m²   Lab Review:   Lab Results   Component Value Date    WBC 5.40 12/04/2019    HGB 13.4 12/04/2019    HCT 41.8 12/04/2019    MCV 89.1 12/04/2019     12/04/2019     Lab Results   Component Value Date    GLUCOSE 73 02/17/2017    BUN 13 10/05/2020    CREATININE 0.58 10/13/2020    EGFRIFNONA 103 10/13/2020    EGFRIFAFRI 122 10/05/2020    BCR 22.0 10/05/2020    K 4.5 10/05/2020    CO2 28.6 10/05/2020    CALCIUM 9.6 10/05/2020    PROTENTOTREF 7.7 06/11/2020    ALBUMIN 4.60 06/11/2020    LABIL2 1.5 06/11/2020    AST 15 06/11/2020    ALT 19 06/11/2020     Lab Results   Component Value Date    CHLPL 181 06/11/2020    CHLPL 178 12/04/2019    CHLPL 191 05/24/2019     Lab Results   Component Value Date    TRIG 176 (H) 06/11/2020    TRIG 124 12/04/2019    TRIG 85 05/24/2019     Lab Results   Component Value Date    HDL 49 06/11/2020    HDL 54 12/04/2019    HDL 62 (H) 05/24/2019     Lab Results   Component Value Date    LDL 97 06/11/2020    LDL 99 12/04/2019     (H) 05/24/2019       I have reviewed the most recent lab results.       Assessment:          Diagnosis Plan   1. Mixed hyperlipidemia  Managed by PCP. On statin. Well controlled.    2. LBBB (left bundle branch block)  Chronic.    3. Dyspnea on exertion  Chronic. Stable.    4. " Gastroesophageal reflux disease without esophagitis  Now improved.    5. Malignant neoplasm of right female breast, unspecified estrogen receptor status, unspecified site of breast (CMS/HCC)  In remission.    6. H/O right mastectomy            Plan:         1. Continue medications as previously prescribed.  2. Report any worsening symptoms.  3. Report any signs of bleeding.  4. Continue heart healthy diet and regular exercise as tolerated.   5. Follow up with PCP for blood pressure and cholesterol management and routine lab work.  6. Follow up with Dr. Johnson in six months, or sooner if needed.

## 2020-11-30 ENCOUNTER — OFFICE VISIT (OUTPATIENT)
Dept: CARDIOLOGY | Facility: CLINIC | Age: 70
End: 2020-11-30

## 2020-11-30 VITALS
SYSTOLIC BLOOD PRESSURE: 108 MMHG | OXYGEN SATURATION: 98 % | WEIGHT: 148 LBS | BODY MASS INDEX: 27.94 KG/M2 | HEART RATE: 56 BPM | DIASTOLIC BLOOD PRESSURE: 66 MMHG | HEIGHT: 61 IN

## 2020-11-30 DIAGNOSIS — K21.9 GASTROESOPHAGEAL REFLUX DISEASE WITHOUT ESOPHAGITIS: ICD-10-CM

## 2020-11-30 DIAGNOSIS — I44.7 LBBB (LEFT BUNDLE BRANCH BLOCK): ICD-10-CM

## 2020-11-30 DIAGNOSIS — R06.09 DYSPNEA ON EXERTION: ICD-10-CM

## 2020-11-30 DIAGNOSIS — C50.911 MALIGNANT NEOPLASM OF RIGHT FEMALE BREAST, UNSPECIFIED ESTROGEN RECEPTOR STATUS, UNSPECIFIED SITE OF BREAST (HCC): ICD-10-CM

## 2020-11-30 DIAGNOSIS — Z90.11 H/O RIGHT MASTECTOMY: ICD-10-CM

## 2020-11-30 DIAGNOSIS — E78.2 MIXED HYPERLIPIDEMIA: Primary | ICD-10-CM

## 2020-11-30 PROCEDURE — 99213 OFFICE O/P EST LOW 20 MIN: CPT | Performed by: NURSE PRACTITIONER

## 2020-11-30 RX ORDER — BENZONATATE 200 MG/1
200 CAPSULE ORAL 3 TIMES DAILY PRN
COMMUNITY
End: 2020-12-07

## 2020-12-07 ENCOUNTER — OFFICE VISIT (OUTPATIENT)
Dept: FAMILY MEDICINE CLINIC | Facility: CLINIC | Age: 70
End: 2020-12-07

## 2020-12-07 VITALS
WEIGHT: 146 LBS | TEMPERATURE: 96.8 F | SYSTOLIC BLOOD PRESSURE: 122 MMHG | BODY MASS INDEX: 27.56 KG/M2 | DIASTOLIC BLOOD PRESSURE: 82 MMHG | HEART RATE: 61 BPM | HEIGHT: 61 IN | OXYGEN SATURATION: 98 % | RESPIRATION RATE: 16 BRPM

## 2020-12-07 DIAGNOSIS — I10 HYPERTENSION, UNSPECIFIED TYPE: ICD-10-CM

## 2020-12-07 DIAGNOSIS — Z00.00 ANNUAL PHYSICAL EXAM: ICD-10-CM

## 2020-12-07 DIAGNOSIS — R53.83 FATIGUE, UNSPECIFIED TYPE: ICD-10-CM

## 2020-12-07 DIAGNOSIS — E78.2 MIXED HYPERLIPIDEMIA: Primary | ICD-10-CM

## 2020-12-07 DIAGNOSIS — Z23 NEED FOR IMMUNIZATION AGAINST INFLUENZA: ICD-10-CM

## 2020-12-07 LAB
BILIRUB BLD-MCNC: NEGATIVE MG/DL
CLARITY, POC: CLEAR
COLOR UR: YELLOW
GLUCOSE UR STRIP-MCNC: NEGATIVE MG/DL
KETONES UR QL: NEGATIVE
LEUKOCYTE EST, POC: ABNORMAL
NITRITE UR-MCNC: NEGATIVE MG/ML
PH UR: 8 [PH] (ref 5–8)
PROT UR STRIP-MCNC: NEGATIVE MG/DL
RBC # UR STRIP: ABNORMAL /UL
SP GR UR: 1.02 (ref 1–1.03)
UROBILINOGEN UR QL: NORMAL

## 2020-12-07 PROCEDURE — 81003 URINALYSIS AUTO W/O SCOPE: CPT | Performed by: FAMILY MEDICINE

## 2020-12-07 PROCEDURE — 1159F MED LIST DOCD IN RCRD: CPT | Performed by: FAMILY MEDICINE

## 2020-12-07 PROCEDURE — G0439 PPPS, SUBSEQ VISIT: HCPCS | Performed by: FAMILY MEDICINE

## 2020-12-07 PROCEDURE — 1170F FXNL STATUS ASSESSED: CPT | Performed by: FAMILY MEDICINE

## 2020-12-07 NOTE — PATIENT INSTRUCTIONS
Exercising to Stay Healthy  To become healthy and stay healthy, it is recommended that you do moderate-intensity and vigorous-intensity exercise. You can tell that you are exercising at a moderate intensity if your heart starts beating faster and you start breathing faster but can still hold a conversation. You can tell that you are exercising at a vigorous intensity if you are breathing much harder and faster and cannot hold a conversation while exercising.  Exercising regularly is important. It has many health benefits, such as:  · Improving overall fitness, flexibility, and endurance.  · Increasing bone density.  · Helping with weight control.  · Decreasing body fat.  · Increasing muscle strength.  · Reducing stress and tension.  · Improving overall health.  How often should I exercise?  Choose an activity that you enjoy, and set realistic goals. Your health care provider can help you make an activity plan that works for you.  Exercise regularly as told by your health care provider. This may include:  · Doing strength training two times a week, such as:  ? Lifting weights.  ? Using resistance bands.  ? Push-ups.  ? Sit-ups.  ? Yoga.  · Doing a certain intensity of exercise for a given amount of time. Choose from these options:  ? A total of 150 minutes of moderate-intensity exercise every week.  ? A total of 75 minutes of vigorous-intensity exercise every week.  ? A mix of moderate-intensity and vigorous-intensity exercise every week.  Children, pregnant women, people who have not exercised regularly, people who are overweight, and older adults may need to talk with a health care provider about what activities are safe to do. If you have a medical condition, be sure to talk with your health care provider before you start a new exercise program.  What are some exercise ideas?  Moderate-intensity exercise ideas include:  · Walking 1 mile (1.6 km) in about 15  minutes.  · Biking.  · Hiking.  · Golfing.  · Dancing.  · Water aerobics.  Vigorous-intensity exercise ideas include:  · Walking 4.5 miles (7.2 km) or more in about 1 hour.  · Jogging or running 5 miles (8 km) in about 1 hour.  · Biking 10 miles (16.1 km) or more in about 1 hour.  · Lap swimming.  · Roller-skating or in-line skating.  · Cross-country skiing.  · Vigorous competitive sports, such as football, basketball, and soccer.  · Jumping rope.  · Aerobic dancing.  What are some everyday activities that can help me to get exercise?  · Yard work, such as:  ? Pushing a .  ? Raking and bagging leaves.  · Washing your car.  · Pushing a stroller.  · Shoveling snow.  · Gardening.  · Washing windows or floors.  How can I be more active in my day-to-day activities?  · Use stairs instead of an elevator.  · Take a walk during your lunch break.  · If you drive, park your car farther away from your work or school.  · If you take public transportation, get off one stop early and walk the rest of the way.  · Stand up or walk around during all of your indoor phone calls.  · Get up, stretch, and walk around every 30 minutes throughout the day.  · Enjoy exercise with a friend. Support to continue exercising will help you keep a regular routine of activity.  What guidelines can I follow while exercising?  · Before you start a new exercise program, talk with your health care provider.  · Do not exercise so much that you hurt yourself, feel dizzy, or get very short of breath.  · Wear comfortable clothes and wear shoes with good support.  · Drink plenty of water while you exercise to prevent dehydration or heat stroke.  · Work out until your breathing and your heartbeat get faster.  Where to find more information  · [place] Department of Health and Human Services: www.hhs.gov  · Centers for Disease Control and Prevention (CDC): www.cdc.gov  Summary  · Exercising regularly is important. It will improve your overall fitness,  flexibility, and endurance.  · Regular exercise also will improve your overall health. It can help you control your weight, reduce stress, and improve your bone density.  · Do not exercise so much that you hurt yourself, feel dizzy, or get very short of breath.  · Before you start a new exercise program, talk with your health care provider.  This information is not intended to replace advice given to you by your health care provider. Make sure you discuss any questions you have with your health care provider.  Document Revised: 11/30/2018 Document Reviewed: 11/08/2018  [employer]vier Patient Education © 2020 [employer]      Fall Prevention in the Home, Adult  Falls can cause injuries and can affect people from all age groups. There are many simple things that you can do to make your home safe and to help prevent falls. Ask for help when making these changes, if needed.  What actions can I take to prevent falls?  General instructions  · Use good lighting in all rooms. Replace any light bulbs that burn out.  · Turn on lights if it is dark. Use night-lights.  · Place frequently used items in easy-to-reach places. Lower the shelves around your home if necessary.  · Set up furniture so that there are clear paths around it. Avoid moving your furniture around.  · Remove throw rugs and other tripping hazards from the floor.  · Avoid walking on wet floors.  · Fix any uneven floor surfaces.  · Add color or contrast paint or tape to grab bars and handrails in your home. Place contrasting color strips on the first and last steps of stairways.  · When you use a stepladder, make sure that it is completely opened and that the sides are firmly locked. Have someone hold the ladder while you are using it. Do not climb a closed stepladder.  · Be aware of any and all pets.  What can I do in the bathroom?         · Keep the floor dry. Immediately clean up any water that spills onto the floor.  · Remove soap buildup in the tub or shower on  a regular basis.  · Use non-skid mats or decals on the floor of the tub or shower.  · Attach bath mats securely with double-sided, non-slip rug tape.  · If you need to sit down while you are in the shower, use a plastic, non-slip stool.  · Install grab bars by the toilet and in the tub and shower. Do not use towel bars as grab bars.  What can I do in the bedroom?  · Make sure that a bedside light is easy to reach.  · Do not use oversized bedding that drapes onto the floor.  · Have a firm chair that has side arms to use for getting dressed.  What can I do in the kitchen?  · Clean up any spills right away.  · If you need to reach for something above you, use a sturdy step stool that has a grab bar.  · Keep electrical cables out of the way.  · Do not use floor polish or wax that makes floors slippery. If you must use wax, make sure that it is non-skid floor wax.  What can I do in the stairways?  · Do not leave any items on the stairs.  · Make sure that you have a light switch at the top of the stairs and the bottom of the stairs. Have them installed if you do not have them.  · Make sure that there are handrails on both sides of the stairs. Fix handrails that are broken or loose. Make sure that handrails are as long as the stairways.  · Install non-slip stair treads on all stairs in your home.  · Avoid having throw rugs at the top or bottom of stairways, or secure the rugs with carpet tape to prevent them from moving.  · Choose a carpet design that does not hide the edge of steps on the stairway.  · Check any carpeting to make sure that it is firmly attached to the stairs. Fix any carpet that is loose or worn.  What can I do on the outside of my home?  · Use bright outdoor lighting.  · Regularly repair the edges of walkways and driveways and fix any cracks.  · Remove high doorway thresholds.  · Trim any shrubbery on the main path into your home.  · Regularly check that handrails are securely fastened and in good repair.  Both sides of any steps should have handrails.  · Install guardrails along the edges of any raised decks or porches.  · Clear walkways of debris and clutter, including tools and rocks.  · Have leaves, snow, and ice cleared regularly.  · Use sand or salt on walkways during winter months.  · In the garage, clean up any spills right away, including grease or oil spills.  What other actions can I take?  · Wear closed-toe shoes that fit well and support your feet. Wear shoes that have rubber soles or low heels.  · Use mobility aids as needed, such as canes, walkers, scooters, and crutches.  · Review your medicines with your health care provider. Some medicines can cause dizziness or changes in blood pressure, which increase your risk of falling.  Talk with your health care provider about other ways that you can decrease your risk of falls. This may include working with a physical therapist or  to improve your strength, balance, and endurance.  Where to find more information  · Centers for Disease Control and Prevention, STEADI: https://www.cdc.gov  · National [place] on Aging: https://ip0fzmn.luisana.nih.gov  Contact a health care provider if:  · You are afraid of falling at home.  · You feel weak, drowsy, or dizzy at home.  · You fall at home.  Summary  · There are many simple things that you can do to make your home safe and to help prevent falls.  · Ways to make your home safe include removing tripping hazards and installing grab bars in the bathroom.  · Ask for help when making these changes in your home.  This information is not intended to replace advice given to you by your health care provider. Make sure you discuss any questions you have with your health care provider.  Document Revised: 11/30/2018 Document Reviewed: 08/02/2018  [employer] Patient Education © 2020 [employer]    Medicare Wellness  Personal Prevention Plan of Service     Date of Office Visit:  12/07/2020  Encounter Provider:  Jose Yancey  MD Mariella  Place of Service:  [facility]  Patient Name: Cindi Hill  :  1950    As part of the Medicare Wellness portion of your visit today, we are providing you with this personalized preventive plan of services (PPPS). This plan is based upon recommendations of the [place] Preventive Services Task Force (USPSTF) and the Advisory Committee on Immunization Practices (ACIP).    This lists the preventive care services that should be considered, and provides dates of when you are due. Items listed as completed are up-to-date and do not require any further intervention.    Health Maintenance   Topic Date Due   • Pneumococcal Vaccine 65+ (2 of 2 - PPSV23) 2017   • COLONOSCOPY  2019   • INFLUENZA VACCINE  2020   • ANNUAL WELLNESS VISIT  2020   • ZOSTER VACCINE (2 of 3) 2021 (Originally 2017)   • LIPID PANEL  2021   • MAMMOGRAM  2021   • TDAP/TD VACCINES (2 - Td) 2027   • HEPATITIS C SCREENING  Completed       No orders of the defined types were placed in this encounter.      Return in 6 months (on 2021).

## 2020-12-07 NOTE — PROGRESS NOTES
The ABCs of the Annual Wellness Visit  Subsequent Medicare Wellness Visit    Chief Complaint   Patient presents with   • Medicare Wellness-subsequent     Fasting, no pap or pelvic.  Dr Cage orders her mammos       Subjective   History of Present Illness:  Cindi Hill is a 69 y.o. female who presents for a Subsequent Medicare Wellness Visit.    HEALTH RISK ASSESSMENT    Recent Hospitalizations:  No hospitalization(s) within the last year.    Current Medical Providers:  Patient Care Team:  Jose Wolff MD as PCP - General (Family Medicine)  Jose Wolff MD as PCP - Family Medicine  Micki Jones APRN (Inactive)  Micki Jones APRN (Inactive)  Tyrell Dixon MD as Consulting Physician (Otolaryngology)  Jose Wolff MD as Referring Physician (Family Medicine)  Anuj Johnson MD as Cardiologist (Cardiology)  Adi Anderson MD as Consulting Physician (Pulmonary Disease)    Smoking Status:  Social History     Tobacco Use   Smoking Status Never Smoker   Smokeless Tobacco Never Used       Alcohol Consumption:  Social History     Substance and Sexual Activity   Alcohol Use No       Depression Screen:   PHQ-2/PHQ-9 Depression Screening 12/7/2020   Little interest or pleasure in doing things 0   Feeling down, depressed, or hopeless 0   Total Score 0       Fall Risk Screen:  STEADI Fall Risk Assessment was completed, and patient is at LOW risk for falls.Assessment completed on:12/7/2020    Health Habits and Functional and Cognitive Screening:  Functional & Cognitive Status 12/7/2020   Do you have difficulty preparing food and eating? No   Do you have difficulty bathing yourself, getting dressed or grooming yourself? No   Do you have difficulty using the toilet? No   Do you have difficulty moving around from place to place? No   Do you have trouble with steps or getting out of a bed or a chair? No   Current Diet Well Balanced Diet   Dental Exam Up to date   Eye Exam Not up  to date   Exercise (times per week) 0 times per week   Current Exercise Activities Include No Regular Exercise   Do you need help using the phone?  No   Are you deaf or do you have serious difficulty hearing?  No   Do you need help with transportation? No   Do you need help shopping? No   Do you need help preparing meals?  No   Do you need help with housework?  No   Do you need help with laundry? No   Do you need help taking your medications? No   Do you need help managing money? No   Do you ever drive or ride in a car without wearing a seat belt? No   Have you felt unusual stress, anger or loneliness in the last month? No   Who do you live with? Spouse   If you need help, do you have trouble finding someone available to you? No   Have you been bothered in the last four weeks by sexual problems? No   Do you have difficulty concentrating, remembering or making decisions? Yes         Does the patient have evidence of cognitive impairment? Yes    Asprin use counseling:Taking ASA appropriately as indicated    Age-appropriate Screening Schedule:  Refer to the list below for future screening recommendations based on patient's age, sex and/or medical conditions. Orders for these recommended tests are listed in the plan section. The patient has been provided with a written plan.    Health Maintenance   Topic Date Due   • COLONOSCOPY  09/23/2019   • INFLUENZA VACCINE  08/01/2020   • ZOSTER VACCINE (2 of 3) 06/11/2021 (Originally 12/29/2017)   • LIPID PANEL  06/11/2021   • MAMMOGRAM  11/23/2021   • TDAP/TD VACCINES (2 - Td) 11/03/2027          The following portions of the patient's history were reviewed and updated as appropriate: allergies, current medications, past family history, past medical history, past social history, past surgical history and problem list.    Outpatient Medications Prior to Visit   Medication Sig Dispense Refill   • aspirin 81 MG tablet Take 81 mg by mouth daily.     • atenolol (TENORMIN) 25 MG  tablet TAKE ONE TABLET BY MOUTH EVERY DAY 90 tablet 3   • calcium carbonate (CALCIUM 600) 600 MG tablet Take 600 mg by mouth daily.     • cetirizine (zyrTEC) 10 MG tablet Take 10 mg by mouth Daily.     • Cholecalciferol 4000 units capsule Take 5,000 Units by mouth Daily.     • fluticasone (FLONASE) 50 MCG/ACT nasal spray 2 sprays into each nostril daily. (Patient taking differently: 2 sprays into the nostril(s) as directed by provider As Needed.) 1 bottle 6   • losartan (COZAAR) 50 MG tablet TAKE ONE TABLET BY MOUTH EVERY DAY IN THE MORNING 90 tablet 1   • lovastatin (MEVACOR) 10 MG tablet TAKE ONE TABLET BY MOUTH EVERY DAY (Patient taking differently: Every Night.) 90 tablet 3   • PREMARIN 0.625 MG/GM vaginal cream      • benzonatate (TESSALON) 200 MG capsule Take 200 mg by mouth 3 (Three) Times a Day As Needed for Cough.       No facility-administered medications prior to visit.        Patient Active Problem List   Diagnosis   • GERD (gastroesophageal reflux disease)   • Cough   • Deviated nasal septum   • Allergic rhinitis due to pollen   • [place]ca laryngitis   • Mixed hyperlipidemia   • Breast cancer (CMS/HCC)   • Osteoarthritis of cervical spine   • LBBB (left bundle branch block)   • Dyspnea on exertion   • Acute left-sided low back pain without sciatica   • Anginal equivalent (CMS/HCC)   • H/O right mastectomy       Advanced Care Planning:  ACP discussion was declined by the patient. Patient has an advance directive in EMR which is still valid.     Review of Systems   Respiratory: Positive for apnea.         CPAP-20th risk for life-he is compliant-pressures of about 5.8 on average-continue usage   Cardiovascular:        Recent coronary CTA within normal range for age   Gastrointestinal:        Cologuard -2019   Genitourinary:        Neuromodulator for urinary incontinence--recent breast biopsy benign   All other systems reviewed and are negative.      Compared to one year ago, the patient feels her physical  "health is better.  Compared to one year ago, the patient feels her mental health is better.    Reviewed chart for potential of high risk medication in the elderly: yes  Reviewed chart for potential of harmful drug interactions in the elderly:yes    Objective         Vitals:    12/07/20 0938   BP: 122/82   BP Location: Left arm   Patient Position: Sitting   Cuff Size: Adult   Pulse: 61   Resp: 16   Temp: 96.8 °F (36 °C)   TempSrc: Temporal   SpO2: 98%   Weight: 66.2 kg (146 lb)   Height: 154.9 cm (61\")   PainSc:   2   PainLoc: Back       Body mass index is 27.59 kg/m².  Discussed the patient's BMI with her. The BMI is above average; BMI management plan is completed.    Physical Exam  Vitals signs and nursing note reviewed.   Constitutional:       Appearance: Normal appearance.   HENT:      Right Ear: Tympanic membrane and ear canal normal.      Left Ear: Tympanic membrane and ear canal normal.   Eyes:      Extraocular Movements: Extraocular movements intact.      Pupils: Pupils are equal, round, and reactive to light.   Neck:      Vascular: No carotid bruit.   Cardiovascular:      Rate and Rhythm: Normal rate and regular rhythm.      Pulses: Normal pulses.      Heart sounds: Normal heart sounds.   Pulmonary:      Effort: Pulmonary effort is normal.      Breath sounds: Normal breath sounds.   Abdominal:      General: Abdomen is flat.      Palpations: Abdomen is soft.   Genitourinary:     Comments: Breast and pelvic exam by another provider  Musculoskeletal:      Right lower leg: No edema.      Left lower leg: No edema.   Lymphadenopathy:      Cervical: No cervical adenopathy.   Skin:     General: Skin is warm and dry.      Capillary Refill: Capillary refill takes less than 2 seconds.   Neurological:      General: No focal deficit present.      Mental Status: She is alert and oriented to person, place, and time.   Psychiatric:         Mood and Affect: Mood normal.         Behavior: Behavior normal.         Thought " Content: Thought content normal.         Judgment: Judgment normal.         Lab Results   Component Value Date    GLU 90 10/05/2020        Assessment/Plan   Medicare Risks and Personalized Health Plan  CMS Preventative Services Quick Reference  Fall Risk    The above risks/problems have been discussed with the patient.  Pertinent information has been shared with the patient in the After Visit Summary.  Follow up plans and orders are seen below in the Assessment/Plan Section.    Diagnoses and all orders for this visit:    1. Mixed hyperlipidemia (Primary)  -     Comprehensive Metabolic Panel  -     Lipid Panel    2. Fatigue, unspecified type  -     TSH  -     T4, free  -     CBC & Differential    3. Hypertension, unspecified type  -     POC Urinalysis Dipstick, Multipro    4. Need for immunization against influenza  -     Fluad Quad >65 years    5. Annual physical exam      Follow Up:  Return in 6 months (on 6/7/2021).     An After Visit Summary and PPPS were given to the patient.       Plan return for flu shot after neuromodulator is permanently placed-COVID-19 safety-continue CPAP

## 2020-12-08 LAB
ALBUMIN SERPL-MCNC: 4.9 G/DL (ref 3.5–5.2)
ALBUMIN/GLOB SERPL: 2 G/DL
ALP SERPL-CCNC: 89 U/L (ref 39–117)
ALT SERPL-CCNC: 24 U/L (ref 1–33)
AST SERPL-CCNC: 18 U/L (ref 1–32)
BASOPHILS # BLD AUTO: 0.03 10*3/MM3 (ref 0–0.2)
BASOPHILS NFR BLD AUTO: 0.5 % (ref 0–1.5)
BILIRUB SERPL-MCNC: 0.4 MG/DL (ref 0–1.2)
BUN SERPL-MCNC: 14 MG/DL (ref 8–23)
BUN/CREAT SERPL: 22.6 (ref 7–25)
CALCIUM SERPL-MCNC: 9.8 MG/DL (ref 8.6–10.5)
CHLORIDE SERPL-SCNC: 98 MMOL/L (ref 98–107)
CHOLEST SERPL-MCNC: 156 MG/DL (ref 0–200)
CO2 SERPL-SCNC: 31.3 MMOL/L (ref 22–29)
CREAT SERPL-MCNC: 0.62 MG/DL (ref 0.57–1)
EOSINOPHIL # BLD AUTO: 0.23 10*3/MM3 (ref 0–0.4)
EOSINOPHIL NFR BLD AUTO: 4.1 % (ref 0.3–6.2)
ERYTHROCYTE [DISTWIDTH] IN BLOOD BY AUTOMATED COUNT: 12.4 % (ref 12.3–15.4)
GLOBULIN SER CALC-MCNC: 2.4 GM/DL
GLUCOSE SERPL-MCNC: 93 MG/DL (ref 65–99)
HCT VFR BLD AUTO: 42.1 % (ref 34–46.6)
HDLC SERPL-MCNC: 51 MG/DL (ref 40–60)
HGB BLD-MCNC: 13.9 G/DL (ref 12–15.9)
IMM GRANULOCYTES # BLD AUTO: 0.01 10*3/MM3 (ref 0–0.05)
IMM GRANULOCYTES NFR BLD AUTO: 0.2 % (ref 0–0.5)
LDLC SERPL CALC-MCNC: 89 MG/DL (ref 0–100)
LYMPHOCYTES # BLD AUTO: 1.16 10*3/MM3 (ref 0.7–3.1)
LYMPHOCYTES NFR BLD AUTO: 20.9 % (ref 19.6–45.3)
MCH RBC QN AUTO: 30.1 PG (ref 26.6–33)
MCHC RBC AUTO-ENTMCNC: 33 G/DL (ref 31.5–35.7)
MCV RBC AUTO: 91.1 FL (ref 79–97)
MONOCYTES # BLD AUTO: 0.54 10*3/MM3 (ref 0.1–0.9)
MONOCYTES NFR BLD AUTO: 9.7 % (ref 5–12)
NEUTROPHILS # BLD AUTO: 3.58 10*3/MM3 (ref 1.7–7)
NEUTROPHILS NFR BLD AUTO: 64.6 % (ref 42.7–76)
NRBC BLD AUTO-RTO: 0 /100 WBC (ref 0–0.2)
PLATELET # BLD AUTO: 258 10*3/MM3 (ref 140–450)
POTASSIUM SERPL-SCNC: 4.8 MMOL/L (ref 3.5–5.2)
PROT SERPL-MCNC: 7.3 G/DL (ref 6–8.5)
RBC # BLD AUTO: 4.62 10*6/MM3 (ref 3.77–5.28)
SODIUM SERPL-SCNC: 139 MMOL/L (ref 136–145)
T4 FREE SERPL-MCNC: 1.39 NG/DL (ref 0.93–1.7)
TRIGL SERPL-MCNC: 84 MG/DL (ref 0–150)
TSH SERPL DL<=0.005 MIU/L-ACNC: 2.13 UIU/ML (ref 0.27–4.2)
VLDLC SERPL CALC-MCNC: 16 MG/DL (ref 5–40)
WBC # BLD AUTO: 5.55 10*3/MM3 (ref 3.4–10.8)

## 2020-12-08 RX ORDER — LOSARTAN POTASSIUM 50 MG/1
50 TABLET ORAL EVERY MORNING
Qty: 90 TABLET | Refills: 3 | Status: SHIPPED | OUTPATIENT
Start: 2020-12-08 | End: 2021-06-03

## 2020-12-09 RX ORDER — LOVASTATIN 10 MG/1
10 TABLET ORAL NIGHTLY
Qty: 90 TABLET | Refills: 3 | Status: SHIPPED | OUTPATIENT
Start: 2020-12-09 | End: 2022-01-11

## 2020-12-17 ENCOUNTER — TELEPHONE (OUTPATIENT)
Dept: SURGERY | Age: 70
End: 2020-12-17

## 2021-01-07 ENCOUNTER — OFFICE VISIT (OUTPATIENT)
Dept: FAMILY MEDICINE CLINIC | Facility: CLINIC | Age: 71
End: 2021-01-07

## 2021-01-07 VITALS
HEIGHT: 62 IN | HEART RATE: 74 BPM | OXYGEN SATURATION: 99 % | TEMPERATURE: 97.5 F | SYSTOLIC BLOOD PRESSURE: 111 MMHG | WEIGHT: 142.6 LBS | DIASTOLIC BLOOD PRESSURE: 71 MMHG | BODY MASS INDEX: 26.24 KG/M2

## 2021-01-07 DIAGNOSIS — R21 RASH: Primary | ICD-10-CM

## 2021-01-07 PROCEDURE — 96372 THER/PROPH/DIAG INJ SC/IM: CPT | Performed by: NURSE PRACTITIONER

## 2021-01-07 PROCEDURE — 99213 OFFICE O/P EST LOW 20 MIN: CPT | Performed by: NURSE PRACTITIONER

## 2021-01-07 RX ORDER — PREDNISONE 10 MG/1
TABLET ORAL
Qty: 21 TABLET | Refills: 0 | Status: SHIPPED | OUTPATIENT
Start: 2021-01-07 | End: 2021-01-14

## 2021-01-07 RX ORDER — FAMOTIDINE 20 MG/1
20 TABLET, FILM COATED ORAL 2 TIMES DAILY
Qty: 30 TABLET | Refills: 0 | Status: SHIPPED | OUTPATIENT
Start: 2021-01-07 | End: 2021-06-03

## 2021-01-07 RX ORDER — METHYLPREDNISOLONE ACETATE 40 MG/ML
40 INJECTION, SUSPENSION INTRA-ARTICULAR; INTRALESIONAL; INTRAMUSCULAR; SOFT TISSUE ONCE
Status: COMPLETED | OUTPATIENT
Start: 2021-01-07 | End: 2021-01-07

## 2021-01-07 RX ADMIN — METHYLPREDNISOLONE ACETATE 40 MG: 40 INJECTION, SUSPENSION INTRA-ARTICULAR; INTRALESIONAL; INTRAMUSCULAR; SOFT TISSUE at 10:50

## 2021-01-07 NOTE — PROGRESS NOTES
CC: rash    History:  Cindi Hill is a 70 y.o. female who presents today for evaluation of the above problems.      Rash started 2-3 days ago with itching on wrist. And has spread over her trunk and arms.  Now also itching at the top of her feet and on her scalp.  No unusual foods, no changes in detergents or soaps.     HPI  ROS:  Review of Systems   HENT: Positive for rhinorrhea. Negative for sore throat and trouble swallowing.    Respiratory: Negative for shortness of breath.    Skin: Positive for rash.       Allergies   Allergen Reactions   • Lisinopril Cough   • Lortab [Hydrocodone-Acetaminophen] Nausea Only   • Percocet [Oxycodone-Acetaminophen] Nausea Only     Past Medical History:   Diagnosis Date   • Acute left-sided low back pain without sciatica 10/24/2017   • Arthritis    • Breast cancer (CMS/HCC)     1997, right breast mastectomy   • Drug therapy 1997   • GERD (gastroesophageal reflux disease)    • Hypercholesteremia    • KARUNA (stress urinary incontinence, female)    • Urge incontinence    • Uterovaginal prolapse    • Vaginal atrophy      Past Surgical History:   Procedure Laterality Date   • BLADDER SLING MODIFIED, ANTERIOR AND POSTERIOR VAGINAL REPAIR     • BLADDER SURGERY      Pacemaker for the bladder to help with incontinence.   • BRONCHOSCOPY N/A 4/26/2017    Procedure: BRONCHOSCOPY BIOPSY WITH ANESTHESIA;  Surgeon: Adi Anderson MD;  Location:  [facility];  Service:    • CARDIAC CATHETERIZATION Left 2/28/2017    Procedure: Cardiac Catheterization/Vascular Study;  Surgeon: Anuj Johnson MD;  Location:  [facility];  Service:    • COLONOSCOPY  2014   • HYSTERECTOMY     • HYSTERECTOMY     • LAMINECTOMY  01/2016   • MASTECTOMY Right 1997   • ROTATOR CUFF REPAIR Right 2012     Family History   Problem Relation Age of Onset   • Dementia Mother    • Heart failure Mother    • Cancer Father         liver   • Cancer Brother         thyroid   • No Known Problems Maternal  "Grandmother    • Diabetes Maternal Grandfather    • No Known Problems Paternal Grandmother    • Cancer Paternal Grandfather    • Breast cancer Neg Hx       reports that she has never smoked. She has never used smokeless tobacco. She reports that she does not drink alcohol or use drugs.      Current Outpatient Medications:   •  aspirin 81 MG tablet, Take 81 mg by mouth daily., Disp: , Rfl:   •  atenolol (TENORMIN) 25 MG tablet, TAKE ONE TABLET BY MOUTH EVERY DAY, Disp: 90 tablet, Rfl: 3  •  calcium carbonate (CALCIUM 600) 600 MG tablet, Take 600 mg by mouth daily., Disp: , Rfl:   •  cetirizine (zyrTEC) 10 MG tablet, Take 10 mg by mouth Daily., Disp: , Rfl:   •  Cholecalciferol 4000 units capsule, Take 5,000 Units by mouth Daily., Disp: , Rfl:   •  fluticasone (FLONASE) 50 MCG/ACT nasal spray, 2 sprays into each nostril daily. (Patient taking differently: 2 sprays into the nostril(s) as directed by provider As Needed.), Disp: 1 bottle, Rfl: 6  •  losartan (COZAAR) 50 MG tablet, Take 1 tablet by mouth Every Morning., Disp: 90 tablet, Rfl: 3  •  lovastatin (MEVACOR) 10 MG tablet, Take 1 tablet by mouth Every Night., Disp: 90 tablet, Rfl: 3  •  PREMARIN 0.625 MG/GM vaginal cream, , Disp: , Rfl:   •  famotidine (Pepcid) 20 MG tablet, Take 1 tablet by mouth 2 (Two) Times a Day., Disp: 30 tablet, Rfl: 0  •  predniSONE (DELTASONE) 10 MG (21) dose pack, Use as directed on package, Disp: 21 tablet, Rfl: 0  No current facility-administered medications for this visit.     OBJECTIVE:  /71 (BP Location: Left arm, Patient Position: Sitting, Cuff Size: Adult)   Pulse 74   Temp 97.5 °F (36.4 °C) (Temporal)   Ht 157.5 cm (62\") Comment: pt reported  Wt 64.7 kg (142 lb 9.6 oz)   LMP  (LMP Unknown)   SpO2 99%   Breastfeeding No   BMI 26.08 kg/m²    Physical Exam  Vitals signs reviewed.   Constitutional:       Appearance: She is well-developed.   Cardiovascular:      Rate and Rhythm: Normal rate.   Pulmonary:      Effort: " Pulmonary effort is normal.   Skin:     Comments: Wide spread hives covering abdomen, back, arms, palms, scalp, top of feet.   Neurological:      Mental Status: She is alert and oriented to person, place, and time.   Psychiatric:         Behavior: Behavior normal.         Assessment/Plan    Diagnoses and all orders for this visit:    1. Rash (Primary)  -     methylPREDNISolone acetate (DEPO-medrol) injection 40 mg  -     famotidine (Pepcid) 20 MG tablet; Take 1 tablet by mouth 2 (Two) Times a Day.  Dispense: 30 tablet; Refill: 0  -     predniSONE (DELTASONE) 10 MG (21) dose pack; Use as directed on package  Dispense: 21 tablet; Refill: 0    zyrtec twice a day.  pepcid twice a day.  Benadryl in between.   Start steroid dose pack tomorrow.   Hold losartan.  Call if BP >140.    An After Visit Summary was printed and given to the patient at discharge.  Return in about 1 week (around 1/14/2021).       BAR Ortega 1/7/21    Electronically signed.

## 2021-01-14 ENCOUNTER — OFFICE VISIT (OUTPATIENT)
Dept: FAMILY MEDICINE CLINIC | Facility: CLINIC | Age: 71
End: 2021-01-14

## 2021-01-14 VITALS
DIASTOLIC BLOOD PRESSURE: 70 MMHG | WEIGHT: 141 LBS | SYSTOLIC BLOOD PRESSURE: 118 MMHG | RESPIRATION RATE: 16 BRPM | BODY MASS INDEX: 25.95 KG/M2 | HEART RATE: 63 BPM | HEIGHT: 62 IN | TEMPERATURE: 97.5 F | OXYGEN SATURATION: 94 %

## 2021-01-14 DIAGNOSIS — C50.911 MALIGNANT NEOPLASM OF RIGHT FEMALE BREAST, UNSPECIFIED ESTROGEN RECEPTOR STATUS, UNSPECIFIED SITE OF BREAST (HCC): ICD-10-CM

## 2021-01-14 DIAGNOSIS — I47.1 SUPRAVENTRICULAR TACHYCARDIA (HCC): ICD-10-CM

## 2021-01-14 DIAGNOSIS — I20.8 ANGINAL EQUIVALENT (HCC): ICD-10-CM

## 2021-01-14 PROBLEM — I47.10 SUPRAVENTRICULAR TACHYCARDIA: Status: ACTIVE | Noted: 2021-01-14

## 2021-01-14 PROCEDURE — 99212 OFFICE O/P EST SF 10 MIN: CPT | Performed by: FAMILY MEDICINE

## 2021-01-14 NOTE — PROGRESS NOTES
70-year-old female returns after treatment for allergic skin reaction.  Reaction is totally cleared -was gone within 24 hours.  Did not feel like it was losartan and if blood pressure systolically goes more than 140 she should start her losartan back at night

## 2021-04-05 ENCOUNTER — TELEPHONE (OUTPATIENT)
Dept: SURGERY | Age: 71
End: 2021-04-05

## 2021-04-05 ENCOUNTER — TELEPHONE (OUTPATIENT)
Dept: CARDIOLOGY | Facility: CLINIC | Age: 71
End: 2021-04-05

## 2021-04-05 NOTE — TELEPHONE ENCOUNTER
Patient is needing cardiac Clearance for 8hr Surgery on 4/12/21 for right breast free Flap Reconstruction, Left Mastectomy with Tissue Expander Placement.     LOV 11/30/21 with Sandie.  HX LBBB and SVT currently taking ASA 81mg     Please advise

## 2021-06-02 PROBLEM — I44.7 LBBB (LEFT BUNDLE BRANCH BLOCK): Chronic | Status: ACTIVE | Noted: 2017-01-10

## 2021-06-02 NOTE — PROGRESS NOTES
Subjective:     Encounter Date:06/03/2021      Patient ID: Cindi Hill is a 70 y.o. female   HPI: This patient presents today for routine follow-up. She has a history of breast cancer status post right mastectomy remotely and left mastectomy with bilateral reconstruction 4/21, hypercholesterolemia and GERD.  She reports a one month history of intermittent, non-exertional, epigastric pain that radiates to the back. She reports associated nausea and vomiting at times. She reports a three day history of non-exertional, non-radiating, left sided chest pain. Patient denies shortness of breath, palpitations, dizziness, syncope, orthopnea, PND, edema or decreased stamina.  Patient denies any signs of bleeding.    Chief Complaint: Routine follow-up  Hyperlipidemia  This is a chronic problem. The current episode started more than 1 year ago. Associated symptoms include chest pain. Pertinent negatives include no shortness of breath. Current antihyperlipidemic treatment includes statins. Risk factors for coronary artery disease include dyslipidemia.       Previous Cardiac Testing:  Results for orders placed during the hospital encounter of 02/17/17    Adult Transthoracic Echo Complete    Interpretation Summary  · Left ventricular function is normal. Estimated EF = 55%.  · Left ventricular diastolic dysfunction (grade I) consistent with impaired relaxation.  · Estimated right ventricular systolic pressure from tricuspid regurgitation is normal (<35 mmHg).    Results for orders placed during the hospital encounter of 02/28/17   Cardiac Catheterization/Vascular Study    Narrative Cardiac Catheterization Operative Report    Cindi Hill  [id number]  2/28/2017    Patient was referred for cardiac catheterization . Indications for the   procedure include:  recurrent chest pain, shortness of breath. Abnormal   nuclear stress test with  moderate anterior ischemia      Procedure performed  Left heart cath  Coronary  angiography  Right femoral arteriography  Insertion of 6 Fr Mynx hemostatic closure device with effective hemostasis   and preserved right lower extremity pulses      Procedure Details  The risks, benefits, complications, treatment options, and expected   outcomes were discussed with the patient. The patient and/or family   concurred with the proposed plan, giving informed consent. Patient was   brought to the cath lab after IV hydration was begun and oral   premedication was given. He was further sedated with fentanyl and   midazolam. He was prepped and draped in the usual manner. Using the   modified Seldinger access technique, a 6f [place] citizen sheath was placed in the   femoral artery.   A left heart catheterization was done. Angiograms were also done.    Cardiac Catheterization Operative Report      Patient was referred for cardiac catheterization . Indications for the   procedure include: abnormal stress test, chest pain, shortness of breath.     Procedure Details  The risks, benefits, complications, treatment options, and expected   outcomes were discussed with the patient. The patient and/or family   concurred with the proposed plan, giving informed consent. Patient was   brought to the cath lab after IV hydration was begun and oral   premedication was given.     The skin overlying the patient's right femoral artery was prepped and   draped in the usual sterile fashion.  Timeout was taken to confirm the   correct patient and procedure.  Lidocaine was administered for local   anesthesia.  IV Versed and fentanyl were used to achieve conscious   sedation.  Modified Seldinger technique was then used to place a 5 [place] citizen   sheath in the right femoral artery    Diagnostic coronary angiography was performed with 5 [place] citizen JL4 and 3DRC   coronary catheters.  Coronary angiogram were performed in ELIF and POWER   projection to evaluate the coronary arterial systems.  A left heart   catheterization was done.  After all coronary  angiograms and LV gram and   Left heart pressure measurements were obtained, a femoral angiogram was   performed and the arteriotomy was suitable for a closure device.  A 6Fr   Mynx closure device was used to achieve hemostasis.  The patient tolerated   the procedure well, and there were no immediate complications.    Procedural Details: After written and informed consent was obtained, the   patient was brought to the cath lab in a fasting state.  Results:    1. Selective coronary angiography:    Left main coronary artery:  The left main coronary artery arises from the   left coronary cusp and continues as the LAD     left circumflex artery arises from RCA    Left main coronary artery is normal    Left anterior descending artery:  The LAD arises normally from the left   main coronary artery and courses in the anterior interventricular groove   and terminates at the apex. No stenosis noted    Left circumflex:  The left circumflex arises form the right coronary   artery and  is normal.    Right coronary artery:  The RCA arises normally from the right coronary   cusp and is dominant for the posterior circulation.  The RCA is dominant   and normal.    2. Left heart cath: LVEDP    9    mm Hg with no gradient across aortic   valve on pullback.    3. LV Gram in Normal LVEF 55% with no significant mitral regurgitation.    4. Interventions: None    Estimated Blood Loss:  Minimal         Complications:  None; patient tolerated the procedure well.           Disposition: COU            Condition: stable        Conclusion  No stenosis of  epicardial coronary arteries with dominant RCA  Left circumflex arises from RCA and normal  Normal LVEF  LVEDP   9    mm Hg    Plan  Hydration   Observation  Workup for non cardiac chest pain  Acceptable cardiovascular risk of bladder surgery             The following portions of the patient's history were reviewed and updated as appropriate: allergies, current medications, past family history,  past medical history, past social history, past surgical history and problem list.    Allergies   Allergen Reactions   • Lisinopril Cough   • Lortab [Hydrocodone-Acetaminophen] Nausea Only   • Percocet [Oxycodone-Acetaminophen] Nausea Only       Current Outpatient Medications:   •  aspirin 81 MG tablet, Take 81 mg by mouth daily., Disp: , Rfl:   •  atenolol (TENORMIN) 25 MG tablet, TAKE ONE TABLET BY MOUTH EVERY DAY, Disp: 90 tablet, Rfl: 3  •  calcium carbonate (CALCIUM 600) 600 MG tablet, Take 600 mg by mouth daily., Disp: , Rfl:   •  cetirizine (zyrTEC) 10 MG tablet, Take 10 mg by mouth Daily., Disp: , Rfl:   •  Cholecalciferol 4000 units capsule, Take 5,000 Units by mouth Daily., Disp: , Rfl:   •  lovastatin (MEVACOR) 10 MG tablet, Take 1 tablet by mouth Every Night., Disp: 90 tablet, Rfl: 3  •  PREMARIN 0.625 MG/GM vaginal cream, , Disp: , Rfl:   Past Medical History:   Diagnosis Date   • Acute left-sided low back pain without sciatica 10/24/2017   • Arthritis    • Breast cancer (CMS/[facility])     1997, right breast mastectomy   • Drug therapy 1997   • GERD (gastroesophageal reflux disease)    • Hypercholesteremia    • KARUNA (stress urinary incontinence, female)    • Urge incontinence    • Uterovaginal prolapse    • Vaginal atrophy      Past Surgical History:   Procedure Laterality Date   • BLADDER SLING MODIFIED, ANTERIOR AND POSTERIOR VAGINAL REPAIR     • BLADDER SURGERY      Pacemaker for the bladder to help with incontinence.   • BRONCHOSCOPY N/A 4/26/2017    Procedure: BRONCHOSCOPY BIOPSY WITH ANESTHESIA;  Surgeon: Adi Anderson MD;  Location:  [facility];  Service:    • CARDIAC CATHETERIZATION Left 2/28/2017    Procedure: Cardiac Catheterization/Vascular Study;  Surgeon: Anuj Johnson MD;  Location:  [facility];  Service:    • COLONOSCOPY  2014   • HYSTERECTOMY     • HYSTERECTOMY     • LAMINECTOMY  01/2016   • MASTECTOMY Right 1997   • ROTATOR CUFF REPAIR Right 2012     Family History    Problem Relation Age of Onset   • Dementia Mother    • Heart failure Mother    • Cancer Father         liver   • Cancer Brother         thyroid   • No Known Problems Maternal Grandmother    • Diabetes Maternal Grandfather    • No Known Problems Paternal Grandmother    • Cancer Paternal Grandfather    • Breast cancer Neg Hx      Social History     Socioeconomic History   • Marital status:      Spouse name: Not on file   • Number of children: Not on file   • Years of education: Not on file   • Highest education level: Not on file   Tobacco Use   • Smoking status: Never Smoker   • Smokeless tobacco: Never Used   Vaping Use   • Vaping Use: Never used   Substance and Sexual Activity   • Alcohol use: No   • Drug use: No   • Sexual activity: Defer       Review of Systems   Constitutional: Negative. Negative for chills, decreased appetite, fever, malaise/fatigue, night sweats, weight gain and weight loss.   HENT: Negative.  Negative for nosebleeds.    Eyes: Negative for visual disturbance.   Cardiovascular: Positive for chest pain and palpitations. Negative for dyspnea on exertion, leg swelling, near-syncope, orthopnea, paroxysmal nocturnal dyspnea and syncope.   Respiratory: Negative for cough, hemoptysis, shortness of breath, snoring and wheezing.    Endocrine: Negative for cold intolerance and heat intolerance.   Hematologic/Lymphatic: Does not bruise/bleed easily.   Skin: Negative for rash.   Musculoskeletal: Negative for back pain and falls.   Gastrointestinal: Negative.  Negative for abdominal pain, change in bowel habit, constipation, diarrhea, dysphagia, heartburn, nausea and vomiting.   Genitourinary: Negative.  Negative for hematuria.   Neurological: Negative for dizziness, headaches, light-headedness and weakness.   Psychiatric/Behavioral: Negative for altered mental status.   Allergic/Immunologic: Negative for persistent infections.              Objective:     Vitals and nursing note reviewed.    Constitutional:       General: Not in acute distress.     Appearance: Normal and healthy appearance. Well-developed, normal weight and not in distress. Not diaphoretic.   Eyes:      General: Lids are normal.         Right eye: No discharge.         Left eye: No discharge.      Conjunctiva/sclera: Conjunctivae normal.      Pupils: Pupils are equal, round, and reactive to light.   HENT:      Head: Normocephalic and atraumatic.      Jaw: There is normal jaw occlusion.      Right Ear: External ear normal.      Left Ear: External ear normal.      Nose: Nose normal.   Neck:      Thyroid: No thyromegaly.      Vascular: No carotid bruit, JVD or JVR. JVD normal.      Trachea: Trachea normal. No tracheal deviation.   Pulmonary:      Effort: Pulmonary effort is normal. No respiratory distress.      Breath sounds: Normal breath sounds. No decreased breath sounds. No wheezing. No rhonchi. No rales.   Chest:      Chest wall: Not tender to palpatation.   Cardiovascular:      PMI at left midclavicular line. Normal rate. Regular rhythm. Normal S1. Normal S2.      Murmurs: There is no murmur.      No gallop. No click. No rub.   Pulses:     Intact distal pulses. No decreased pulses.   Edema:     Peripheral edema absent.   Abdominal:      General: Bowel sounds are normal. There is no distension.      Palpations: Abdomen is soft.      Tenderness: There is no abdominal tenderness.   Musculoskeletal: Normal range of motion.         General: No tenderness or deformity.      Cervical back: Normal range of motion and neck supple. Skin:     General: Skin is warm and dry.      Coloration: Skin is not pale.      Findings: No erythema or rash.   Neurological:      General: No focal deficit present.      Mental Status: Alert, oriented to person, place, and time and oriented to person, place and time.   Psychiatric:         Attention and Perception: Attention and perception normal.         Mood and Affect: Mood and affect normal.         Speech:  "Speech normal.         Behavior: Behavior normal.         Thought Content: Thought content normal.         Cognition and Memory: Cognition and memory normal.         Judgment: Judgment normal.             ECG 12 Lead    Date/Time: 6/3/2021 10:51 AM  Performed by: Sandie Chi APRN  Authorized by: Sandie Chi APRN   Comparison: compared with previous ECG from 9/28/2020  Similar to previous ECG  Rhythm: sinus rhythm  Rate: normal  BPM: 67  Conduction: incomplete left bundle branch block  QRS axis: left    Clinical impression: abnormal EKG          /70   Pulse 67   Ht 157.5 cm (62\")   Wt 55.8 kg (123 lb)   LMP  (LMP Unknown)   SpO2 95%   BMI 22.50 kg/m²   Lab Review:   Lab Results   Component Value Date    WBC 5.55 12/07/2020    HGB 13.9 12/07/2020    HCT 42.1 12/07/2020    MCV 91.1 12/07/2020     12/07/2020     Lab Results   Component Value Date    GLUCOSE 73 02/17/2017    BUN 14 12/07/2020    CREATININE 0.62 12/07/2020    EGFRIFNONA 95 12/07/2020    EGFRIFAFRI 116 12/07/2020    BCR 22.6 12/07/2020    K 4.8 12/07/2020    CO2 31.3 (H) 12/07/2020    CALCIUM 9.8 12/07/2020    PROTENTOTREF 7.3 12/07/2020    ALBUMIN 4.90 12/07/2020    LABIL2 2.0 12/07/2020    AST 18 12/07/2020    ALT 24 12/07/2020     Lab Results   Component Value Date    CHLPL 156 12/07/2020    CHLPL 181 06/11/2020    CHLPL 178 12/04/2019     Lab Results   Component Value Date    TRIG 84 12/07/2020    TRIG 176 (H) 06/11/2020    TRIG 124 12/04/2019     Lab Results   Component Value Date    HDL 51 12/07/2020    HDL 49 06/11/2020    HDL 54 12/04/2019     Lab Results   Component Value Date    LDL 89 12/07/2020    LDL 97 06/11/2020    LDL 99 12/04/2019       I have reviewed the most recent lab results.       Assessment:          Diagnosis Plan   1. Mixed hyperlipidemia  Managed by PCP. On statin. Well controlled.    2. LBBB (left bundle branch block)  Chronic.    3. Atypical chest pain Check lexiscan.    4. Gastroesophageal " reflux disease without esophagitis  Now improved.    5. Malignant neoplasm of right female breast, unspecified estrogen receptor status, unspecified site of breast (CMS/HCC)  In remission.    6. H/O bilateral mastectomy            Plan:         1. Continue medications as previously prescribed.  2. Report any worsening symptoms.  3. Report any signs of bleeding.  4. Continue heart healthy diet and regular exercise as tolerated.   5. Follow up with PCP for blood pressure and cholesterol management and routine lab work.  6. Follow up in one month, or sooner if needed.   7. Lexiscan.

## 2021-06-03 ENCOUNTER — OFFICE VISIT (OUTPATIENT)
Dept: CARDIOLOGY | Facility: CLINIC | Age: 71
End: 2021-06-03

## 2021-06-03 VITALS
WEIGHT: 123 LBS | BODY MASS INDEX: 22.63 KG/M2 | SYSTOLIC BLOOD PRESSURE: 122 MMHG | HEART RATE: 67 BPM | DIASTOLIC BLOOD PRESSURE: 70 MMHG | OXYGEN SATURATION: 95 % | HEIGHT: 62 IN

## 2021-06-03 DIAGNOSIS — E78.2 MIXED HYPERLIPIDEMIA: Primary | Chronic | ICD-10-CM

## 2021-06-03 DIAGNOSIS — R07.89 CHEST PAIN, ATYPICAL: ICD-10-CM

## 2021-06-03 DIAGNOSIS — Z90.13 HISTORY OF BILATERAL MASTECTOMY: ICD-10-CM

## 2021-06-03 DIAGNOSIS — K21.9 GASTROESOPHAGEAL REFLUX DISEASE WITHOUT ESOPHAGITIS: ICD-10-CM

## 2021-06-03 DIAGNOSIS — C50.911 MALIGNANT NEOPLASM OF RIGHT FEMALE BREAST, UNSPECIFIED ESTROGEN RECEPTOR STATUS, UNSPECIFIED SITE OF BREAST (HCC): ICD-10-CM

## 2021-06-03 DIAGNOSIS — I44.7 LBBB (LEFT BUNDLE BRANCH BLOCK): Chronic | ICD-10-CM

## 2021-06-03 PROCEDURE — 99214 OFFICE O/P EST MOD 30 MIN: CPT | Performed by: NURSE PRACTITIONER

## 2021-06-03 PROCEDURE — 93000 ELECTROCARDIOGRAM COMPLETE: CPT | Performed by: NURSE PRACTITIONER

## 2021-06-07 ENCOUNTER — OFFICE VISIT (OUTPATIENT)
Dept: FAMILY MEDICINE CLINIC | Facility: CLINIC | Age: 71
End: 2021-06-07

## 2021-06-07 VITALS
DIASTOLIC BLOOD PRESSURE: 80 MMHG | SYSTOLIC BLOOD PRESSURE: 138 MMHG | RESPIRATION RATE: 14 BRPM | TEMPERATURE: 97.4 F | HEIGHT: 62 IN | WEIGHT: 122.4 LBS | OXYGEN SATURATION: 96 % | BODY MASS INDEX: 22.52 KG/M2 | HEART RATE: 77 BPM

## 2021-06-07 DIAGNOSIS — E78.2 MIXED HYPERLIPIDEMIA: Primary | ICD-10-CM

## 2021-06-07 DIAGNOSIS — R10.13 EPIGASTRIC PAIN: ICD-10-CM

## 2021-06-07 PROCEDURE — 99214 OFFICE O/P EST MOD 30 MIN: CPT | Performed by: FAMILY MEDICINE

## 2021-06-07 RX ORDER — ONDANSETRON 4 MG/1
4 TABLET, ORALLY DISINTEGRATING ORAL
COMMUNITY
Start: 2021-04-17

## 2021-06-07 NOTE — PROGRESS NOTES
Subjective   Cindi Hill is a 70 y.o. female.     70-year-old female with 2 to 3-week history of epigastric abdominal pain and history of hyperlipidemia      The following portions of the patient's history were reviewed and updated as appropriate: allergies, current medications, past family history, past medical history, past social history, past surgical history and problem list.    Review of Systems   Respiratory: Negative for shortness of breath.    Cardiovascular: Negative for chest pain and leg swelling.        Having a nuclear stress test Thursday   Gastrointestinal:        Presumably patient had subcutaneous drains removed a week or 2 before her symptoms began-they were a result of reconstruction breast surgery       Objective   Physical Exam  Vitals and nursing note reviewed.   Constitutional:       Appearance: Normal appearance. She is normal weight.   Cardiovascular:      Rate and Rhythm: Normal rate and regular rhythm.      Pulses: Normal pulses.      Heart sounds: Normal heart sounds.   Pulmonary:      Effort: Pulmonary effort is normal.      Breath sounds: Normal breath sounds.   Abdominal:      Tenderness: There is abdominal tenderness.      Comments: Epigastric tenderness   Musculoskeletal:      Right lower leg: No edema.      Left lower leg: No edema.   Neurological:      General: No focal deficit present.      Mental Status: She is alert and oriented to person, place, and time.   Psychiatric:         Mood and Affect: Mood normal.         Behavior: Behavior normal.         Thought Content: Thought content normal.         Judgment: Judgment normal.         Assessment/Plan   Diagnoses and all orders for this visit:    1. Mixed hyperlipidemia (Primary)  -     Comprehensive Metabolic Panel  -     Lipid Panel    2. Epigastric pain  -     Comprehensive Metabolic Panel  -     CBC & Differential  -     Amylase  -     Lipase         Following up with plastic surgeon on Friday-probably need CT of chest  abdomen and possible gallbladder or GI evaluation

## 2021-06-08 LAB
ALBUMIN SERPL-MCNC: 4.8 G/DL (ref 3.5–5.2)
ALBUMIN/GLOB SERPL: 2.1 G/DL
ALP SERPL-CCNC: 89 U/L (ref 39–117)
ALT SERPL-CCNC: 12 U/L (ref 1–33)
AMYLASE SERPL-CCNC: 55 U/L (ref 28–100)
AST SERPL-CCNC: 14 U/L (ref 1–32)
BASOPHILS # BLD AUTO: 0.04 10*3/MM3 (ref 0–0.2)
BASOPHILS NFR BLD AUTO: 0.6 % (ref 0–1.5)
BILIRUB SERPL-MCNC: 0.2 MG/DL (ref 0–1.2)
BUN SERPL-MCNC: 15 MG/DL (ref 8–23)
BUN/CREAT SERPL: 22.4 (ref 7–25)
CALCIUM SERPL-MCNC: 10.4 MG/DL (ref 8.6–10.5)
CHLORIDE SERPL-SCNC: 99 MMOL/L (ref 98–107)
CHOLEST SERPL-MCNC: 163 MG/DL (ref 0–200)
CO2 SERPL-SCNC: 29.5 MMOL/L (ref 22–29)
CREAT SERPL-MCNC: 0.67 MG/DL (ref 0.57–1)
EOSINOPHIL # BLD AUTO: 0.18 10*3/MM3 (ref 0–0.4)
EOSINOPHIL NFR BLD AUTO: 2.6 % (ref 0.3–6.2)
ERYTHROCYTE [DISTWIDTH] IN BLOOD BY AUTOMATED COUNT: 12.9 % (ref 12.3–15.4)
GLOBULIN SER CALC-MCNC: 2.3 GM/DL
GLUCOSE SERPL-MCNC: 94 MG/DL (ref 65–99)
HCT VFR BLD AUTO: 37.9 % (ref 34–46.6)
HDLC SERPL-MCNC: 51 MG/DL (ref 40–60)
HGB BLD-MCNC: 12.1 G/DL (ref 12–15.9)
IMM GRANULOCYTES # BLD AUTO: 0.02 10*3/MM3 (ref 0–0.05)
IMM GRANULOCYTES NFR BLD AUTO: 0.3 % (ref 0–0.5)
LDLC SERPL CALC-MCNC: 93 MG/DL (ref 0–100)
LIPASE SERPL-CCNC: 35 U/L (ref 13–60)
LYMPHOCYTES # BLD AUTO: 1.44 10*3/MM3 (ref 0.7–3.1)
LYMPHOCYTES NFR BLD AUTO: 20.5 % (ref 19.6–45.3)
MCH RBC QN AUTO: 27.8 PG (ref 26.6–33)
MCHC RBC AUTO-ENTMCNC: 31.9 G/DL (ref 31.5–35.7)
MCV RBC AUTO: 87.1 FL (ref 79–97)
MONOCYTES # BLD AUTO: 0.54 10*3/MM3 (ref 0.1–0.9)
MONOCYTES NFR BLD AUTO: 7.7 % (ref 5–12)
NEUTROPHILS # BLD AUTO: 4.81 10*3/MM3 (ref 1.7–7)
NEUTROPHILS NFR BLD AUTO: 68.3 % (ref 42.7–76)
NRBC BLD AUTO-RTO: 0 /100 WBC (ref 0–0.2)
PLATELET # BLD AUTO: 346 10*3/MM3 (ref 140–450)
POTASSIUM SERPL-SCNC: 4.4 MMOL/L (ref 3.5–5.2)
PROT SERPL-MCNC: 7.1 G/DL (ref 6–8.5)
RBC # BLD AUTO: 4.35 10*6/MM3 (ref 3.77–5.28)
SODIUM SERPL-SCNC: 138 MMOL/L (ref 136–145)
TRIGL SERPL-MCNC: 104 MG/DL (ref 0–150)
VLDLC SERPL CALC-MCNC: 19 MG/DL (ref 5–40)
WBC # BLD AUTO: 7.03 10*3/MM3 (ref 3.4–10.8)

## 2021-06-10 ENCOUNTER — APPOINTMENT (OUTPATIENT)
Dept: CARDIOLOGY | Facility: HOSPITAL | Age: 71
End: 2021-06-10

## 2021-07-29 ENCOUNTER — HOSPITAL ENCOUNTER (OUTPATIENT)
Dept: CARDIOLOGY | Facility: HOSPITAL | Age: 71
Discharge: HOME | End: 2021-07-29

## 2021-07-29 DIAGNOSIS — R07.89 CHEST PAIN, ATYPICAL: ICD-10-CM

## 2021-07-29 DIAGNOSIS — I44.7 LBBB (LEFT BUNDLE BRANCH BLOCK): ICD-10-CM

## 2021-07-29 LAB
BH CV REST NUCLEAR ISOTOPE DOSE: 9.5 MCI
BH CV STRESS BP STAGE 1: NORMAL
BH CV STRESS COMMENTS STAGE 1: NORMAL
BH CV STRESS DOSE REGADENOSON STAGE 1: 0.4
BH CV STRESS DURATION MIN STAGE 1: 0
BH CV STRESS DURATION SEC STAGE 1: 10
BH CV STRESS HR STAGE 1: 86
BH CV STRESS NUCLEAR ISOTOPE DOSE: 32.6 MCI
BH CV STRESS PROTOCOL 1: NORMAL
BH CV STRESS RECOVERY BP: NORMAL MMHG
BH CV STRESS RECOVERY HR: 86 BPM
BH CV STRESS STAGE 1: 1
LV EF NUC BP: 62 %
MAXIMAL PREDICTED HEART RATE: 150 BPM
PERCENT MAX PREDICTED HR: 57.33 %
STRESS BASELINE BP: NORMAL MMHG
STRESS BASELINE HR: 52 BPM
STRESS PERCENT HR: 67 %
STRESS POST EXERCISE DUR SEC: 10 SEC
STRESS POST PEAK BP: NORMAL MMHG
STRESS POST PEAK HR: 86 BPM
STRESS TARGET HR: 128 BPM

## 2021-07-29 PROCEDURE — A9500 TC99M SESTAMIBI: HCPCS | Performed by: NURSE PRACTITIONER

## 2021-07-29 PROCEDURE — 0 TECHNETIUM SESTAMIBI: Performed by: NURSE PRACTITIONER

## 2021-07-29 PROCEDURE — 25010000002 REGADENOSON 0.4 MG/5ML SOLUTION: Performed by: INTERNAL MEDICINE

## 2021-07-29 PROCEDURE — 93018 CV STRESS TEST I&R ONLY: CPT | Performed by: INTERNAL MEDICINE

## 2021-07-29 PROCEDURE — 78452 HT MUSCLE IMAGE SPECT MULT: CPT | Performed by: INTERNAL MEDICINE

## 2021-07-29 PROCEDURE — 78452 HT MUSCLE IMAGE SPECT MULT: CPT

## 2021-07-29 PROCEDURE — 93017 CV STRESS TEST TRACING ONLY: CPT

## 2021-07-29 RX ADMIN — TECHNETIUM TC 99M SESTAMIBI 1 DOSE: 1 INJECTION INTRAVENOUS at 09:49

## 2021-07-29 RX ADMIN — REGADENOSON 0.4 MG: 0.08 INJECTION, SOLUTION INTRAVENOUS at 10:54

## 2021-07-29 RX ADMIN — TECHNETIUM TC 99M SESTAMIBI 1 DOSE: 1 INJECTION INTRAVENOUS at 11:11

## 2021-08-04 PROBLEM — R94.39 ABNORMAL NUCLEAR STRESS TEST: Status: ACTIVE | Noted: 2021-06-03

## 2021-08-04 NOTE — PROGRESS NOTES
Subjective:     Encounter Date:08/05/2021      Patient ID: Cindi Hill is a 70 y.o. female   HPI: This patient presents today for routine follow-up of outpatient testing. Stephani scan on 7/29/2021 revealed small to moderate septal and apical septal ischemia.  She has been seen by GI since her last office today and started on Pepcid and Protonix.  She reports resolution of chest pain since that time.  She has a history of breast cancer status post right mastectomy remotely and left mastectomy with bilateral reconstruction 4/21, hypercholesterolemia and GERD. Patient denies chest pain, shortness of breath, palpitations, dizziness, syncope, orthopnea, PND, edema or decreased stamina.  Patient denies any signs of bleeding.    Chief Complaint: Routine follow-up  Hyperlipidemia  This is a chronic problem. The current episode started more than 1 year ago. Associated symptoms include chest pain. Pertinent negatives include no shortness of breath. Current antihyperlipidemic treatment includes statins. Risk factors for coronary artery disease include dyslipidemia.       Previous Cardiac Testing:  Results for orders placed during the hospital encounter of 02/17/17    Adult Transthoracic Echo Complete    Interpretation Summary  · Left ventricular function is normal. Estimated EF = 55%.  · Left ventricular diastolic dysfunction (grade I) consistent with impaired relaxation.  · Estimated right ventricular systolic pressure from tricuspid regurgitation is normal (<35 mmHg).    Results for orders placed during the hospital encounter of 02/28/17   Cardiac Catheterization/Vascular Study    Narrative Cardiac Catheterization Operative Report    Cindi Hill  [id number]  2/28/2017    Patient was referred for cardiac catheterization . Indications for the   procedure include:  recurrent chest pain, shortness of breath. Abnormal   nuclear stress test with  moderate anterior ischemia      Procedure performed  Left heart cath  Coronary  angiography  Right femoral arteriography  Insertion of 6 Fr Mynx hemostatic closure device with effective hemostasis   and preserved right lower extremity pulses      Procedure Details  The risks, benefits, complications, treatment options, and expected   outcomes were discussed with the patient. The patient and/or family   concurred with the proposed plan, giving informed consent. Patient was   brought to the cath lab after IV hydration was begun and oral   premedication was given. He was further sedated with fentanyl and   midazolam. He was prepped and draped in the usual manner. Using the   modified Seldinger access technique, a 6f Ugandan sheath was placed in the   femoral artery.   A left heart catheterization was done. Angiograms were also done.    Cardiac Catheterization Operative Report      Patient was referred for cardiac catheterization . Indications for the   procedure include: abnormal stress test, chest pain, shortness of breath.     Procedure Details  The risks, benefits, complications, treatment options, and expected   outcomes were discussed with the patient. The patient and/or family   concurred with the proposed plan, giving informed consent. Patient was   brought to the cath lab after IV hydration was begun and oral   premedication was given.     The skin overlying the patient's right femoral artery was prepped and   draped in the usual sterile fashion.  Timeout was taken to confirm the   correct patient and procedure.  Lidocaine was administered for local   anesthesia.  IV Versed and fentanyl were used to achieve conscious   sedation.  Modified Seldinger technique was then used to place a 5 [place]   sheath in the right femoral artery    Diagnostic coronary angiography was performed with 5 Ugandan JL4 and 3DRC   coronary catheters.  Coronary angiogram were performed in ELIF and POWER   projection to evaluate the coronary arterial systems.  A left heart   catheterization was done.  After all coronary  angiograms and LV gram and   Left heart pressure measurements were obtained, a femoral angiogram was   performed and the arteriotomy was suitable for a closure device.  A 6Fr   Mynx closure device was used to achieve hemostasis.  The patient tolerated   the procedure well, and there were no immediate complications.    Procedural Details: After written and informed consent was obtained, the   patient was brought to the cath lab in a fasting state.  Results:    1. Selective coronary angiography:    Left main coronary artery:  The left main coronary artery arises from the   left coronary cusp and continues as the LAD     left circumflex artery arises from RCA    Left main coronary artery is normal    Left anterior descending artery:  The LAD arises normally from the left   main coronary artery and courses in the anterior interventricular groove   and terminates at the apex. No stenosis noted    Left circumflex:  The left circumflex arises form the right coronary   artery and  is normal.    Right coronary artery:  The RCA arises normally from the right coronary   cusp and is dominant for the posterior circulation.  The RCA is dominant   and normal.    2. Left heart cath: LVEDP    9    mm Hg with no gradient across aortic   valve on pullback.    3. LV Gram in Normal LVEF 55% with no significant mitral regurgitation.    4. Interventions: None    Estimated Blood Loss:  Minimal         Complications:  None; patient tolerated the procedure well.           Disposition: COU            Condition: stable        Conclusion  No stenosis of  epicardial coronary arteries with dominant RCA  Left circumflex arises from RCA and normal  Normal LVEF  LVEDP   9    mm Hg    Plan  Hydration   Observation  Workup for non cardiac chest pain  Acceptable cardiovascular risk of bladder surgery             The following portions of the patient's history were reviewed and updated as appropriate: allergies, current medications, past family history,  past medical history, past social history, past surgical history and problem list.    Allergies   Allergen Reactions   • Lisinopril Cough   • Lortab [Hydrocodone-Acetaminophen] Nausea Only   • Percocet [Oxycodone-Acetaminophen] Nausea Only   • Pregabalin Other (See Comments)     Made pt very sleepy and dizzy       Current Outpatient Medications:   •  atenolol (TENORMIN) 25 MG tablet, TAKE ONE TABLET BY MOUTH EVERY DAY, Disp: 90 tablet, Rfl: 3  •  calcium carbonate (CALCIUM 600) 600 MG tablet, Take 600 mg by mouth daily., Disp: , Rfl:   •  cetirizine (zyrTEC) 10 MG tablet, Take 10 mg by mouth As Needed., Disp: , Rfl:   •  Cholecalciferol 4000 units capsule, Take 5,000 Units by mouth Daily., Disp: , Rfl:   •  famotidine (PEPCID) 40 MG tablet, Take 40 mg by mouth Daily., Disp: , Rfl:   •  lovastatin (MEVACOR) 10 MG tablet, Take 1 tablet by mouth Every Night., Disp: 90 tablet, Rfl: 3  •  ondansetron ODT (ZOFRAN-ODT) 4 MG disintegrating tablet, Take 4 mg by mouth., Disp: , Rfl:   •  pantoprazole (PROTONIX) 40 MG EC tablet, Take 40 mg by mouth 2 (two) times a day., Disp: , Rfl:   •  PREMARIN 0.625 MG/GM vaginal cream, , Disp: , Rfl:   Past Medical History:   Diagnosis Date   • Acute left-sided low back pain without sciatica 10/24/2017   • Arthritis    • Breast cancer (CMS/HCC)     1997, right breast mastectomy   • Drug therapy 1997   • GERD (gastroesophageal reflux disease)    • Hypercholesteremia    • KARUNA (stress urinary incontinence, female)    • Urge incontinence    • Uterovaginal prolapse    • Vaginal atrophy      Past Surgical History:   Procedure Laterality Date   • BLADDER SLING MODIFIED, ANTERIOR AND POSTERIOR VAGINAL REPAIR     • BLADDER SURGERY      Pacemaker for the bladder to help with incontinence.   • BRONCHOSCOPY N/A 4/26/2017    Procedure: BRONCHOSCOPY BIOPSY WITH ANESTHESIA;  Surgeon: Adi Anderson MD;  Location: [facility] ENDOSCOPY;  Service:    • CARDIAC CATHETERIZATION Left 2/28/2017    Procedure:  Cardiac Catheterization/Vascular Study;  Surgeon: Anuj Johnson MD;  Location: [facility] CATH INVASIVE LOCATION;  Service:    • COLONOSCOPY  2014   • HYSTERECTOMY     • HYSTERECTOMY     • LAMINECTOMY  01/2016   • MASTECTOMY      Right in 1997 and Left in 2021   • ROTATOR CUFF REPAIR Right 2012     Family History   Problem Relation Age of Onset   • Dementia Mother    • Heart failure Mother    • Cancer Father         liver   • Cancer Brother         thyroid   • No Known Problems Maternal Grandmother    • Diabetes Maternal Grandfather    • No Known Problems Paternal Grandmother    • Cancer Paternal Grandfather    • Breast cancer Neg Hx      Social History     Socioeconomic History   • Marital status:      Spouse name: Not on file   • Number of children: Not on file   • Years of education: Not on file   • Highest education level: Not on file   Tobacco Use   • Smoking status: Never Smoker   • Smokeless tobacco: Never Used   Vaping Use   • Vaping Use: Never used   Substance and Sexual Activity   • Alcohol use: No   • Drug use: No   • Sexual activity: Defer       Review of Systems   Constitutional: Negative. Negative for chills, decreased appetite, fever, malaise/fatigue, night sweats, weight gain and weight loss.   HENT: Negative.  Negative for nosebleeds.    Eyes: Negative for visual disturbance.   Cardiovascular: Positive for chest pain and palpitations. Negative for dyspnea on exertion, leg swelling, near-syncope, orthopnea, paroxysmal nocturnal dyspnea and syncope.   Respiratory: Negative for cough, hemoptysis, shortness of breath, snoring and wheezing.    Endocrine: Negative for cold intolerance and heat intolerance.   Hematologic/Lymphatic: Does not bruise/bleed easily.   Skin: Negative for rash.   Musculoskeletal: Negative for back pain and falls.   Gastrointestinal: Negative.  Negative for abdominal pain, change in bowel habit, constipation, diarrhea, dysphagia, heartburn, nausea and vomiting.   Genitourinary:  Negative.  Negative for hematuria.   Neurological: Negative for dizziness, headaches, light-headedness and weakness.   Psychiatric/Behavioral: Negative for altered mental status.   Allergic/Immunologic: Negative for persistent infections.              Objective:     Vitals and nursing note reviewed.   Constitutional:       General: Not in acute distress.     Appearance: Normal and healthy appearance. Well-developed, normal weight and not in distress. Not diaphoretic.   Eyes:      General: Lids are normal.         Right eye: No discharge.         Left eye: No discharge.      Conjunctiva/sclera: Conjunctivae normal.      Pupils: Pupils are equal, round, and reactive to light.   HENT:      Head: Normocephalic and atraumatic.      Jaw: There is normal jaw occlusion.      Right Ear: External ear normal.      Left Ear: External ear normal.      Nose: Nose normal.   Neck:      Thyroid: No thyromegaly.      Vascular: No carotid bruit, JVD or JVR. JVD normal.      Trachea: Trachea normal. No tracheal deviation.   Pulmonary:      Effort: Pulmonary effort is normal. No respiratory distress.      Breath sounds: Normal breath sounds. No decreased breath sounds. No wheezing. No rhonchi. No rales.   Chest:      Chest wall: Not tender to palpatation.   Cardiovascular:      PMI at left midclavicular line. Normal rate. Regular rhythm. Normal S1. Normal S2.      Murmurs: There is no murmur.      No gallop. No click. No rub.   Pulses:     Intact distal pulses. No decreased pulses.   Edema:     Peripheral edema absent.   Abdominal:      General: Bowel sounds are normal. There is no distension.      Palpations: Abdomen is soft.      Tenderness: There is no abdominal tenderness.   Musculoskeletal: Normal range of motion.         General: No tenderness or deformity.      Cervical back: Normal range of motion and neck supple. Skin:     General: Skin is warm and dry.      Coloration: Skin is not pale.      Findings: No erythema or rash.  "  Neurological:      General: No focal deficit present.      Mental Status: Alert, oriented to person, place, and time and oriented to person, place and time.   Psychiatric:         Attention and Perception: Attention and perception normal.         Mood and Affect: Mood and affect normal.         Speech: Speech normal.         Behavior: Behavior normal.         Thought Content: Thought content normal.         Cognition and Memory: Cognition and memory normal.         Judgment: Judgment normal.           Procedures  /78   Pulse 62   Ht 157.5 cm (62\")   Wt 53.5 kg (118 lb)   LMP  (LMP Unknown)   SpO2 98%   BMI 21.58 kg/m²   Lab Review:   Lab Results   Component Value Date    WBC 7.03 06/07/2021    HGB 12.1 06/07/2021    HCT 37.9 06/07/2021    MCV 87.1 06/07/2021     06/07/2021     Lab Results   Component Value Date    GLUCOSE 73 02/17/2017    BUN 15 06/07/2021    CREATININE 0.67 06/07/2021    EGFRIFNONA 87 06/07/2021    EGFRIFAFRI 105 06/07/2021    BCR 22.4 06/07/2021    K 4.4 06/07/2021    CO2 29.5 (H) 06/07/2021    CALCIUM 10.4 06/07/2021    PROTENTOTREF 7.1 06/07/2021    ALBUMIN 4.80 06/07/2021    LABIL2 2.1 06/07/2021    AST 14 06/07/2021    ALT 12 06/07/2021     Lab Results   Component Value Date    CHLPL 163 06/07/2021    CHLPL 156 12/07/2020    CHLPL 181 06/11/2020     Lab Results   Component Value Date    TRIG 104 06/07/2021    TRIG 84 12/07/2020    TRIG 176 (H) 06/11/2020     Lab Results   Component Value Date    HDL 51 06/07/2021    HDL 51 12/07/2020    HDL 49 06/11/2020     Lab Results   Component Value Date    LDL 93 06/07/2021    LDL 89 12/07/2020    LDL 97 06/11/2020       I have reviewed the most recent lab results.       Assessment:          Diagnosis Plan   1. Mixed hyperlipidemia  Managed by PCP. On statin. Well controlled.    2. LBBB (left bundle branch block)  Chronic.    3. Abnormal nuclear stress test Small to moderate septal and apical septal ischemia on Lexiscan 7/29/2021. " Previous false positive lexiscan in 2017. Essentially normal CTA of the coronary arteries 10/20. Now asymptomatic after starting GI meds.    4. Gastroesophageal reflux disease without esophagitis  Now improved.    5. Malignant neoplasm of right female breast, unspecified estrogen receptor status, unspecified site of breast (CMS/HCC)  In remission.    6. H/O bilateral mastectomy            Plan:         1. Continue medications as previously prescribed.  2. Report any worsening symptoms.  3. Report any signs of bleeding.  4. Continue heart healthy diet and regular exercise as tolerated.   5. Follow up with PCP for blood pressure and cholesterol management and routine lab work.  6. Follow up in three months, or sooner if needed.   7. Consider left heart catheterization, if symptoms return.

## 2021-08-05 ENCOUNTER — OFFICE VISIT (OUTPATIENT)
Dept: CARDIOLOGY | Facility: CLINIC | Age: 71
End: 2021-08-05

## 2021-08-05 VITALS
OXYGEN SATURATION: 98 % | WEIGHT: 118 LBS | HEART RATE: 62 BPM | SYSTOLIC BLOOD PRESSURE: 128 MMHG | BODY MASS INDEX: 21.71 KG/M2 | HEIGHT: 62 IN | DIASTOLIC BLOOD PRESSURE: 78 MMHG

## 2021-08-05 DIAGNOSIS — I44.7 LBBB (LEFT BUNDLE BRANCH BLOCK): Chronic | ICD-10-CM

## 2021-08-05 DIAGNOSIS — R94.39 ABNORMAL NUCLEAR STRESS TEST: ICD-10-CM

## 2021-08-05 DIAGNOSIS — C50.911 MALIGNANT NEOPLASM OF RIGHT FEMALE BREAST, UNSPECIFIED ESTROGEN RECEPTOR STATUS, UNSPECIFIED SITE OF BREAST (HCC): ICD-10-CM

## 2021-08-05 DIAGNOSIS — E78.2 MIXED HYPERLIPIDEMIA: Primary | Chronic | ICD-10-CM

## 2021-08-05 DIAGNOSIS — K21.9 GASTROESOPHAGEAL REFLUX DISEASE WITHOUT ESOPHAGITIS: ICD-10-CM

## 2021-08-05 DIAGNOSIS — Z90.13 HISTORY OF BILATERAL MASTECTOMY: ICD-10-CM

## 2021-08-05 PROCEDURE — 99214 OFFICE O/P EST MOD 30 MIN: CPT | Performed by: NURSE PRACTITIONER

## 2021-08-05 RX ORDER — FAMOTIDINE 40 MG/1
40 TABLET, FILM COATED ORAL DAILY PRN
COMMUNITY

## 2021-08-05 RX ORDER — PANTOPRAZOLE SODIUM 40 MG/1
40 TABLET, DELAYED RELEASE ORAL 2 TIMES DAILY
COMMUNITY

## 2021-11-04 PROBLEM — I47.10 PSVT (PAROXYSMAL SUPRAVENTRICULAR TACHYCARDIA): Chronic | Status: ACTIVE | Noted: 2021-01-14

## 2021-11-04 PROBLEM — I47.1 PSVT (PAROXYSMAL SUPRAVENTRICULAR TACHYCARDIA) (HCC): Chronic | Status: ACTIVE | Noted: 2021-01-14

## 2021-11-04 NOTE — PROGRESS NOTES
Subjective:     Encounter Date: 11/5/2021      Patient ID: Cindi Hill is a 70 y.o. female   HPI: This patient presents today for routine follow-up. She has had an abnormal Stephani scan in the past with a subsequently normal CT angiogram of the coronary arteries.  She has had breast cancer status post right mastectomy remotely and left mastectomy with bilateral reconstruction 4/21.  She has paroxysmal supraventricular tachycardia, chronic left bundle branch block, hypercholesterolemia and GERD. She reports occasional palpitations. Patient denies chest pain, shortness of breath, dizziness, syncope, orthopnea, PND, edema or decreased stamina.  Patient denies any signs of bleeding.    Chief Complaint: Routine follow-up  Hyperlipidemia  This is a chronic problem. The current episode started more than 1 year ago. Pertinent negatives include no chest pain or shortness of breath. Current antihyperlipidemic treatment includes statins. Risk factors for coronary artery disease include dyslipidemia.       Previous Cardiac Testing:  Results for orders placed during the hospital encounter of 02/17/17    Adult Transthoracic Echo Complete    Interpretation Summary  · Left ventricular function is normal. Estimated EF = 55%.  · Left ventricular diastolic dysfunction (grade I) consistent with impaired relaxation.  · Estimated right ventricular systolic pressure from tricuspid regurgitation is normal (<35 mmHg).    Results for orders placed during the hospital encounter of 02/28/17   Cardiac Catheterization/Vascular Study    Narrative Cardiac Catheterization Operative Report    Cindi Hill  [id number]  2/28/2017    Patient was referred for cardiac catheterization . Indications for the   procedure include:  recurrent chest pain, shortness of breath. Abnormal   nuclear stress test with  moderate anterior ischemia      Procedure performed  Left heart cath  Coronary angiography  Right femoral arteriography  Insertion of 6 Fr Mynx  hemostatic closure device with effective hemostasis   and preserved right lower extremity pulses      Procedure Details  The risks, benefits, complications, treatment options, and expected   outcomes were discussed with the patient. The patient and/or family   concurred with the proposed plan, giving informed consent. Patient was   brought to the cath lab after IV hydration was begun and oral   premedication was given. He was further sedated with fentanyl and   midazolam. He was prepped and draped in the usual manner. Using the   modified Seldinger access technique, a 6f Yemeni sheath was placed in the   femoral artery.   A left heart catheterization was done. Angiograms were also done.    Cardiac Catheterization Operative Report      Patient was referred for cardiac catheterization . Indications for the   procedure include: abnormal stress test, chest pain, shortness of breath.     Procedure Details  The risks, benefits, complications, treatment options, and expected   outcomes were discussed with the patient. The patient and/or family   concurred with the proposed plan, giving informed consent. Patient was   brought to the cath lab after IV hydration was begun and oral   premedication was given.     The skin overlying the patient's right femoral artery was prepped and   draped in the usual sterile fashion.  Timeout was taken to confirm the   correct patient and procedure.  Lidocaine was administered for local   anesthesia.  IV Versed and fentanyl were used to achieve conscious   sedation.  Modified Seldinger technique was then used to place a 5 Yemeni   sheath in the right femoral artery    Diagnostic coronary angiography was performed with 5 Yemeni JL4 and 3DRC   coronary catheters.  Coronary angiogram were performed in ELIF and POWER   projection to evaluate the coronary arterial systems.  A left heart   catheterization was done.  After all coronary angiograms and LV gram and   Left heart pressure measurements were  obtained, a femoral angiogram was   performed and the arteriotomy was suitable for a closure device.  A 6Fr   Mynx closure device was used to achieve hemostasis.  The patient tolerated   the procedure well, and there were no immediate complications.    Procedural Details: After written and informed consent was obtained, the   patient was brought to the cath lab in a fasting state.  Results:    1. Selective coronary angiography:    Left main coronary artery:  The left main coronary artery arises from the   left coronary cusp and continues as the LAD     left circumflex artery arises from RCA    Left main coronary artery is normal    Left anterior descending artery:  The LAD arises normally from the left   main coronary artery and courses in the anterior interventricular groove   and terminates at the apex. No stenosis noted    Left circumflex:  The left circumflex arises form the right coronary   artery and  is normal.    Right coronary artery:  The RCA arises normally from the right coronary   cusp and is dominant for the posterior circulation.  The RCA is dominant   and normal.    2. Left heart cath: LVEDP    9    mm Hg with no gradient across aortic   valve on pullback.    3. LV Gram in Normal LVEF 55% with no significant mitral regurgitation.    4. Interventions: None    Estimated Blood Loss:  Minimal         Complications:  None; patient tolerated the procedure well.           Disposition: COU            Condition: stable        Conclusion  No stenosis of  epicardial coronary arteries with dominant RCA  Left circumflex arises from RCA and normal  Normal LVEF  LVEDP   9    mm Hg    Plan  Hydration   Observation  Workup for non cardiac chest pain  Acceptable cardiovascular risk of bladder surgery             The following portions of the patient's history were reviewed and updated as appropriate: allergies, current medications, past family history, past medical history, past social history, past surgical history  and problem list.    Allergies   Allergen Reactions   • Lisinopril Cough   • Lortab [Hydrocodone-Acetaminophen] Nausea Only   • Percocet [Oxycodone-Acetaminophen] Nausea Only   • Pregabalin Other (See Comments)     Made pt very sleepy and dizzy       Current Outpatient Medications:   •  atenolol (TENORMIN) 25 MG tablet, Take 1 tablet by mouth Daily., Disp: 90 tablet, Rfl: 3  •  calcium carbonate (CALCIUM 600) 600 MG tablet, Take 600 mg by mouth daily., Disp: , Rfl:   •  cetirizine (zyrTEC) 10 MG tablet, Take 10 mg by mouth As Needed., Disp: , Rfl:   •  Cholecalciferol 4000 units capsule, Take 5,000 Units by mouth Daily., Disp: , Rfl:   •  diphenhydrAMINE (BENADRYL) 25 mg capsule, Take 25 mg by mouth As Needed for Itching., Disp: , Rfl:   •  famotidine (PEPCID) 40 MG tablet, Take 40 mg by mouth Daily., Disp: , Rfl:   •  fluticasone (FLONASE) 50 MCG/ACT nasal spray, 2 sprays into the nostril(s) as directed by provider Daily., Disp: , Rfl:   •  lovastatin (MEVACOR) 10 MG tablet, Take 1 tablet by mouth Every Night., Disp: 90 tablet, Rfl: 3  •  ondansetron ODT (ZOFRAN-ODT) 4 MG disintegrating tablet, Take 4 mg by mouth., Disp: , Rfl:   •  pantoprazole (PROTONIX) 40 MG EC tablet, Take 40 mg by mouth 2 (two) times a day., Disp: , Rfl:   •  PREMARIN 0.625 MG/GM vaginal cream, , Disp: , Rfl:   •  sucralfate (CARAFATE) 1 g tablet, Take 1 g by mouth 4 (Four) Times a Day., Disp: , Rfl:   Past Medical History:   Diagnosis Date   • Acute left-sided low back pain without sciatica 10/24/2017   • Arthritis    • Breast cancer (HCC)     1997, right breast mastectomy   • Drug therapy 1997   • GERD (gastroesophageal reflux disease)    • Hypercholesteremia    • KARUNA (stress urinary incontinence, female)    • Urge incontinence    • Uterovaginal prolapse    • Vaginal atrophy      Past Surgical History:   Procedure Laterality Date   • BLADDER SLING MODIFIED, ANTERIOR AND POSTERIOR VAGINAL REPAIR     • BLADDER SURGERY      Pacemaker for the  bladder to help with incontinence.   • BRONCHOSCOPY N/A 4/26/2017    Procedure: BRONCHOSCOPY BIOPSY WITH ANESTHESIA;  Surgeon: Adi Anderson MD;  Location:  [facility];  Service:    • CARDIAC CATHETERIZATION Left 2/28/2017    Procedure: Cardiac Catheterization/Vascular Study;  Surgeon: Anuj Johnson MD;  Location:  [facility];  Service:    • COLONOSCOPY  2014   • HYSTERECTOMY     • HYSTERECTOMY     • LAMINECTOMY  01/2016   • MASTECTOMY      Right in 1997 and Left in 2021   • ROTATOR CUFF REPAIR Right 2012     Family History   Problem Relation Age of Onset   • Dementia Mother    • Heart failure Mother    • Cancer Father         liver   • Cancer Brother         thyroid   • No Known Problems Maternal Grandmother    • Diabetes Maternal Grandfather    • No Known Problems Paternal Grandmother    • Cancer Paternal Grandfather    • Breast cancer Neg Hx      Social History     Socioeconomic History   • Marital status:    Tobacco Use   • Smoking status: Never Smoker   • Smokeless tobacco: Never Used   Vaping Use   • Vaping Use: Never used   Substance and Sexual Activity   • Alcohol use: No   • Drug use: No   • Sexual activity: Defer       Review of Systems   Constitutional: Negative. Negative for chills, decreased appetite, fever, malaise/fatigue, night sweats, weight gain and weight loss.   HENT: Negative.  Negative for nosebleeds.    Eyes: Negative for visual disturbance.   Cardiovascular: Positive for palpitations. Negative for chest pain, dyspnea on exertion, leg swelling, near-syncope, orthopnea, paroxysmal nocturnal dyspnea and syncope.   Respiratory: Negative for cough, hemoptysis, shortness of breath, snoring and wheezing.    Endocrine: Negative for cold intolerance and heat intolerance.   Hematologic/Lymphatic: Does not bruise/bleed easily.   Skin: Negative for rash.   Musculoskeletal: Negative for back pain and falls.   Gastrointestinal: Negative.  Negative for abdominal pain,  change in bowel habit, constipation, diarrhea, dysphagia, heartburn, nausea and vomiting.   Genitourinary: Negative.  Negative for hematuria.   Neurological: Negative for dizziness, headaches, light-headedness and weakness.   Psychiatric/Behavioral: Negative for altered mental status.   Allergic/Immunologic: Negative for persistent infections.              Objective:     Vitals and nursing note reviewed.   Constitutional:       General: Not in acute distress.     Appearance: Normal and healthy appearance. Well-developed, normal weight and not in distress. Not diaphoretic.   Eyes:      General: Lids are normal.         Right eye: No discharge.         Left eye: No discharge.      Conjunctiva/sclera: Conjunctivae normal.      Pupils: Pupils are equal, round, and reactive to light.   HENT:      Head: Normocephalic and atraumatic.      Jaw: There is normal jaw occlusion.      Right Ear: External ear normal.      Left Ear: External ear normal.      Nose: Nose normal.   Neck:      Thyroid: No thyromegaly.      Vascular: No carotid bruit, JVD or JVR. JVD normal.      Trachea: Trachea normal. No tracheal deviation.   Pulmonary:      Effort: Pulmonary effort is normal. No respiratory distress.      Breath sounds: Normal breath sounds. No decreased breath sounds. No wheezing. No rhonchi. No rales.   Chest:      Chest wall: Not tender to palpatation.   Cardiovascular:      PMI at left midclavicular line. Normal rate. Regular rhythm. Normal S1. Normal S2.      Murmurs: There is no murmur.      No gallop. No click. No rub.   Pulses:     Intact distal pulses. No decreased pulses.   Edema:     Peripheral edema absent.   Abdominal:      General: Bowel sounds are normal. There is no distension.      Palpations: Abdomen is soft.      Tenderness: There is no abdominal tenderness.   Musculoskeletal: Normal range of motion.         General: No tenderness or deformity.      Cervical back: Normal range of motion and neck supple. Skin:      "General: Skin is warm and dry.      Coloration: Skin is not pale.      Findings: No erythema or rash.   Neurological:      General: No focal deficit present.      Mental Status: Alert, oriented to person, place, and time and oriented to person, place and time.   Psychiatric:         Attention and Perception: Attention and perception normal.         Mood and Affect: Mood and affect normal.         Speech: Speech normal.         Behavior: Behavior normal.         Thought Content: Thought content normal.         Cognition and Memory: Cognition and memory normal.         Judgment: Judgment normal.           Procedures  /78   Pulse 64   Ht 157.5 cm (62\")   Wt 56.2 kg (124 lb)   LMP  (LMP Unknown)   SpO2 98%   BMI 22.68 kg/m²   Lab Review:   Lab Results   Component Value Date    WBC 7.03 06/07/2021    HGB 12.1 06/07/2021    HCT 37.9 06/07/2021    MCV 87.1 06/07/2021     06/07/2021     Lab Results   Component Value Date    GLUCOSE 94 06/07/2021    BUN 16 06/11/2021    CREATININE 0.7 06/11/2021    EGFRIFNONA 87 06/07/2021    EGFRIFAFRI 100 06/11/2021    BCR 22.4 06/07/2021    K 3.7 06/11/2021    CO2 29 06/11/2021    CALCIUM 9.9 06/11/2021    PROTENTOTREF 7.1 06/07/2021    ALBUMIN 4.3 06/11/2021    LABIL2 2.1 06/07/2021    AST 12 (L) 06/11/2021    ALT 9 06/11/2021     Lab Results   Component Value Date    CHLPL 163 06/07/2021    CHLPL 156 12/07/2020    CHLPL 181 06/11/2020     Lab Results   Component Value Date    TRIG 104 06/07/2021    TRIG 84 12/07/2020    TRIG 176 (H) 06/11/2020     Lab Results   Component Value Date    HDL 51 06/07/2021    HDL 51 12/07/2020    HDL 49 06/11/2020     Lab Results   Component Value Date    LDL 93 06/07/2021    LDL 89 12/07/2020    LDL 97 06/11/2020       I have reviewed the most recent lab results.       Assessment:          Diagnosis Plan   1. Mixed hyperlipidemia  Managed by PCP. On statin. Well controlled.    2. LBBB (left bundle branch block)  Chronic.    3. Abnormal " nuclear stress test Small to moderate septal and apical septal ischemia on Lexiscan 7/29/2021. Previous false positive lexiscan in 2017. Essentially normal CTA of the coronary arteries 10/20. Now asymptomatic after starting GI meds.    4. Gastroesophageal reflux disease without esophagitis  Now improved.    5 PSVT (paroxysmal supraventricular tachycardia) (HCC)  15 runs with longest duration of 20 beats at a maximum rate of 226 bpm on 14-day Holter monitor 8/19/2019.  Relatively asymptomatic. Continue atenolol.          Plan:         1. Continue medications as previously prescribed.  2. Report any worsening symptoms.  3. Report any signs of bleeding.  4. Continue heart healthy diet and regular exercise as tolerated.   5. Follow up with PCP for blood pressure and cholesterol management and routine lab work.  6. Follow up with Dr. Johnson in 6 months, or sooner if needed.

## 2021-11-05 ENCOUNTER — OFFICE VISIT (OUTPATIENT)
Dept: CARDIOLOGY | Facility: CLINIC | Age: 71
End: 2021-11-05

## 2021-11-05 VITALS
OXYGEN SATURATION: 98 % | DIASTOLIC BLOOD PRESSURE: 78 MMHG | SYSTOLIC BLOOD PRESSURE: 132 MMHG | HEIGHT: 62 IN | WEIGHT: 124 LBS | HEART RATE: 64 BPM | BODY MASS INDEX: 22.82 KG/M2

## 2021-11-05 DIAGNOSIS — I44.7 LBBB (LEFT BUNDLE BRANCH BLOCK): Chronic | ICD-10-CM

## 2021-11-05 DIAGNOSIS — E78.2 MIXED HYPERLIPIDEMIA: Primary | Chronic | ICD-10-CM

## 2021-11-05 DIAGNOSIS — I47.1 PSVT (PAROXYSMAL SUPRAVENTRICULAR TACHYCARDIA) (HCC): Chronic | ICD-10-CM

## 2021-11-05 DIAGNOSIS — R94.39 ABNORMAL NUCLEAR STRESS TEST: ICD-10-CM

## 2021-11-05 DIAGNOSIS — K21.9 GASTROESOPHAGEAL REFLUX DISEASE WITHOUT ESOPHAGITIS: ICD-10-CM

## 2021-11-05 PROCEDURE — 99214 OFFICE O/P EST MOD 30 MIN: CPT | Performed by: NURSE PRACTITIONER

## 2021-11-05 RX ORDER — DIPHENHYDRAMINE HCL 25 MG
25 CAPSULE ORAL AS NEEDED
COMMUNITY

## 2021-11-05 RX ORDER — FLUTICASONE PROPIONATE 50 MCG
2 SPRAY, SUSPENSION (ML) NASAL DAILY
COMMUNITY

## 2021-11-05 RX ORDER — SUCRALFATE 1 G/1
1 TABLET ORAL 4 TIMES DAILY
COMMUNITY
End: 2021-12-09

## 2021-11-05 RX ORDER — ATENOLOL 25 MG/1
25 TABLET ORAL DAILY
Qty: 90 TABLET | Refills: 3 | Status: SHIPPED | OUTPATIENT
Start: 2021-11-05 | End: 2022-11-07 | Stop reason: SDUPTHER

## 2021-12-09 ENCOUNTER — OFFICE VISIT (OUTPATIENT)
Dept: FAMILY MEDICINE CLINIC | Facility: CLINIC | Age: 71
End: 2021-12-09

## 2021-12-09 VITALS
OXYGEN SATURATION: 93 % | TEMPERATURE: 97.4 F | WEIGHT: 124 LBS | RESPIRATION RATE: 16 BRPM | SYSTOLIC BLOOD PRESSURE: 130 MMHG | HEIGHT: 62 IN | HEART RATE: 71 BPM | DIASTOLIC BLOOD PRESSURE: 78 MMHG | BODY MASS INDEX: 22.82 KG/M2

## 2021-12-09 DIAGNOSIS — R41.3 MEMORY LOSS: ICD-10-CM

## 2021-12-09 DIAGNOSIS — R53.83 FATIGUE, UNSPECIFIED TYPE: Primary | ICD-10-CM

## 2021-12-09 DIAGNOSIS — E55.9 VITAMIN D DEFICIENCY: ICD-10-CM

## 2021-12-09 DIAGNOSIS — E78.2 MIXED HYPERLIPIDEMIA: ICD-10-CM

## 2021-12-09 DIAGNOSIS — Z00.00 MEDICARE ANNUAL WELLNESS VISIT, SUBSEQUENT: ICD-10-CM

## 2021-12-09 DIAGNOSIS — I10 HYPERTENSION, UNSPECIFIED TYPE: ICD-10-CM

## 2021-12-09 LAB
BILIRUB BLD-MCNC: NEGATIVE MG/DL
CLARITY, POC: CLEAR
COLOR UR: YELLOW
GLUCOSE UR STRIP-MCNC: NEGATIVE MG/DL
KETONES UR QL: NEGATIVE
LEUKOCYTE EST, POC: NEGATIVE
NITRITE UR-MCNC: NEGATIVE MG/ML
PH UR: 6.5 [PH] (ref 5–8)
PROT UR STRIP-MCNC: NEGATIVE MG/DL
RBC # UR STRIP: ABNORMAL /UL
SP GR UR: 1.02 (ref 1–1.03)
UROBILINOGEN UR QL: NORMAL

## 2021-12-09 PROCEDURE — 1170F FXNL STATUS ASSESSED: CPT | Performed by: FAMILY MEDICINE

## 2021-12-09 PROCEDURE — 1126F AMNT PAIN NOTED NONE PRSNT: CPT | Performed by: FAMILY MEDICINE

## 2021-12-09 PROCEDURE — G0439 PPPS, SUBSEQ VISIT: HCPCS | Performed by: FAMILY MEDICINE

## 2021-12-09 PROCEDURE — 81003 URINALYSIS AUTO W/O SCOPE: CPT | Performed by: FAMILY MEDICINE

## 2021-12-09 PROCEDURE — 1160F RVW MEDS BY RX/DR IN RCRD: CPT | Performed by: FAMILY MEDICINE

## 2021-12-09 RX ORDER — DOXYCYCLINE HYCLATE 100 MG/1
100 CAPSULE ORAL DAILY
COMMUNITY
Start: 2021-11-16 | End: 2021-12-12

## 2021-12-09 RX ORDER — MONTELUKAST SODIUM 10 MG/1
10 TABLET ORAL
COMMUNITY
Start: 2021-11-16 | End: 2022-11-17

## 2021-12-09 RX ORDER — MULTIVIT WITH MINERALS/LUTEIN
TABLET ORAL DAILY
COMMUNITY
End: 2022-12-13

## 2021-12-09 NOTE — PATIENT INSTRUCTIONS
You are due for Shingrix vaccination series ( the newest shingles vaccine).  It is a two shot series spaced 2-6 months apart. Please get this vaccine series started at your earliest convenience at your local pharmacy to help avoid shingles outbreak. It is more effective than the old Zostavax vaccine and is recommended even if you have had the Zostavax vaccine in the past.  Once the Shingrix series is completed, it does not need to be repeated.   For more information, please look at the website below:  [facility] Shingrix Vaccine Information      Medicare Wellness  Personal Prevention Plan of Service     Date of Office Visit:    Encounter Provider:  Jose Wolff MD  Place of Service:  [facility]  Patient Name: Cindi Hill  :  1950    As part of the Medicare Wellness portion of your visit today, we are providing you with this personalized preventive plan of services (PPPS). This plan is based upon recommendations of the [place] Preventive Services Task Force (USPSTF) and the Advisory Committee on Immunization Practices (ACIP).    This lists the preventive care services that should be considered, and provides dates of when you are due. Items listed as completed are up-to-date and do not require any further intervention.    Health Maintenance   Topic Date Due   • ZOSTER VACCINE (2 of 3) 2017   • COVID-19 Vaccine (3 - Booster for Moderna series) 10/01/2021   • DXA SCAN  2021   • ANNUAL WELLNESS VISIT  2021   • Pneumococcal Vaccine 65+ (2 of 2 - PPSV23) 2022 (Originally 2017)   • LIPID PANEL  2022   • COLORECTAL CANCER SCREENING  2023   • TDAP/TD VACCINES (2 - Td or Tdap) 2027   • HEPATITIS C SCREENING  Completed   • INFLUENZA VACCINE  Completed       No orders of the defined types were placed in this encounter.      No follow-ups on file.        Exercising to Stay Healthy  To become healthy and stay healthy, it is  recommended that you do moderate-intensity and vigorous-intensity exercise. You can tell that you are exercising at a moderate intensity if your heart starts beating faster and you start breathing faster but can still hold a conversation. You can tell that you are exercising at a vigorous intensity if you are breathing much harder and faster and cannot hold a conversation while exercising.  Exercising regularly is important. It has many health benefits, such as:  · Improving overall fitness, flexibility, and endurance.  · Increasing bone density.  · Helping with weight control.  · Decreasing body fat.  · Increasing muscle strength.  · Reducing stress and tension.  · Improving overall health.  How often should I exercise?  Choose an activity that you enjoy, and set realistic goals. Your health care provider can help you make an activity plan that works for you.  Exercise regularly as told by your health care provider. This may include:  · Doing strength training two times a week, such as:  ? Lifting weights.  ? Using resistance bands.  ? Push-ups.  ? Sit-ups.  ? Yoga.  · Doing a certain intensity of exercise for a given amount of time. Choose from these options:  ? A total of 150 minutes of moderate-intensity exercise every week.  ? A total of 75 minutes of vigorous-intensity exercise every week.  ? A mix of moderate-intensity and vigorous-intensity exercise every week.  Children, pregnant women, people who have not exercised regularly, people who are overweight, and older adults may need to talk with a health care provider about what activities are safe to do. If you have a medical condition, be sure to talk with your health care provider before you start a new exercise program.  What are some exercise ideas?  Moderate-intensity exercise ideas include:  · Walking 1 mile (1.6 km) in about 15 minutes.  · Biking.  · Hiking.  · Golfing.  · Dancing.  · Water aerobics.  Vigorous-intensity exercise ideas  include:  · Walking 4.5 miles (7.2 km) or more in about 1 hour.  · Jogging or running 5 miles (8 km) in about 1 hour.  · Biking 10 miles (16.1 km) or more in about 1 hour.  · Lap swimming.  · Roller-skating or in-line skating.  · Cross-country skiing.  · Vigorous competitive sports, such as football, basketball, and soccer.  · Jumping rope.  · Aerobic dancing.  What are some everyday activities that can help me to get exercise?  · Yard work, such as:  ? Pushing a .  ? Raking and bagging leaves.  · Washing your car.  · Pushing a stroller.  · Shoveling snow.  · Gardening.  · Washing windows or floors.  How can I be more active in my day-to-day activities?  · Use stairs instead of an elevator.  · Take a walk during your lunch break.  · If you drive, park your car farther away from your work or school.  · If you take public transportation, get off one stop early and walk the rest of the way.  · Stand up or walk around during all of your indoor phone calls.  · Get up, stretch, and walk around every 30 minutes throughout the day.  · Enjoy exercise with a friend. Support to continue exercising will help you keep a regular routine of activity.  What guidelines can I follow while exercising?  · Before you start a new exercise program, talk with your health care provider.  · Do not exercise so much that you hurt yourself, feel dizzy, or get very short of breath.  · Wear comfortable clothes and wear shoes with good support.  · Drink plenty of water while you exercise to prevent dehydration or heat stroke.  · Work out until your breathing and your heartbeat get faster.  Where to find more information  · [place] Department of Health and Human Services: www.hhs.gov  · Centers for Disease Control and Prevention (CDC): www.cdc.gov  Summary  · Exercising regularly is important. It will improve your overall fitness, flexibility, and endurance.  · Regular exercise also will improve your overall health. It can help you control  your weight, reduce stress, and improve your bone density.  · Do not exercise so much that you hurt yourself, feel dizzy, or get very short of breath.  · Before you start a new exercise program, talk with your health care provider.  This information is not intended to replace advice given to you by your health care provider. Make sure you discuss any questions you have with your health care provider.  Document Revised: 11/30/2018 Document Reviewed: 11/08/2018  Elsevier Patient Education © 2021 Elsevier Inc.    Mediterranean Diet  A Mediterranean diet refers to food and lifestyle choices that are based on the traditions of countries located on the [place]. This way of eating has been shown to help prevent certain conditions and improve outcomes for people who have chronic diseases, like kidney disease and heart disease.  What are tips for following this plan?  Lifestyle  · Cook and eat meals together with your family, when possible.  · Drink enough fluid to keep your urine clear or pale yellow.  · Be physically active every day. This includes:  ? Aerobic exercise like running or swimming.  ? Leisure activities like gardening, walking, or housework.  · Get 7-8 hours of sleep each night.  · If recommended by your health care provider, drink red wine in moderation. This means 1 glass a day for nonpregnant women and 2 glasses a day for men. A glass of wine equals 5 oz (150 mL).  Reading food labels    · Check the serving size of packaged foods. For foods such as rice and pasta, the serving size refers to the amount of cooked product, not dry.  · Check the total fat in packaged foods. Avoid foods that have saturated fat or trans fats.  · Check the ingredients list for added sugars, such as corn syrup.    Shopping  · At the grocery store, buy most of your food from the areas near the walls of the store. This includes:  ? Fresh fruits and vegetables (produce).  ? Grains, beans, nuts, and seeds. Some of these may  be available in unpackaged forms or large amounts (in bulk).  ? Fresh seafood.  ? Poultry and eggs.  ? Low-fat dairy products.  · Buy whole ingredients instead of prepackaged foods.  · Buy fresh fruits and vegetables in-season from local farmers markets.  · Buy frozen fruits and vegetables in resealable bags.  · If you do not have access to quality fresh seafood, buy precooked frozen shrimp or canned fish, such as tuna, salmon, or sardines.  · Buy small amounts of raw or cooked vegetables, salads, or olives from the deli or salad bar at your store.  · Stock your pantry so you always have certain foods on hand, such as olive oil, canned tuna, canned tomatoes, rice, pasta, and beans.  Cooking  · Cook foods with extra-virgin olive oil instead of using butter or other vegetable oils.  · Have meat as a side dish, and have vegetables or grains as your main dish. This means having meat in small portions or adding small amounts of meat to foods like pasta or stew.  · Use beans or vegetables instead of meat in common dishes like chili or lasagna.  · [place] with different cooking methods. Try roasting or broiling vegetables instead of steaming or sautéeing them.  · Add frozen vegetables to soups, stews, pasta, or rice.  · Add nuts or seeds for added healthy fat at each meal. You can add these to yogurt, salads, or vegetable dishes.  · Marinate fish or vegetables using olive oil, lemon juice, garlic, and fresh herbs.  Meal planning    · Plan to eat 1 vegetarian meal one day each week. Try to work up to 2 vegetarian meals, if possible.  · Eat seafood 2 or more times a week.  · Have healthy snacks readily available, such as:  ? Vegetable sticks with hummus.  ? Greek yogurt.  ? Fruit and nut trail mix.  · Eat balanced meals throughout the week. This includes:  ? Fruit: 2-3 servings a day  ? Vegetables: 4-5 servings a day  ? Low-fat dairy: 2 servings a day  ? Fish, poultry, or lean meat: 1 serving a day  ? Beans and legumes: 2  or more servings a week  ? Nuts and seeds: 1-2 servings a day  ? Whole grains: 6-8 servings a day  ? Extra-virgin olive oil: 3-4 servings a day  · Limit red meat and sweets to only a few servings a month    What are my food choices?  · Mediterranean diet  ? Recommended  § Grains: Whole-grain pasta. Brown rice. Bulgar wheat. Polenta. Couscous. Whole-wheat bread. Oatmeal. Quinoa.  § Vegetables: Artichokes. Beets. Broccoli. Cabbage. Carrots. Eggplant. Green beans. Chard. Kale. Spinach. Onions. Leeks. Peas. Squash. Tomatoes. Peppers. Radishes.  § Fruits: Apples. Apricots. Avocado. Berries. Bananas. Cherries. Dates. Figs. Grapes. Jose. Melon. Oranges. Peaches. Plums. Pomegranate.  § Meats and other protein foods: Beans. Almonds. Sunflower seeds. Pine nuts. Peanuts. Cod. [place]. Scallops. Shrimp. Tuna. Tilapia. Clams. Oysters. Eggs.  § Dairy: Low-fat milk. Cheese. Greek yogurt.  § Beverages: Water. Red wine. Herbal tea.  § Fats and oils: Extra virgin olive oil. Avocado oil. Grape seed oil.  § Sweets and desserts: Greek yogurt with honey. Baked apples. Poached pears. Trail mix.  § Seasoning and other foods: Basil. Cilantro. Coriander. Cumin. Mint. Parsley. Jesús. Rosemary. Tarragon. Garlic. Oregano. Thyme. Pepper. Balsalmic vinegar. Tahini. Hummus. Tomato sauce. Olives. Mushrooms.  ? Limit these  § Grains: Prepackaged pasta or rice dishes. Prepackaged cereal with added sugar.  § Vegetables: Deep fried potatoes (french fries).  § Fruits: Fruit canned in syrup.  § Meats and other protein foods: Beef. Pork. Lamb. Poultry with skin. Hot dogs. [place].  § Dairy: Ice cream. Sour cream. Whole milk.  § Beverages: Juice. Sugar-sweetened soft drinks. Beer. Liquor and spirits.  § Fats and oils: Butter. Canola oil. Vegetable oil. Beef fat (tallow). Lard.  § Sweets and desserts: Cookies. Cakes. Pies. Candy.  § Seasoning and other foods: Mayonnaise. Premade sauces and marinades.  The items listed may not be a complete list. Talk with  your dietitian about what dietary choices are right for you.  Summary  · The Mediterranean diet includes both food and lifestyle choices.  · Eat a variety of fresh fruits and vegetables, beans, nuts, seeds, and whole grains.  · Limit the amount of red meat and sweets that you eat.  · Talk with your health care provider about whether it is safe for you to drink red wine in moderation. This means 1 glass a day for nonpregnant women and 2 glasses a day for men. A glass of wine equals 5 oz (150 mL).  This information is not intended to replace advice given to you by your health care provider. Make sure you discuss any questions you have with your health care provider.  Document Revised: 08/17/2017 Document Reviewed: 08/10/2017  Elsevier Patient Education © 2020 Elsevier Inc.

## 2021-12-09 NOTE — PROGRESS NOTES
The ABCs of the Annual Wellness Visit  Subsequent Medicare Wellness Visit    Chief Complaint   Patient presents with   • Medicare Wellness-subsequent     Fasting      Subjective    History of Present Illness:  Cindi Hill is a 70 y.o. female who presents for a Subsequent Medicare Wellness Visit.    The following portions of the patient's history were reviewed and   updated as appropriate: allergies, current medications, past family history, past medical history, past social history, past surgical history and problem list.    Compared to one year ago, the patient feels her physical   health is the same.    Compared to one year ago, the patient feels her mental   health is the same.    Recent Hospitalizations:  She was not admitted to the hospital during the last year.       Current Medical Providers:  Patient Care Team:  Jose Wolff MD as PCP - General (Family Medicine)  Jose Wolff MD as PCP - Family Medicine  Micki Jones APRN (Inactive)  Micki Jones APRN (Inactive)  Tyrell Dixon MD as Consulting Physician (Otolaryngology)  Jose Wolff MD as Referring Physician (Family Medicine)  Anuj Johnson MD as Cardiologist (Cardiology)  Adi Anedrson MD as Consulting Physician (Pulmonary Disease)    Outpatient Medications Prior to Visit   Medication Sig Dispense Refill   • atenolol (TENORMIN) 25 MG tablet Take 1 tablet by mouth Daily. 90 tablet 3   • calcium carbonate (CALCIUM 600) 600 MG tablet Take 600 mg by mouth daily.     • cetirizine (zyrTEC) 10 MG tablet Take 10 mg by mouth As Needed.     • Cholecalciferol 4000 units capsule Take 5,000 Units by mouth Daily.     • diphenhydrAMINE (BENADRYL) 25 mg capsule Take 25 mg by mouth As Needed for Itching.     • doxycycline (VIBRAMYCIN) 100 MG capsule Take 100 mg by mouth Daily.     • famotidine (PEPCID) 40 MG tablet Take 40 mg by mouth Daily.     • fluticasone (FLONASE) 50 MCG/ACT nasal spray 2 sprays into the  nostril(s) as directed by provider Daily.     • lovastatin (MEVACOR) 10 MG tablet Take 1 tablet by mouth Every Night. 90 tablet 3   • montelukast (SINGULAIR) 10 MG tablet Take 10 mg by mouth.     • ondansetron ODT (ZOFRAN-ODT) 4 MG disintegrating tablet Take 4 mg by mouth.     • pantoprazole (PROTONIX) 40 MG EC tablet Take 40 mg by mouth 2 (two) times a day.     • PREMARIN 0.625 MG/GM vaginal cream      • Vitamin E 450 MG (1000 UT) capsule Take  by mouth Daily.     • sucralfate (CARAFATE) 1 g tablet Take 1 g by mouth 4 (Four) Times a Day.       No facility-administered medications prior to visit.       No opioid medication identified on active medication list. I have reviewed chart for other potential  high risk medication/s and harmful drug interactions in the elderly.          Aspirin is not on active medication list.  Aspirin use is not indicated based on review of current medical condition/s. Risk of harm outweighs potential benefits.  .    Patient Active Problem List   Diagnosis   • GERD (gastroesophageal reflux disease)   • Cough   • Deviated nasal septum   • Allergic rhinitis due to pollen   • [place] laryngitis   • Mixed hyperlipidemia   • Breast cancer (HCC)   • Osteoarthritis of cervical spine   • LBBB (left bundle branch block)   • Dyspnea on exertion   • Acute left-sided low back pain without sciatica   • Anginal equivalent (HCC)   • History of bilateral mastectomy   • PSVT (paroxysmal supraventricular tachycardia) ([facility])   • Abnormal nuclear stress test     Advance Care Planning  Advance Directive is not on file.  ACP discussion was declined by the patient. Patient does not have an advance directive, declines further assistance.    Review of Systems   Respiratory: Positive for apnea.         Sleep apnea CPAP cannot do without it continue lifetime   Cardiovascular: Negative for chest pain and leg swelling.        CTA of coronary arteries recently stephon   Gastrointestinal:        Kalyn -2019--had  "colonoscopy this year and endoscopy that showed duodenal ulcers not healed yet going back for rescoping   Genitourinary: Negative for difficulty urinating.        History of breast cancer recently followed by mastectomies bilaterally        Objective    Vitals:    12/09/21 1023   BP: 130/78   BP Location: Left arm   Patient Position: Sitting   Cuff Size: Adult   Pulse: 71   Resp: 16   Temp: 97.4 °F (36.3 °C)   TempSrc: Temporal   SpO2: 93%   Weight: 56.2 kg (124 lb)   Height: 157.5 cm (62\")   PainSc: 0-No pain     BMI Readings from Last 1 Encounters:   12/09/21 22.68 kg/m²   BMI is within normal parameters. No follow-up required.    Does the patient have evidence of cognitive impairment? No    Physical Exam  Vitals and nursing note reviewed.   Constitutional:       Appearance: Normal appearance.   HENT:      Right Ear: Tympanic membrane and ear canal normal.      Left Ear: Tympanic membrane and ear canal normal.   Eyes:      Extraocular Movements: Extraocular movements intact.      Pupils: Pupils are equal, round, and reactive to light.   Neck:      Vascular: No carotid bruit.   Cardiovascular:      Rate and Rhythm: Normal rate and regular rhythm.      Pulses: Normal pulses.      Heart sounds: Normal heart sounds.   Pulmonary:      Effort: Pulmonary effort is normal.      Breath sounds: Normal breath sounds.      Comments: Recently postop for bilateral mastectomies-followed by surgeon  Abdominal:      General: Abdomen is flat. There is no distension.      Palpations: Abdomen is soft. There is no mass.      Tenderness: There is no abdominal tenderness.   Genitourinary:     Comments: Surgical past  Musculoskeletal:      Right lower leg: No edema.      Left lower leg: No edema.   Lymphadenopathy:      Cervical: No cervical adenopathy.   Skin:     General: Skin is warm and dry.   Neurological:      General: No focal deficit present.      Mental Status: She is alert and oriented to person, place, and time.   Psychiatric: "         Mood and Affect: Mood normal.         Behavior: Behavior normal.         Thought Content: Thought content normal.         Judgment: Judgment normal.                 HEALTH RISK ASSESSMENT    Smoking Status:  Social History     Tobacco Use   Smoking Status Never Smoker   Smokeless Tobacco Never Used     Alcohol Consumption:  Social History     Substance and Sexual Activity   Alcohol Use No     Fall Risk Screen:    ASHLEE Fall Risk Assessment was completed, and patient is at LOW risk for falls.Assessment completed on:6/7/2021    Depression Screening:  PHQ-2/PHQ-9 Depression Screening 1/14/2021   Little interest or pleasure in doing things 0   Feeling down, depressed, or hopeless 0   Total Score 0       Health Habits and Functional and Cognitive Screening:  Functional & Cognitive Status 12/7/2020   Do you have difficulty preparing food and eating? No   Do you have difficulty bathing yourself, getting dressed or grooming yourself? No   Do you have difficulty using the toilet? No   Do you have difficulty moving around from place to place? No   Do you have trouble with steps or getting out of a bed or a chair? No   Current Diet Well Balanced Diet   Dental Exam Up to date   Eye Exam Not up to date   Exercise (times per week) 0 times per week   Current Exercise Activities Include No Regular Exercise   Do you need help using the phone?  No   Are you deaf or do you have serious difficulty hearing?  No   Do you need help with transportation? No   Do you need help shopping? No   Do you need help preparing meals?  No   Do you need help with housework?  No   Do you need help with laundry? No   Do you need help taking your medications? No   Do you need help managing money? No   Do you ever drive or ride in a car without wearing a seat belt? No   Have you felt unusual stress, anger or loneliness in the last month? No   Who do you live with? Spouse   If you need help, do you have trouble finding someone available to you? No    Have you been bothered in the last four weeks by sexual problems? No   Do you have difficulty concentrating, remembering or making decisions? Yes       Age-appropriate Screening Schedule:  Refer to the list below for future screening recommendations based on patient's age, sex and/or medical conditions. Orders for these recommended tests are listed in the plan section. The patient has been provided with a written plan.    Health Maintenance   Topic Date Due   • ZOSTER VACCINE (2 of 3) 12/29/2017   • DXA SCAN  11/18/2021   • LIPID PANEL  06/07/2022   • TDAP/TD VACCINES (2 - Td or Tdap) 11/03/2027   • INFLUENZA VACCINE  Completed              Assessment/Plan   CMS Preventative Services Quick Reference  Risk Factors Identified During Encounter  Fall Risk-High or Moderate  The above risks/problems have been discussed with the patient.  Follow up actions/plans if indicated are seen below in the Assessment/Plan Section.  Pertinent information has been shared with the patient in the After Visit Summary.    Diagnoses and all orders for this visit:    1. Fatigue, unspecified type (Primary)    2. Mixed hyperlipidemia    3. Hypertension, unspecified type    4. Vitamin D deficiency    5. Memory loss    6. Medicare annual wellness visit, subsequent        Follow Up:   Return in about 6 months (around 6/9/2022).     An After Visit Summary and PPPS were made available to the patient.        I spent 25 minutes caring for Cindi on this date of service. This time includes time spent by me in the following activities:preparing for the visit, reviewing tests, obtaining and/or reviewing a separately obtained history, performing a medically appropriate examination and/or evaluation , counseling and educating the patient/family/caregiver and ordering medications, tests, or procedures       Plan above-follow-up with endoscopist for peptic ulcer disease

## 2021-12-10 LAB
25(OH)D3+25(OH)D2 SERPL-MCNC: 51.1 NG/ML (ref 30–100)
ALBUMIN SERPL-MCNC: 4.6 G/DL (ref 3.5–5.2)
ALBUMIN/GLOB SERPL: 1.5 G/DL
ALP SERPL-CCNC: 97 U/L (ref 39–117)
ALT SERPL-CCNC: 16 U/L (ref 1–33)
AST SERPL-CCNC: 17 U/L (ref 1–32)
BASOPHILS # BLD AUTO: 0.03 10*3/MM3 (ref 0–0.2)
BASOPHILS NFR BLD AUTO: 0.6 % (ref 0–1.5)
BILIRUB SERPL-MCNC: 0.3 MG/DL (ref 0–1.2)
BUN SERPL-MCNC: 15 MG/DL (ref 8–23)
BUN/CREAT SERPL: 20 (ref 7–25)
CALCIUM SERPL-MCNC: 9.6 MG/DL (ref 8.6–10.5)
CHLORIDE SERPL-SCNC: 99 MMOL/L (ref 98–107)
CHOLEST SERPL-MCNC: 166 MG/DL (ref 0–200)
CO2 SERPL-SCNC: 29.2 MMOL/L (ref 22–29)
CREAT SERPL-MCNC: 0.75 MG/DL (ref 0.57–1)
EOSINOPHIL # BLD AUTO: 0.21 10*3/MM3 (ref 0–0.4)
EOSINOPHIL NFR BLD AUTO: 4.4 % (ref 0.3–6.2)
ERYTHROCYTE [DISTWIDTH] IN BLOOD BY AUTOMATED COUNT: 13.1 % (ref 12.3–15.4)
FOLATE SERPL-MCNC: 15.6 NG/ML (ref 4.78–24.2)
GLOBULIN SER CALC-MCNC: 3 GM/DL
GLUCOSE SERPL-MCNC: 91 MG/DL (ref 65–99)
HCT VFR BLD AUTO: 36.7 % (ref 34–46.6)
HDLC SERPL-MCNC: 65 MG/DL (ref 40–60)
HGB BLD-MCNC: 12.2 G/DL (ref 12–15.9)
IMM GRANULOCYTES # BLD AUTO: 0.01 10*3/MM3 (ref 0–0.05)
IMM GRANULOCYTES NFR BLD AUTO: 0.2 % (ref 0–0.5)
LDLC SERPL CALC-MCNC: 87 MG/DL (ref 0–100)
LYMPHOCYTES # BLD AUTO: 0.82 10*3/MM3 (ref 0.7–3.1)
LYMPHOCYTES NFR BLD AUTO: 17 % (ref 19.6–45.3)
MCH RBC QN AUTO: 28.4 PG (ref 26.6–33)
MCHC RBC AUTO-ENTMCNC: 33.2 G/DL (ref 31.5–35.7)
MCV RBC AUTO: 85.5 FL (ref 79–97)
MONOCYTES # BLD AUTO: 0.48 10*3/MM3 (ref 0.1–0.9)
MONOCYTES NFR BLD AUTO: 10 % (ref 5–12)
NEUTROPHILS # BLD AUTO: 3.26 10*3/MM3 (ref 1.7–7)
NEUTROPHILS NFR BLD AUTO: 67.8 % (ref 42.7–76)
NRBC BLD AUTO-RTO: 0 /100 WBC (ref 0–0.2)
PLATELET # BLD AUTO: 218 10*3/MM3 (ref 140–450)
POTASSIUM SERPL-SCNC: 4.1 MMOL/L (ref 3.5–5.2)
PROT SERPL-MCNC: 7.6 G/DL (ref 6–8.5)
RBC # BLD AUTO: 4.29 10*6/MM3 (ref 3.77–5.28)
SODIUM SERPL-SCNC: 139 MMOL/L (ref 136–145)
T4 FREE SERPL-MCNC: 1.1 NG/DL (ref 0.93–1.7)
TRIGL SERPL-MCNC: 72 MG/DL (ref 0–150)
TSH SERPL DL<=0.005 MIU/L-ACNC: 2.34 UIU/ML (ref 0.27–4.2)
VIT B12 SERPL-MCNC: 579 PG/ML (ref 211–946)
VLDLC SERPL CALC-MCNC: 14 MG/DL (ref 5–40)
WBC # BLD AUTO: 4.81 10*3/MM3 (ref 3.4–10.8)

## 2022-01-11 RX ORDER — LOVASTATIN 10 MG/1
TABLET ORAL
Qty: 90 TABLET | Refills: 3 | Status: SHIPPED | OUTPATIENT
Start: 2022-01-11 | End: 2023-02-07

## 2022-01-11 NOTE — TELEPHONE ENCOUNTER
Rx Refill Note  Requested Prescriptions     Pending Prescriptions Disp Refills   • lovastatin (MEVACOR) 10 MG tablet [Pharmacy Med Name: LOVASTATIN 10MG TABS] 90 tablet 3     Sig: TAKE ONE TABLET BY MOUTH AT BEDTIME      Last office visit with prescribing clinician: 12/9/2021      Next office visit with prescribing clinician: 6/9/2022            Leann Clark MA  01/11/22, 12:36 CST

## 2022-06-24 ENCOUNTER — OFFICE VISIT (OUTPATIENT)
Dept: FAMILY MEDICINE CLINIC | Facility: CLINIC | Age: 72
End: 2022-06-24

## 2022-06-24 VITALS
HEIGHT: 62 IN | TEMPERATURE: 98 F | DIASTOLIC BLOOD PRESSURE: 75 MMHG | SYSTOLIC BLOOD PRESSURE: 118 MMHG | HEART RATE: 60 BPM | OXYGEN SATURATION: 99 % | BODY MASS INDEX: 23.92 KG/M2 | WEIGHT: 130 LBS

## 2022-06-24 DIAGNOSIS — Z23 NEED FOR SHINGLES VACCINE: ICD-10-CM

## 2022-06-24 DIAGNOSIS — E78.2 MIXED HYPERLIPIDEMIA: Primary | Chronic | ICD-10-CM

## 2022-06-24 DIAGNOSIS — K21.9 GASTROESOPHAGEAL REFLUX DISEASE WITHOUT ESOPHAGITIS: ICD-10-CM

## 2022-06-24 DIAGNOSIS — Z23 NEED FOR PNEUMOCOCCAL VACCINATION: ICD-10-CM

## 2022-06-24 DIAGNOSIS — Z90.13 HISTORY OF BILATERAL MASTECTOMY: ICD-10-CM

## 2022-06-24 DIAGNOSIS — Z23 ENCOUNTER FOR IMMUNIZATION: ICD-10-CM

## 2022-06-24 DIAGNOSIS — Z78.0 POSTMENOPAUSAL: ICD-10-CM

## 2022-06-24 PROBLEM — R06.09 DYSPNEA ON EXERTION: Status: RESOLVED | Noted: 2017-01-10 | Resolved: 2022-06-24

## 2022-06-24 PROBLEM — R11.2 NAUSEA AND VOMITING: Status: ACTIVE | Noted: 2021-06-10

## 2022-06-24 PROBLEM — Z99.89 OSA ON CPAP: Status: ACTIVE | Noted: 2022-04-21

## 2022-06-24 PROBLEM — M54.50 ACUTE LEFT-SIDED LOW BACK PAIN WITHOUT SCIATICA: Status: RESOLVED | Noted: 2017-10-24 | Resolved: 2022-06-24

## 2022-06-24 PROBLEM — K26.9: Status: ACTIVE | Noted: 2022-02-28

## 2022-06-24 PROBLEM — G47.33 OSA ON CPAP: Status: ACTIVE | Noted: 2022-04-21

## 2022-06-24 PROBLEM — I10 ESSENTIAL HYPERTENSION: Status: ACTIVE | Noted: 2022-04-21

## 2022-06-24 PROBLEM — K21.00 GASTROESOPHAGEAL REFLUX DISEASE WITH ESOPHAGITIS WITHOUT HEMORRHAGE: Status: ACTIVE | Noted: 2022-04-21

## 2022-06-24 PROCEDURE — 99214 OFFICE O/P EST MOD 30 MIN: CPT | Performed by: NURSE PRACTITIONER

## 2022-06-24 RX ORDER — PNEUMOCOCCAL VACCINE POLYVALENT 25; 25; 25; 25; 25; 25; 25; 25; 25; 25; 25; 25; 25; 25; 25; 25; 25; 25; 25; 25; 25; 25; 25 UG/.5ML; UG/.5ML; UG/.5ML; UG/.5ML; UG/.5ML; UG/.5ML; UG/.5ML; UG/.5ML; UG/.5ML; UG/.5ML; UG/.5ML; UG/.5ML; UG/.5ML; UG/.5ML; UG/.5ML; UG/.5ML; UG/.5ML; UG/.5ML; UG/.5ML; UG/.5ML; UG/.5ML; UG/.5ML; UG/.5ML
0.5 INJECTION, SOLUTION INTRAMUSCULAR; SUBCUTANEOUS ONCE
Qty: 0.5 ML | Refills: 0 | Status: SHIPPED | OUTPATIENT
Start: 2022-06-24 | End: 2022-06-24

## 2022-06-24 RX ORDER — SUCRALFATE 1 G/1
1 TABLET ORAL 4 TIMES DAILY
COMMUNITY
Start: 2022-05-12 | End: 2022-12-13 | Stop reason: ALTCHOICE

## 2022-06-24 RX ORDER — ZOSTER VACCINE RECOMBINANT, ADJUVANTED 50 MCG/0.5
0.5 KIT INTRAMUSCULAR ONCE
Qty: 1 EACH | Refills: 1 | Status: SHIPPED | OUTPATIENT
Start: 2022-06-24 | End: 2022-06-24

## 2022-06-24 RX ORDER — DOCUSATE SODIUM 100 MG/1
100 CAPSULE, LIQUID FILLED ORAL 2 TIMES DAILY
COMMUNITY

## 2022-06-24 NOTE — PROGRESS NOTES
CC: 6 month check    History:  Cindi Hill is a 71 y.o. female who presents today for evaluation of the above problems.      Patient reports that she is doing well. She denies any current issues, though notes that her duodenum was punctured during an endoscopy in April. She is recovering well from this and notes that she has just now been allowed to increase her diet gradually back to regular from [facility]. BP looks great. Weight is appropriate. She is due for a DEXA.          HPI  ROS:  Review of Systems   Respiratory: Negative for shortness of breath.    Cardiovascular: Negative for chest pain.   Gastrointestinal: Negative for abdominal pain.       Allergies   Allergen Reactions   • Lisinopril Cough   • Lortab [Hydrocodone-Acetaminophen] Nausea Only   • Percocet [Oxycodone-Acetaminophen] Nausea Only   • Pregabalin Other (See Comments)     Made pt very sleepy and dizzy   • Adhesive Tape Itching     Bandage tape caused itching and redness     Past Medical History:   Diagnosis Date   • Acute left-sided low back pain without sciatica 10/24/2017   • Arthritis    • Breast cancer (HCC)     1997, right breast mastectomy   • Drug therapy 1997   • Essential hypertension 4/21/2022   • GERD (gastroesophageal reflux disease)    • Hypercholesteremia    • TERESA on CPAP 4/21/2022   • KAURNA (stress urinary incontinence, female)    • Ulcer, duodenal, with obstruction 2/28/2022   • Urge incontinence    • Uterovaginal prolapse    • Vaginal atrophy      Past Surgical History:   Procedure Laterality Date   • BLADDER SLING MODIFIED, ANTERIOR AND POSTERIOR VAGINAL REPAIR     • BLADDER SURGERY      Pacemaker for the bladder to help with incontinence.   • BRONCHOSCOPY N/A 4/26/2017    Procedure: BRONCHOSCOPY BIOPSY WITH ANESTHESIA;  Surgeon: Adi Anderson MD;  Location:  [facility];  Service:    • CARDIAC CATHETERIZATION Left 2/28/2017    Procedure: Cardiac Catheterization/Vascular Study;  Surgeon: Anuj Johnson MD;  Location:   [facility]ATH [facility]VASIVE LOCATION;  Service:    • COLONOSCOPY  2014   • HYSTERECTOMY     • HYSTERECTOMY     • LAMINECTOMY  01/2016   • MASTECTOMY      Right in 1997 and Left in 2021   • ROTATOR CUFF REPAIR Right 2012     Family History   Problem Relation Age of Onset   • Dementia Mother    • Heart failure Mother    • Cancer Father         liver   • Cancer Brother         thyroid   • No Known Problems Maternal Grandmother    • Diabetes Maternal Grandfather    • No Known Problems Paternal Grandmother    • Cancer Paternal Grandfather    • Breast cancer Neg Hx       reports that she has never smoked. She has never used smokeless tobacco. She reports that she does not drink alcohol and does not use drugs.      Current Outpatient Medications:   •  atenolol (TENORMIN) 25 MG tablet, Take 1 tablet by mouth Daily., Disp: 90 tablet, Rfl: 3  •  calcium carbonate (OS-MARIANNE) 600 MG tablet, Take 600 mg by mouth daily., Disp: , Rfl:   •  cetirizine (zyrTEC) 10 MG tablet, Take 10 mg by mouth As Needed., Disp: , Rfl:   •  Cholecalciferol 4000 units capsule, Take 5,000 Units by mouth Daily., Disp: , Rfl:   •  docusate sodium (COLACE) 100 MG capsule, Take 100 mg by mouth 2 (Two) Times a Day., Disp: , Rfl:   •  famotidine (PEPCID) 40 MG tablet, Take 40 mg by mouth Daily., Disp: , Rfl:   •  lovastatin (MEVACOR) 10 MG tablet, TAKE ONE TABLET BY MOUTH AT BEDTIME, Disp: 90 tablet, Rfl: 3  •  montelukast (SINGULAIR) 10 MG tablet, Take 10 mg by mouth., Disp: , Rfl:   •  pantoprazole (PROTONIX) 40 MG EC tablet, Take 40 mg by mouth 2 (two) times a day., Disp: , Rfl:   •  PREMARIN 0.625 MG/GM vaginal cream, , Disp: , Rfl:   •  sucralfate (CARAFATE) 1 g tablet, Take 1 g by mouth 4 (Four) Times a Day., Disp: , Rfl:   •  Vitamin E 450 MG (1000 UT) capsule, Take  by mouth Daily., Disp: , Rfl:   •  diphenhydrAMINE (BENADRYL) 25 mg capsule, Take 25 mg by mouth As Needed for Itching., Disp: , Rfl:   •  fluticasone (FLONASE) 50 MCG/ACT nasal spray, 2 sprays into  "the nostril(s) as directed by provider Daily., Disp: , Rfl:   •  ondansetron ODT (ZOFRAN-ODT) 4 MG disintegrating tablet, Take 4 mg by mouth., Disp: , Rfl:   •  pneumococcal polysaccharide 23-valent (Pneumovax 23) 25 MCG/0.5ML vaccine, Inject 0.5 mL into the appropriate muscle as directed by prescriber 1 (One) Time for 1 dose., Disp: 0.5 mL, Rfl: 0  •  Zoster Vac Recomb Adjuvanted (Shingrix) 50 MCG/0.5ML reconstituted suspension, Inject 0.5 mL into the appropriate muscle as directed by prescriber 1 (One) Time for 1 dose., Disp: 1 each, Rfl: 1    OBJECTIVE:  /75 (BP Location: Left arm, Patient Position: Sitting, Cuff Size: Adult)   Pulse 60   Temp 98 °F (36.7 °C) (Temporal)   Ht 157.5 cm (62\")   Wt 59 kg (130 lb)   LMP  (LMP Unknown)   SpO2 99%   Breastfeeding No   BMI 23.78 kg/m²    Physical Exam  Vitals reviewed.   Constitutional:       Appearance: She is well-developed.   Cardiovascular:      Rate and Rhythm: Normal rate and regular rhythm.      Heart sounds: Normal heart sounds.   Pulmonary:      Effort: Pulmonary effort is normal.      Breath sounds: Normal breath sounds.   Neurological:      Mental Status: She is alert and oriented to person, place, and time.   Psychiatric:         Behavior: Behavior normal.         Assessment/Plan    Diagnoses and all orders for this visit:    1. Mixed hyperlipidemia (Primary)  -     Lipid Panel    2. Gastroesophageal reflux disease without esophagitis  -     CBC & Differential  -     Comprehensive Metabolic Panel    3. History of bilateral mastectomy    4. Need for shingles vaccine  -     Zoster Vac Recomb Adjuvanted (Shingrix) 50 MCG/0.5ML reconstituted suspension; Inject 0.5 mL into the appropriate muscle as directed by prescriber 1 (One) Time for 1 dose.  Dispense: 1 each; Refill: 1    5. Encounter for immunization  -     pneumococcal polysaccharide 23-valent (Pneumovax 23) 25 MCG/0.5ML vaccine; Inject 0.5 mL into the appropriate muscle as directed by " prescriber 1 (One) Time for 1 dose.  Dispense: 0.5 mL; Refill: 0    6. Postmenopausal  -     DEXA Bone Density Axial; Future  -     CBC & Differential  -     Comprehensive Metabolic Panel      An After Visit Summary was printed and given to the patient at discharge.  Return in about 6 months (around 12/24/2022) for Annual physical.       BAR Ortega 6/24/22    Electronically signed.

## 2022-06-25 LAB
ALBUMIN SERPL-MCNC: 4.5 G/DL (ref 3.5–5.2)
ALBUMIN/GLOB SERPL: 1.5 G/DL
ALP SERPL-CCNC: 85 U/L (ref 39–117)
ALT SERPL-CCNC: 13 U/L (ref 1–33)
AST SERPL-CCNC: 14 U/L (ref 1–32)
BASOPHILS # BLD AUTO: 0.03 10*3/MM3 (ref 0–0.2)
BASOPHILS NFR BLD AUTO: 0.5 % (ref 0–1.5)
BILIRUB SERPL-MCNC: 0.3 MG/DL (ref 0–1.2)
BUN SERPL-MCNC: 15 MG/DL (ref 8–23)
BUN/CREAT SERPL: 19 (ref 7–25)
CALCIUM SERPL-MCNC: 9.3 MG/DL (ref 8.6–10.5)
CHLORIDE SERPL-SCNC: 103 MMOL/L (ref 98–107)
CHOLEST SERPL-MCNC: 173 MG/DL (ref 0–200)
CO2 SERPL-SCNC: 30.8 MMOL/L (ref 22–29)
CREAT SERPL-MCNC: 0.79 MG/DL (ref 0.57–1)
EGFRCR SERPLBLD CKD-EPI 2021: 80.1 ML/MIN/1.73
EOSINOPHIL # BLD AUTO: 0.27 10*3/MM3 (ref 0–0.4)
EOSINOPHIL NFR BLD AUTO: 4.7 % (ref 0.3–6.2)
ERYTHROCYTE [DISTWIDTH] IN BLOOD BY AUTOMATED COUNT: 12.9 % (ref 12.3–15.4)
GLOBULIN SER CALC-MCNC: 3.1 GM/DL
GLUCOSE SERPL-MCNC: 94 MG/DL (ref 65–99)
HCT VFR BLD AUTO: 38.9 % (ref 34–46.6)
HDLC SERPL-MCNC: 50 MG/DL (ref 40–60)
HGB BLD-MCNC: 12.6 G/DL (ref 12–15.9)
IMM GRANULOCYTES # BLD AUTO: 0.01 10*3/MM3 (ref 0–0.05)
IMM GRANULOCYTES NFR BLD AUTO: 0.2 % (ref 0–0.5)
LDLC SERPL CALC-MCNC: 105 MG/DL (ref 0–100)
LYMPHOCYTES # BLD AUTO: 1.16 10*3/MM3 (ref 0.7–3.1)
LYMPHOCYTES NFR BLD AUTO: 20.3 % (ref 19.6–45.3)
MCH RBC QN AUTO: 28.8 PG (ref 26.6–33)
MCHC RBC AUTO-ENTMCNC: 32.4 G/DL (ref 31.5–35.7)
MCV RBC AUTO: 88.8 FL (ref 79–97)
MONOCYTES # BLD AUTO: 0.42 10*3/MM3 (ref 0.1–0.9)
MONOCYTES NFR BLD AUTO: 7.3 % (ref 5–12)
NEUTROPHILS # BLD AUTO: 3.83 10*3/MM3 (ref 1.7–7)
NEUTROPHILS NFR BLD AUTO: 67 % (ref 42.7–76)
NRBC BLD AUTO-RTO: 0 /100 WBC (ref 0–0.2)
PLATELET # BLD AUTO: 234 10*3/MM3 (ref 140–450)
POTASSIUM SERPL-SCNC: 4.3 MMOL/L (ref 3.5–5.2)
PROT SERPL-MCNC: 7.6 G/DL (ref 6–8.5)
RBC # BLD AUTO: 4.38 10*6/MM3 (ref 3.77–5.28)
SODIUM SERPL-SCNC: 143 MMOL/L (ref 136–145)
TRIGL SERPL-MCNC: 96 MG/DL (ref 0–150)
VLDLC SERPL CALC-MCNC: 18 MG/DL (ref 5–40)
WBC # BLD AUTO: 5.72 10*3/MM3 (ref 3.4–10.8)

## 2022-07-14 ENCOUNTER — APPOINTMENT (OUTPATIENT)
Dept: BONE DENSITY | Facility: HOSPITAL | Age: 72
End: 2022-07-14

## 2022-08-05 ENCOUNTER — HOSPITAL ENCOUNTER (OUTPATIENT)
Dept: BONE DENSITY | Facility: HOSPITAL | Age: 72
Discharge: HOME | End: 2022-08-05
Admitting: NURSE PRACTITIONER

## 2022-08-05 DIAGNOSIS — Z78.0 POSTMENOPAUSAL: ICD-10-CM

## 2022-08-05 PROCEDURE — 77080 DXA BONE DENSITY AXIAL: CPT

## 2022-11-07 ENCOUNTER — OFFICE VISIT (OUTPATIENT)
Dept: CARDIOLOGY | Facility: CLINIC | Age: 72
End: 2022-11-07

## 2022-11-07 VITALS
DIASTOLIC BLOOD PRESSURE: 70 MMHG | HEART RATE: 62 BPM | SYSTOLIC BLOOD PRESSURE: 142 MMHG | BODY MASS INDEX: 23.74 KG/M2 | HEIGHT: 62 IN | OXYGEN SATURATION: 98 % | WEIGHT: 129 LBS

## 2022-11-07 DIAGNOSIS — G47.33 OSA ON CPAP: ICD-10-CM

## 2022-11-07 DIAGNOSIS — I47.1 PSVT (PAROXYSMAL SUPRAVENTRICULAR TACHYCARDIA): Primary | Chronic | ICD-10-CM

## 2022-11-07 DIAGNOSIS — I10 ESSENTIAL HYPERTENSION: ICD-10-CM

## 2022-11-07 DIAGNOSIS — I44.7 LBBB (LEFT BUNDLE BRANCH BLOCK): Chronic | ICD-10-CM

## 2022-11-07 DIAGNOSIS — Z99.89 OSA ON CPAP: ICD-10-CM

## 2022-11-07 DIAGNOSIS — E78.2 MIXED HYPERLIPIDEMIA: Chronic | ICD-10-CM

## 2022-11-07 DIAGNOSIS — R06.09 DOE (DYSPNEA ON EXERTION): ICD-10-CM

## 2022-11-07 DIAGNOSIS — R94.39 ABNORMAL NUCLEAR STRESS TEST: ICD-10-CM

## 2022-11-07 PROCEDURE — 99214 OFFICE O/P EST MOD 30 MIN: CPT | Performed by: INTERNAL MEDICINE

## 2022-11-07 RX ORDER — ATENOLOL 25 MG/1
25 TABLET ORAL DAILY
Qty: 90 TABLET | Refills: 3 | Status: SHIPPED | OUTPATIENT
Start: 2022-11-07

## 2022-11-07 NOTE — PROGRESS NOTES
Cindi Hill  [id number]  1950  71 y.o.  female    Referring Provider: Jose Wolff MD    Reason for  Visit:  Here for routine follow up     Subjective    Mild chronic exertional shortness of breath on exertion relieved with rest  No significant cough or wheezing    No sustained palpitations  Very rarely weeks apart she feels she skips a beat or two  No associated chest pain  No significant pedal edema    No fever or chills  No significant expectoration    No hemoptysis  No presyncope or syncope    Tolerating current medications well with no untoward side effects   Compliant with prescribed medication regimen. Tries to adhere to cardiac diet.     Rarely skipped beats   BP well controlled at home.     120s/70 s mm Hg usually at home    Good  effort tolerance with no cardiovascular limitations and at the patient's baseline   Can walk a mile      Has second left sided mastectomy prophylacticaly and had reconstruction     History of present illness:  Cindi Hill is a 71 y.o. yo female with essential hypertension   who presents today for   Chief Complaint   Patient presents with   • Hypertension     1 YR FU- pt states she's had increased migraines    • Palpitations     When patient lays down flat   .    History  Past Medical History:   Diagnosis Date   • Acute left-sided low back pain without sciatica 10/24/2017   • Arthritis    • Breast cancer (HCC)     1997, right breast mastectomy   • Drug therapy 1997   • Essential hypertension 4/21/2022   • GERD (gastroesophageal reflux disease)    • Hypercholesteremia    • TERESA on CPAP 4/21/2022   • KARUNA (stress urinary incontinence, female)    • Ulcer, duodenal, with obstruction 2/28/2022   • Urge incontinence    • Uterovaginal prolapse    • Vaginal atrophy    ,   Past Surgical History:   Procedure Laterality Date   • BLADDER SLING MODIFIED, ANTERIOR AND POSTERIOR VAGINAL REPAIR     • BLADDER SURGERY      Pacemaker for the bladder to help with incontinence.    • BRONCHOSCOPY N/A 4/26/2017    Procedure: BRONCHOSCOPY BIOPSY WITH ANESTHESIA;  Surgeon: Adi Anderson MD;  Location: [facility] ENDOSCOPY;  Service:    • CARDIAC CATHETERIZATION Left 2/28/2017    Procedure: Cardiac Catheterization/Vascular Study;  Surgeon: Anuj Johnson MD;  Location: [facility] CATH INVASIVE LOCATION;  Service:    • COLONOSCOPY  2014   • HYSTERECTOMY     • HYSTERECTOMY     • LAMINECTOMY  01/2016   • MASTECTOMY      Right in 1997 and Left in 2021   • ROTATOR CUFF REPAIR Right 2012   ,   Family History   Problem Relation Age of Onset   • Dementia Mother    • Heart failure Mother    • Cancer Father         liver   • Cancer Brother         thyroid   • No Known Problems Maternal Grandmother    • Diabetes Maternal Grandfather    • No Known Problems Paternal Grandmother    • Cancer Paternal Grandfather    • Breast cancer Neg Hx    ,   Social History     Tobacco Use   • Smoking status: Never   • Smokeless tobacco: Never   Vaping Use   • Vaping Use: Never used   Substance Use Topics   • Alcohol use: No   • Drug use: No   ,     Medications  Current Outpatient Medications   Medication Sig Dispense Refill   • atenolol (TENORMIN) 25 MG tablet Take 1 tablet by mouth Daily. 90 tablet 3   • calcium carbonate (OS-MARIANNE) 600 MG tablet Take 600 mg by mouth daily.     • cetirizine (zyrTEC) 10 MG tablet Take 10 mg by mouth As Needed.     • Cholecalciferol 4000 units capsule Take 5,000 Units by mouth Daily.     • diphenhydrAMINE (BENADRYL) 25 mg capsule Take 25 mg by mouth As Needed for Itching.     • docusate sodium (COLACE) 100 MG capsule Take 100 mg by mouth 2 (Two) Times a Day.     • famotidine (PEPCID) 40 MG tablet Take 40 mg by mouth Daily.     • fluticasone (FLONASE) 50 MCG/ACT nasal spray 2 sprays into the nostril(s) as directed by provider Daily.     • lovastatin (MEVACOR) 10 MG tablet TAKE ONE TABLET BY MOUTH AT BEDTIME 90 tablet 3   • ondansetron ODT (ZOFRAN-ODT) 4 MG disintegrating tablet Take 4 mg by mouth.   "   • pantoprazole (PROTONIX) 40 MG EC tablet Take 40 mg by mouth 2 (two) times a day.     • PREMARIN 0.625 MG/GM vaginal cream      • montelukast (SINGULAIR) 10 MG tablet Take 10 mg by mouth.     • sucralfate (CARAFATE) 1 g tablet Take 1 g by mouth 4 (Four) Times a Day.     • Vitamin E 450 MG (1000 UT) capsule Take  by mouth Daily.       No current facility-administered medications for this visit.       Allergies:  Lisinopril, Lortab [hydrocodone-acetaminophen], Percocet [oxycodone-acetaminophen], Pregabalin, and Adhesive tape    Review of Systems  Review of Systems   Constitutional: Positive for malaise/fatigue.   HENT: Negative.    Eyes: Negative.    Cardiovascular: Positive for dyspnea on exertion and palpitations. Negative for chest pain, claudication, cyanosis, irregular heartbeat, leg swelling, near-syncope, orthopnea, paroxysmal nocturnal dyspnea and syncope.   Respiratory: Negative.    Endocrine: Negative.    Hematologic/Lymphatic: Negative.    Skin: Negative.    Gastrointestinal: Negative for anorexia.   Genitourinary: Negative.    Neurological: Negative.    Psychiatric/Behavioral: Negative.        Objective     Physical Exam:  /70 (BP Location: Left arm, Patient Position: Sitting, Cuff Size: Adult)   Pulse 62   Ht 157.5 cm (62.01\")   Wt 58.5 kg (129 lb)   LMP  (LMP Unknown)   SpO2 98%   BMI 23.59 kg/m²     Physical Exam  Constitutional:       Appearance: She is well-developed.   HENT:      Head: Normocephalic.   Neck:      Vascular: Normal carotid pulses. No carotid bruit or JVD.      Trachea: No tracheal tenderness or tracheal deviation.   Cardiovascular:      Rate and Rhythm: Regular rhythm.      Pulses: Normal pulses.      Heart sounds: Murmur heard.    Systolic murmur is present with a grade of 2/6.  Pulmonary:      Effort: Pulmonary effort is normal.      Breath sounds: No stridor.   Abdominal:      Palpations: Abdomen is soft.   Musculoskeletal:      Cervical back: No edema.   Skin:     " General: Skin is warm.   Neurological:      Mental Status: She is alert.      Cranial Nerves: No cranial nerve deficit.      Sensory: No sensory deficit.   Psychiatric:         Speech: Speech normal.         Behavior: Behavior normal.         Results Review:    Condition: stable         Conclusion   No stenosis of  epicardial coronary arteries with dominant RCA  Left circumflex arises from RCA and normal  Normal LVEF  LVEDP   9    mm Hg     Plan  Hydration   Observation  Workup for non cardiac chest pain  Acceptable cardiovascular risk of bladder surgery         Cindi Hill  Regadenoson Stress Test with Myocardial Perfusion SPECT (Multi Study)  Order# [id number]  Reading physician: Anuj Johnson MD Ordering physician: Sandie Chi APRN Study date: 21     Patient Information    Patient Name   Cindi Hill MRN   [id number] Legal Sex   Female  (Age)   1950 (71 y.o.)     Interpretation Summary       • Small to moderate septal and apical-septal ischemia  • Breast attenuation and GI artifacts are present.  • Left ventricular ejection fraction is normal. (Calculated EF = 62%).        Cardiac CT angiography 10/13/2020     Impression:      1. Total calcium score : 51  2.  Mild focal calcification of the proximal left anterior descending coronary artery without evidence of any obstructive coronary artery disease        ___________________________________________________________________________     Recommendations:     Ongoing risk factor modifications  Further evaluation if ongoing chest pain or high risk of suspicion of significant ischemic heart disease          Results for orders placed during the hospital encounter of 17    Adult Transthoracic Echo Complete    Interpretation Summary  · Left ventricular function is normal. Estimated EF = 55%.  · Left ventricular diastolic dysfunction (grade I) consistent with impaired relaxation.  · Estimated right ventricular systolic pressure from  tricuspid regurgitation is normal (<35 mmHg).        ____________________________________________________________________________________________________________________________________________  Health maintenance and recommendations    Low salt/ HTN/ Heart healthy carbohydrate restricted cardiac diet   The patient is advised to reduce or avoid caffeine or other cardiac stimulants.   Minimize or avoid  NSAID-type medications      Monitor for any signs of bleeding including red or dark stools. Fall precautions.   Advised staying uptodate with immunizations per established standard guidelines.    Offered to give patient  a copy of my notes     Questions were encouraged, asked and answered to the patient's  understanding and satisfaction. Questions if any regarding current medications and side effects, need for refills and importance of compliance to medications stressed.    Reviewed available prior notes, consults, prior visits, laboratory findings, radiology and cardiology relevant reports. Updated chart as applicable. I have reviewed the patient's medical history in detail and updated the computerized patient record as relevant.      Updated patient regarding any new or relevant abnormalities on review of records or any new findings on physical exam. Mentioned to patient about purpose of visit and desirable health short and long term goals and objectives.    Primary to monitor CBC CMP Lipid panel and TSH as applicable    ___________________________________________________________________________________________________________________________________________   Procedures    Assessment & Plan   Diagnoses and all orders for this visit:    1. PSVT (paroxysmal supraventricular tachycardia) (HCC) (Primary)    2. TERESA on CPAP    3. LBBB (left bundle branch block)    4. Mixed hyperlipidemia    5. Essential hypertension    6. Abnormal nuclear stress test    7. TRAN (dyspnea on exertion)  -     Adult Transthoracic Echo  Complete w/ Color, Spectral and Contrast if necessary per protocol; Future    Other orders  -     atenolol (TENORMIN) 25 MG tablet; Take 1 tablet by mouth Daily.  Dispense: 90 tablet; Refill: 3          Plan    Continue beta blocker therapy   Requested Prescriptions     Signed Prescriptions Disp Refills   • atenolol (TENORMIN) 25 MG tablet 90 tablet 3     Sig: Take 1 tablet by mouth Daily.        Orders Placed This Encounter   Procedures   • Adult Transthoracic Echo Complete w/ Color, Spectral and Contrast if necessary per protocol     Standing Status:   Future     Standing Expiration Date:   11/7/2023     Scheduling Instructions:      Myocardial strain to be performed during echocardiogram as long as technically feasible     Order Specific Question:   Reason for exam?     Answer:   Dyspnea     Order Specific Question:   Release to patient     Answer:   Routine Release      Check BP and heart rates twice daily initially till blood pressures and heart rates under good control and then at least 3x / week,   If blood pressures continue to be well-controlled then can check week a month  at home and bring a recording for review next visit  If BP >130/85 or < 100/60 persistently over 3 reading 30 mins apart or if heart rates persistently above 100 bpm or less than 55 bpm call sooner for evaluation and advise     Patient expressed understanding  Encouraged and answered all questions   Discussed with the patient and all questioned fully answered. She will call me if any problems arise.   Discussed results of prior testing with patient : echo, myocardial perfusion scan and prior coronary CT angiography       Due to prior GI bleeding not on Aspirin     Follow up with Sandie IDNERO              Return in about 6 months (around 5/7/2023).

## 2022-12-02 ENCOUNTER — HOSPITAL ENCOUNTER (OUTPATIENT)
Dept: CARDIOLOGY | Facility: HOSPITAL | Age: 72
Discharge: HOME | End: 2022-12-02
Admitting: INTERNAL MEDICINE

## 2022-12-02 VITALS
HEIGHT: 62 IN | WEIGHT: 129 LBS | SYSTOLIC BLOOD PRESSURE: 123 MMHG | DIASTOLIC BLOOD PRESSURE: 50 MMHG | BODY MASS INDEX: 23.74 KG/M2

## 2022-12-02 DIAGNOSIS — R06.09 DOE (DYSPNEA ON EXERTION): ICD-10-CM

## 2022-12-02 PROCEDURE — 93356 MYOCRD STRAIN IMG SPCKL TRCK: CPT | Performed by: INTERNAL MEDICINE

## 2022-12-02 PROCEDURE — 93356 MYOCRD STRAIN IMG SPCKL TRCK: CPT

## 2022-12-02 PROCEDURE — 93306 TTE W/DOPPLER COMPLETE: CPT | Performed by: INTERNAL MEDICINE

## 2022-12-02 PROCEDURE — 93306 TTE W/DOPPLER COMPLETE: CPT

## 2022-12-04 LAB
BH CV ECHO LEFT VENTRICLE GLOBAL LONGITUDINAL STRAIN: -15.4 %
BH CV ECHO MEAS - AO MAX PG: 9.2 MMHG
BH CV ECHO MEAS - AO MEAN PG: 4.7 MMHG
BH CV ECHO MEAS - AO ROOT DIAM: 3 CM
BH CV ECHO MEAS - AO V2 MAX: 152 CM/SEC
BH CV ECHO MEAS - AO V2 VTI: 35.1 CM
BH CV ECHO MEAS - AVA(I,D): 1.8 CM2
BH CV ECHO MEAS - EDV(CUBED): 93.6 ML
BH CV ECHO MEAS - EDV(MOD-SP4): 65.7 ML
BH CV ECHO MEAS - EF(MOD-SP4): 49.5 %
BH CV ECHO MEAS - ESV(CUBED): 24.4 ML
BH CV ECHO MEAS - ESV(MOD-SP4): 33.2 ML
BH CV ECHO MEAS - FS: 36.1 %
BH CV ECHO MEAS - IVS/LVPW: 1.05 CM
BH CV ECHO MEAS - IVSD: 0.85 CM
BH CV ECHO MEAS - LA DIMENSION: 2.4 CM
BH CV ECHO MEAS - LAT PEAK E' VEL: 5.4 CM/SEC
BH CV ECHO MEAS - LV DIASTOLIC VOL/BSA (35-75): 42 CM2
BH CV ECHO MEAS - LV MASS(C)D: 120.8 GRAMS
BH CV ECHO MEAS - LV MAX PG: 3.8 MMHG
BH CV ECHO MEAS - LV MEAN PG: 2 MMHG
BH CV ECHO MEAS - LV SYSTOLIC VOL/BSA (12-30): 21.2 CM2
BH CV ECHO MEAS - LV V1 MAX: 97.6 CM/SEC
BH CV ECHO MEAS - LV V1 VTI: 22.3 CM
BH CV ECHO MEAS - LVIDD: 4.5 CM
BH CV ECHO MEAS - LVIDS: 2.9 CM
BH CV ECHO MEAS - LVOT AREA: 2.8 CM2
BH CV ECHO MEAS - LVOT DIAM: 1.9 CM
BH CV ECHO MEAS - LVPWD: 0.81 CM
BH CV ECHO MEAS - MED PEAK E' VEL: 5.2 CM/SEC
BH CV ECHO MEAS - MV A MAX VEL: 102 CM/SEC
BH CV ECHO MEAS - MV DEC TIME: 0.25 MSEC
BH CV ECHO MEAS - MV E MAX VEL: 72.4 CM/SEC
BH CV ECHO MEAS - MV E/A: 0.71
BH CV ECHO MEAS - PA V2 MAX: 47.6 CM/SEC
BH CV ECHO MEAS - PI END-D VEL: 102 CM/SEC
BH CV ECHO MEAS - RAP SYSTOLE: 5 MMHG
BH CV ECHO MEAS - RVSP: 37.5 MMHG
BH CV ECHO MEAS - SI(MOD-SP4): 20.8 ML/M2
BH CV ECHO MEAS - SV(LVOT): 63.2 ML
BH CV ECHO MEAS - SV(MOD-SP4): 32.5 ML
BH CV ECHO MEAS - TR MAX PG: 32.5 MMHG
BH CV ECHO MEAS - TR MAX VEL: 285 CM/SEC
BH CV ECHO MEASUREMENTS AVERAGE E/E' RATIO: 13.66
LEFT ATRIUM VOLUME INDEX: 29.6 ML/M2
LEFT ATRIUM VOLUME: 46.4 ML
MAXIMAL PREDICTED HEART RATE: 149 BPM
STRESS TARGET HR: 127 BPM

## 2022-12-13 ENCOUNTER — OFFICE VISIT (OUTPATIENT)
Dept: FAMILY MEDICINE CLINIC | Facility: CLINIC | Age: 72
End: 2022-12-13

## 2022-12-13 VITALS
HEIGHT: 62 IN | SYSTOLIC BLOOD PRESSURE: 115 MMHG | HEART RATE: 57 BPM | WEIGHT: 136 LBS | OXYGEN SATURATION: 99 % | BODY MASS INDEX: 25.03 KG/M2 | DIASTOLIC BLOOD PRESSURE: 70 MMHG | TEMPERATURE: 97.7 F

## 2022-12-13 DIAGNOSIS — R53.83 FATIGUE, UNSPECIFIED TYPE: ICD-10-CM

## 2022-12-13 DIAGNOSIS — C50.911 MALIGNANT NEOPLASM OF RIGHT FEMALE BREAST, UNSPECIFIED ESTROGEN RECEPTOR STATUS, UNSPECIFIED SITE OF BREAST: ICD-10-CM

## 2022-12-13 DIAGNOSIS — Z12.11 COLON CANCER SCREENING: ICD-10-CM

## 2022-12-13 DIAGNOSIS — Z00.00 MEDICARE ANNUAL WELLNESS VISIT, SUBSEQUENT: Primary | ICD-10-CM

## 2022-12-13 DIAGNOSIS — H61.23 BILATERAL IMPACTED CERUMEN: ICD-10-CM

## 2022-12-13 DIAGNOSIS — Z90.13 HISTORY OF BILATERAL MASTECTOMY: ICD-10-CM

## 2022-12-13 DIAGNOSIS — K21.9 GASTROESOPHAGEAL REFLUX DISEASE WITHOUT ESOPHAGITIS: ICD-10-CM

## 2022-12-13 DIAGNOSIS — Z99.89 OSA ON CPAP: ICD-10-CM

## 2022-12-13 DIAGNOSIS — R82.90 ABNORMAL URINALYSIS: ICD-10-CM

## 2022-12-13 DIAGNOSIS — G47.33 OSA ON CPAP: ICD-10-CM

## 2022-12-13 DIAGNOSIS — E78.2 MIXED HYPERLIPIDEMIA: Chronic | ICD-10-CM

## 2022-12-13 DIAGNOSIS — I47.1 PSVT (PAROXYSMAL SUPRAVENTRICULAR TACHYCARDIA): Chronic | ICD-10-CM

## 2022-12-13 DIAGNOSIS — Z23 ENCOUNTER FOR IMMUNIZATION: ICD-10-CM

## 2022-12-13 DIAGNOSIS — I10 ESSENTIAL HYPERTENSION: ICD-10-CM

## 2022-12-13 LAB
BILIRUB BLD-MCNC: NEGATIVE MG/DL
CLARITY, POC: CLEAR
COLOR UR: YELLOW
GLUCOSE UR STRIP-MCNC: NEGATIVE MG/DL
KETONES UR QL: NEGATIVE
LEUKOCYTE EST, POC: ABNORMAL
NITRITE UR-MCNC: NEGATIVE MG/ML
PH UR: 7 [PH] (ref 5–8)
PROT UR STRIP-MCNC: NEGATIVE MG/DL
RBC # UR STRIP: ABNORMAL /UL
SP GR UR: 1.02 (ref 1–1.03)
UROBILINOGEN UR QL: ABNORMAL

## 2022-12-13 PROCEDURE — G0008 ADMIN INFLUENZA VIRUS VAC: HCPCS | Performed by: NURSE PRACTITIONER

## 2022-12-13 PROCEDURE — 90662 IIV NO PRSV INCREASED AG IM: CPT | Performed by: NURSE PRACTITIONER

## 2022-12-13 PROCEDURE — 81003 URINALYSIS AUTO W/O SCOPE: CPT | Performed by: NURSE PRACTITIONER

## 2022-12-13 PROCEDURE — 1126F AMNT PAIN NOTED NONE PRSNT: CPT | Performed by: NURSE PRACTITIONER

## 2022-12-13 PROCEDURE — 1170F FXNL STATUS ASSESSED: CPT | Performed by: NURSE PRACTITIONER

## 2022-12-13 PROCEDURE — 1160F RVW MEDS BY RX/DR IN RCRD: CPT | Performed by: NURSE PRACTITIONER

## 2022-12-13 PROCEDURE — G0439 PPPS, SUBSEQ VISIT: HCPCS | Performed by: NURSE PRACTITIONER

## 2022-12-13 NOTE — PROGRESS NOTES
The ABCs of the Annual Wellness Visit  Subsequent Medicare Wellness Visit    Subjective    Cindi Hill is a 71 y.o. female who presents for a Subsequent Medicare Wellness Visit.    The following portions of the patient's history were reviewed and   updated as appropriate: allergies, current medications, past family history, past medical history, past social history, past surgical history and problem list.    Compared to one year ago, the patient feels her physical   health is the same.    Compared to one year ago, the patient feels her mental   health is the same.    Recent Hospitalizations:  She was not admitted to the hospital during the last year.       Current Medical Providers:  Patient Care Team:  Jose Wolff MD as PCP - General (Family Medicine)  Jose Wolff MD as PCP - Family Medicine  Micki Jones APRN (Inactive)  Micki Jones APRN (Inactive)  Tyrell Dixon MD as Consulting Physician (Otolaryngology)  Jose Wolff MD as Referring Physician (Family Medicine)  Anuj Johnson MD as Cardiologist (Cardiology)  Adi Anderson MD as Consulting Physician (Pulmonary Disease)    Outpatient Medications Prior to Visit   Medication Sig Dispense Refill   • atenolol (TENORMIN) 25 MG tablet Take 1 tablet by mouth Daily. 90 tablet 3   • calcium carbonate (OS-MARIANNE) 600 MG tablet Take 600 mg by mouth daily.     • cetirizine (zyrTEC) 10 MG tablet Take 10 mg by mouth As Needed.     • Cholecalciferol 4000 units capsule Take 5,000 Units by mouth Daily.     • diphenhydrAMINE (BENADRYL) 25 mg capsule Take 25 mg by mouth As Needed for Itching.     • docusate sodium (COLACE) 100 MG capsule Take 100 mg by mouth 2 (Two) Times a Day.     • famotidine (PEPCID) 40 MG tablet Take 40 mg by mouth Daily As Needed for Heartburn or Indigestion.     • fluticasone (FLONASE) 50 MCG/ACT nasal spray 2 sprays into the nostril(s) as directed by provider Daily.     • lovastatin (MEVACOR) 10 MG  tablet TAKE ONE TABLET BY MOUTH AT BEDTIME 90 tablet 3   • pantoprazole (PROTONIX) 40 MG EC tablet Take 40 mg by mouth 2 (two) times a day.     • PREMARIN 0.625 MG/GM vaginal cream      • ondansetron ODT (ZOFRAN-ODT) 4 MG disintegrating tablet Take 4 mg by mouth.     • sucralfate (CARAFATE) 1 g tablet Take 1 g by mouth 4 (Four) Times a Day.     • Vitamin E 450 MG (1000 UT) capsule Take  by mouth Daily.       No facility-administered medications prior to visit.       No opioid medication identified on active medication list. I have reviewed chart for other potential  high risk medication/s and harmful drug interactions in the elderly.          Aspirin is not on active medication list.  Aspirin use is indicated based on review of current medical condition/s. Pros and cons of this therapy have been discussed with this patient. Benefits of this medication outweigh potential harm.  Patient has been instructed to start taking this medication..    Patient Active Problem List   Diagnosis   • GERD (gastroesophageal reflux disease)   • Deviated nasal septum   • Allergic rhinitis due to pollen   • Sicca laryngitis   • Mixed hyperlipidemia   • Breast cancer (HCC)   • Osteoarthritis of cervical spine   • LBBB (left bundle branch block)   • Anginal equivalent ([facility])   • History of bilateral mastectomy   • PSVT (paroxysmal supraventricular tachycardia) ([facility])   • Abnormal nuclear stress test   • Ulcer, duodenal, with obstruction   • Essential hypertension   • Gastroesophageal reflux disease with esophagitis without hemorrhage   • Nausea and vomiting   • TERESA on CPAP   • TRAN (dyspnea on exertion)     Advance Care Planning  Advance Directive is not on file.  ACP discussion was held with the patient during this visit. Patient does not have an advance directive, information provided.     Objective    Vitals:    12/13/22 1004   BP: 115/70   BP Location: Left arm   Patient Position: Sitting   Cuff Size: Adult   Pulse: 57   Temp: 97.7 °F  "(36.5 °C)   TempSrc: Temporal   SpO2: 99%   Weight: 61.7 kg (136 lb)   Height: 157.5 cm (62\")   PainSc: 0-No pain     Estimated body mass index is 24.87 kg/m² as calculated from the following:    Height as of this encounter: 157.5 cm (62\").    Weight as of this encounter: 61.7 kg (136 lb).    BMI is within normal parameters. No other follow-up for BMI required.      Does the patient have evidence of cognitive impairment? No          HEALTH RISK ASSESSMENT    Smoking Status:  Social History     Tobacco Use   Smoking Status Never   Smokeless Tobacco Never     Alcohol Consumption:  Social History     Substance and Sexual Activity   Alcohol Use No     Fall Risk Screen:    STEADI Fall Risk Assessment was completed, and patient is at MODERATE risk for falls. Assessment completed on:12/13/2022    Depression Screening:  PHQ-2/PHQ-9 Depression Screening 12/13/2022   Retired PHQ-9 Total Score -   Retired Total Score -   Little Interest or Pleasure in Doing Things 0-->not at all   Feeling Down, Depressed or Hopeless 0-->not at all   PHQ-9: Brief Depression Severity Measure Score 0       Health Habits and Functional and Cognitive Screening:  Functional & Cognitive Status 12/13/2022   Do you have difficulty preparing food and eating? No   Do you have difficulty bathing yourself, getting dressed or grooming yourself? No   Do you have difficulty using the toilet? No   Do you have difficulty moving around from place to place? No   Do you have trouble with steps or getting out of a bed or a chair? No   Current Diet Well Balanced Diet   Dental Exam Up to date   Eye Exam Up to date   Exercise (times per week) 2 times per week   Current Exercises Include Walking   Current Exercise Activities Include -   Do you need help using the phone?  No   Are you deaf or do you have serious difficulty hearing?  No   Do you need help with transportation? No   Do you need help shopping? No   Do you need help preparing meals?  No   Do you need help " with housework?  No   Do you need help with laundry? No   Do you need help taking your medications? No   Do you need help managing money? No   Do you ever drive or ride in a car without wearing a seat belt? No   Have you felt unusual stress, anger or loneliness in the last month? No   Who do you live with? Spouse   If you need help, do you have trouble finding someone available to you? No   Have you been bothered in the last four weeks by sexual problems? No   Do you have difficulty concentrating, remembering or making decisions? No       Age-appropriate Screening Schedule:  Refer to the list below for future screening recommendations based on patient's age, sex and/or medical conditions. Orders for these recommended tests are listed in the plan section. The patient has been provided with a written plan.    Health Maintenance   Topic Date Due   • INFLUENZA VACCINE  08/01/2022   • ZOSTER VACCINE (2 of 3) 06/24/2023 (Originally 12/29/2017)   • LIPID PANEL  06/24/2023   • DXA SCAN  08/05/2024   • TDAP/TD VACCINES (2 - Td or Tdap) 11/03/2027                CMS Preventative Services Quick Reference  Risk Factors Identified During Encounter  Immunizations Discussed/Encouraged: Influenza and COVID19  The above risks/problems have been discussed with the patient.  Pertinent information has been shared with the patient in the After Visit Summary.  An After Visit Summary and PPPS were made available to the patient.    Follow Up:   Next Medicare Wellness visit to be scheduled in 1 year.       Additional E&M Note during same encounter follows:  Patient has multiple medical problems which are significant and separately identifiable that require additional work above and beyond the Medicare Wellness Visit.      Chief Complaint:  HTN, GERD, PSVT, hyperlipidemia      Patient states that she is doing well overall. Notes that she did have multiple migraines about a month ago, however, hasn't had any since. Had a colonoscopy in 2022.  "DEXA was also in 2022.  She is due for a flu shot. BP is well controlled.     Subjective        HPI  Cindi Hill is also being seen today for:     ICD-10-CM ICD-9-CM          Essential hypertension  I10 401.9    Gastroesophageal reflux disease without esophagitis  K21.9 530.81    PSVT (paroxysmal supraventricular tachycardia) (HCC)  I47.1 427.0    Mixed hyperlipidemia  E78.2 272.2    Colon cancer screening  Z12.11 V76.51    Fatigue, unspecified type  R53.83 780.79    Encounter for immunization  Z23 V03.89     Review of Systems   Constitutional: Negative for fever.   Respiratory: Positive for shortness of breath (occasional - when lying down. Evaluated by cardiology).    Cardiovascular: Negative for chest pain.   Gastrointestinal: Negative for constipation and diarrhea.   Neurological: Positive for dizziness (occasional with position changes). Negative for light-headedness.       Objective   Vital Signs:  /70 (BP Location: Left arm, Patient Position: Sitting, Cuff Size: Adult)   Pulse 57   Temp 97.7 °F (36.5 °C) (Temporal)   Ht 157.5 cm (62\")   Wt 61.7 kg (136 lb)   SpO2 99%   BMI 24.87 kg/m²     Physical Exam  Vitals reviewed.   Constitutional:       Appearance: She is well-developed.   HENT:      Right Ear: There is impacted cerumen.      Left Ear: There is impacted cerumen.   Neck:      Vascular: No carotid bruit.   Cardiovascular:      Rate and Rhythm: Normal rate and regular rhythm.      Heart sounds: Normal heart sounds.   Pulmonary:      Effort: Pulmonary effort is normal.      Breath sounds: Normal breath sounds.   Chest:      Comments: Bilateral mastectomy. Patient states that some of her tissue was left on the left breast though. Exam deferred due to mastectomy history.   Abdominal:      General: Abdomen is flat. Bowel sounds are normal.      Palpations: Abdomen is soft.   Neurological:      Mental Status: She is alert and oriented to person, place, and time.   Psychiatric:         Behavior: " Behavior normal.                     Assessment and Plan   Diagnoses and all orders for this visit:    1. Medicare annual wellness visit, subsequent (Primary)  -     CBC & Differential  -     TSH  -     T4, free  -     Comprehensive Metabolic Panel  -     Lipid Panel  -     POC Urinalysis Dipstick, Multipro  -     Fluzone High-Dose 65+yrs ([id number])  -     US Breast Left Complete; Future    2. Essential hypertension  -     Comprehensive Metabolic Panel  -     POC Urinalysis Dipstick, Multipro    3. Gastroesophageal reflux disease without esophagitis  -     CBC & Differential    4. PSVT (paroxysmal supraventricular tachycardia) (HCC)  -     CBC & Differential    5. Mixed hyperlipidemia  -     Lipid Panel    6. Colon cancer screening    7. Fatigue, unspecified type  -     CBC & Differential  -     TSH  -     T4, free  -     Comprehensive Metabolic Panel    8. Encounter for immunization  -     Fluzone High-Dose 65+yrs ([id number])    9. History of bilateral mastectomy  -     US Breast Left Complete; Future    10. Malignant neoplasm of right female breast, unspecified estrogen receptor status, unspecified site of breast (HCC)  -     US Breast Left Complete; Future    11. Bilateral impacted cerumen  -     carbamide peroxide (Debrox) 6.5 % otic solution; Administer 5 drops into both ears Every Night.  Dispense: 22 mL; Refill: 0             Follow Up   Return in about 2 weeks (around 12/27/2022) for cerumen lavage.  Patient was given instructions and counseling regarding her condition or for health maintenance advice. Please see specific information pulled into the AVS if appropriate.     Jeanne Prescott, APRN 12/13/22

## 2022-12-13 NOTE — PATIENT INSTRUCTIONS
Medicare Wellness  Personal Prevention Plan of Service     Date of Office Visit:    Encounter Provider:  BAR Jacobson  Place of Service:  [facility]  Patient Name: Cindi Hill  :  1950    As part of the Medicare Wellness portion of your visit today, we are providing you with this personalized preventive plan of services (PPPS). This plan is based upon recommendations of the [place] Preventive Services Task Force (USPSTF) and the Advisory Committee on Immunization Practices (ACIP).    This lists the preventive care services that should be considered, and provides dates of when you are due. Items listed as completed are up-to-date and do not require any further intervention.    Health Maintenance   Topic Date Due    COVID-19 Vaccine (4 - Booster for Moderna series) 2022    INFLUENZA VACCINE  2022    ZOSTER VACCINE (2 of 3) 2023 (Originally 2017)    LIPID PANEL  2023    COLORECTAL CANCER SCREENING  2023    ANNUAL WELLNESS VISIT  2023    DXA SCAN  2024    TDAP/TD VACCINES (2 - Td or Tdap) 2027    HEPATITIS C SCREENING  Completed    Pneumococcal Vaccine 65+  Completed       Orders Placed This Encounter   Procedures    US Breast Left Complete     Standing Status:   Future     Standing Expiration Date:   2023     Order Specific Question:   Reason for Exam:     Answer:   mastectomy followup     Order Specific Question:   Release to patient     Answer:   Routine Release    Fluzone High-Dose 65+yrs ([id number])    TSH     Order Specific Question:   Release to patient     Answer:   Routine Release    T4, free     Order Specific Question:   Release to patient     Answer:   Routine Release    Comprehensive Metabolic Panel     Order Specific Question:   Release to patient     Answer:   Routine Release    Lipid Panel    POC Urinalysis Dipstick, Multipro     Order Specific Question:   Release to patient      Answer:   Routine Release    CBC & Differential     Order Specific Question:   Manual Differential     Answer:   No       No follow-ups on file.        Advance Care Planning and Advance Directives     You make decisions on a daily basis - decisions about where you want to live, your career, your home, your life. Perhaps one of the most important decisions you face is your choice for future medical care. Take time to talk with your family and your healthcare team and start planning today.  Advance Care Planning is a process that can help you:  Understand possible future healthcare decisions in light of your own experiences  Reflect on those decision in light of your goals and values  Discuss your decisions with those closest to you and the healthcare professionals that care for you  Make a plan by creating a document that reflects your wishes    Surrogate Decision Maker  In the event of a medical emergency, which has left you unable to communicate or to make your own decisions, you would need someone to make decisions for you.  It is important to discuss your preferences for medical treatment with this person while you are in good health.     Qualities of a surrogate decision maker:  Willing to take on this role and responsibility  Knows what you want for future medical care  Willing to follow your wishes even if they don't agree with them  Able to make difficult medical decisions under stressful circumstances    Advance Directives  These are legal documents you can create that will guide your healthcare team and decision maker(s) when needed. These documents can be stored in the electronic medical record.    Living Will - a legal document to guide your care if you have a terminal condition or a serious illness and are unable to communicate. States vary by statute in document names/types, but most forms may include one or more of the following:        -  Directions regarding life-prolonging treatments        -   Directions regarding artificially provided nutrition/hydration        -  Choosing a healthcare decision maker        -  Direction regarding organ/tissue donation    Durable Power of  for Healthcare - this document names an -in-fact to make medical decisions for you, but it may also allow this person to make personal and financial decisions for you. Please seek the advice of an  if you need this type of document.    **Advance Directives are not required and no one may discriminate against you if you do not sign one.    Medical Orders  Many states allow specific forms/orders signed by your physician to record your wishes for medical treatment in your current state of health. This form, signed in personal communication with your physician, addresses resuscitation and other medical interventions that you may or may not want.      For more information or to schedule a time with a [facility] Advance Care Planning Facilitator contact: [email or web address] or call [phone] and someone will contact you directly.

## 2022-12-14 LAB
ALBUMIN SERPL-MCNC: 4.7 G/DL (ref 3.5–5.2)
ALBUMIN/GLOB SERPL: 2 G/DL
ALP SERPL-CCNC: 92 U/L (ref 39–117)
ALT SERPL-CCNC: 13 U/L (ref 1–33)
AST SERPL-CCNC: 18 U/L (ref 1–32)
BASOPHILS # BLD AUTO: 0.02 10*3/MM3 (ref 0–0.2)
BASOPHILS NFR BLD AUTO: 0.4 % (ref 0–1.5)
BILIRUB SERPL-MCNC: 0.3 MG/DL (ref 0–1.2)
BUN SERPL-MCNC: 9 MG/DL (ref 8–23)
BUN/CREAT SERPL: 13 (ref 7–25)
CALCIUM SERPL-MCNC: 9.1 MG/DL (ref 8.6–10.5)
CHLORIDE SERPL-SCNC: 99 MMOL/L (ref 98–107)
CHOLEST SERPL-MCNC: 161 MG/DL (ref 0–200)
CO2 SERPL-SCNC: 30.4 MMOL/L (ref 22–29)
CREAT SERPL-MCNC: 0.69 MG/DL (ref 0.57–1)
EGFRCR SERPLBLD CKD-EPI 2021: 92.9 ML/MIN/1.73
EOSINOPHIL # BLD AUTO: 0.18 10*3/MM3 (ref 0–0.4)
EOSINOPHIL NFR BLD AUTO: 3.8 % (ref 0.3–6.2)
ERYTHROCYTE [DISTWIDTH] IN BLOOD BY AUTOMATED COUNT: 12.4 % (ref 12.3–15.4)
GLOBULIN SER CALC-MCNC: 2.3 GM/DL
GLUCOSE SERPL-MCNC: 77 MG/DL (ref 65–99)
HCT VFR BLD AUTO: 39 % (ref 34–46.6)
HDLC SERPL-MCNC: 55 MG/DL (ref 40–60)
HGB BLD-MCNC: 12.7 G/DL (ref 12–15.9)
IMM GRANULOCYTES # BLD AUTO: 0.01 10*3/MM3 (ref 0–0.05)
IMM GRANULOCYTES NFR BLD AUTO: 0.2 % (ref 0–0.5)
LDLC SERPL CALC-MCNC: 89 MG/DL (ref 0–100)
LYMPHOCYTES # BLD AUTO: 1.14 10*3/MM3 (ref 0.7–3.1)
LYMPHOCYTES NFR BLD AUTO: 24.2 % (ref 19.6–45.3)
MCH RBC QN AUTO: 29.5 PG (ref 26.6–33)
MCHC RBC AUTO-ENTMCNC: 32.6 G/DL (ref 31.5–35.7)
MCV RBC AUTO: 90.5 FL (ref 79–97)
MONOCYTES # BLD AUTO: 0.33 10*3/MM3 (ref 0.1–0.9)
MONOCYTES NFR BLD AUTO: 7 % (ref 5–12)
NEUTROPHILS # BLD AUTO: 3.04 10*3/MM3 (ref 1.7–7)
NEUTROPHILS NFR BLD AUTO: 64.4 % (ref 42.7–76)
NRBC BLD AUTO-RTO: 0 /100 WBC (ref 0–0.2)
PLATELET # BLD AUTO: 235 10*3/MM3 (ref 140–450)
POTASSIUM SERPL-SCNC: 3.9 MMOL/L (ref 3.5–5.2)
PROT SERPL-MCNC: 7 G/DL (ref 6–8.5)
RBC # BLD AUTO: 4.31 10*6/MM3 (ref 3.77–5.28)
SODIUM SERPL-SCNC: 138 MMOL/L (ref 136–145)
T4 FREE SERPL-MCNC: 1.25 NG/DL (ref 0.93–1.7)
TRIGL SERPL-MCNC: 89 MG/DL (ref 0–150)
TSH SERPL DL<=0.005 MIU/L-ACNC: 2.51 UIU/ML (ref 0.27–4.2)
VLDLC SERPL CALC-MCNC: 17 MG/DL (ref 5–40)
WBC # BLD AUTO: 4.72 10*3/MM3 (ref 3.4–10.8)

## 2022-12-15 LAB
BACTERIA UR CULT: NORMAL
BACTERIA UR CULT: NORMAL

## 2023-01-09 ENCOUNTER — TELEPHONE (OUTPATIENT)
Dept: FAMILY MEDICINE CLINIC | Facility: CLINIC | Age: 73
End: 2023-01-09
Payer: MEDICARE

## 2023-01-09 ENCOUNTER — OFFICE VISIT (OUTPATIENT)
Dept: FAMILY MEDICINE CLINIC | Facility: CLINIC | Age: 73
End: 2023-01-09
Payer: MEDICARE

## 2023-01-09 VITALS
BODY MASS INDEX: 25.65 KG/M2 | OXYGEN SATURATION: 96 % | DIASTOLIC BLOOD PRESSURE: 69 MMHG | HEART RATE: 68 BPM | WEIGHT: 139.4 LBS | HEIGHT: 62 IN | TEMPERATURE: 97.5 F | SYSTOLIC BLOOD PRESSURE: 113 MMHG

## 2023-01-09 DIAGNOSIS — J30.89 NON-SEASONAL ALLERGIC RHINITIS DUE TO OTHER ALLERGIC TRIGGER: Primary | ICD-10-CM

## 2023-01-09 DIAGNOSIS — H61.23 BILATERAL IMPACTED CERUMEN: ICD-10-CM

## 2023-01-09 DIAGNOSIS — H66.90 ACUTE OTITIS MEDIA, UNSPECIFIED OTITIS MEDIA TYPE: ICD-10-CM

## 2023-01-09 PROCEDURE — 99213 OFFICE O/P EST LOW 20 MIN: CPT | Performed by: NURSE PRACTITIONER

## 2023-01-09 PROCEDURE — 69209 REMOVE IMPACTED EAR WAX UNI: CPT | Performed by: NURSE PRACTITIONER

## 2023-01-09 RX ORDER — CIPROFLOXACIN AND DEXAMETHASONE 3; 1 MG/ML; MG/ML
4 SUSPENSION/ DROPS AURICULAR (OTIC) 2 TIMES DAILY
Qty: 7.5 ML | Refills: 0 | Status: SHIPPED | OUTPATIENT
Start: 2023-01-09 | End: 2023-01-09

## 2023-01-09 RX ORDER — AZELASTINE 1 MG/ML
2 SPRAY, METERED NASAL 2 TIMES DAILY
Qty: 30 ML | Refills: 12 | Status: SHIPPED | OUTPATIENT
Start: 2023-01-09

## 2023-01-09 NOTE — TELEPHONE ENCOUNTER
Pt calling--ear drops are $ 127 after insurance.  If she really needs them, she will pay for it or is there something cheaper she can use.      [facility]

## 2023-01-09 NOTE — PROGRESS NOTES
Procedure   Cerumen Removal    Date/Time: 1/9/2023 11:55 AM  Performed by: Leann Clark MA  Authorized by: Jeanne Prescott APRN   Location details: left ear and right ear  Patient tolerance: patient tolerated the procedure well with no immediate complications  Comments: TMs visualized and erythematous post procedure.   Procedure type: irrigation   Sedation:  Patient sedated: no

## 2023-01-09 NOTE — PROGRESS NOTES
CC: cerumen impaction, runny nose and watery eyes    History:  Cindi Hill is a 72 y.o. female who presents today for evaluation of the above problems.      Patient is here for a recheck on cerumen impaction. She has been using debrox drops for two weeks. Also notes that she has been experiencing a runny nose and watery eyes. Has been taking zyrtec daily.       HPI  ROS:  Review of Systems   HENT: Positive for hearing loss and rhinorrhea.    Eyes:        Watery eyes       Allergies   Allergen Reactions   • Lisinopril Cough   • Lortab [Hydrocodone-Acetaminophen] Nausea Only   • Percocet [Oxycodone-Acetaminophen] Nausea Only   • Pregabalin Other (See Comments)     Made pt very sleepy and dizzy   • Adhesive Tape Itching     Bandage tape caused itching and redness     Past Medical History:   Diagnosis Date   • Acute left-sided low back pain without sciatica 10/24/2017   • Arthritis    • Breast cancer (HCC)     1997, right breast mastectomy   • Drug therapy 1997   • Essential hypertension 4/21/2022   • GERD (gastroesophageal reflux disease)    • Hypercholesteremia    • TERESA on CPAP 4/21/2022   • KARUNA (stress urinary incontinence, female)    • Ulcer, duodenal, with obstruction 2/28/2022   • Urge incontinence    • Uterovaginal prolapse    • Vaginal atrophy      Past Surgical History:   Procedure Laterality Date   • BLADDER SLING MODIFIED, ANTERIOR AND POSTERIOR VAGINAL REPAIR     • BLADDER SURGERY      Pacemaker for the bladder to help with incontinence.   • BRONCHOSCOPY N/A 4/26/2017    Procedure: BRONCHOSCOPY BIOPSY WITH ANESTHESIA;  Surgeon: Adi Anderson MD;  Location:  [facility];  Service:    • CARDIAC CATHETERIZATION Left 2/28/2017    Procedure: Cardiac Catheterization/Vascular Study;  Surgeon: Anuj Johnson MD;  Location:  [facility]ATH INVASIVE [facility]ATION;  Service:    • COLONOSCOPY  2014   • HYSTERECTOMY     • HYSTERECTOMY     • LAMINECTOMY  01/2016   • MASTECTOMY      Right in 1997 and Left in 2021   •  ROTATOR CUFF REPAIR Right 2012     Family History   Problem Relation Age of Onset   • Dementia Mother    • Heart failure Mother    • Cancer Father         liver   • Cancer Brother         thyroid   • No Known Problems Maternal Grandmother    • Diabetes Maternal Grandfather    • No Known Problems Paternal Grandmother    • Cancer Paternal Grandfather    • Breast cancer Neg Hx       reports that she has never smoked. She has never used smokeless tobacco. She reports that she does not drink alcohol and does not use drugs.      Current Outpatient Medications:   •  atenolol (TENORMIN) 25 MG tablet, Take 1 tablet by mouth Daily., Disp: 90 tablet, Rfl: 3  •  calcium carbonate (OS-MARIANNE) 600 MG tablet, Take 600 mg by mouth daily., Disp: , Rfl:   •  carbamide peroxide (Debrox) 6.5 % otic solution, Administer 5 drops into both ears Every Night., Disp: 22 mL, Rfl: 0  •  cetirizine (zyrTEC) 10 MG tablet, Take 10 mg by mouth As Needed., Disp: , Rfl:   •  Cholecalciferol 4000 units capsule, Take 5,000 Units by mouth Daily., Disp: , Rfl:   •  diphenhydrAMINE (BENADRYL) 25 mg capsule, Take 25 mg by mouth As Needed for Itching., Disp: , Rfl:   •  docusate sodium (COLACE) 100 MG capsule, Take 100 mg by mouth 2 (Two) Times a Day., Disp: , Rfl:   •  famotidine (PEPCID) 40 MG tablet, Take 40 mg by mouth Daily As Needed for Heartburn or Indigestion., Disp: , Rfl:   •  fluticasone (FLONASE) 50 MCG/ACT nasal spray, 2 sprays into the nostril(s) as directed by provider Daily., Disp: , Rfl:   •  lovastatin (MEVACOR) 10 MG tablet, TAKE ONE TABLET BY MOUTH AT BEDTIME, Disp: 90 tablet, Rfl: 3  •  ondansetron ODT (ZOFRAN-ODT) 4 MG disintegrating tablet, Take 4 mg by mouth., Disp: , Rfl:   •  pantoprazole (PROTONIX) 40 MG EC tablet, Take 40 mg by mouth 2 (two) times a day., Disp: , Rfl:   •  PREMARIN 0.625 MG/GM vaginal cream, , Disp: , Rfl:   •  azelastine (ASTELIN) 0.1 % nasal spray, 2 sprays into the nostril(s) as directed by provider 2 (Two)  Times a Day. Use in each nostril as directed, Disp: 30 mL, Rfl: 12  •  ciprofloxacin-dexamethasone (Ciprodex) 0.3-0.1 % otic suspension, Administer 4 drops into both ears 2 (Two) Times a Day for 7 days., Disp: 7.5 mL, Rfl: 0    OBJECTIVE:  /69 (BP Location: Left arm, Patient Position: Sitting, Cuff Size: Adult)   Pulse 68   Temp 97.5 °F (36.4 °C) (Temporal)   Ht 157.5 cm (62\")   Wt 63.2 kg (139 lb 6.4 oz)   LMP  (LMP Unknown)   SpO2 96%   BMI 25.50 kg/m²    Physical Exam  Vitals reviewed.   Constitutional:       Appearance: She is well-developed.   HENT:      Right Ear: There is impacted cerumen.      Left Ear: There is impacted cerumen.   Cardiovascular:      Rate and Rhythm: Normal rate.   Pulmonary:      Effort: Pulmonary effort is normal.   Neurological:      Mental Status: She is alert and oriented to person, place, and time.   Psychiatric:         Behavior: Behavior normal.         Assessment/Plan    Diagnoses and all orders for this visit:    1. Non-seasonal allergic rhinitis due to other allergic trigger (Primary)  -     azelastine (ASTELIN) 0.1 % nasal spray; 2 sprays into the nostril(s) as directed by provider 2 (Two) Times a Day. Use in each nostril as directed  Dispense: 30 mL; Refill: 12    2. Acute otitis media, unspecified otitis media type  -     ciprofloxacin-dexamethasone (Ciprodex) 0.3-0.1 % otic suspension; Administer 4 drops into both ears 2 (Two) Times a Day for 7 days.  Dispense: 7.5 mL; Refill: 0    3. Bilateral impacted cerumen  -     Cerumen Removal    Advised to use OTC allergy eye drops in addition to astelin nasal spray.     An After Visit Summary was printed and given to the patient at discharge.  Return if symptoms worsen or fail to improve, for Next scheduled follow up.       BAR Ortega 1/9/23    Electronically signed.   1.5

## 2023-01-11 ENCOUNTER — APPOINTMENT (OUTPATIENT)
Dept: OTHER | Facility: HOSPITAL | Age: 73
End: 2023-01-11
Payer: MEDICARE

## 2023-01-11 ENCOUNTER — HOSPITAL ENCOUNTER (OUTPATIENT)
Dept: ULTRASOUND IMAGING | Facility: HOSPITAL | Age: 73
Discharge: HOME | End: 2023-01-11
Payer: MEDICARE

## 2023-01-11 ENCOUNTER — HOSPITAL ENCOUNTER (OUTPATIENT)
Dept: MAMMOGRAPHY | Facility: HOSPITAL | Age: 73
Discharge: HOME | End: 2023-01-11
Payer: MEDICARE

## 2023-01-11 DIAGNOSIS — Z00.6 ENCOUNTER FOR EXAMINATION FOR NORMAL COMPARISON AND CONTROL IN CLINICAL RESEARCH PROGRAM: ICD-10-CM

## 2023-01-11 DIAGNOSIS — Z00.00 MEDICARE ANNUAL WELLNESS VISIT, SUBSEQUENT: ICD-10-CM

## 2023-01-11 DIAGNOSIS — Z90.13 HISTORY OF BILATERAL MASTECTOMY: ICD-10-CM

## 2023-01-11 DIAGNOSIS — C50.911 MALIGNANT NEOPLASM OF RIGHT FEMALE BREAST, UNSPECIFIED ESTROGEN RECEPTOR STATUS, UNSPECIFIED SITE OF BREAST: ICD-10-CM

## 2023-01-11 DIAGNOSIS — R92.8 ABNORMAL ULTRASOUND OF BREAST: ICD-10-CM

## 2023-01-11 PROCEDURE — G0279 TOMOSYNTHESIS, MAMMO: HCPCS

## 2023-01-11 PROCEDURE — 76641 ULTRASOUND BREAST COMPLETE: CPT

## 2023-01-11 PROCEDURE — 77065 DX MAMMO INCL CAD UNI: CPT

## 2023-01-11 NOTE — PROGRESS NOTES
Findings were ok on the left breat. There is a cyst and fat breakdown.  They did also say that there is enough breast tissue there that she needs annual mammograms.

## 2023-02-07 DIAGNOSIS — E78.2 MIXED HYPERLIPIDEMIA: Primary | ICD-10-CM

## 2023-02-07 RX ORDER — LOVASTATIN 10 MG/1
TABLET ORAL
Qty: 90 TABLET | Refills: 3 | Status: SHIPPED | OUTPATIENT
Start: 2023-02-07

## 2023-02-07 NOTE — TELEPHONE ENCOUNTER
Rx Refill Note  Requested Prescriptions     Pending Prescriptions Disp Refills   • lovastatin (MEVACOR) 10 MG tablet [Pharmacy Med Name: LOVASTATIN 10MG TABS] 90 tablet 3     Sig: TAKE ONE TABLET BY MOUTH AT BEDTIME      Last office visit with prescribing clinician: 12/13/22  Last telemedicine visit with prescribing clinician: 6/14/2023   Next office visit with prescribing clinician: 6/14/2023       {TIP  Please add Last Relevant Lab 12/13/22                 Would you like a call back once the refill request has been completed: [] Yes [] No    If the office needs to give you a call back, can they leave a voicemail: [] Yes [] No    Leann Clark MA  02/07/23, 12:25 CST

## 2023-05-09 ENCOUNTER — OFFICE VISIT (OUTPATIENT)
Dept: CARDIOLOGY | Facility: CLINIC | Age: 73
End: 2023-05-09
Payer: MEDICARE

## 2023-05-09 VITALS
BODY MASS INDEX: 26.5 KG/M2 | HEIGHT: 62 IN | WEIGHT: 144 LBS | SYSTOLIC BLOOD PRESSURE: 118 MMHG | HEART RATE: 64 BPM | DIASTOLIC BLOOD PRESSURE: 71 MMHG

## 2023-05-09 DIAGNOSIS — E78.2 MIXED HYPERLIPIDEMIA: ICD-10-CM

## 2023-05-09 DIAGNOSIS — R94.39 ABNORMAL NUCLEAR STRESS TEST: ICD-10-CM

## 2023-05-09 DIAGNOSIS — I35.1 NONRHEUMATIC AORTIC VALVE INSUFFICIENCY: ICD-10-CM

## 2023-05-09 DIAGNOSIS — I27.20 PULMONARY HYPERTENSION: ICD-10-CM

## 2023-05-09 DIAGNOSIS — I44.7 LBBB (LEFT BUNDLE BRANCH BLOCK): ICD-10-CM

## 2023-05-09 DIAGNOSIS — I47.1 PSVT (PAROXYSMAL SUPRAVENTRICULAR TACHYCARDIA): Primary | ICD-10-CM

## 2023-05-09 DIAGNOSIS — K21.9 GASTROESOPHAGEAL REFLUX DISEASE WITHOUT ESOPHAGITIS: ICD-10-CM

## 2023-05-09 PROCEDURE — 99214 OFFICE O/P EST MOD 30 MIN: CPT | Performed by: NURSE PRACTITIONER

## 2023-05-09 PROCEDURE — 3078F DIAST BP <80 MM HG: CPT | Performed by: NURSE PRACTITIONER

## 2023-05-09 PROCEDURE — 93000 ELECTROCARDIOGRAM COMPLETE: CPT | Performed by: NURSE PRACTITIONER

## 2023-05-09 PROCEDURE — 3074F SYST BP LT 130 MM HG: CPT | Performed by: NURSE PRACTITIONER

## 2023-05-09 NOTE — PROGRESS NOTES
Subjective:     Encounter Date: 05/09/2023      Patient ID: Cindi Hill is a 72 y.o. female with has paroxysmal supraventricular tachycardia, chronic left bundle branch block, hypercholesterolemia and GERD.    Chief Complaint: 6 month follow up  History of Present Illness  Patient presents today for management of paroxysmal SVT. Patietn denies any chest pain. Patient reports that she has been feeling well. She reports that she has chronic dyspnea on exertion that she feels like takes less exertion to produce. She also notices the shortness of breath particularly when she lays down at night. She denies any leg swelling, or PND. She reports occasional palpitations. She denies any dizziness or near syncope. She denies monitoring her BP regularly but it remains well controlled. Patient follows with Dr Wolff as PCP.     The following portions of the patient's history were reviewed and updated as appropriate: allergies, current medications, past family history, past medical history, past social history, past surgical history and problem list.    Allergies   Allergen Reactions   • Lisinopril Cough   • Lortab [Hydrocodone-Acetaminophen] Nausea Only   • Percocet [Oxycodone-Acetaminophen] Nausea Only   • Pregabalin Other (See Comments)     Made pt very sleepy and dizzy   • Adhesive Tape Itching     Bandage tape caused itching and redness       Current Outpatient Medications:   •  atenolol (TENORMIN) 25 MG tablet, Take 1 tablet by mouth Daily., Disp: 90 tablet, Rfl: 3  •  azelastine (ASTELIN) 0.1 % nasal spray, 2 sprays into the nostril(s) as directed by provider 2 (Two) Times a Day. Use in each nostril as directed, Disp: 30 mL, Rfl: 12  •  calcium carbonate (OS-MARIANNE) 600 MG tablet, Take 1 tablet by mouth Daily., Disp: , Rfl:   •  cetirizine (zyrTEC) 10 MG tablet, Take 1 tablet by mouth As Needed., Disp: , Rfl:   •  Cholecalciferol 4000 units capsule, Take 5,000 Units by mouth Daily., Disp: , Rfl:   •  diphenhydrAMINE  (BENADRYL) 25 mg capsule, Take 1 capsule by mouth As Needed for Itching., Disp: , Rfl:   •  docusate sodium (COLACE) 100 MG capsule, Take 1 capsule by mouth 2 (Two) Times a Day., Disp: , Rfl:   •  ESTRADIOL VA, PLACE fingertip amount (1/4gram) TO vaginal opening WITH fingertip AT bedtime every NIGHT 1/4 GRAM=1 CLICK ON DISPENSER, Disp: , Rfl:   •  famotidine (PEPCID) 40 MG tablet, Take 1 tablet by mouth Daily As Needed for Heartburn or Indigestion., Disp: , Rfl:   •  fluticasone (FLONASE) 50 MCG/ACT nasal spray, 2 sprays into the nostril(s) as directed by provider Daily., Disp: , Rfl:   •  lovastatin (MEVACOR) 10 MG tablet, TAKE ONE TABLET BY MOUTH AT BEDTIME, Disp: 90 tablet, Rfl: 3  •  ondansetron ODT (ZOFRAN-ODT) 4 MG disintegrating tablet, Take 1 tablet by mouth., Disp: , Rfl:   •  pantoprazole (PROTONIX) 40 MG EC tablet, Take 1 tablet by mouth 2 (two) times a day., Disp: , Rfl:   Past Medical History:   Diagnosis Date   • Acute left-sided low back pain without sciatica 10/24/2017   • Arthritis    • Breast cancer     1997, right breast mastectomy   • Drug therapy 1997   • Essential hypertension 04/21/2022   • GERD (gastroesophageal reflux disease)    • Hypercholesteremia    • TERESA on CPAP 04/21/2022   • KARUNA (stress urinary incontinence, female)    • Ulcer, duodenal, with obstruction 02/28/2022   • Urge incontinence    • Uterovaginal prolapse    • Vaginal atrophy      Social History     Socioeconomic History   • Marital status:    Tobacco Use   • Smoking status: Never   • Smokeless tobacco: Never   Vaping Use   • Vaping Use: Never used   Substance and Sexual Activity   • Alcohol use: No   • Drug use: No   • Sexual activity: Defer       Review of Systems   Constitutional: Negative for malaise/fatigue.   HENT: Negative for nosebleeds.    Cardiovascular: Positive for dyspnea on exertion. Negative for chest pain, irregular heartbeat, leg swelling, near-syncope, orthopnea, palpitations, paroxysmal nocturnal  "dyspnea and syncope.   Respiratory: Positive for shortness of breath.    Hematologic/Lymphatic: Does not bruise/bleed easily.   Genitourinary: Negative for hematuria.   Neurological: Negative for dizziness and weakness.   All other systems reviewed and are negative.         Objective:     Vitals reviewed.   Constitutional:       General: Not in acute distress.     Appearance: Normal appearance. Well-developed.   Eyes:      Pupils: Pupils are equal, round, and reactive to light.   HENT:      Head: Normocephalic and atraumatic.      Nose: Nose normal.   Neck:      Vascular: No carotid bruit.   Pulmonary:      Effort: Pulmonary effort is normal. No respiratory distress.      Breath sounds: Normal breath sounds. No wheezing. No rales.   Cardiovascular:      Normal rate. Regular rhythm.   Abdominal:      General: There is no distension.      Palpations: Abdomen is soft.   Musculoskeletal: Normal range of motion.      Cervical back: Normal range of motion and neck supple. Skin:     General: Skin is warm.      Findings: No erythema or rash.   Neurological:      Mental Status: Alert and oriented to person, place, and time.   Psychiatric:         Speech: Speech normal.         Behavior: Behavior normal.         Thought Content: Thought content normal.         Judgment: Judgment normal.         /71   Pulse 64   Ht 157.5 cm (62\")   Wt 65.3 kg (144 lb)   LMP  (LMP Unknown)   BMI 26.34 kg/m²       ECG 12 Lead    Date/Time: 5/9/2023 2:27 PM  Performed by: Yogesh Rehman APRN  Authorized by: Yogesh Rehman APRN   Comparison: compared with previous ECG from 6/3/2021  Similar to previous ECG  Rhythm: sinus rhythm  Rate: normal  BPM: 64  QRS axis: left              Lab Review:     Results for orders placed during the hospital encounter of 12/02/22    Adult Transthoracic Echo Complete w/ Color, Spectral and Contrast if necessary per protocol    Interpretation Summary  •  Left ventricular systolic function is normal. " Left ventricular ejection fraction appears to be 56 - 60%.  •  Left ventricular diastolic function is consistent with (grade Ia w/high LAP) impaired relaxation.  •  Mild to moderate aortic valve regurgitation is present.  •  Mild pulmonary hypertension is present.  •  There is a trivial pericardial effusion. The effusion is fluid filled. There is no evidence of cardiac tamponade.    Nuclear stress test 7/29/2021:  Interpretation Summary     • Small to moderate septal and apical-septal ischemia  • Breast attenuation and GI artifacts are present.  • Left ventricular ejection fraction is normal. (Calculated EF = 62%).        Refer to the perfusion abnormalities with rest and stress as well as summed score and percent abnormalities  in the bull's eye diagram below       Lab Results   Component Value Date    CHLPL 161 12/13/2022    TRIG 89 12/13/2022    HDL 55 12/13/2022    LDL 89 12/13/2022     Cardiac CT angiography 10/13/2020     Impression:      1. Total calcium score : 51  2.  Mild focal calcification of the proximal left anterior descending coronary artery without evidence of any obstructive coronary artery disease        ___________________________________________________________________________     Recommendations:     Ongoing risk factor modifications  Further evaluation if ongoing chest pain or high risk of suspicion of significant ischemic heart disease    I have personally reviewed labs, CTA coronary, echo, stress test and past office notes prior to patients visit  Assessment:          Diagnosis Plan   1. PSVT (paroxysmal supraventricular tachycardia)  ECG 12 Lead      2. Abnormal nuclear stress test        3. Nonrheumatic aortic valve insufficiency        4. Pulmonary hypertension  Full Pulmonary Function Test With Bronchodilator    Ambulatory Referral to Pulmonology      5. LBBB (left bundle branch block)        6. Mixed hyperlipidemia        7. Gastroesophageal reflux disease without esophagitis                Plan:       1. Paroxysmal SVT: 15 runs on holter 8/19/2019 with longest duration of 20 beats and max rate of 226 bpm. Asymptomatic. Continue atenolol.     2. Abnormal nuclear stress test: Small to moderate septal and apical septal ischemia on Lexiscan 7/29/2021. Previous false positive lexiscan in 2017. Essentially normal CTA of the coronary arteries 10/20.    3. Aortic valve regurgitation: mild to moderate on echo 12/2022. Will continue to monitor routinely.     4. Pulmonary hypertension: she was previously seen by pulmonology but hasnt seen then in 2-3 years. Will order PFTs and send referral to pulmonology.    5. LBBB: Chronic    6. Hyperlipidemia: managed and followed by PCP. Continue lovastatin.     7. GERD    I spent 38 minutes caring for Cindi on this date of service. This time includes time spent by me in the following activities:preparing for the visit, reviewing tests, obtaining and/or reviewing a separately obtained history, performing a medically appropriate examination and/or evaluation , counseling and educating the patient/family/caregiver and documenting information in the medical record     I spent 2 minutes on the separately reported service of EKG. This time is not included in the time used to support the E/M service also reported today.    Patient is to follow up in 6 months or sooner if needed

## 2023-06-13 ENCOUNTER — HOSPITAL ENCOUNTER (OUTPATIENT)
Dept: PULMONOLOGY | Facility: HOSPITAL | Age: 73
Discharge: HOME | End: 2023-06-13
Admitting: NURSE PRACTITIONER
Payer: MEDICARE

## 2023-06-13 DIAGNOSIS — I27.20 PULMONARY HYPERTENSION: ICD-10-CM

## 2023-06-13 PROCEDURE — 94726 PLETHYSMOGRAPHY LUNG VOLUMES: CPT

## 2023-06-13 PROCEDURE — 94060 EVALUATION OF WHEEZING: CPT

## 2023-06-13 PROCEDURE — 94726 PLETHYSMOGRAPHY LUNG VOLUMES: CPT | Performed by: INTERNAL MEDICINE

## 2023-06-13 PROCEDURE — 94729 DIFFUSING CAPACITY: CPT

## 2023-06-13 PROCEDURE — 94729 DIFFUSING CAPACITY: CPT | Performed by: INTERNAL MEDICINE

## 2023-06-13 PROCEDURE — 94060 EVALUATION OF WHEEZING: CPT | Performed by: INTERNAL MEDICINE

## 2023-06-13 RX ORDER — ALBUTEROL SULFATE 2.5 MG/3ML
2.5 SOLUTION RESPIRATORY (INHALATION) ONCE
Status: COMPLETED | OUTPATIENT
Start: 2023-06-13 | End: 2023-06-13

## 2023-06-13 RX ADMIN — ALBUTEROL SULFATE 2.5 MG: 2.5 SOLUTION RESPIRATORY (INHALATION) at 13:29

## 2023-06-14 ENCOUNTER — OFFICE VISIT (OUTPATIENT)
Dept: FAMILY MEDICINE CLINIC | Facility: CLINIC | Age: 73
End: 2023-06-14
Payer: MEDICARE

## 2023-06-14 VITALS
TEMPERATURE: 97.8 F | HEART RATE: 60 BPM | HEIGHT: 62 IN | RESPIRATION RATE: 16 BRPM | DIASTOLIC BLOOD PRESSURE: 84 MMHG | OXYGEN SATURATION: 97 % | BODY MASS INDEX: 26.39 KG/M2 | WEIGHT: 143.4 LBS | SYSTOLIC BLOOD PRESSURE: 126 MMHG

## 2023-06-14 DIAGNOSIS — R06.02 SHORTNESS OF BREATH: ICD-10-CM

## 2023-06-14 DIAGNOSIS — E78.2 MIXED HYPERLIPIDEMIA: Primary | ICD-10-CM

## 2023-06-14 NOTE — PROGRESS NOTES
Subjective   Cindi Hill is a 72 y.o. female.     History of Present Illness  72-year-old female with history of hyperlipidemia shortness of breath possible aortic valve disease    The following portions of the patient's history were reviewed and updated as appropriate: allergies, current medications, past family history, past medical history, past social history, past surgical history, and problem list.    Review of Systems   Respiratory:  Positive for shortness of breath.         Seeing Dr. Anderson pulmonologist for shortness of breath   Cardiovascular:  Negative for chest pain and leg swelling.     Objective   Physical Exam  Vitals and nursing note reviewed.   Constitutional:       Appearance: Normal appearance.   HENT:      Right Ear: Tympanic membrane and ear canal normal.      Left Ear: Tympanic membrane and ear canal normal.   Eyes:      Extraocular Movements: Extraocular movements intact.      Pupils: Pupils are equal, round, and reactive to light.   Cardiovascular:      Rate and Rhythm: Normal rate and regular rhythm.   Pulmonary:      Effort: Pulmonary effort is normal.      Breath sounds: Normal breath sounds.   Musculoskeletal:      Right lower leg: No edema.      Left lower leg: No edema.   Skin:     General: Skin is warm and dry.   Neurological:      General: No focal deficit present.      Mental Status: She is alert and oriented to person, place, and time.   Psychiatric:         Mood and Affect: Mood normal.         Behavior: Behavior normal.         Thought Content: Thought content normal.         Judgment: Judgment normal.       Assessment & Plan   Diagnoses and all orders for this visit:    1. Mixed hyperlipidemia (Primary)  -     Comprehensive Metabolic Panel  -     Lipid Panel    2. Shortness of breath  -     CBC & Differential  -     BNP         Plan above continue seeing specialist

## 2023-06-15 LAB
ALBUMIN SERPL-MCNC: 4.5 G/DL (ref 3.5–5.2)
ALBUMIN/GLOB SERPL: 1.7 G/DL
ALP SERPL-CCNC: 75 U/L (ref 39–117)
ALT SERPL-CCNC: 14 U/L (ref 1–33)
AST SERPL-CCNC: 11 U/L (ref 1–32)
BASOPHILS # BLD AUTO: 0.03 10*3/MM3 (ref 0–0.2)
BASOPHILS NFR BLD AUTO: 0.6 % (ref 0–1.5)
BILIRUB SERPL-MCNC: 0.3 MG/DL (ref 0–1.2)
BNP SERPL-MCNC: 97.3 PG/ML (ref 0–100)
BUN SERPL-MCNC: 12 MG/DL (ref 8–23)
BUN/CREAT SERPL: 16.9 (ref 7–25)
CALCIUM SERPL-MCNC: 10 MG/DL (ref 8.6–10.5)
CHLORIDE SERPL-SCNC: 103 MMOL/L (ref 98–107)
CHOLEST SERPL-MCNC: 182 MG/DL (ref 0–200)
CO2 SERPL-SCNC: 29.8 MMOL/L (ref 22–29)
CREAT SERPL-MCNC: 0.71 MG/DL (ref 0.57–1)
EGFRCR SERPLBLD CKD-EPI 2021: 90.5 ML/MIN/1.73
EOSINOPHIL # BLD AUTO: 0.26 10*3/MM3 (ref 0–0.4)
EOSINOPHIL NFR BLD AUTO: 5.5 % (ref 0.3–6.2)
ERYTHROCYTE [DISTWIDTH] IN BLOOD BY AUTOMATED COUNT: 12.5 % (ref 12.3–15.4)
GLOBULIN SER CALC-MCNC: 2.7 GM/DL
GLUCOSE SERPL-MCNC: 92 MG/DL (ref 65–99)
HCT VFR BLD AUTO: 39 % (ref 34–46.6)
HDLC SERPL-MCNC: 56 MG/DL (ref 40–60)
HGB BLD-MCNC: 12.7 G/DL (ref 12–15.9)
IMM GRANULOCYTES # BLD AUTO: 0.01 10*3/MM3 (ref 0–0.05)
IMM GRANULOCYTES NFR BLD AUTO: 0.2 % (ref 0–0.5)
LDLC SERPL CALC-MCNC: 108 MG/DL (ref 0–100)
LYMPHOCYTES # BLD AUTO: 1.09 10*3/MM3 (ref 0.7–3.1)
LYMPHOCYTES NFR BLD AUTO: 22.9 % (ref 19.6–45.3)
MCH RBC QN AUTO: 28.9 PG (ref 26.6–33)
MCHC RBC AUTO-ENTMCNC: 32.6 G/DL (ref 31.5–35.7)
MCV RBC AUTO: 88.6 FL (ref 79–97)
MONOCYTES # BLD AUTO: 0.38 10*3/MM3 (ref 0.1–0.9)
MONOCYTES NFR BLD AUTO: 8 % (ref 5–12)
NEUTROPHILS # BLD AUTO: 2.98 10*3/MM3 (ref 1.7–7)
NEUTROPHILS NFR BLD AUTO: 62.8 % (ref 42.7–76)
NRBC BLD AUTO-RTO: 0 /100 WBC (ref 0–0.2)
PLATELET # BLD AUTO: 228 10*3/MM3 (ref 140–450)
POTASSIUM SERPL-SCNC: 4.3 MMOL/L (ref 3.5–5.2)
PROT SERPL-MCNC: 7.2 G/DL (ref 6–8.5)
RBC # BLD AUTO: 4.4 10*6/MM3 (ref 3.77–5.28)
SODIUM SERPL-SCNC: 141 MMOL/L (ref 136–145)
TRIGL SERPL-MCNC: 101 MG/DL (ref 0–150)
VLDLC SERPL CALC-MCNC: 18 MG/DL (ref 5–40)
WBC # BLD AUTO: 4.75 10*3/MM3 (ref 3.4–10.8)

## 2023-06-16 DIAGNOSIS — E78.2 MIXED HYPERLIPIDEMIA: Primary | ICD-10-CM

## 2023-06-16 RX ORDER — LOVASTATIN 20 MG/1
20 TABLET ORAL NIGHTLY
Qty: 90 TABLET | Refills: 3 | Status: SHIPPED | OUTPATIENT
Start: 2023-06-16

## 2023-06-19 ENCOUNTER — OFFICE VISIT (OUTPATIENT)
Dept: PULMONOLOGY | Facility: CLINIC | Age: 73
End: 2023-06-19
Payer: MEDICARE

## 2023-06-19 VITALS
BODY MASS INDEX: 26.31 KG/M2 | WEIGHT: 143 LBS | OXYGEN SATURATION: 94 % | HEART RATE: 68 BPM | DIASTOLIC BLOOD PRESSURE: 80 MMHG | SYSTOLIC BLOOD PRESSURE: 136 MMHG | HEIGHT: 62 IN

## 2023-06-19 DIAGNOSIS — I27.20 PULMONARY HYPERTENSION: Primary | ICD-10-CM

## 2023-06-19 DIAGNOSIS — J98.09 BRONCHOMALACIA: ICD-10-CM

## 2023-06-19 DIAGNOSIS — R06.02 SHORTNESS OF BREATH: ICD-10-CM

## 2023-06-19 DIAGNOSIS — J45.30 MILD PERSISTENT ASTHMA, UNSPECIFIED WHETHER COMPLICATED: ICD-10-CM

## 2023-06-19 PROCEDURE — 99204 OFFICE O/P NEW MOD 45 MIN: CPT | Performed by: INTERNAL MEDICINE

## 2023-06-19 PROCEDURE — 3079F DIAST BP 80-89 MM HG: CPT | Performed by: INTERNAL MEDICINE

## 2023-06-19 PROCEDURE — 3075F SYST BP GE 130 - 139MM HG: CPT | Performed by: INTERNAL MEDICINE

## 2023-06-19 RX ORDER — MONTELUKAST SODIUM 10 MG/1
10 TABLET ORAL NIGHTLY
Qty: 90 TABLET | Refills: 3 | Status: SHIPPED | OUTPATIENT
Start: 2023-06-19

## 2023-06-19 NOTE — PROGRESS NOTES
Background:  Pt with cough, upper airway cough, dynamic airway collapse   Chief Complaint  pulmonary hyptension    Subjective    History of Present Illness    Cindi Hill presents to [facility].  History of Present Illness  I am asked to see her regarding respiratory symptoms.  She has developed increasing orthopnea the last few weeks.  She has history of pulm htn, aortic valve disease.  The orthopnea does not occur every time but most of the times when lying down. She has had chronic cough which we had treated with multiple inhalers with not much benefit.  The cough is better than in the past.  During Covid, she felt that it was better.  This had correlated with starting on cpap, and use of montelukast for breast reconstruction skin healing.  She has had PUD treated in [place] around the time of the breast reconstruction.     has a past medical history of Acute left-sided low back pain without sciatica (10/24/2017), Arthritis, Breast cancer, Drug therapy (1997), Essential hypertension (04/21/2022), GERD (gastroesophageal reflux disease), Hypercholesteremia, TERESA on CPAP (04/21/2022), KARUNA (stress urinary incontinence, female), Ulcer, duodenal, with obstruction (02/28/2022), Urge incontinence, Uterovaginal prolapse, and Vaginal atrophy.   has a past surgical history that includes Rotator cuff repair (Right, 2012); Colonoscopy (2014); Laminectomy (01/2016); Cardiac catheterization (Left, 02/28/2017); Bronchoscopy (N/A, 04/26/2017); Hysterectomy; Hysterectomy; bladder sling modified, anterior and posterior vaginal repair; Mastectomy; and Bladder surgery.  family history includes Cancer in her brother, father, and paternal grandfather; Dementia in her mother; Diabetes in her maternal grandfather; Heart failure in her mother; No Known Problems in her maternal grandmother and paternal grandmother.   reports that she has never smoked. She has never used smokeless  "tobacco. She reports that she does not drink alcohol and does not use drugs.  Allergies   Allergen Reactions    Lisinopril Cough    Lortab [Hydrocodone-Acetaminophen] Nausea Only    Percocet [Oxycodone-Acetaminophen] Nausea Only    Pregabalin Other (See Comments)     Made pt very sleepy and dizzy    Adhesive Tape Itching     Bandage tape caused itching and redness     Current Outpatient Medications   Medication Instructions    atenolol (TENORMIN) 25 mg, Oral, Daily    azelastine (ASTELIN) 0.1 % nasal spray 2 sprays, Nasal, 2 Times Daily, Use in each nostril as directed    calcium carbonate (OS-MARIANNE) 600 mg, Oral, Daily    cetirizine (ZYRTEC) 10 mg, Oral, As Needed    Cholecalciferol 5,000 Units, Oral, Daily    diphenhydrAMINE (BENADRYL) 25 mg, Oral, As Needed    docusate sodium (COLACE) 100 mg, Oral, 2 Times Daily    ESTRADIOL VA PLACE fingertip amount (1/4gram) TO vaginal opening WITH fingertip AT bedtime every NIGHT 1/4 GRAM=1 CLICK ON DISPENSER    famotidine (PEPCID) 40 mg, Oral, Daily PRN    fluticasone (FLONASE) 50 MCG/ACT nasal spray 2 sprays, Nasal, Daily    lovastatin (MEVACOR) 20 mg, Oral, Nightly    montelukast (SINGULAIR) 10 mg, Oral, Nightly    ondansetron ODT (ZOFRAN-ODT) 4 mg, Oral      Objective     Vital Signs:   /80   Pulse 68   Ht 157.5 cm (62\")   Wt 64.9 kg (143 lb)   SpO2 94% Comment: RA  BMI 26.16 kg/m²   Physical Exam  Constitutional:       General: She is not in acute distress.     Appearance: She is well-developed. She is not ill-appearing or toxic-appearing.   HENT:      Head: Atraumatic.   Eyes:      General: No scleral icterus.     Conjunctiva/sclera: Conjunctivae normal.   Cardiovascular:      Rate and Rhythm: Normal rate and regular rhythm.      Heart sounds: S1 normal and S2 normal.   Pulmonary:      Effort: Pulmonary effort is normal.      Breath sounds: Normal breath sounds.   Abdominal:      General: There is no distension.   Musculoskeletal:         General: No deformity. "      Cervical back: Neck supple.   Skin:     Coloration: Skin is not pale.      Findings: No rash.   Neurological:      Mental Status: She is alert.      Result Review  Data Reviewed:                Adult Transthoracic Echo Complete w/ Color, Spectral and Contrast if necessary per protocol (12/02/2022 15:05)      Left ventricular systolic function is normal. Left ventricular ejection fraction appears to be 56 - 60%.    Left ventricular diastolic function is consistent with (grade Ia w/high LAP) impaired relaxation.    Mild to moderate aortic valve regurgitation is present.    Mild pulmonary hypertension is present.    There is a trivial pericardial effusion. The effusion is fluid filled. There is no evidence of cardiac tamponade.  Assessment and Plan   Diagnoses and all orders for this visit:    1. Pulmonary hypertension (Primary)    2. Shortness of breath    3. Bronchomalacia  -     montelukast (SINGULAIR) 10 MG tablet; Take 1 tablet by mouth Every Night.  Dispense: 90 tablet; Refill: 3    4. Mild persistent asthma, unspecified whether complicated  -     montelukast (SINGULAIR) 10 MG tablet; Take 1 tablet by mouth Every Night.  Dispense: 90 tablet; Refill: 3    Will give trial of trelegy with the montelukast given PFT result. Call to report whether she benefits.  Poor response to inhalers in the past so expectation is uncertain  Will monitor pa pressure along with cardiology  She is known to have some bronchomalacia based on previous bronchoscopy, which appears not to be worsening.  Shortness of breath relates to all the above    Follow Up   No follow-ups on file.  Patient was given instructions and counseling regarding her condition or for health maintenance advice. Please see specific information pulled into the AVS if appropriate.    Electronically signed by Adi Anderson MD, 6/19/2023, 11:06 CDT

## 2023-06-29 ENCOUNTER — TELEPHONE (OUTPATIENT)
Dept: PULMONOLOGY | Facility: CLINIC | Age: 73
End: 2023-06-29

## 2023-06-29 NOTE — TELEPHONE ENCOUNTER
She says the cough is better but it was before she started with the inhaler.   She is wondering if the inhaler was to help with cough or shortness of breath when she lays down or exerts herself, or was it to help with both.    She is still having shortness of breath despite taking the inhaler.

## 2023-06-29 NOTE — TELEPHONE ENCOUNTER
Provider: JUNG SABA  Caller:   Relationship to Patient: SELF  Reason for Call: PATIENT WANTED TO LET MEGGAN KNOW THAT SHE DOES NOT SEE ANY IMPROVEMENT WITH INHALER AND DOES NOT SEE A NEED TO FILL SCRIPT.

## 2023-07-27 LAB
AMBIG ABBREV BMP8 DEFAULT: NORMAL
BUN SERPL-MCNC: 19 MG/DL (ref 8–27)
BUN/CREAT SERPL: 24 (ref 12–28)
CALCIUM SERPL-MCNC: 9.9 MG/DL (ref 8.7–10.3)
CHLORIDE SERPL-SCNC: 105 MMOL/L (ref 96–106)
CO2 SERPL-SCNC: 24 MMOL/L (ref 20–29)
CREAT SERPL-MCNC: 0.8 MG/DL (ref 0.57–1)
EGFRCR SERPLBLD CKD-EPI 2021: 78 ML/MIN/1.73
GLUCOSE SERPL-MCNC: 109 MG/DL (ref 70–99)
POTASSIUM SERPL-SCNC: 4.4 MMOL/L (ref 3.5–5.2)
SODIUM SERPL-SCNC: 146 MMOL/L (ref 134–144)

## 2023-09-13 ENCOUNTER — TELEPHONE (OUTPATIENT)
Dept: FAMILY MEDICINE CLINIC | Facility: CLINIC | Age: 73
End: 2023-09-13

## 2023-09-13 ENCOUNTER — OFFICE VISIT (OUTPATIENT)
Dept: FAMILY MEDICINE CLINIC | Facility: CLINIC | Age: 73
End: 2023-09-13
Payer: MEDICARE

## 2023-09-13 ENCOUNTER — TELEPHONE (OUTPATIENT)
Dept: FAMILY MEDICINE CLINIC | Facility: CLINIC | Age: 73
End: 2023-09-13
Payer: MEDICARE

## 2023-09-13 VITALS
SYSTOLIC BLOOD PRESSURE: 110 MMHG | OXYGEN SATURATION: 98 % | BODY MASS INDEX: 26.94 KG/M2 | WEIGHT: 146.4 LBS | HEART RATE: 71 BPM | TEMPERATURE: 97.7 F | HEIGHT: 62 IN | DIASTOLIC BLOOD PRESSURE: 71 MMHG

## 2023-09-13 DIAGNOSIS — B35.9 TINEA: Primary | ICD-10-CM

## 2023-09-13 PROCEDURE — 3078F DIAST BP <80 MM HG: CPT | Performed by: NURSE PRACTITIONER

## 2023-09-13 PROCEDURE — 99213 OFFICE O/P EST LOW 20 MIN: CPT | Performed by: NURSE PRACTITIONER

## 2023-09-13 PROCEDURE — 3074F SYST BP LT 130 MM HG: CPT | Performed by: NURSE PRACTITIONER

## 2023-09-13 RX ORDER — TERBINAFINE HYDROCHLORIDE 250 MG/1
250 TABLET ORAL DAILY
Qty: 14 TABLET | Refills: 0 | Status: SHIPPED | OUTPATIENT
Start: 2023-09-13 | End: 2023-09-27

## 2023-09-13 RX ORDER — HYDROCORTISONE 2.5% / KETOCONAZOLE 2% 2.5; 2 G/100G; G/100G
1 CREAM TOPICAL 2 TIMES DAILY
Qty: 30 G | Refills: 0 | Status: SHIPPED | OUTPATIENT
Start: 2023-09-13

## 2023-09-13 NOTE — PROGRESS NOTES
CC: rash    History:  Cindi Hill is a 72 y.o. female who presents today for evaluation of the above problems.      Patient states that she has had a rash on her groin for around 6 weeks. Has tried ciclopirox and steroid topicals and these have not helped. Rash is pruritic at times, but not generally. Does become more red and inflamed when she is hot or has had on pants that rub the area when she walks.     HPI  ROS:  Review of Systems   Skin:  Positive for rash.     Allergies   Allergen Reactions    Lisinopril Cough    Lortab [Hydrocodone-Acetaminophen] Nausea Only    Percocet [Oxycodone-Acetaminophen] Nausea Only    Pregabalin Other (See Comments)     Made pt very sleepy and dizzy    Adhesive Tape Itching     Bandage tape caused itching and redness     Past Medical History:   Diagnosis Date    Acute left-sided low back pain without sciatica 10/24/2017    Arthritis     Breast cancer     1997, right breast mastectomy    Drug therapy 1997    Essential hypertension 04/21/2022    GERD (gastroesophageal reflux disease)     Hypercholesteremia     TERESA on CPAP 04/21/2022    KARUNA (stress urinary incontinence, female)     Ulcer, duodenal, with obstruction 02/28/2022    Urge incontinence     Uterovaginal prolapse     Vaginal atrophy      Past Surgical History:   Procedure Laterality Date    BLADDER SLING MODIFIED, ANTERIOR AND POSTERIOR VAGINAL REPAIR      BLADDER SURGERY      Pacemaker for the bladder to help with incontinence.    BRONCHOSCOPY N/A 04/26/2017    Procedure: BRONCHOSCOPY BIOPSY WITH ANESTHESIA;  Surgeon: Adi Anderson MD;  Location:  [facility];  Service:     CARDIAC CATHETERIZATION Left 02/28/2017    Procedure: Cardiac Catheterization/Vascular Study;  Surgeon: Anuj Johnson MD;  Location:  [facility];  Service:     COLONOSCOPY  2014    HYSTERECTOMY      HYSTERECTOMY      LAMINECTOMY  01/2016    MASTECTOMY      Right in 1997 and Left in 2021    ROTATOR CUFF REPAIR Right 2012      Family History   Problem Relation Age of Onset    Dementia Mother     Heart failure Mother     Cancer Father         liver    Cancer Brother         thyroid    No Known Problems Maternal Grandmother     Diabetes Maternal Grandfather     No Known Problems Paternal Grandmother     Cancer Paternal Grandfather     Breast cancer Neg Hx       reports that she has never smoked. She has been exposed to tobacco smoke. She has never used smokeless tobacco. She reports that she does not drink alcohol and does not use drugs.      Current Outpatient Medications:     atenolol (TENORMIN) 25 MG tablet, Take 1 tablet by mouth Daily., Disp: 90 tablet, Rfl: 3    azelastine (ASTELIN) 0.1 % nasal spray, 2 sprays into the nostril(s) as directed by provider 2 (Two) Times a Day. Use in each nostril as directed, Disp: 30 mL, Rfl: 12    calcium carbonate (OS-MARIANNE) 600 MG tablet, Take 1 tablet by mouth Daily., Disp: , Rfl:     cetirizine (zyrTEC) 10 MG tablet, Take 1 tablet by mouth As Needed., Disp: , Rfl:     Cholecalciferol 4000 units capsule, Take 5,000 Units by mouth Daily., Disp: , Rfl:     diphenhydrAMINE (BENADRYL) 25 mg capsule, Take 1 capsule by mouth As Needed for Itching., Disp: , Rfl:     docusate sodium (COLACE) 100 MG capsule, Take 1 capsule by mouth 2 (Two) Times a Day., Disp: , Rfl:     ESTRADIOL VA, PLACE fingertip amount (1/4gram) TO vaginal opening WITH fingertip AT bedtime every NIGHT 1/4 GRAM=1 CLICK ON DISPENSER, Disp: , Rfl:     famotidine (PEPCID) 40 MG tablet, Take 1 tablet by mouth Daily As Needed for Heartburn or Indigestion., Disp: , Rfl:     fluticasone (FLONASE) 50 MCG/ACT nasal spray, 2 sprays into the nostril(s) as directed by provider Daily., Disp: , Rfl:     lovastatin (MEVACOR) 20 MG tablet, Take 1 tablet by mouth Every Night., Disp: 90 tablet, Rfl: 3    montelukast (SINGULAIR) 10 MG tablet, Take 1 tablet by mouth Every Night., Disp: 90 tablet, Rfl: 3    ondansetron ODT (ZOFRAN-ODT) 4 MG disintegrating  "tablet, Take 1 tablet by mouth., Disp: , Rfl:     spironolactone (ALDACTONE) 25 MG tablet, Take 1 tablet by mouth Daily., Disp: 30 tablet, Rfl: 11    Ketoconazole-Hydrocortisone 2-2.5 % cream, Apply 1 application  topically 2 (Two) Times a Day., Disp: 30 g, Rfl: 0    terbinafine (lamiSIL) 250 MG tablet, Take 1 tablet by mouth Daily for 14 days., Disp: 14 tablet, Rfl: 0    OBJECTIVE:  /71 (BP Location: Left arm, Patient Position: Sitting, Cuff Size: Adult)   Pulse 71   Temp 97.7 °F (36.5 °C) (Temporal)   Ht 157.5 cm (62\")   Wt 66.4 kg (146 lb 6.4 oz)   LMP  (LMP Unknown)   SpO2 98%   BMI 26.78 kg/m²    Physical Exam  Vitals reviewed.   Constitutional:       Appearance: She is well-developed.   Cardiovascular:      Rate and Rhythm: Normal rate.   Pulmonary:      Effort: Pulmonary effort is normal.   Genitourinary:      Neurological:      Mental Status: She is alert and oriented to person, place, and time.   Psychiatric:         Behavior: Behavior normal.       Assessment/Plan    Diagnoses and all orders for this visit:    1. Tinea (Primary)  -     terbinafine (lamiSIL) 250 MG tablet; Take 1 tablet by mouth Daily for 14 days.  Dispense: 14 tablet; Refill: 0  -     Ketoconazole-Hydrocortisone 2-2.5 % cream; Apply 1 application  topically 2 (Two) Times a Day.  Dispense: 30 g; Refill: 0        An After Visit Summary was printed and given to the patient at discharge.  Return in about 2 weeks (around 9/27/2023) for Recheck.       BAR Ortega 9/13/23    Electronically signed.  "

## 2023-09-13 NOTE — TELEPHONE ENCOUNTER
Caller: [facility] - [place], [place] - [address] - [phone] [facility] [phone] FX    Relationship: Pharmacy  SPOKE WITH KISHA Parker call back number: [phone]     What is the best time to reach you: ANYTIME    Who are you requesting to speak with (clinical staff, provider,  specific staff member): CLINICAL      What was the call regarding: NEEDS TO KNOW ON THE Ketoconazole-Hydrocortisone 2-2.5 % cream WHERE IT IS BEING APPLIED

## 2023-10-18 ENCOUNTER — OFFICE VISIT (OUTPATIENT)
Dept: FAMILY MEDICINE CLINIC | Facility: CLINIC | Age: 73
End: 2023-10-18
Payer: MEDICARE

## 2023-10-18 VITALS
HEART RATE: 65 BPM | SYSTOLIC BLOOD PRESSURE: 113 MMHG | HEIGHT: 62 IN | BODY MASS INDEX: 27.09 KG/M2 | WEIGHT: 147.2 LBS | TEMPERATURE: 98 F | OXYGEN SATURATION: 96 % | DIASTOLIC BLOOD PRESSURE: 72 MMHG

## 2023-10-18 DIAGNOSIS — L57.0 ACTINIC KERATOSIS: ICD-10-CM

## 2023-10-18 DIAGNOSIS — L98.9 SKIN LESION: ICD-10-CM

## 2023-10-18 DIAGNOSIS — B35.9 TINEA: Primary | ICD-10-CM

## 2023-10-18 DIAGNOSIS — E78.2 MIXED HYPERLIPIDEMIA: ICD-10-CM

## 2023-10-18 PROCEDURE — 99213 OFFICE O/P EST LOW 20 MIN: CPT | Performed by: NURSE PRACTITIONER

## 2023-10-18 PROCEDURE — 3078F DIAST BP <80 MM HG: CPT | Performed by: NURSE PRACTITIONER

## 2023-10-18 PROCEDURE — 3074F SYST BP LT 130 MM HG: CPT | Performed by: NURSE PRACTITIONER

## 2023-10-18 RX ORDER — LOVASTATIN 20 MG/1
20 TABLET ORAL NIGHTLY
Qty: 7 TABLET | Refills: 0 | Status: SHIPPED | OUTPATIENT
Start: 2023-10-18 | End: 2023-10-19

## 2023-10-18 RX ORDER — KETOCONAZOLE 20 MG/G
1 CREAM TOPICAL DAILY
COMMUNITY
Start: 2023-09-13

## 2023-10-18 RX ORDER — TERBINAFINE HYDROCHLORIDE 250 MG/1
250 TABLET ORAL DAILY
Qty: 14 TABLET | Refills: 0 | Status: SHIPPED | OUTPATIENT
Start: 2023-10-18

## 2023-10-18 NOTE — PROGRESS NOTES
CC: rash and skin lesion    History:  Cindi Hill is a 72 y.o. female who presents today for evaluation of the above problems.      Patient states that the rash in her groin has improved significantly since her last visit, however, is still slightly visible. It doesn't itch or hurt.   Also notes that she has had a skin lesion on her right ankle since July that is very itchy and won't go away. She initially thought it was an insect bite, however, it has never healed.     HPI  ROS:  Review of Systems   Skin:  Positive for rash.        Lesion on right ankle       Allergies   Allergen Reactions    Lisinopril Cough    Lortab [Hydrocodone-Acetaminophen] Nausea Only    Percocet [Oxycodone-Acetaminophen] Nausea Only    Pregabalin Other (See Comments)     Made pt very sleepy and dizzy    Adhesive Tape Itching     Bandage tape caused itching and redness     Past Medical History:   Diagnosis Date    Acute left-sided low back pain without sciatica 10/24/2017    Arthritis     Breast cancer     1997, right breast mastectomy    Drug therapy 1997    Essential hypertension 04/21/2022    GERD (gastroesophageal reflux disease)     Headache     Hypercholesteremia     Obesity     TERESA on CPAP 04/21/2022    KARUNA (stress urinary incontinence, female)     Ulcer, duodenal, with obstruction 02/28/2022    Urge incontinence     Uterovaginal prolapse     Vaginal atrophy      Past Surgical History:   Procedure Laterality Date    BLADDER SLING MODIFIED, ANTERIOR AND POSTERIOR VAGINAL REPAIR      BLADDER SURGERY      Pacemaker for the bladder to help with incontinence.    BREAST SURGERY  1997, 2021    BRONCHOSCOPY N/A 04/26/2017    Procedure: BRONCHOSCOPY BIOPSY WITH ANESTHESIA;  Surgeon: Adi Anderson MD;  Location:  [facility];  Service:     CARDIAC CATHETERIZATION Left 02/28/2017    Procedure: Cardiac Catheterization/Vascular Study;  Surgeon: Anuj Johnson MD;  Location:  [facility];  Service:     CARDIAC  CATHETERIZATION  2/28/2017    COLONOSCOPY  2014    COSMETIC SURGERY  2021    HYSTERECTOMY      HYSTERECTOMY      LAMINECTOMY  01/2016    LYMPH NODE BIOPSY  1997    MASTECTOMY      Right in 1997 and Left in 2021    ROTATOR CUFF REPAIR Right 2012    SPINE SURGERY  2016     Family History   Problem Relation Age of Onset    Dementia Mother     Heart failure Mother     Arthritis Mother     Cancer Father         liver    Cancer Brother         thyroid    Diabetes Brother     Thyroid disease Brother     Thyroid disease Maternal Grandmother     Diabetes Maternal Grandfather     No Known Problems Paternal Grandmother     Cancer Paternal Grandfather     Breast cancer Neg Hx       reports that she has never smoked. She has been exposed to tobacco smoke. She has never used smokeless tobacco. She reports that she does not drink alcohol and does not use drugs.      Current Outpatient Medications:     atenolol (TENORMIN) 25 MG tablet, Take 1 tablet by mouth Daily., Disp: 90 tablet, Rfl: 3    azelastine (ASTELIN) 0.1 % nasal spray, 2 sprays into the nostril(s) as directed by provider 2 (Two) Times a Day. Use in each nostril as directed, Disp: 30 mL, Rfl: 12    calcium carbonate (OS-MARIANNE) 600 MG tablet, Take 1 tablet by mouth Daily., Disp: , Rfl:     cetirizine (zyrTEC) 10 MG tablet, Take 1 tablet by mouth As Needed., Disp: , Rfl:     Cholecalciferol 4000 units capsule, Take 5,000 Units by mouth Daily., Disp: , Rfl:     diphenhydrAMINE (BENADRYL) 25 mg capsule, Take 1 capsule by mouth As Needed for Itching., Disp: , Rfl:     docusate sodium (COLACE) 100 MG capsule, Take 1 capsule by mouth 2 (Two) Times a Day., Disp: , Rfl:     ESTRADIOL VA, PLACE fingertip amount (1/4gram) TO vaginal opening WITH fingertip AT bedtime every NIGHT 1/4 GRAM=1 CLICK ON DISPENSER, Disp: , Rfl:     famotidine (PEPCID) 40 MG tablet, Take 1 tablet by mouth Daily As Needed for Heartburn or Indigestion., Disp: , Rfl:     fluticasone (FLONASE) 50 MCG/ACT nasal  "spray, 2 sprays into the nostril(s) as directed by provider Daily., Disp: , Rfl:     hydrocortisone 2.5 % cream, Apply 1 application  topically to the appropriate area as directed 2 (Two) Times a Day., Disp: , Rfl:     ketoconazole (NIZORAL) 2 % cream, Apply 1 application  topically to the appropriate area as directed Daily., Disp: , Rfl:     Ketoconazole-Hydrocortisone 2-2.5 % cream, Apply 1 application  topically 2 (Two) Times a Day., Disp: 30 g, Rfl: 0    lovastatin (MEVACOR) 20 MG tablet, Take 1 tablet by mouth Every Night., Disp: 7 tablet, Rfl: 0    montelukast (SINGULAIR) 10 MG tablet, Take 1 tablet by mouth Every Night., Disp: 90 tablet, Rfl: 3    ondansetron ODT (ZOFRAN-ODT) 4 MG disintegrating tablet, Take 1 tablet by mouth., Disp: , Rfl:     spironolactone (ALDACTONE) 25 MG tablet, Take 1 tablet by mouth Daily., Disp: 30 tablet, Rfl: 11    terbinafine (LamISIL) 250 MG tablet, Take 1 tablet by mouth Daily., Disp: 14 tablet, Rfl: 0    OBJECTIVE:  /72 (BP Location: Left arm, Patient Position: Sitting, Cuff Size: Adult)   Pulse 65   Temp 98 °F (36.7 °C) (Temporal)   Ht 157.5 cm (62\")   Wt 66.8 kg (147 lb 3.2 oz)   LMP  (LMP Unknown)   SpO2 96%   BMI 26.92 kg/m²    Physical Exam  Vitals reviewed.   Constitutional:       Appearance: She is well-developed.   Cardiovascular:      Rate and Rhythm: Normal rate.   Pulmonary:      Effort: Pulmonary effort is normal.   Genitourinary:      Musculoskeletal:        Feet:    Neurological:      Mental Status: She is alert and oriented to person, place, and time.   Psychiatric:         Behavior: Behavior normal.         Assessment/Plan    Diagnoses and all orders for this visit:    1. Tinea (Primary)  -     terbinafine (LamISIL) 250 MG tablet; Take 1 tablet by mouth Daily.  Dispense: 14 tablet; Refill: 0    2. Skin lesion  -     Destruction of Lesion  -     Reference Histopathology    3. Actinic keratosis  -     Destruction of Lesion    4. Mixed " hyperlipidemia  -     lovastatin (MEVACOR) 20 MG tablet; Take 1 tablet by mouth Every Night.  Dispense: 7 tablet; Refill: 0        An After Visit Summary was printed and given to the patient at discharge.  Return if symptoms worsen or fail to improve, for Next scheduled follow up.       BAR Ortega 10/18/23    Electronically signed.

## 2023-10-18 NOTE — PROGRESS NOTES
Procedure   Destruction of Lesion    Date/Time: 10/18/2023 10:59 AM    Performed by: Jeanne Prescott APRN  Authorized by: Jeanne Prescott APRN  Preparation: Patient was prepped and draped in the usual sterile fashion.  Local anesthesia used: yes  Anesthesia: local infiltration    Anesthesia:  Local anesthesia used: yes  Local Anesthetic: lidocaine 1% with epinephrine  Anesthetic total: 1 mL    Sedation:  Patient sedated: no    Patient tolerance: patient tolerated the procedure well with no immediate complications  Comments: Curette used to scrape lesion from right ankle. Area cauterized and antibiotic ointment and bandaid applied.

## 2023-10-19 DIAGNOSIS — E78.2 MIXED HYPERLIPIDEMIA: Primary | ICD-10-CM

## 2023-10-19 RX ORDER — ATORVASTATIN CALCIUM 20 MG/1
20 TABLET, FILM COATED ORAL NIGHTLY
Qty: 90 TABLET | Refills: 0 | Status: SHIPPED | OUTPATIENT
Start: 2023-10-19

## 2023-10-23 LAB
DX ICD CODE: NORMAL
DX ICD CODE: NORMAL
PATH REPORT.FINAL DX SPEC: NORMAL
PATH REPORT.GROSS SPEC: NORMAL
PATH REPORT.SITE OF ORIGIN SPEC: NORMAL
PATHOLOGIST NAME: NORMAL
PAYMENT PROCEDURE: NORMAL

## 2023-10-23 NOTE — PROGRESS NOTES
Lesion did not appear cancerous. It was so small they couldn't diagnose for sure, but either a wart or age spot.

## 2023-10-25 ENCOUNTER — OFFICE VISIT (OUTPATIENT)
Dept: PULMONOLOGY | Facility: CLINIC | Age: 73
End: 2023-10-25
Payer: MEDICARE

## 2023-10-25 VITALS
BODY MASS INDEX: 27.79 KG/M2 | HEART RATE: 76 BPM | OXYGEN SATURATION: 95 % | HEIGHT: 62 IN | SYSTOLIC BLOOD PRESSURE: 126 MMHG | WEIGHT: 151 LBS | DIASTOLIC BLOOD PRESSURE: 70 MMHG

## 2023-10-25 DIAGNOSIS — Z29.11 NEED FOR PROPHYLACTIC VACCINATION AND INOCULATION AGAINST RESPIRATORY SYNCYTIAL VIRUS (RSV): ICD-10-CM

## 2023-10-25 DIAGNOSIS — J45.30 MILD PERSISTENT ASTHMA, UNSPECIFIED WHETHER COMPLICATED: ICD-10-CM

## 2023-10-25 DIAGNOSIS — I27.20 PULMONARY HYPERTENSION: ICD-10-CM

## 2023-10-25 DIAGNOSIS — J98.09 BRONCHOMALACIA: Primary | ICD-10-CM

## 2023-10-25 NOTE — PROGRESS NOTES
Background:  Pt with cough, upper airway cough, dynamic airway collapse   Chief Complaint  No chief complaint on file.    Subjective    History of Present Illness     Cindi Hill is here for follow up with [facility].  History of Present Illness  She has cough not responsive to bronchodilators, has dynamic airway collapse.  She has als taken beta blockers.  She is still short of breath and not better.  She choked on some coffee the other night.  Inhalers did not help much.       Tobacco Use: Low Risk  (10/18/2023)    Patient History     Smoking Tobacco Use: Never     Smokeless Tobacco Use: Never     Passive Exposure: Past      Current Outpatient Medications   Medication Instructions    atenolol (TENORMIN) 25 mg, Oral, Daily    atorvastatin (LIPITOR) 20 mg, Oral, Nightly    azelastine (ASTELIN) 0.1 % nasal spray 2 sprays, Nasal, 2 Times Daily, Use in each nostril as directed    calcium carbonate (OS-MARIANNE) 600 mg, Oral, Daily    cetirizine (ZYRTEC) 10 mg, Oral, As Needed    Cholecalciferol 5,000 Units, Oral, Daily    diphenhydrAMINE (BENADRYL) 25 mg, Oral, As Needed    docusate sodium (COLACE) 100 mg, Oral, 2 Times Daily    ESTRADIOL VA PLACE fingertip amount (1/4gram) TO vaginal opening WITH fingertip AT bedtime every NIGHT 1/4 GRAM=1 CLICK ON DISPENSER    famotidine (PEPCID) 40 mg, Oral, Daily PRN    fluticasone (FLONASE) 50 MCG/ACT nasal spray 2 sprays, Nasal, Daily    hydrocortisone 2.5 % cream 1 application , Topical, 2 Times Daily    ketoconazole (NIZORAL) 2 % cream 1 application , Topical, Daily    Ketoconazole-Hydrocortisone 2-2.5 % cream 1 application , Apply externally, 2 Times Daily    montelukast (SINGULAIR) 10 mg, Oral, Nightly    ondansetron ODT (ZOFRAN-ODT) 4 mg, Oral    spironolactone (ALDACTONE) 25 mg, Oral, Daily    terbinafine (LAMISIL) 250 mg, Oral, Daily      Objective     Vital Signs:   There were no vitals taken for this visit.  Physical  Exam  Constitutional:       General: She is not in acute distress.     Appearance: She is well-developed. She is not ill-appearing or toxic-appearing.   HENT:      Head: Atraumatic.   Eyes:      General: No scleral icterus.     Conjunctiva/sclera: Conjunctivae normal.   Cardiovascular:      Rate and Rhythm: Normal rate and regular rhythm.      Heart sounds: S1 normal and S2 normal.   Pulmonary:      Effort: Pulmonary effort is normal.      Breath sounds: Normal breath sounds. No wheezing or rales.   Abdominal:      General: There is no distension.   Musculoskeletal:         General: No deformity.      Cervical back: Neck supple.   Skin:     Coloration: Skin is not pale.      Findings: No rash.   Neurological:      Mental Status: She is alert.      Result Review  Data Reviewed:                      Assessment and Plan    Diagnoses and all orders for this visit:    1. Bronchomalacia (Primary)    2. Mild persistent asthma, unspecified whether complicated    3. Pulmonary hypertension    Trial of breztri with spacer.  We gave sample and demonstrated proper inhaler technique for breztri with spacer  previous PFT suggested bronchodilator could help but that has not been the case so far.  Spacer could make the difference  Recommend rsv vaccine  Continue mgmt for group 2 PH  If we do identify definite bd responsiveness, consider stopping beta blocker.    Follow Up   No follow-ups on file.  Patient was given instructions and counseling regarding her condition or for health maintenance advice. Please see specific information pulled into the AVS if appropriate.    Electronically signed by Adi Anderson MD, 10/25/2023, 06:29 CDT

## 2023-11-08 ENCOUNTER — TELEPHONE (OUTPATIENT)
Dept: PULMONOLOGY | Facility: CLINIC | Age: 73
End: 2023-11-08
Payer: MEDICARE

## 2023-11-08 NOTE — TELEPHONE ENCOUNTER
Calling to report on how the breztri did for her.  She can't tell it made her any difference at all.

## 2023-12-11 ENCOUNTER — OFFICE VISIT (OUTPATIENT)
Dept: CARDIOLOGY | Facility: CLINIC | Age: 73
End: 2023-12-11
Payer: MEDICARE

## 2023-12-11 VITALS
HEIGHT: 62 IN | OXYGEN SATURATION: 97 % | RESPIRATION RATE: 18 BRPM | HEART RATE: 75 BPM | SYSTOLIC BLOOD PRESSURE: 130 MMHG | WEIGHT: 151 LBS | DIASTOLIC BLOOD PRESSURE: 80 MMHG | BODY MASS INDEX: 27.79 KG/M2

## 2023-12-11 DIAGNOSIS — R94.39 ABNORMAL NUCLEAR STRESS TEST: ICD-10-CM

## 2023-12-11 DIAGNOSIS — G47.33 OSA ON CPAP: ICD-10-CM

## 2023-12-11 DIAGNOSIS — I47.10 PSVT (PAROXYSMAL SUPRAVENTRICULAR TACHYCARDIA): Chronic | ICD-10-CM

## 2023-12-11 DIAGNOSIS — I35.1 NONRHEUMATIC AORTIC VALVE INSUFFICIENCY: ICD-10-CM

## 2023-12-11 DIAGNOSIS — R06.09 DYSPNEA ON EXERTION: Primary | ICD-10-CM

## 2023-12-11 DIAGNOSIS — I27.20 PULMONARY HYPERTENSION: ICD-10-CM

## 2023-12-11 DIAGNOSIS — K21.9 GASTROESOPHAGEAL REFLUX DISEASE WITHOUT ESOPHAGITIS: ICD-10-CM

## 2023-12-11 DIAGNOSIS — E78.2 MIXED HYPERLIPIDEMIA: ICD-10-CM

## 2023-12-11 DIAGNOSIS — I44.7 LBBB (LEFT BUNDLE BRANCH BLOCK): ICD-10-CM

## 2023-12-11 DIAGNOSIS — I50.32 CHRONIC DIASTOLIC (CONGESTIVE) HEART FAILURE: ICD-10-CM

## 2023-12-11 PROCEDURE — 3079F DIAST BP 80-89 MM HG: CPT | Performed by: NURSE PRACTITIONER

## 2023-12-11 PROCEDURE — 3075F SYST BP GE 130 - 139MM HG: CPT | Performed by: NURSE PRACTITIONER

## 2023-12-11 PROCEDURE — 1160F RVW MEDS BY RX/DR IN RCRD: CPT | Performed by: NURSE PRACTITIONER

## 2023-12-11 PROCEDURE — 99214 OFFICE O/P EST MOD 30 MIN: CPT | Performed by: NURSE PRACTITIONER

## 2023-12-11 PROCEDURE — 1159F MED LIST DOCD IN RCRD: CPT | Performed by: NURSE PRACTITIONER

## 2023-12-11 NOTE — PROGRESS NOTES
Subjective:     Encounter Date: 12/11/2023      Patient ID: Cindi Hill is a 72 y.o. female with has paroxysmal supraventricular tachycardia, pulmonary hypertension, chronic left bundle branch block, hypercholesterolemia and GERD.    Chief Complaint: follow up  History of Present Illness    Patient presents today for management of dyspnea on exertion/orthopnea. Patient previously saw Dr Anderson for chronic cough and tried every inhaler with no improvement. She was put on Singulair to help the skin heal after breast reduction surgery and her cough improved. Patient reports that the trelegy also didn't work and Dr Anderson didn't feel like inhalers were going to help at all. She felt like he didn't have any other suggestions. She is scheduled to see him again in 6 months.   Today she reports that she is about the same. She states that she has had chronic dyspnea on exertion but feels it takes little exertion or when she lays down at night. She reports that she feels like she will get more short of breath when she first lays down and it will calm down after a few minutes. She feels that when she turns from one side to another it is also present. She denies any heart racing or palpitations with the movement from side to side. She reports intermittent palpitations randomly. She states that it lasts a couple of seconds and then resolves on its own. She reports that she is wearing her CPAP but doesn't wear it all night due to not being tolerated well.   She denies any chest pain. She denies any leg swelling. She denies any dizziness or near syncope. She denies monitoring her BP regularly but it remains well controlled. PCP changed her from lovastatin to atorvastatin due to elevated cholesterol on last check. Patient follows with Dr Wolff as PCP.     The following portions of the patient's history were reviewed and updated as appropriate: allergies, current medications, past family history, past medical history, past  social history, past surgical history and problem list.    Allergies   Allergen Reactions    Lisinopril Cough    Lortab [Hydrocodone-Acetaminophen] Nausea Only    Percocet [Oxycodone-Acetaminophen] Nausea Only    Pregabalin Other (See Comments)     Made pt very sleepy and dizzy    Adhesive Tape Itching     Bandage tape caused itching and redness       Current Outpatient Medications:     atenolol (TENORMIN) 25 MG tablet, Take 1 tablet by mouth Daily., Disp: 90 tablet, Rfl: 3    atorvastatin (LIPITOR) 20 MG tablet, Take 1 tablet by mouth Every Night., Disp: 90 tablet, Rfl: 0    azelastine (ASTELIN) 0.1 % nasal spray, 2 sprays into the nostril(s) as directed by provider 2 (Two) Times a Day. Use in each nostril as directed, Disp: 30 mL, Rfl: 12    calcium carbonate (OS-MARIANNE) 600 MG tablet, Take 1 tablet by mouth Daily., Disp: , Rfl:     cetirizine (zyrTEC) 10 MG tablet, Take 1 tablet by mouth As Needed., Disp: , Rfl:     Cholecalciferol 4000 units capsule, Take 5,000 Units by mouth Daily., Disp: , Rfl:     diphenhydrAMINE (BENADRYL) 25 mg capsule, Take 1 capsule by mouth As Needed for Itching., Disp: , Rfl:     docusate sodium (COLACE) 100 MG capsule, Take 1 capsule by mouth 2 (Two) Times a Day., Disp: , Rfl:     ESTRADIOL VA, PLACE fingertip amount (1/4gram) TO vaginal opening WITH fingertip AT bedtime every NIGHT 1/4 GRAM=1 CLICK ON DISPENSER, Disp: , Rfl:     famotidine (PEPCID) 40 MG tablet, Take 1 tablet by mouth Daily As Needed for Heartburn or Indigestion., Disp: , Rfl:     fluticasone (FLONASE) 50 MCG/ACT nasal spray, 2 sprays into the nostril(s) as directed by provider Daily., Disp: , Rfl:     hydrocortisone 2.5 % cream, Apply 1 application  topically to the appropriate area as directed 2 (Two) Times a Day., Disp: , Rfl:     ketoconazole (NIZORAL) 2 % cream, Apply 1 application  topically to the appropriate area as directed Daily., Disp: , Rfl:     Ketoconazole-Hydrocortisone 2-2.5 % cream, Apply 1 application   topically 2 (Two) Times a Day., Disp: 30 g, Rfl: 0    montelukast (SINGULAIR) 10 MG tablet, Take 1 tablet by mouth Every Night., Disp: 90 tablet, Rfl: 3    ondansetron ODT (ZOFRAN-ODT) 4 MG disintegrating tablet, Take 1 tablet by mouth., Disp: , Rfl:     spironolactone (ALDACTONE) 25 MG tablet, Take 1 tablet by mouth Daily., Disp: 30 tablet, Rfl: 11    terbinafine (LamISIL) 250 MG tablet, Take 1 tablet by mouth Daily., Disp: 14 tablet, Rfl: 0  Past Medical History:   Diagnosis Date    Acute left-sided low back pain without sciatica 10/24/2017    Arthritis     Breast cancer     1997, right breast mastectomy    Drug therapy 1997    Essential hypertension 04/21/2022    GERD (gastroesophageal reflux disease)     Headache     Hypercholesteremia     Obesity     TERESA on CPAP 04/21/2022    KARUNA (stress urinary incontinence, female)     Ulcer, duodenal, with obstruction 02/28/2022    Urge incontinence     Uterovaginal prolapse     Vaginal atrophy      Social History     Socioeconomic History    Marital status:    Tobacco Use    Smoking status: Never     Passive exposure: Past    Smokeless tobacco: Never   Vaping Use    Vaping Use: Never used   Substance and Sexual Activity    Alcohol use: No    Drug use: No    Sexual activity: Defer       Review of Systems   Constitutional: Negative for malaise/fatigue.   HENT:  Negative for nosebleeds.    Cardiovascular:  Positive for dyspnea on exertion. Negative for chest pain, irregular heartbeat, leg swelling, near-syncope, orthopnea, palpitations, paroxysmal nocturnal dyspnea and syncope.   Respiratory:  Positive for shortness of breath.    Hematologic/Lymphatic: Does not bruise/bleed easily.   Genitourinary:  Negative for hematuria.   Neurological:  Negative for dizziness and weakness.   All other systems reviewed and are negative.         Objective:     Vitals reviewed.   Constitutional:       General: Not in acute distress.     Appearance: Normal appearance. Well-developed.  "  Eyes:      Pupils: Pupils are equal, round, and reactive to light.   HENT:      Head: Normocephalic and atraumatic.      Nose: Nose normal.   Neck:      Vascular: No carotid bruit.   Pulmonary:      Effort: Pulmonary effort is normal. No respiratory distress.      Breath sounds: Normal breath sounds. No wheezing. No rales.   Cardiovascular:      Normal rate. Regular rhythm.   Abdominal:      General: There is no distension.      Palpations: Abdomen is soft.   Musculoskeletal: Normal range of motion.      Cervical back: Normal range of motion and neck supple. Skin:     General: Skin is warm.      Findings: No erythema or rash.   Neurological:      Mental Status: Alert and oriented to person, place, and time.   Psychiatric:         Speech: Speech normal.         Behavior: Behavior normal.         Thought Content: Thought content normal.         Judgment: Judgment normal.         /80 (BP Location: Right arm, Patient Position: Sitting, Cuff Size: Adult)   Pulse 75   Resp 18   Ht 157.5 cm (62\")   Wt 68.5 kg (151 lb)   LMP  (LMP Unknown)   SpO2 97%   BMI 27.62 kg/m²     Procedures    Lab Review:     Results for orders placed during the hospital encounter of 12/02/22    Adult Transthoracic Echo Complete w/ Color, Spectral and Contrast if necessary per protocol    Interpretation Summary    Left ventricular systolic function is normal. Left ventricular ejection fraction appears to be 56 - 60%.    Left ventricular diastolic function is consistent with (grade Ia w/high LAP) impaired relaxation.    Mild to moderate aortic valve regurgitation is present.    Mild pulmonary hypertension is present.    There is a trivial pericardial effusion. The effusion is fluid filled. There is no evidence of cardiac tamponade.    Nuclear stress test 7/29/2021:  Interpretation Summary  Small to moderate septal and apical-septal ischemia  Breast attenuation and GI artifacts are present.  Left ventricular ejection fraction is " normal. (Calculated EF = 62%).        Refer to the perfusion abnormalities with rest and stress as well as summed score and percent abnormalities  in the bull's eye diagram below       Lab Results   Component Value Date    CHLPL 201 (H) 10/18/2023    TRIG 143 10/18/2023    HDL 57 10/18/2023     (H) 10/18/2023     Cardiac CT angiography 10/13/2020     Impression:      1. Total calcium score : 51  2.  Mild focal calcification of the proximal left anterior descending coronary artery without evidence of any obstructive coronary artery disease        ___________________________________________________________________________     Recommendations:     Ongoing risk factor modifications  Further evaluation if ongoing chest pain or high risk of suspicion of significant ischemic heart disease    I have personally reviewed labs, CTA coronary, echo, stress test and past office notes prior to patients visit  Assessment:          Diagnosis Plan   1. Dyspnea on exertion  Adult Transthoracic Echo Complete w/ Color, Spectral and Contrast if necessary per protocol      2. PSVT (paroxysmal supraventricular tachycardia)        3. Abnormal nuclear stress test        4. Chronic diastolic (congestive) heart failure        5. Nonrheumatic aortic valve insufficiency  Adult Transthoracic Echo Complete w/ Color, Spectral and Contrast if necessary per protocol      6. Pulmonary hypertension  Adult Transthoracic Echo Complete w/ Color, Spectral and Contrast if necessary per protocol      7. LBBB (left bundle branch block)        8. Mixed hyperlipidemia        9. Gastroesophageal reflux disease without esophagitis        10. TERESA on CPAP                   Plan:       1. Dyspnea on exertion: Discussed with patient that this is likely multifactorial with deconditioning, aortic valve regurgitation, chronic diastolic congestive heart failure and pulmonary hypertension contributing. Will recheck echo to evaluate aortic valve regurgitation and  pulmonary hypertension.     2. Paroxysmal SVT: 15 runs on holter 8/19/2019 with longest duration of 20 beats and max rate of 226 bpm.Continue atenolol. Discussed re-evaluating with a holter with patient but symptoms are pretty infrequent. If palpitations worsening then would reconsider holter monitor to further evaluate.     3. Abnormal nuclear stress test: Small to moderate septal and apical septal ischemia on Lexiscan 7/29/2021. Previous false positive lexiscan in 2017. Essentially normal CTA of the coronary arteries 10/20. Discussed with patient. Will continue to monitor as she denies any chest pain.     4. Chronic diastolic congestive heart failure: Echo 12/2022 showed diastolic dysfunction. No improvement with addition of spironolactone. Reviewed signs and symptoms of CHF and what to report to office with patient. Patient instructed to restrict sodium and record daily weight. Patient is to report weight gain of greater than 2 lbs overnight or 5 lbs in 1 week. Patient verbalized understanding of instructions and plan of care.     5. Aortic valve regurgitation: mild to moderate on echo 12/2022. Will recheck echo    6. Pulmonary hypertension: Follows with pulmonology. Will recheck echo and consider second opinion    7. LBBB: Chronic    8. Hyperlipidemia: managed and followed by PCP. Lipid panel 6/14/2023 LDL elevated 108. PCP increased lovastatin.     9. GERD    10. TERESA: Patient reports that she wears it every night but hasn't been able to tolerate it for the full night lately. Recommend to get an appointment to check mask and settings. Patient verbalized understanding.     I attest that all portions of this note reviewed and all information has been updated by myself to reflect the patient's current status.      I spent 37 minutes caring for Cindi on this date of service. This time includes time spent by me in the following activities:preparing for the visit, reviewing tests, obtaining and/or reviewing a  separately obtained history, performing a medically appropriate examination and/or evaluation , counseling and educating the patient/family/caregiver, ordering medications, tests, or procedures, and documenting information in the medical record    Patient is to follow up in 6 months or sooner if needed

## 2023-12-12 RX ORDER — ATENOLOL 25 MG/1
25 TABLET ORAL DAILY
Qty: 90 TABLET | Refills: 3 | Status: SHIPPED | OUTPATIENT
Start: 2023-12-12

## 2023-12-15 ENCOUNTER — OFFICE VISIT (OUTPATIENT)
Dept: FAMILY MEDICINE CLINIC | Facility: CLINIC | Age: 73
End: 2023-12-15
Payer: MEDICARE

## 2023-12-15 VITALS
HEART RATE: 69 BPM | HEIGHT: 62 IN | BODY MASS INDEX: 27.64 KG/M2 | DIASTOLIC BLOOD PRESSURE: 70 MMHG | SYSTOLIC BLOOD PRESSURE: 114 MMHG | TEMPERATURE: 97.5 F | RESPIRATION RATE: 16 BRPM | OXYGEN SATURATION: 98 % | WEIGHT: 150.2 LBS

## 2023-12-15 DIAGNOSIS — R53.83 FATIGUE, UNSPECIFIED TYPE: ICD-10-CM

## 2023-12-15 DIAGNOSIS — E55.9 VITAMIN D DEFICIENCY: ICD-10-CM

## 2023-12-15 DIAGNOSIS — I10 ESSENTIAL HYPERTENSION: ICD-10-CM

## 2023-12-15 DIAGNOSIS — R41.3 MEMORY LOSS: ICD-10-CM

## 2023-12-15 DIAGNOSIS — Z00.00 MEDICARE ANNUAL WELLNESS VISIT, SUBSEQUENT: ICD-10-CM

## 2023-12-15 DIAGNOSIS — E78.2 MIXED HYPERLIPIDEMIA: Primary | ICD-10-CM

## 2023-12-15 NOTE — PROGRESS NOTES
The ABCs of the Annual Wellness Visit  Subsequent Medicare Wellness Visit    Subjective        Cindi Hill is a 72 y.o. female who presents for a Subsequent Medicare Wellness Visit.    The following portions of the patient's history were reviewed and   updated as appropriate: allergies, current medications, past family history, past medical history, past social history, past surgical history, and problem list.    Review of Systems   Respiratory:  Positive for apnea and shortness of breath.         Compliant with CPAP   Cardiovascular:  Negative for chest pain and leg swelling.   Gastrointestinal:         Colonoscopy last year at [facility]     Genitourinary:         Remote breast cancer-history of bilateral mastectomy   Musculoskeletal:  Positive for arthralgias.   All other systems reviewed and are negative.       Compared to one year ago, the patient feels her physical   health is the same.    Compared to one year ago, the patient feels her mental   health is the same.    Recent Hospitalizations:  She was not admitted to the hospital during the last year.       Current Medical Providers:  Patient Care Team:  Jose Wolff MD as PCP - General (Family Medicine)  Micki Jones APRN (Inactive)  Tyrell Dixon MD as Consulting Physician (Otolaryngology)  Anuj Johnson MD as Cardiologist (Cardiology)  Adi Anderson MD as Consulting Physician (Pulmonary Disease)  Yogesh Rehman APRN as Nurse Practitioner (Cardiology)  Sandie Chi APRN as Nurse Practitioner (Family Medicine)    Outpatient Medications Prior to Visit   Medication Sig Dispense Refill    atenolol (TENORMIN) 25 MG tablet TAKE ONE TABLET BY MOUTH EVERY DAY 90 tablet 3    atorvastatin (LIPITOR) 20 MG tablet Take 1 tablet by mouth Every Night. 90 tablet 0    azelastine (ASTELIN) 0.1 % nasal spray 2 sprays into the nostril(s) as directed by provider 2 (Two) Times a Day. Use in each nostril as directed 30 mL 12    calcium  carbonate (OS-MARIANNE) 600 MG tablet Take 1 tablet by mouth Daily.      cetirizine (zyrTEC) 10 MG tablet Take 1 tablet by mouth As Needed.      Cholecalciferol 4000 units capsule Take 5,000 Units by mouth Daily.      diphenhydrAMINE (BENADRYL) 25 mg capsule Take 1 capsule by mouth As Needed for Itching.      docusate sodium (COLACE) 100 MG capsule Take 1 capsule by mouth 2 (Two) Times a Day.      ESTRADIOL VA PLACE fingertip amount (1/4gram) TO vaginal opening WITH fingertip AT bedtime every NIGHT 1/4 GRAM=1 CLICK ON DISPENSER      famotidine (PEPCID) 40 MG tablet Take 1 tablet by mouth Daily As Needed for Heartburn or Indigestion.      fluticasone (FLONASE) 50 MCG/ACT nasal spray 2 sprays into the nostril(s) as directed by provider Daily.      Ketoconazole-Hydrocortisone 2-2.5 % cream Apply 1 application  topically 2 (Two) Times a Day. 30 g 0    montelukast (SINGULAIR) 10 MG tablet Take 1 tablet by mouth Every Night. 90 tablet 3    ondansetron ODT (ZOFRAN-ODT) 4 MG disintegrating tablet Take 1 tablet by mouth.      spironolactone (ALDACTONE) 25 MG tablet Take 1 tablet by mouth Daily. 30 tablet 11    hydrocortisone 2.5 % cream Apply 1 application  topically to the appropriate area as directed 2 (Two) Times a Day.      ketoconazole (NIZORAL) 2 % cream Apply 1 application  topically to the appropriate area as directed Daily.      terbinafine (LamISIL) 250 MG tablet Take 1 tablet by mouth Daily. 14 tablet 0     No facility-administered medications prior to visit.       No opioid medication identified on active medication list. I have reviewed chart for other potential  high risk medication/s and harmful drug interactions in the elderly.        Aspirin is not on active medication list.  Aspirin use is not indicated based on review of current medical condition/s. Risk of harm outweighs potential benefits.  .    Patient Active Problem List   Diagnosis    GERD (gastroesophageal reflux disease)    Deviated nasal septum     "Allergic rhinitis due to pollen    Sicca laryngitis    Mixed hyperlipidemia    Breast cancer    Osteoarthritis of cervical spine    LBBB (left bundle branch block)    Anginal equivalent    History of bilateral mastectomy    PSVT (paroxysmal supraventricular tachycardia)    Abnormal nuclear stress test    Ulcer, duodenal, with obstruction    Essential hypertension    Gastroesophageal reflux disease with esophagitis without hemorrhage    Nausea and vomiting    TERESA on CPAP    TRAN (dyspnea on exertion)     Advance Care Planning  Advance Directive is not on file.  ACP discussion was declined by the patient. Patient does not have an advance directive, declines further assistance.     Objective    Vitals:    12/15/23 0940   BP: 114/70   BP Location: Left arm   Patient Position: Sitting   Cuff Size: Adult   Pulse: 69   Resp: 16   Temp: 97.5 °F (36.4 °C)   TempSrc: Temporal   SpO2: 98%   Weight: 68.1 kg (150 lb 3.2 oz)   Height: 157.5 cm (62\")   PainSc: 0-No pain     Estimated body mass index is 27.47 kg/m² as calculated from the following:    Height as of this encounter: 157.5 cm (62\").    Weight as of this encounter: 68.1 kg (150 lb 3.2 oz).           Physical Exam  Vitals and nursing note reviewed.   Constitutional:       Appearance: Normal appearance.   HENT:      Right Ear: Tympanic membrane and ear canal normal.      Left Ear: Tympanic membrane and ear canal normal.   Eyes:      Extraocular Movements: Extraocular movements intact.      Pupils: Pupils are equal, round, and reactive to light.   Neck:      Vascular: No carotid bruit.   Cardiovascular:      Rate and Rhythm: Normal rate and regular rhythm.      Pulses: Normal pulses.      Heart sounds: Normal heart sounds.   Pulmonary:      Effort: Pulmonary effort is normal.      Breath sounds: Normal breath sounds.      Comments: Bilateral mastectomy  Abdominal:      General: Abdomen is flat.      Palpations: Abdomen is soft. There is no mass.   Genitourinary:     " Comments: Surgical past  Musculoskeletal:      Right lower leg: No edema.      Left lower leg: No edema.   Lymphadenopathy:      Cervical: No cervical adenopathy.   Skin:     General: Skin is warm and dry.   Neurological:      General: No focal deficit present.      Mental Status: She is alert and oriented to person, place, and time.   Psychiatric:         Mood and Affect: Mood normal.         Behavior: Behavior normal.         Thought Content: Thought content normal.         Judgment: Judgment normal.         Does the patient have evidence of cognitive impairment?   No    Lab Results   Component Value Date    CHLPL 201 (H) 10/18/2023    TRIG 143 10/18/2023    HDL 57 10/18/2023     (H) 10/18/2023    VLDL 25 10/18/2023          HEALTH RISK ASSESSMENT    Smoking Status:  Social History     Tobacco Use   Smoking Status Never    Passive exposure: Past   Smokeless Tobacco Never       Alcohol Consumption:  Social History     Substance and Sexual Activity   Alcohol Use No       Fall Risk Screen:    SUZIADI Fall Risk Assessment was completed, and patient is at LOW risk for falls.Assessment completed on:12/15/2023    Depression Screenin/15/2023     9:44 AM   PHQ-2/PHQ-9 Depression Screening   Little Interest or Pleasure in Doing Things 0-->not at all   Feeling Down, Depressed or Hopeless 0-->not at all   PHQ-9: Brief Depression Severity Measure Score 0       Health Habits and Functional and Cognitive Screenin/15/2023     9:45 AM   Functional & Cognitive Status   Do you have difficulty preparing food and eating? No   Do you have difficulty bathing yourself, getting dressed or grooming yourself? No   Do you have difficulty using the toilet? No   Do you have difficulty moving around from place to place? No   Do you have trouble with steps or getting out of a bed or a chair? Yes   Current Diet Well Balanced Diet   Dental Exam Up to date   Eye Exam Up to date   Exercise (times per week) 0 times per week    Current Exercises Include No Regular Exercise   Do you need help using the phone?  No   Are you deaf or do you have serious difficulty hearing?  No   Do you need help to go to places out of walking distance? No   Do you need help shopping? No   Do you need help preparing meals?  No   Do you need help with housework?  No   Do you need help with laundry? No   Do you need help taking your medications? No   Do you need help managing money? No   Do you ever drive or ride in a car without wearing a seat belt? No   Have you felt unusual stress, anger or loneliness in the last month? No   Who do you live with? Spouse   If you need help, do you have trouble finding someone available to you? No   Have you been bothered in the last four weeks by sexual problems? No   Do you have difficulty concentrating, remembering or making decisions? No       Age-appropriate Screening Schedule:  Refer to the list below for future screening recommendations based on patient's age, sex and/or medical conditions. Orders for these recommended tests are listed in the plan section. The patient has been provided with a written plan.    Health Maintenance   Topic Date Due    COLORECTAL CANCER SCREENING  12/07/2023    COVID-19 Vaccine (4 - 2023-24 season) 10/18/2024 (Originally 9/1/2023)    ZOSTER VACCINE (2 of 3) 12/15/2024 (Originally 12/29/2017)    DXA SCAN  08/05/2024    BMI FOLLOWUP  09/13/2024    LIPID PANEL  10/18/2024    ANNUAL WELLNESS VISIT  12/15/2024    TDAP/TD VACCINES (2 - Td or Tdap) 11/03/2027    HEPATITIS C SCREENING  Completed    INFLUENZA VACCINE  Completed    Pneumococcal Vaccine 65+  Completed            CMS Preventative Services Quick Reference  Risk Factors Identified During Encounter:    Fall Risk-High or Moderate: Information on Fall Prevention Shared in After Visit Summary    The above risks/problems have been discussed with the patient.  Pertinent information has been shared with the patient in the After Visit  Summary.    Diagnoses and all orders for this visit:    1. Mixed hyperlipidemia (Primary)  -     Comprehensive Metabolic Panel  -     Lipid Panel    2. Essential hypertension  -     POC Urinalysis Dipstick, Multipro    3. Fatigue, unspecified type  -     TSH  -     T4, free  -     CBC & Differential    4. Vitamin D deficiency  -     Vitamin D,25-Hydroxy    5. Memory loss  -     Vitamin B12  -     Folate    6. Medicare annual wellness visit, subsequent    Plan above-advised RSV    Follow Up:   Next Medicare Wellness visit to be scheduled in 1 year.      An After Visit Summary and PPPS were made available to the patient.    Davian Wolff MD

## 2023-12-15 NOTE — PATIENT INSTRUCTIONS
Advance Care Planning and Advance Directives     You make decisions on a daily basis - decisions about where you want to live, your career, your home, your life. Perhaps one of the most important decisions you face is your choice for future medical care. Take time to talk with your family and your healthcare team and start planning today.  Advance Care Planning is a process that can help you:  Understand possible future healthcare decisions in light of your own experiences  Reflect on those decision in light of your goals and values  Discuss your decisions with those closest to you and the healthcare professionals that care for you  Make a plan by creating a document that reflects your wishes    Surrogate Decision Maker  In the event of a medical emergency, which has left you unable to communicate or to make your own decisions, you would need someone to make decisions for you.  It is important to discuss your preferences for medical treatment with this person while you are in good health.     Qualities of a surrogate decision maker:  Willing to take on this role and responsibility  Knows what you want for future medical care  Willing to follow your wishes even if they don't agree with them  Able to make difficult medical decisions under stressful circumstances    Advance Directives  These are legal documents you can create that will guide your healthcare team and decision maker(s) when needed. These documents can be stored in the electronic medical record.    Living Will - a legal document to guide your care if you have a terminal condition or a serious illness and are unable to communicate. States vary by statute in document names/types, but most forms may include one or more of the following:        -  Directions regarding life-prolonging treatments        -  Directions regarding artificially provided nutrition/hydration        -  Choosing a healthcare decision maker        -  Direction regarding organ/tissue  donation    Durable Power of  for Healthcare - this document names an -in-fact to make medical decisions for you, but it may also allow this person to make personal and financial decisions for you. Please seek the advice of an  if you need this type of document.    **Advance Directives are not required and no one may discriminate against you if you do not sign one.    Medical Orders  Many states allow specific forms/orders signed by your physician to record your wishes for medical treatment in your current state of health. This form, signed in personal communication with your physician, addresses resuscitation and other medical interventions that you may or may not want.      For more information or to schedule a time with a [facility] Advance Care Planning Facilitator contact: [facility] or call [phone] and someone will contact you directly.    Medicare Wellness  Personal Prevention Plan of Service     Date of Office Visit:    Encounter Provider:  Jose Wolff MD  Place of Service:  [facility]  Patient Name: Cindi Hill  :  1950    As part of the Medicare Wellness portion of your visit today, we are providing you with this personalized preventive plan of services (PPPS). This plan is based upon recommendations of the [place] Preventive Services Task Force (USPSTF) and the Advisory Committee on Immunization Practices (ACIP).    This lists the preventive care services that should be considered, and provides dates of when you are due. Items listed as completed are up-to-date and do not require any further intervention.    Health Maintenance   Topic Date Due   • COLORECTAL CANCER SCREENING  2023   • COVID-19 Vaccine ( season) 10/18/2024 (Originally 2023)   • ZOSTER VACCINE (2 of 3) 12/15/2024 (Originally 2017)   • DXA SCAN  2024   • BMI FOLLOWUP  2024   • LIPID PANEL   10/18/2024   • ANNUAL WELLNESS VISIT  12/15/2024   • TDAP/TD VACCINES (2 - Td or Tdap) 11/03/2027   • HEPATITIS C SCREENING  Completed   • INFLUENZA VACCINE  Completed   • Pneumococcal Vaccine 65+  Completed       Orders Placed This Encounter   Procedures   • TSH     Order Specific Question:   Release to patient     Answer:   Routine Release [[id number]]   • T4, free     Order Specific Question:   Release to patient     Answer:   Routine Release [[id number]]   • Comprehensive Metabolic Panel     Order Specific Question:   Release to patient     Answer:   Routine Release [[id number]]   • Lipid Panel     Order Specific Question:   Release to patient     Answer:   Routine Release [[id number]]   • Vitamin D,25-Hydroxy     Order Specific Question:   Release to patient     Answer:   Routine Release [[id number]]   • Vitamin B12     Order Specific Question:   Release to patient     Answer:   Routine Release [[id number]]   • Folate     Order Specific Question:   Release to patient     Answer:   Routine Release [[id number]]   • POC Urinalysis Dipstick, Multipro     Order Specific Question:   Release to patient     Answer:   Routine Release [[id number]]   • CBC & Differential     Order Specific Question:   Manual Differential     Answer:   No     Order Specific Question:   Release to patient     Answer:   Routine Release [[id number]]         Return in about 6 months (around 6/15/2024), or if symptoms worsen or fail to improve.

## 2023-12-16 LAB
25(OH)D3+25(OH)D2 SERPL-MCNC: 38.7 NG/ML (ref 30–100)
ALBUMIN SERPL-MCNC: 4.8 G/DL (ref 3.8–4.8)
ALBUMIN/GLOB SERPL: 1.8 {RATIO} (ref 1.2–2.2)
ALP SERPL-CCNC: 93 IU/L (ref 44–121)
ALT SERPL-CCNC: 13 IU/L (ref 0–32)
AST SERPL-CCNC: 18 IU/L (ref 0–40)
BASOPHILS # BLD AUTO: 0 X10E3/UL (ref 0–0.2)
BASOPHILS NFR BLD AUTO: 0 %
BILIRUB SERPL-MCNC: 0.3 MG/DL (ref 0–1.2)
BUN SERPL-MCNC: 15 MG/DL (ref 8–27)
BUN/CREAT SERPL: 19 (ref 12–28)
CALCIUM SERPL-MCNC: 9.6 MG/DL (ref 8.7–10.3)
CHLORIDE SERPL-SCNC: 99 MMOL/L (ref 96–106)
CHOLEST SERPL-MCNC: 135 MG/DL (ref 100–199)
CO2 SERPL-SCNC: 28 MMOL/L (ref 20–29)
CREAT SERPL-MCNC: 0.79 MG/DL (ref 0.57–1)
EGFRCR SERPLBLD CKD-EPI 2021: 79 ML/MIN/1.73
EOSINOPHIL # BLD AUTO: 0.2 X10E3/UL (ref 0–0.4)
EOSINOPHIL NFR BLD AUTO: 4 %
ERYTHROCYTE [DISTWIDTH] IN BLOOD BY AUTOMATED COUNT: 12.4 % (ref 11.7–15.4)
FOLATE SERPL-MCNC: 12.2 NG/ML
GLOBULIN SER CALC-MCNC: 2.7 G/DL (ref 1.5–4.5)
GLUCOSE SERPL-MCNC: 99 MG/DL (ref 70–99)
HCT VFR BLD AUTO: 40.2 % (ref 34–46.6)
HDLC SERPL-MCNC: 56 MG/DL
HGB BLD-MCNC: 13.2 G/DL (ref 11.1–15.9)
IMM GRANULOCYTES # BLD AUTO: 0 X10E3/UL (ref 0–0.1)
IMM GRANULOCYTES NFR BLD AUTO: 0 %
LDLC SERPL CALC-MCNC: 62 MG/DL (ref 0–99)
LYMPHOCYTES # BLD AUTO: 1.1 X10E3/UL (ref 0.7–3.1)
LYMPHOCYTES NFR BLD AUTO: 23 %
MCH RBC QN AUTO: 29.4 PG (ref 26.6–33)
MCHC RBC AUTO-ENTMCNC: 32.8 G/DL (ref 31.5–35.7)
MCV RBC AUTO: 90 FL (ref 79–97)
MONOCYTES # BLD AUTO: 0.4 X10E3/UL (ref 0.1–0.9)
MONOCYTES NFR BLD AUTO: 8 %
NEUTROPHILS # BLD AUTO: 3.1 X10E3/UL (ref 1.4–7)
NEUTROPHILS NFR BLD AUTO: 65 %
PLATELET # BLD AUTO: 229 X10E3/UL (ref 150–450)
POTASSIUM SERPL-SCNC: 4.3 MMOL/L (ref 3.5–5.2)
PROT SERPL-MCNC: 7.5 G/DL (ref 6–8.5)
RBC # BLD AUTO: 4.49 X10E6/UL (ref 3.77–5.28)
SODIUM SERPL-SCNC: 140 MMOL/L (ref 134–144)
T4 FREE SERPL-MCNC: 1.38 NG/DL (ref 0.82–1.77)
TRIGL SERPL-MCNC: 87 MG/DL (ref 0–149)
TSH SERPL DL<=0.005 MIU/L-ACNC: 2.53 UIU/ML (ref 0.45–4.5)
VIT B12 SERPL-MCNC: 440 PG/ML (ref 232–1245)
VLDLC SERPL CALC-MCNC: 17 MG/DL (ref 5–40)
WBC # BLD AUTO: 4.7 X10E3/UL (ref 3.4–10.8)

## 2024-01-10 ENCOUNTER — HOSPITAL ENCOUNTER (OUTPATIENT)
Dept: CARDIOLOGY | Facility: HOSPITAL | Age: 74
Discharge: HOME | End: 2024-01-10
Admitting: NURSE PRACTITIONER
Payer: MEDICARE

## 2024-01-10 VITALS
BODY MASS INDEX: 27.6 KG/M2 | WEIGHT: 150 LBS | DIASTOLIC BLOOD PRESSURE: 70 MMHG | SYSTOLIC BLOOD PRESSURE: 114 MMHG | HEIGHT: 62 IN

## 2024-01-10 DIAGNOSIS — I27.20 PULMONARY HYPERTENSION: ICD-10-CM

## 2024-01-10 DIAGNOSIS — I35.1 NONRHEUMATIC AORTIC VALVE INSUFFICIENCY: ICD-10-CM

## 2024-01-10 DIAGNOSIS — R06.09 DYSPNEA ON EXERTION: ICD-10-CM

## 2024-01-10 PROCEDURE — 93306 TTE W/DOPPLER COMPLETE: CPT

## 2024-01-12 LAB
BH CV ECHO MEAS - AO MAX PG: 8.6 MMHG
BH CV ECHO MEAS - AO MEAN PG: 5 MMHG
BH CV ECHO MEAS - AO ROOT DIAM: 2.7 CM
BH CV ECHO MEAS - AO V2 MAX: 147 CM/SEC
BH CV ECHO MEAS - AO V2 VTI: 34.3 CM
BH CV ECHO MEAS - AVA(I,D): 2.6 CM2
BH CV ECHO MEAS - EDV(CUBED): 101.2 ML
BH CV ECHO MEAS - EDV(MOD-SP2): 53.1 ML
BH CV ECHO MEAS - EDV(MOD-SP4): 52.5 ML
BH CV ECHO MEAS - EF(MOD-BP): 49.2 %
BH CV ECHO MEAS - EF(MOD-SP2): 47.6 %
BH CV ECHO MEAS - EF(MOD-SP4): 52 %
BH CV ECHO MEAS - ESV(CUBED): 18.4 ML
BH CV ECHO MEAS - ESV(MOD-SP2): 27.8 ML
BH CV ECHO MEAS - ESV(MOD-SP4): 25.2 ML
BH CV ECHO MEAS - FS: 43.3 %
BH CV ECHO MEAS - IVS/LVPW: 0.83 CM
BH CV ECHO MEAS - IVSD: 0.85 CM
BH CV ECHO MEAS - LA DIMENSION: 2.9 CM
BH CV ECHO MEAS - LAT PEAK E' VEL: 5.6 CM/SEC
BH CV ECHO MEAS - LV DIASTOLIC VOL/BSA (35-75): 31 CM2
BH CV ECHO MEAS - LV MASS(C)D: 147.8 GRAMS
BH CV ECHO MEAS - LV MAX PG: 6.2 MMHG
BH CV ECHO MEAS - LV MEAN PG: 5 MMHG
BH CV ECHO MEAS - LV SYSTOLIC VOL/BSA (12-30): 14.9 CM2
BH CV ECHO MEAS - LV V1 MAX: 122.4 CM/SEC
BH CV ECHO MEAS - LV V1 VTI: 39.5 CM
BH CV ECHO MEAS - LVIDD: 4.7 CM
BH CV ECHO MEAS - LVIDS: 2.6 CM
BH CV ECHO MEAS - LVOT AREA: 2.27 CM2
BH CV ECHO MEAS - LVOT DIAM: 1.7 CM
BH CV ECHO MEAS - LVPWD: 1.02 CM
BH CV ECHO MEAS - MED PEAK E' VEL: 3.8 CM/SEC
BH CV ECHO MEAS - MV A MAX VEL: 112 CM/SEC
BH CV ECHO MEAS - MV DEC TIME: 0.28 SEC
BH CV ECHO MEAS - MV E MAX VEL: 54.4 CM/SEC
BH CV ECHO MEAS - MV E/A: 0.49
BH CV ECHO MEAS - PI END-D VEL: 96.9 CM/SEC
BH CV ECHO MEAS - RAP SYSTOLE: 5 MMHG
BH CV ECHO MEAS - RVSP: 26.7 MMHG
BH CV ECHO MEAS - SI(MOD-SP2): 15 ML/M2
BH CV ECHO MEAS - SI(MOD-SP4): 16.1 ML/M2
BH CV ECHO MEAS - SV(LVOT): 89.7 ML
BH CV ECHO MEAS - SV(MOD-SP2): 25.3 ML
BH CV ECHO MEAS - SV(MOD-SP4): 27.3 ML
BH CV ECHO MEAS - TR MAX PG: 21.7 MMHG
BH CV ECHO MEAS - TR MAX VEL: 233 CM/SEC
BH CV ECHO MEASUREMENTS AVERAGE E/E' RATIO: 11.57
LEFT ATRIUM VOLUME INDEX: 20.6 ML/M2
LEFT ATRIUM VOLUME: 34.8 ML

## 2024-01-17 ENCOUNTER — OFFICE VISIT (OUTPATIENT)
Dept: FAMILY MEDICINE CLINIC | Facility: CLINIC | Age: 74
End: 2024-01-17
Payer: MEDICARE

## 2024-01-17 VITALS
SYSTOLIC BLOOD PRESSURE: 103 MMHG | TEMPERATURE: 98 F | DIASTOLIC BLOOD PRESSURE: 68 MMHG | WEIGHT: 142 LBS | OXYGEN SATURATION: 97 % | BODY MASS INDEX: 26.13 KG/M2 | HEART RATE: 69 BPM | HEIGHT: 62 IN

## 2024-01-17 DIAGNOSIS — R05.1 ACUTE COUGH: ICD-10-CM

## 2024-01-17 DIAGNOSIS — J40 BRONCHITIS: Primary | ICD-10-CM

## 2024-01-17 LAB
EXPIRATION DATE: NORMAL
EXPIRATION DATE: NORMAL
FLUAV AG NPH QL: NEGATIVE
FLUBV AG NPH QL: NEGATIVE
INTERNAL CONTROL: NORMAL
INTERNAL CONTROL: NORMAL
Lab: NORMAL
Lab: NORMAL
SARS-COV-2 AG UPPER RESP QL IA.RAPID: NOT DETECTED

## 2024-01-17 RX ORDER — DEXTROMETHORPHAN HYDROBROMIDE AND PROMETHAZINE HYDROCHLORIDE 15; 6.25 MG/5ML; MG/5ML
5 SYRUP ORAL 4 TIMES DAILY PRN
Qty: 180 ML | Refills: 0 | Status: SHIPPED | OUTPATIENT
Start: 2024-01-17

## 2024-01-17 RX ORDER — PREDNISONE 10 MG/1
TABLET ORAL
Qty: 21 TABLET | Refills: 0 | Status: SHIPPED | OUTPATIENT
Start: 2024-01-17

## 2024-01-17 RX ORDER — AZITHROMYCIN 250 MG/1
TABLET, FILM COATED ORAL
Qty: 6 TABLET | Refills: 0 | Status: SHIPPED | OUTPATIENT
Start: 2024-01-17

## 2024-01-17 RX ORDER — TRIAMCINOLONE ACETONIDE 40 MG/ML
40 INJECTION, SUSPENSION INTRA-ARTICULAR; INTRAMUSCULAR ONCE
Status: COMPLETED | OUTPATIENT
Start: 2024-01-17 | End: 2024-01-17

## 2024-01-17 RX ADMIN — TRIAMCINOLONE ACETONIDE 40 MG: 40 INJECTION, SUSPENSION INTRA-ARTICULAR; INTRAMUSCULAR at 15:59

## 2024-01-17 NOTE — PROGRESS NOTES
CC: cough    History:  Cindi Hill is a 73 y.o. female who presents today for evaluation of the above problems.  URI   This is a new problem. The current episode started in the past 7 days. The problem has been gradually worsening. There has been no fever. Associated symptoms include congestion, coughing, diarrhea, ear pain and headaches. She has tried antihistamine (nasal steroids) for the symptoms. The treatment provided no relief.     ROS:  Review of Systems   Constitutional:  Negative for fever.   HENT:  Positive for congestion and ear pain.    Respiratory:  Positive for cough.    Gastrointestinal:  Positive for diarrhea.   Neurological:  Positive for headaches.       Allergies   Allergen Reactions    Lisinopril Cough    Lortab [Hydrocodone-Acetaminophen] Nausea Only    Percocet [Oxycodone-Acetaminophen] Nausea Only    Pregabalin Other (See Comments)     Made pt very sleepy and dizzy    Adhesive Tape Itching     Bandage tape caused itching and redness     Past Medical History:   Diagnosis Date    Acute left-sided low back pain without sciatica 10/24/2017    Arthritis     Breast cancer     1997, right breast mastectomy    Drug therapy 1997    Essential hypertension 04/21/2022    GERD (gastroesophageal reflux disease)     Headache     Hypercholesteremia     Obesity     TERESA on CPAP 04/21/2022    KARUNA (stress urinary incontinence, female)     Ulcer, duodenal, with obstruction 02/28/2022    Urge incontinence     Uterovaginal prolapse     Vaginal atrophy      Past Surgical History:   Procedure Laterality Date    BLADDER SLING MODIFIED, ANTERIOR AND POSTERIOR VAGINAL REPAIR      BLADDER SURGERY      Pacemaker for the bladder to help with incontinence.    BREAST SURGERY  1997, 2021    BRONCHOSCOPY N/A 04/26/2017    Procedure: BRONCHOSCOPY BIOPSY WITH ANESTHESIA;  Surgeon: Adi Anderson MD;  Location[facility] ENDOSCOPY;  Service:     CARDIAC CATHETERIZATION Left 02/28/2017    Procedure: Cardiac  Catheterization/Vascular Study;  Surgeon: Anuj Johnson MD;  Location:  [facility] CATH INVASIVE LOCATION;  Service:     CARDIAC CATHETERIZATION  2/28/2017    COLONOSCOPY  2014    COSMETIC SURGERY  2021    HYSTERECTOMY      HYSTERECTOMY      LAMINECTOMY  01/2016    LYMPH NODE BIOPSY  1997    MASTECTOMY      Right in 1997 and Left in 2021    ROTATOR CUFF REPAIR Right 2012    SPINE SURGERY  2016     Family History   Problem Relation Age of Onset    Dementia Mother     Heart failure Mother     Arthritis Mother     Cancer Father         liver    Cancer Brother         thyroid    Diabetes Brother     Thyroid disease Brother     Thyroid disease Maternal Grandmother     Diabetes Maternal Grandfather     No Known Problems Paternal Grandmother     Cancer Paternal Grandfather     Breast cancer Neg Hx       reports that she has never smoked. She has been exposed to tobacco smoke. She has never used smokeless tobacco. She reports that she does not drink alcohol and does not use drugs.      Current Outpatient Medications:     atenolol (TENORMIN) 25 MG tablet, TAKE ONE TABLET BY MOUTH EVERY DAY, Disp: 90 tablet, Rfl: 3    atorvastatin (LIPITOR) 20 MG tablet, Take 1 tablet by mouth Every Night., Disp: 90 tablet, Rfl: 0    azelastine (ASTELIN) 0.1 % nasal spray, 2 sprays into the nostril(s) as directed by provider 2 (Two) Times a Day. Use in each nostril as directed, Disp: 30 mL, Rfl: 12    calcium carbonate (OS-MARIANNE) 600 MG tablet, Take 1 tablet by mouth Daily., Disp: , Rfl:     cetirizine (zyrTEC) 10 MG tablet, Take 1 tablet by mouth As Needed., Disp: , Rfl:     Cholecalciferol 4000 units capsule, Take 5,000 Units by mouth Daily., Disp: , Rfl:     diphenhydrAMINE (BENADRYL) 25 mg capsule, Take 1 capsule by mouth As Needed for Itching., Disp: , Rfl:     docusate sodium (COLACE) 100 MG capsule, Take 1 capsule by mouth 2 (Two) Times a Day., Disp: , Rfl:     ESTRADIOL VA, PLACE fingertip amount (1/4gram) TO vaginal opening WITH fingertip AT  "bedtime every NIGHT 1/4 GRAM=1 CLICK ON DISPENSER, Disp: , Rfl:     famotidine (PEPCID) 40 MG tablet, Take 1 tablet by mouth Daily As Needed for Heartburn or Indigestion., Disp: , Rfl:     fluticasone (FLONASE) 50 MCG/ACT nasal spray, 2 sprays into the nostril(s) as directed by provider Daily., Disp: , Rfl:     Ketoconazole-Hydrocortisone 2-2.5 % cream, Apply 1 application  topically 2 (Two) Times a Day., Disp: 30 g, Rfl: 0    montelukast (SINGULAIR) 10 MG tablet, Take 1 tablet by mouth Every Night., Disp: 90 tablet, Rfl: 3    ondansetron ODT (ZOFRAN-ODT) 4 MG disintegrating tablet, Take 1 tablet by mouth., Disp: , Rfl:     spironolactone (ALDACTONE) 25 MG tablet, Take 1 tablet by mouth Daily., Disp: 30 tablet, Rfl: 11    azithromycin (Zithromax) 250 MG tablet, Take 2 tablets the first day, then 1 tablet daily for 4 days., Disp: 6 tablet, Rfl: 0    predniSONE (DELTASONE) 10 MG (21) dose pack, Use as directed on package, Disp: 21 tablet, Rfl: 0    promethazine-dextromethorphan (PROMETHAZINE-DM) 6.25-15 MG/5ML syrup, Take 5 mL by mouth 4 (Four) Times a Day As Needed for Cough., Disp: 180 mL, Rfl: 0  No current facility-administered medications for this visit.    OBJECTIVE:  /68 (BP Location: Left arm, Patient Position: Sitting, Cuff Size: Adult)   Pulse 69   Temp 98 °F (36.7 °C) (Temporal)   Ht 157.5 cm (62\") Comment: pt reported  Wt 64.4 kg (142 lb) Comment: pt reported  LMP  (LMP Unknown)   SpO2 97%   BMI 25.97 kg/m²    Physical Exam  Vitals reviewed.   Constitutional:       Appearance: She is well-developed.   HENT:      Right Ear: Tympanic membrane, ear canal and external ear normal.      Left Ear: Tympanic membrane, ear canal and external ear normal.      Mouth/Throat:      Mouth: Mucous membranes are moist.      Pharynx: Oropharynx is clear.   Cardiovascular:      Rate and Rhythm: Normal rate and regular rhythm.      Heart sounds: Normal heart sounds.   Pulmonary:      Effort: Pulmonary effort is " normal.      Breath sounds: Normal breath sounds.   Neurological:      Mental Status: She is alert and oriented to person, place, and time.   Psychiatric:         Behavior: Behavior normal.     Patient unable to stop coughing throughout exam. Very dry cough.     Assessment/Plan    Diagnoses and all orders for this visit:    1. Bronchitis (Primary)  -     triamcinolone acetonide (KENALOG-40) injection 40 mg  -     azithromycin (Zithromax) 250 MG tablet; Take 2 tablets the first day, then 1 tablet daily for 4 days.  Dispense: 6 tablet; Refill: 0  -     predniSONE (DELTASONE) 10 MG (21) dose pack; Use as directed on package  Dispense: 21 tablet; Refill: 0  -     promethazine-dextromethorphan (PROMETHAZINE-DM) 6.25-15 MG/5ML syrup; Take 5 mL by mouth 4 (Four) Times a Day As Needed for Cough.  Dispense: 180 mL; Refill: 0    2. Acute cough  -     POCT SARS-CoV-2 Antigen TERESA  -     POCT Influenza A/B      Swabs negative.     An After Visit Summary was printed and given to the patient at discharge.  Return if symptoms worsen or fail to improve, for Next scheduled follow up.       Jeanne Prescott, APRN 1/1724    Electronically signed.

## 2024-01-24 DIAGNOSIS — E78.2 MIXED HYPERLIPIDEMIA: ICD-10-CM

## 2024-01-24 RX ORDER — ATORVASTATIN CALCIUM 20 MG/1
20 TABLET, FILM COATED ORAL NIGHTLY
Qty: 90 TABLET | Refills: 3 | Status: SHIPPED | OUTPATIENT
Start: 2024-01-24

## 2024-01-24 NOTE — TELEPHONE ENCOUNTER
Rx Refill Note  Requested Prescriptions     Pending Prescriptions Disp Refills    atorvastatin (LIPITOR) 20 MG tablet [Pharmacy Med Name: ATORVASTATIN CALCIUM 20MG TABS] 90 tablet 0     Sig: TAKE ONE TABLET BY MOUTH EVERY DAY AT NIGHT      Last office visit with prescribing clinician: 1/17/24   Last telemedicine visit with prescribing clinician: Visit date not found   Next office visit with prescribing clinician: 6/21/2024       {TIP  Please add Last Relevant Lab 12/15/23    Leann Clark MA  01/24/24, 11:11 CST

## 2024-01-31 ENCOUNTER — OFFICE VISIT (OUTPATIENT)
Dept: PULMONOLOGY | Facility: CLINIC | Age: 74
End: 2024-01-31
Payer: MEDICARE

## 2024-01-31 VITALS
DIASTOLIC BLOOD PRESSURE: 78 MMHG | WEIGHT: 147 LBS | SYSTOLIC BLOOD PRESSURE: 122 MMHG | HEIGHT: 62 IN | HEART RATE: 68 BPM | OXYGEN SATURATION: 99 % | BODY MASS INDEX: 27.05 KG/M2

## 2024-01-31 DIAGNOSIS — R06.02 SHORTNESS OF BREATH: Primary | ICD-10-CM

## 2024-01-31 DIAGNOSIS — I27.20 PULMONARY HYPERTENSION: ICD-10-CM

## 2024-01-31 DIAGNOSIS — J98.09 BRONCHOMALACIA: ICD-10-CM

## 2024-01-31 DIAGNOSIS — J45.30 MILD PERSISTENT ASTHMA, UNSPECIFIED WHETHER COMPLICATED: ICD-10-CM

## 2024-01-31 PROCEDURE — 99214 OFFICE O/P EST MOD 30 MIN: CPT | Performed by: INTERNAL MEDICINE

## 2024-01-31 PROCEDURE — 3078F DIAST BP <80 MM HG: CPT | Performed by: INTERNAL MEDICINE

## 2024-01-31 PROCEDURE — 3074F SYST BP LT 130 MM HG: CPT | Performed by: INTERNAL MEDICINE

## 2024-01-31 NOTE — PROGRESS NOTES
"Background:  Pt with cough, upper airway cough, dynamic airway collapse   Chief Complaint  Pulmonary hypertension    Subjective    History of Present Illness     Cindi Hill is here for follow up with [facility].  History of Present Illness  She still has shortness of breath when lying down.  Inhalers never helped.  She had another echo recently reviewed below.  No recent lung imaging.  No fevers. No increased cough or sputum production.     Tobacco Use: Low Risk  (1/31/2024)    Patient History     Smoking Tobacco Use: Never     Smokeless Tobacco Use: Never     Passive Exposure: Past      Current Outpatient Medications   Medication Instructions    atenolol (TENORMIN) 25 mg, Oral, Daily    atorvastatin (LIPITOR) 20 mg, Oral, Nightly    azelastine (ASTELIN) 0.1 % nasal spray 2 sprays, Nasal, 2 Times Daily, Use in each nostril as directed    azithromycin (Zithromax) 250 MG tablet Take 2 tablets the first day, then 1 tablet daily for 4 days.    calcium carbonate (OS-MARIANNE) 600 mg, Oral, Daily    cetirizine (ZYRTEC) 10 mg, Oral, As Needed    Cholecalciferol 5,000 Units, Oral, Daily    diphenhydrAMINE (BENADRYL) 25 mg, Oral, As Needed    docusate sodium (COLACE) 100 mg, Oral, 2 Times Daily    ESTRADIOL VA PLACE fingertip amount (1/4gram) TO vaginal opening WITH fingertip AT bedtime every NIGHT 1/4 GRAM=1 CLICK ON DISPENSER    famotidine (PEPCID) 40 mg, Oral, Daily PRN    fluticasone (FLONASE) 50 MCG/ACT nasal spray 2 sprays, Nasal, Daily    Ketoconazole-Hydrocortisone 2-2.5 % cream 1 application , Apply externally, 2 Times Daily    montelukast (SINGULAIR) 10 mg, Oral, Nightly    ondansetron ODT (ZOFRAN-ODT) 4 mg, Oral    RSVPreF3 Vac Recomb Adjuvanted (AREXVY) 120 mcg, Intramuscular, Once    spironolactone (ALDACTONE) 25 mg, Oral, Daily      Objective     Vital Signs:   /78   Pulse 68   Ht 157.5 cm (62\")   Wt 66.7 kg (147 lb)   SpO2 99% Comment: RA  BMI " 26.89 kg/m²   Physical Exam  Constitutional:       General: She is not in acute distress.     Appearance: She is well-developed. She is not ill-appearing or toxic-appearing.   HENT:      Head: Atraumatic.   Eyes:      General: No scleral icterus.     Conjunctiva/sclera: Conjunctivae normal.   Cardiovascular:      Rate and Rhythm: Normal rate and regular rhythm.      Heart sounds: S1 normal and S2 normal.   Pulmonary:      Effort: Pulmonary effort is normal. No respiratory distress.      Breath sounds: Normal breath sounds. No wheezing.   Abdominal:      General: There is no distension.   Musculoskeletal:         General: No deformity.      Cervical back: Neck supple.   Skin:     Coloration: Skin is not pale.      Findings: No rash.   Neurological:      Mental Status: She is alert.        Result Review  Data Reviewed:  Adult Transthoracic Echo Complete w/ Color, Spectral and Contrast if necessary per protocol (01/10/2024 11:38)      Left Ventricle Left ventricular ejection fraction appears to be 56 - 60%.     Normal left ventricular cavity size and wall thickness noted. All left ventricular wall segments contract normally. Left ventricular diastolic function was normal. Normal left atrial pressure.   Right Ventricle Normal right ventricular cavity size, wall thickness, systolic function and septal motion noted.   Left Atrium Normal left atrial size and volume noted.   Right Atrium Normal right atrial cavity size noted.   Aortic Valve The aortic valve is abnormal in structure. The aortic valve exhibits sclerosis. There is mild calcification of the aortic valve. There is mild thickening of the aortic valve. Mild aortic valve regurgitation is present. No aortic valve stenosis is present.   Mitral Valve Mitral annular calcification is present. There is mild calcification of the mitral valve. Mild mitral valve regurgitation is present. No significant mitral valve stenosis is present.   Tricuspid Valve The tricuspid valve  is grossly normal in structure. Trace tricuspid valve regurgitation is present. Estimated right ventricular systolic pressure from tricuspid regurgitation is normal (<35 mmHg). No evidence of pulmonary hypertension is present. No evidence of significant tricuspid valve stenosis is present.   Pulmonic Valve The pulmonic valve is grossly normal in structure. There is mild pulmonic valve regurgitation present. There is no pulmonic valve stenosis present.   Greater Vessels No dilation of the aortic root is present.   Pericardium The pericardium is normal. There is no evidence of pericardial effusion. .                    Assessment and Plan    Diagnoses and all orders for this visit:    1. Shortness of breath (Primary)  -     CT Chest Hi Resolution Diagnostic; Future  -     Complete PFT - Pre & Post Bronchodilator; Future    2. Bronchomalacia  -     CT Chest Hi Resolution Diagnostic; Future    3. Mild persistent asthma, unspecified whether complicated  -     CT Chest Hi Resolution Diagnostic; Future    4. Pulmonary hypertension  Comments:  WHO group 2    Other orders  -     RSVPreF3 Vac Recomb Adjuvanted (AREXVY) 120 MCG/0.5ML reconstituted suspension injection; Inject 0.5 mL into the appropriate muscle as directed by prescriber 1 (One) Time for 1 dose.  Dispense: 1 each; Refill: 0    PH is better  Not much else we can do for the tracheobronchomalacia.    Will check hrct and PFT to evaluate for anything else actionable    Follow Up   Return in about 4 months (around 5/31/2024).  Patient was given instructions and counseling regarding her condition or for health maintenance advice. Please see specific information pulled into the AVS if appropriate.    Electronically signed by Adi Anderson MD, 1/31/2024, 19:41 CST

## 2024-02-06 DIAGNOSIS — J34.0 NASAL SEPTAL ULCER: Primary | ICD-10-CM

## 2024-02-12 ENCOUNTER — TELEPHONE (OUTPATIENT)
Dept: OTOLARYNGOLOGY | Facility: CLINIC | Age: 74
End: 2024-02-12
Payer: MEDICARE

## 2024-02-12 ENCOUNTER — OFFICE VISIT (OUTPATIENT)
Dept: OTOLARYNGOLOGY | Facility: CLINIC | Age: 74
End: 2024-02-12
Payer: MEDICARE

## 2024-02-12 VITALS
BODY MASS INDEX: 27.64 KG/M2 | DIASTOLIC BLOOD PRESSURE: 76 MMHG | WEIGHT: 150.2 LBS | HEIGHT: 62 IN | SYSTOLIC BLOOD PRESSURE: 133 MMHG | HEART RATE: 84 BPM | TEMPERATURE: 96.9 F

## 2024-02-12 DIAGNOSIS — G50.0 TRIGEMINAL NEURALGIA OF LEFT SIDE OF FACE: ICD-10-CM

## 2024-02-12 DIAGNOSIS — R51.9 FACIAL PAIN: ICD-10-CM

## 2024-02-12 DIAGNOSIS — G44.89 SLUDER'S NEURALGIA: ICD-10-CM

## 2024-02-12 DIAGNOSIS — J32.9 SINUSITIS, UNSPECIFIED CHRONICITY, UNSPECIFIED LOCATION: ICD-10-CM

## 2024-02-12 DIAGNOSIS — J30.1 ALLERGIC RHINITIS DUE TO POLLEN, UNSPECIFIED SEASONALITY: Primary | ICD-10-CM

## 2024-02-12 DIAGNOSIS — M79.2 NEURALGIA: ICD-10-CM

## 2024-02-12 RX ORDER — FLUTICASONE PROPIONATE 50 MCG
2 SPRAY, SUSPENSION (ML) NASAL 2 TIMES DAILY
Qty: 48 G | Refills: 3 | Status: SHIPPED | OUTPATIENT
Start: 2024-02-12

## 2024-02-26 ENCOUNTER — TELEPHONE (OUTPATIENT)
Dept: CARDIOLOGY | Facility: CLINIC | Age: 74
End: 2024-02-26
Payer: MEDICARE

## 2024-02-26 NOTE — TELEPHONE ENCOUNTER
PT TO UNDERGO AN EGD ON [birth year]    DR PASCALE ZARATE WOULD LIKE CLEARANCE FOR PT TO PROCEED.    FORM TO BE SIGNED

## 2024-02-29 ENCOUNTER — HOSPITAL ENCOUNTER (OUTPATIENT)
Dept: PULMONOLOGY | Facility: HOSPITAL | Age: 74
Discharge: HOME | End: 2024-02-29
Payer: MEDICARE

## 2024-02-29 ENCOUNTER — HOSPITAL ENCOUNTER (OUTPATIENT)
Dept: CT IMAGING | Facility: HOSPITAL | Age: 74
Discharge: HOME | End: 2024-02-29
Payer: MEDICARE

## 2024-02-29 DIAGNOSIS — J98.09 BRONCHOMALACIA: ICD-10-CM

## 2024-02-29 DIAGNOSIS — R06.02 SHORTNESS OF BREATH: ICD-10-CM

## 2024-02-29 DIAGNOSIS — J45.30 MILD PERSISTENT ASTHMA, UNSPECIFIED WHETHER COMPLICATED: ICD-10-CM

## 2024-02-29 PROCEDURE — 94729 DIFFUSING CAPACITY: CPT

## 2024-02-29 PROCEDURE — 94726 PLETHYSMOGRAPHY LUNG VOLUMES: CPT

## 2024-02-29 PROCEDURE — 71250 CT THORAX DX C-: CPT

## 2024-02-29 PROCEDURE — 94060 EVALUATION OF WHEEZING: CPT

## 2024-02-29 RX ORDER — ALBUTEROL SULFATE 2.5 MG/3ML
2.5 SOLUTION RESPIRATORY (INHALATION) ONCE
Status: COMPLETED | OUTPATIENT
Start: 2024-02-29 | End: 2024-02-29

## 2024-02-29 RX ADMIN — ALBUTEROL SULFATE 2.5 MG: 2.5 SOLUTION RESPIRATORY (INHALATION) at 09:14

## 2024-03-01 NOTE — PROGRESS NOTES
Adi Anderson MD  [facility] Clinical Pool  Let her know this looks ok.  Nothing surprising.  Nothing actionable    Spoke with patient and relayed test results. Patient voiced understanding.

## 2024-03-05 NOTE — PROGRESS NOTES
Let pt know this looked ok.  PFT looks about the same as last year's.  Nothing else to do for now.  Keep follow up as planned

## 2024-03-20 ENCOUNTER — HOSPITAL ENCOUNTER (OUTPATIENT)
Dept: CT IMAGING | Facility: HOSPITAL | Age: 74
Discharge: HOME | End: 2024-03-20
Admitting: OTOLARYNGOLOGY
Payer: MEDICARE

## 2024-03-20 DIAGNOSIS — J32.9 SINUSITIS, UNSPECIFIED CHRONICITY, UNSPECIFIED LOCATION: ICD-10-CM

## 2024-03-20 DIAGNOSIS — M79.2 NEURALGIA: ICD-10-CM

## 2024-03-20 DIAGNOSIS — G50.0 TRIGEMINAL NEURALGIA OF LEFT SIDE OF FACE: ICD-10-CM

## 2024-03-20 DIAGNOSIS — R51.9 FACIAL PAIN: ICD-10-CM

## 2024-03-20 PROCEDURE — 70486 CT MAXILLOFACIAL W/O DYE: CPT

## 2024-03-27 ENCOUNTER — OFFICE VISIT (OUTPATIENT)
Dept: OTOLARYNGOLOGY | Facility: CLINIC | Age: 74
End: 2024-03-27
Payer: MEDICARE

## 2024-03-27 VITALS
TEMPERATURE: 97.9 F | BODY MASS INDEX: 26.13 KG/M2 | SYSTOLIC BLOOD PRESSURE: 110 MMHG | DIASTOLIC BLOOD PRESSURE: 62 MMHG | WEIGHT: 142 LBS | HEART RATE: 65 BPM | HEIGHT: 62 IN

## 2024-03-27 DIAGNOSIS — J34.2 ACQUIRED DEVIATED NASAL SEPTUM: ICD-10-CM

## 2024-03-27 DIAGNOSIS — J34.3 NASAL TURBINATE HYPERTROPHY: ICD-10-CM

## 2024-03-27 DIAGNOSIS — J32.3 CHRONIC SPHENOIDAL SINUSITIS: ICD-10-CM

## 2024-03-27 DIAGNOSIS — J30.1 ALLERGIC RHINITIS DUE TO POLLEN, UNSPECIFIED SEASONALITY: Primary | ICD-10-CM

## 2024-03-27 DIAGNOSIS — G50.0 TRIGEMINAL NEURALGIA OF LEFT SIDE OF FACE: ICD-10-CM

## 2024-03-27 DIAGNOSIS — M79.2 NEURALGIA: ICD-10-CM

## 2024-03-27 DIAGNOSIS — G44.89 SLUDER'S NEURALGIA: ICD-10-CM

## 2024-03-27 DIAGNOSIS — R51.9 FACIAL PAIN: ICD-10-CM

## 2024-03-27 DIAGNOSIS — J32.9 SINUSITIS, UNSPECIFIED CHRONICITY, UNSPECIFIED LOCATION: ICD-10-CM

## 2024-03-27 NOTE — PATIENT INSTRUCTIONS
NASAL SALINE:  Use 2 puffs each nostril 4-6 times daily and more frequently if possible.  You can buy saline spray or you can make your own and use an old spray bottle to administer  Use a humidifier at bedside  Recipe for saline:  Water                                 1 quart  Salt (table)                        1 tablespoon  Gylcerin (or Annalee Syrup)    1 teaspoon  Sodium bicarbonate           1 teaspoon  Sprays or [place] pots are recommended    Do not allow to stand for more than 24 hrs. Make new solution. There is no preservative in this solution.    Sinus irrigation and saline application can be enhanced with various over-the-counter products.  A WaterPik can be quite useful to irrigate, especially following sinus surgery.  Navage makes a product that irrigates the nose and some of the sinuses.  NeilMed makes a sign you Gator to irrigate the sinuses.  Neomed also has canned saline that we will come out under pressure.  A [place] pot can also be helpful.  All of these products help keep the nose clear of debris.  Please use as directed on the instructions that come with the particular device.     Call for nasal problems     CONTACT INFORMATION:  The main office phone number is [phone]. For emergencies after hours and on weekends, this number will convert over to our answering service and the on call provider will answer. Please try to keep non emergent phone calls/ questions to office hours 9am-5pm Monday through Friday.     [employer]  As an alternative, you can sign up and use the Epic MyChart system for more direct and quicker access for non emergent questions/ problems.  [facility] [employer] allows you to send messages to your doctor, view your test results, renew your prescriptions, schedule appointments, and more. To sign up, go to [employer] and click on the Sign Up Now link in the New User? box. Enter your [employer] Activation Code exactly as it appears below along with the last four  digits of your Social Security Number and your Date of Birth () to complete the sign-up process. If you do not sign up before the expiration date, you must request a new code.    [employer] Activation Code: Activation code not generated  Current [employer] Status: Active    If you have questions, you can email [email or web address] or call [phone] to talk to our [employer] staff. Remember, [employer] is NOT to be used for urgent needs. For medical emergencies, dial 911.

## 2024-03-27 NOTE — LETTER
March 27, 2024     Jose Wolff MD  [address] [facility] [address]    Patient: Cindi Hill   YOB: 1950   Date of Visit: 3/27/2024     Dear Jose Wolff MD:       Thank you for referring Cindi Hill to me for evaluation. Below are the relevant portions of my assessment and plan of care.    If you have questions, please do not hesitate to call me. I look forward to following Cindi along with you.         Sincerely,        Nirav Eduardo Jr, MD        CC: No Recipients    Nirav Eduardo Jr., MD  03/27/24 [id number]  Sign when Signing Visit      Nirav Eduardo Jr, MD  [facility]  [address], [address]  [facility] [address]  Fax [phone]  Phone [phone]      Visit Type: FOLLOW UP   Chief Complaint   Patient presents with   • Recheck rhinitis     Left side pain beyond her nose, she is not congested           HPI  Accompanied by:No one  She presents for a follow up evaluation. Ct scan completed.  Nose pain gone for 5 days.  Had nosebleed and then Migraine with aura on left. She used to have right side. She has one pain brushing yteeth. Now none.  Pain had worsened. But now gone   No brething issues. She has runny nose and eyes. She feels allergy.    Past Medical History:   Diagnosis Date   • Acute left-sided low back pain without sciatica 10/24/2017   • Arthritis    • Breast cancer     1997, right breast mastectomy   • Drug therapy 1997   • Essential hypertension 04/21/2022   • GERD (gastroesophageal reflux disease)    • Headache    • Hypercholesteremia    • Obesity    • TERESA on CPAP 04/21/2022   • KARUNA (stress urinary incontinence, female)    • Ulcer, duodenal, with obstruction 02/28/2022   • Urge incontinence    • Uterovaginal prolapse    • Vaginal atrophy        Past Surgical History:   Procedure Laterality Date   • BLADDER SLING MODIFIED, ANTERIOR AND POSTERIOR VAGINAL REPAIR     •  BLADDER SURGERY      Pacemaker for the bladder to help with incontinence.   • BREAST SURGERY  1997, 2021   • BRONCHOSCOPY N/A 04/26/2017    Procedure: BRONCHOSCOPY BIOPSY WITH ANESTHESIA;  Surgeon: Adi Anderson MD;  Location:  [facility];  Service:    • CARDIAC CATHETERIZATION Left 02/28/2017    Procedure: Cardiac Catheterization/Vascular Study;  Surgeon: Anuj Johnson MD;  Location:  [facility];  Service:    • CARDIAC CATHETERIZATION  2/28/2017   • COLONOSCOPY  2014   • COSMETIC SURGERY  2021   • HYSTERECTOMY     • HYSTERECTOMY     • LAMINECTOMY  01/2016   • LYMPH NODE BIOPSY  1997   • MASTECTOMY      Right in 1997 and Left in 2021   • ROTATOR CUFF REPAIR Right 2012   • SPINE SURGERY  2016       Family History: Her family history includes Arthritis in her mother; Cancer in her brother, father, and paternal grandfather; Dementia in her mother; Diabetes in her brother and maternal grandfather; Heart failure in her mother; No Known Problems in her paternal grandmother; Thyroid disease in her brother and maternal grandmother.     Social History: She  reports that she has never smoked. She has been exposed to tobacco smoke. She has never used smokeless tobacco. She reports that she does not drink alcohol and does not use drugs.    Home Medications:  Cholecalciferol, Estradiol, Ketoconazole-Hydrocortisone, atenolol, atorvastatin, azelastine, calcium carbonate, cetirizine, diphenhydrAMINE, docusate sodium, famotidine, fluticasone, montelukast, ondansetron ODT, and spironolactone    Allergies:  She is allergic to lisinopril, lortab [hydrocodone-acetaminophen], percocet [oxycodone-acetaminophen], pregabalin, and adhesive tape.       Vital Signs:   Temp:  [97.9 °F (36.6 °C)] 97.9 °F (36.6 °C)  Heart Rate:  [65] 65  BP: (110)/(62) 110/62  ENT Physical Exam  Constitutional  Appearance: patient appears well-developed and well-nourished,  Communication/Voice: communication appropriate for  developmental age; vocal quality normal;  Head and Face  Appearance: head appears normal, face appears normal and face appears atraumatic;  Palpation: facial palpation normal;  Salivary: glands normal;  Ear  Hearing: intact;  Auricles: bilateral auricles normal;  External Mastoids: right external mastoid normal; left external mastoid normal;  Ear Canals: bilateral ear canals normal;  Tympanic Membranes: bilateral tympanic membranes normal;  Nose  External Nose: nares patent bilaterally; external nose normal;  Internal Nose: nasal mucosa normal; Nasal mucosa comments: very dry   septum normal; Septum comments: [place] area dry but no lesion   bilateral inferior turbinates normal;  Oral Cavity/Oropharynx  Lips: normal;  Teeth: normal;  Gums: gingiva normal;  Tongue: normal;  Oral mucosa: normal;  Hard palate: normal;  Soft palate: normal;  Tonsils: normal;  Base of Tongue: normal;  Posterior pharyngeal wall: normal;  Neck  Neck: neck normal;  Respiratory  Inspection: breathing unlabored; normal breathing rate;  Cardiovascular  Inspection: extremities are warm and well perfused; no peripheral edema present;  Neurovestibular  Mental Status: alert and oriented;  Psychiatric: mood normal; affect is appropriate;           Result Review      RESULTS REVIEW    I have reviewed the patients old records in the chart.   I have reviewed the patients old records in the chart.  The following results/records were reviewed:  I reviewed the patient's new imaging results and agree with the interpretation.  Results for orders placed during the hospital encounter of 03/20/24    CT Sinus Without Contrast    Narrative  CT SINUS WO CONTRAST- 3/20/2024 9:21 AM    HISTORY: loss smell taste; M79.2-Neuralgia and neuritis, unspecified;  R51.9-Headache, unspecified; G50.0-Trigeminal neuralgia; J32.9-Chronic  sinusitis, unspecified    COMPARISON: NONE.    DOSE LENGTH PRODUCT: 314.29 mGy.cm  Automated exposure control was also  utilized to  decrease patient radiation dose.    TECHNIQUE: Helical tomographic images of the sinuses were obtained  without contrast. Multiplanar reformatted images were provided for  review.    FINDINGS:    Maxillary sinuses are clear, as are the maxillary infundibula.    Frontal sinuses and anterior ethmoids are clear, as are the frontal  recesses.    Minimal left sphenoid sinus mucosal thickening with frothy secretions.  The right sphenoid sinus is clear. Mild mucosal thickening about the  left sphenoethmoid recess. The right sphenoethmoid recess is clear..    Cribriform plates are intact. Lamina papyracea is intact. No optic nerve  canal dehiscence. No supraorbital pneumatization superior to the  anterior ethmoid notch.    Deviation of the nasal septum to the right.. Normal configuration of the  turbinates. Facial bones are intact. Normal alignment at the TMJs.  Mastoids are clear. Visualized intracranial contents are unremarkable.  No inflammatory changes in the deep neck. Nasopharyngeal airway is  unremarkable.    Impression  Minimal left sphenoid sinus mucosal thickening with frothy secretions.  Minimal mucosal thickening about the left sphenoethmoid recess.    Otherwise paranasal sinuses are clear.    Deviated nasal septum to the right          This report was signed and finalized on 3/20/2024 11:44 AM by Carlos A Jimenez.     CT Sinus Without Contrast (03/20/2024 10:28)        Assessment & Plan  Allergic rhinitis due to pollen, unspecified seasonality    Neuralgia    Facial pain    Trigeminal neuralgia of left side of face    Sinusitis, unspecified chronicity, unspecified location    Sluder's neuralgia    Chronic sphenoidal sinusitis    Acquired deviated nasal septum    Nasal turbinate hypertrophy        Diagnosis Plan   1. Allergic rhinitis due to pollen, unspecified seasonality      Improved control      2. Neuralgia      In remission      3. Facial pain      In remission      4. Trigeminal neuralgia of left side of  face      In remission      5. Sinusitis, unspecified chronicity, unspecified location      Minimal left sphenoid sinus mucosal thickening      6. Sluder's neuralgia      In remission      7. Chronic sphenoidal sinusitis      Minimal      8. Acquired deviated nasal septum      No change      9. Nasal turbinate hypertrophy      Slightly improved                 Medical and surgical options were discussed including observation, continued medical management, medication modification, and surgical management. Risks, benefits and alternatives were discussed and questions were answered. After considering the options, the patient decided to proceed with continued medical management.  Patient appears to be markedly improved at this point in time.  She has noticed that her facial pain has resolved.  Brushing teeth no longer bothers her.  Because of this, I will have the patient continue nasal saline.  She is to continue with her primary care for any further problems.  I will reevaluate her in 3 months to be sure she is not developing Sluder's neuralgia.  If she does have exacerbation, I would recommend septoplasty and turbinate surgery for possible contact points causing Sluder's neuralgia.  Currently, she does not need any medication.  Nasal saline  Call for nasal problems  See primary care for exacerbation of trigeminal neuralgia    My Chart:  Patient is using My Chart    Patient understand(s) and agree(s) with the treatment plan as described.    Return in about 3 months (around 6/27/2024) for Recheck nose, nasal endoscopy.        Electronically signed by Nirav Eduardo Jr, MD 03/27/24 11:35 AM CDT.

## 2024-03-27 NOTE — PROGRESS NOTES
Nirav Eduardo Jr, MD  [facility]  [address], [address]  [place] [place] [address]  Fax [phone]  Phone [phone]      Visit Type: FOLLOW UP   Chief Complaint   Patient presents with    Recheck rhinitis     Left side pain beyond her nose, she is not congested           HPI  Accompanied by:No one  She presents for a follow up evaluation. Ct scan completed.  Nose pain gone for 5 days.  Had nosebleed and then Migraine with aura on left. She used to have right side. She has one pain brushing yteeth. Now none.  Pain had worsened. But now gone   No brething issues. She has runny nose and eyes. She feels allergy.    Past Medical History:   Diagnosis Date    Acute left-sided low back pain without sciatica 10/24/2017    Arthritis     Breast cancer     1997, right breast mastectomy    Drug therapy 1997    Essential hypertension 04/21/2022    GERD (gastroesophageal reflux disease)     Headache     Hypercholesteremia     Obesity     TERESA on CPAP 04/21/2022    KARUNA (stress urinary incontinence, female)     Ulcer, duodenal, with obstruction 02/28/2022    Urge incontinence     Uterovaginal prolapse     Vaginal atrophy        Past Surgical History:   Procedure Laterality Date    BLADDER SLING MODIFIED, ANTERIOR AND POSTERIOR VAGINAL REPAIR      BLADDER SURGERY      Pacemaker for the bladder to help with incontinence.    BREAST SURGERY  1997, 2021    BRONCHOSCOPY N/A 04/26/2017    Procedure: BRONCHOSCOPY BIOPSY WITH ANESTHESIA;  Surgeon: Adi Anderson MD;  Location: [facility] ENDOSCOPY;  Service:     CARDIAC CATHETERIZATION Left 02/28/2017    Procedure: Cardiac Catheterization/Vascular Study;  Surgeon: Anuj Johnson MD;  Location:  [facility]ATH INVASIVE LOCATION;  Service:     CARDIAC CATHETERIZATION  2/28/2017    COLONOSCOPY  2014    COSMETIC SURGERY  2021    HYSTERECTOMY      HYSTERECTOMY      LAMINECTOMY  01/2016    LYMPH NODE BIOPSY  1997     MASTECTOMY      Right in 1997 and Left in 2021    ROTATOR CUFF REPAIR Right 2012    SPINE SURGERY  2016       Family History: Her family history includes Arthritis in her mother; Cancer in her brother, father, and paternal grandfather; Dementia in her mother; Diabetes in her brother and maternal grandfather; Heart failure in her mother; No Known Problems in her paternal grandmother; Thyroid disease in her brother and maternal grandmother.     Social History: She  reports that she has never smoked. She has been exposed to tobacco smoke. She has never used smokeless tobacco. She reports that she does not drink alcohol and does not use drugs.    Home Medications:  Cholecalciferol, Estradiol, Ketoconazole-Hydrocortisone, atenolol, atorvastatin, azelastine, calcium carbonate, cetirizine, diphenhydrAMINE, docusate sodium, famotidine, fluticasone, montelukast, ondansetron ODT, and spironolactone    Allergies:  She is allergic to lisinopril, lortab [hydrocodone-acetaminophen], percocet [oxycodone-acetaminophen], pregabalin, and adhesive tape.       Vital Signs:   Temp:  [97.9 °F (36.6 °C)] 97.9 °F (36.6 °C)  Heart Rate:  [65] 65  BP: (110)/(62) 110/62  ENT Physical Exam  Constitutional  Appearance: patient appears well-developed and well-nourished,  Communication/Voice: communication appropriate for developmental age; vocal quality normal;  Head and Face  Appearance: head appears normal, face appears normal and face appears atraumatic;  Palpation: facial palpation normal;  Salivary: glands normal;  Ear  Hearing: intact;  Auricles: bilateral auricles normal;  External Mastoids: right external mastoid normal; left external mastoid normal;  Ear Canals: bilateral ear canals normal;  Tympanic Membranes: bilateral tympanic membranes normal;  Nose  External Nose: nares patent bilaterally; external nose normal;  Internal Nose: nasal mucosa normal; Nasal mucosa comments: very dry   septum normal; Septum comments: [place] area dry  but no lesion   bilateral inferior turbinates normal;  Oral Cavity/Oropharynx  Lips: normal;  Teeth: normal;  Gums: gingiva normal;  Tongue: normal;  Oral mucosa: normal;  Hard palate: normal;  Soft palate: normal;  Tonsils: normal;  Base of Tongue: normal;  Posterior pharyngeal wall: normal;  Neck  Neck: neck normal;  Respiratory  Inspection: breathing unlabored; normal breathing rate;  Cardiovascular  Inspection: extremities are warm and well perfused; no peripheral edema present;  Neurovestibular  Mental Status: alert and oriented;  Psychiatric: mood normal; affect is appropriate;           Result Review       RESULTS REVIEW    I have reviewed the patients old records in the chart.   I have reviewed the patients old records in the chart.  The following results/records were reviewed:  I reviewed the patient's new imaging results and agree with the interpretation.  Results for orders placed during the hospital encounter of 03/20/24    CT Sinus Without Contrast    Narrative  CT SINUS WO CONTRAST- 3/20/2024 9:21 AM    HISTORY: loss smell taste; M79.2-Neuralgia and neuritis, unspecified;  R51.9-Headache, unspecified; G50.0-Trigeminal neuralgia; J32.9-Chronic  sinusitis, unspecified    COMPARISON: NONE.    DOSE LENGTH PRODUCT: 314.29 mGy.cm  Automated exposure control was also  utilized to decrease patient radiation dose.    TECHNIQUE: Helical tomographic images of the sinuses were obtained  without contrast. Multiplanar reformatted images were provided for  review.    FINDINGS:    Maxillary sinuses are clear, as are the maxillary infundibula.    Frontal sinuses and anterior ethmoids are clear, as are the frontal  recesses.    Minimal left sphenoid sinus mucosal thickening with frothy secretions.  The right sphenoid sinus is clear. Mild mucosal thickening about the  left sphenoethmoid recess. The right sphenoethmoid recess is clear..    Cribriform plates are intact. Lamina papyracea is intact. No optic nerve  canal  dehiscence. No supraorbital pneumatization superior to the  anterior ethmoid notch.    Deviation of the nasal septum to the right.. Normal configuration of the  turbinates. Facial bones are intact. Normal alignment at the TMJs.  Mastoids are clear. Visualized intracranial contents are unremarkable.  No inflammatory changes in the deep neck. Nasopharyngeal airway is  unremarkable.    Impression  Minimal left sphenoid sinus mucosal thickening with frothy secretions.  Minimal mucosal thickening about the left sphenoethmoid recess.    Otherwise paranasal sinuses are clear.    Deviated nasal septum to the right          This report was signed and finalized on 3/20/2024 11:44 AM by Carlos A Jimenez.     CT Sinus Without Contrast (03/20/2024 10:28)        Assessment & Plan  Allergic rhinitis due to pollen, unspecified seasonality    Neuralgia    Facial pain    Trigeminal neuralgia of left side of face    Sinusitis, unspecified chronicity, unspecified location    Sluder's neuralgia    Chronic sphenoidal sinusitis    Acquired deviated nasal septum    Nasal turbinate hypertrophy        Diagnosis Plan   1. Allergic rhinitis due to pollen, unspecified seasonality      Improved control      2. Neuralgia      In remission      3. Facial pain      In remission      4. Trigeminal neuralgia of left side of face      In remission      5. Sinusitis, unspecified chronicity, unspecified location      Minimal left sphenoid sinus mucosal thickening      6. Sluder's neuralgia      In remission      7. Chronic sphenoidal sinusitis      Minimal      8. Acquired deviated nasal septum      No change      9. Nasal turbinate hypertrophy      Slightly improved                 Medical and surgical options were discussed including observation, continued medical management, medication modification, and surgical management. Risks, benefits and alternatives were discussed and questions were answered. After considering the options, the patient decided  to proceed with continued medical management.  Patient appears to be markedly improved at this point in time.  She has noticed that her facial pain has resolved.  Brushing teeth no longer bothers her.  Because of this, I will have the patient continue nasal saline.  She is to continue with her primary care for any further problems.  I will reevaluate her in 3 months to be sure she is not developing Sluder's neuralgia.  If she does have exacerbation, I would recommend septoplasty and turbinate surgery for possible contact points causing Sluder's neuralgia.  Currently, she does not need any medication.  Nasal saline  Call for nasal problems  See primary care for exacerbation of trigeminal neuralgia    My Chart:  Patient is using My Chart    Patient understand(s) and agree(s) with the treatment plan as described.    Return in about 3 months (around 6/27/2024) for Recheck nose, nasal endoscopy.        Electronically signed by Nirav Eduardo Jr, MD 03/27/24 11:35 AM CDT.

## 2024-05-01 ENCOUNTER — OFFICE VISIT (OUTPATIENT)
Dept: FAMILY MEDICINE CLINIC | Facility: CLINIC | Age: 74
End: 2024-05-01
Payer: MEDICARE

## 2024-05-01 VITALS
BODY MASS INDEX: 27.12 KG/M2 | TEMPERATURE: 97.1 F | HEIGHT: 62 IN | DIASTOLIC BLOOD PRESSURE: 75 MMHG | SYSTOLIC BLOOD PRESSURE: 121 MMHG | HEART RATE: 78 BPM | WEIGHT: 147.4 LBS | OXYGEN SATURATION: 94 %

## 2024-05-01 DIAGNOSIS — J06.9 UPPER RESPIRATORY TRACT INFECTION, UNSPECIFIED TYPE: Primary | ICD-10-CM

## 2024-05-01 PROCEDURE — 3078F DIAST BP <80 MM HG: CPT | Performed by: NURSE PRACTITIONER

## 2024-05-01 PROCEDURE — 1160F RVW MEDS BY RX/DR IN RCRD: CPT | Performed by: NURSE PRACTITIONER

## 2024-05-01 PROCEDURE — 3074F SYST BP LT 130 MM HG: CPT | Performed by: NURSE PRACTITIONER

## 2024-05-01 PROCEDURE — 1159F MED LIST DOCD IN RCRD: CPT | Performed by: NURSE PRACTITIONER

## 2024-05-01 PROCEDURE — 96372 THER/PROPH/DIAG INJ SC/IM: CPT | Performed by: NURSE PRACTITIONER

## 2024-05-01 PROCEDURE — 99213 OFFICE O/P EST LOW 20 MIN: CPT | Performed by: NURSE PRACTITIONER

## 2024-05-01 RX ORDER — AZITHROMYCIN 250 MG/1
TABLET, FILM COATED ORAL
Qty: 6 TABLET | Refills: 0 | Status: SHIPPED | OUTPATIENT
Start: 2024-05-01

## 2024-05-01 RX ORDER — DEXTROMETHORPHAN HYDROBROMIDE AND PROMETHAZINE HYDROCHLORIDE 15; 6.25 MG/5ML; MG/5ML
5 SYRUP ORAL 4 TIMES DAILY PRN
COMMUNITY

## 2024-05-01 RX ORDER — TRIAMCINOLONE ACETONIDE 40 MG/ML
40 INJECTION, SUSPENSION INTRA-ARTICULAR; INTRAMUSCULAR ONCE
Status: COMPLETED | OUTPATIENT
Start: 2024-05-01 | End: 2024-05-01

## 2024-05-01 RX ORDER — PREDNISONE 10 MG/1
TABLET ORAL
Qty: 21 TABLET | Refills: 0 | Status: SHIPPED | OUTPATIENT
Start: 2024-05-01

## 2024-05-01 RX ADMIN — TRIAMCINOLONE ACETONIDE 40 MG: 40 INJECTION, SUSPENSION INTRA-ARTICULAR; INTRAMUSCULAR at 16:09

## 2024-05-01 NOTE — PROGRESS NOTES
CC:uri      History:  Cindi Hill is a 73 y.o. female who presents today for evaluation of the above problems.        URI   This is a new problem. The current episode started 1 to 4 weeks ago. The problem has been waxing and waning. There has been no fever. Associated symptoms include congestion, coughing and a sore throat. She has tried antihistamine for the symptoms. The treatment provided no relief.     ROS:  Review of Systems   HENT:  Positive for congestion and sore throat.    Respiratory:  Positive for cough.        Allergies   Allergen Reactions    Lisinopril Cough    Lortab [Hydrocodone-Acetaminophen] Nausea Only    Percocet [Oxycodone-Acetaminophen] Nausea Only    Pregabalin Other (See Comments)     Made pt very sleepy and dizzy    Adhesive Tape Itching     Bandage tape caused itching and redness     Past Medical History:   Diagnosis Date    Acute left-sided low back pain without sciatica 10/24/2017    Arthritis     Breast cancer     1997, right breast mastectomy    Drug therapy 1997    Essential hypertension 04/21/2022    GERD (gastroesophageal reflux disease)     Headache     Hypercholesteremia     Obesity     TERESA on CPAP 04/21/2022    KARUNA (stress urinary incontinence, female)     Ulcer, duodenal, with obstruction 02/28/2022    Urge incontinence     Uterovaginal prolapse     Vaginal atrophy      Past Surgical History:   Procedure Laterality Date    BLADDER SLING MODIFIED, ANTERIOR AND POSTERIOR VAGINAL REPAIR      BLADDER SURGERY      Pacemaker for the bladder to help with incontinence.    BREAST SURGERY  1997, 2021    BRONCHOSCOPY N/A 04/26/2017    Procedure: BRONCHOSCOPY BIOPSY WITH ANESTHESIA;  Surgeon: Adi Anderson MD;  Location:  [facility] ENDOSCOPY;  Service:     CARDIAC CATHETERIZATION Left 02/28/2017    Procedure: Cardiac Catheterization/Vascular Study;  Surgeon: Anuj Johnson MD;  Location:  [facility] INVASIVE LOCATION;  Service:     CARDIAC CATHETERIZATION  2/28/2017    COLONOSCOPY  2014     COSMETIC SURGERY  2021    HYSTERECTOMY      HYSTERECTOMY      LAMINECTOMY  01/2016    LYMPH NODE BIOPSY  1997    MASTECTOMY      Right in 1997 and Left in 2021    ROTATOR CUFF REPAIR Right 2012    SPINE SURGERY  2016     Family History   Problem Relation Age of Onset    Dementia Mother     Heart failure Mother     Arthritis Mother     Cancer Father         liver    Cancer Brother         thyroid    Diabetes Brother     Thyroid disease Brother     Thyroid disease Maternal Grandmother     Diabetes Maternal Grandfather     No Known Problems Paternal Grandmother     Cancer Paternal Grandfather     Breast cancer Neg Hx       reports that she has never smoked. She has been exposed to tobacco smoke. She has never used smokeless tobacco. She reports that she does not drink alcohol and does not use drugs.      Current Outpatient Medications:     atenolol (TENORMIN) 25 MG tablet, TAKE ONE TABLET BY MOUTH EVERY DAY, Disp: 90 tablet, Rfl: 3    atorvastatin (LIPITOR) 20 MG tablet, TAKE ONE TABLET BY MOUTH EVERY DAY AT NIGHT, Disp: 90 tablet, Rfl: 3    azelastine (ASTELIN) 0.1 % nasal spray, 2 sprays into the nostril(s) as directed by provider 2 (Two) Times a Day. Use in each nostril as directed (Patient taking differently: 2 sprays into the nostril(s) as directed by provider Daily As Needed. Use in each nostril as directed), Disp: 30 mL, Rfl: 12    calcium carbonate (OS-MARIANNE) 600 MG tablet, Take 1 tablet by mouth Daily., Disp: , Rfl:     cetirizine (zyrTEC) 10 MG tablet, Take 1 tablet by mouth As Needed., Disp: , Rfl:     Cholecalciferol 4000 units capsule, Take 5,000 Units by mouth Daily., Disp: , Rfl:     diphenhydrAMINE (BENADRYL) 25 mg capsule, Take 1 capsule by mouth As Needed for Itching., Disp: , Rfl:     docusate sodium (COLACE) 100 MG capsule, Take 1 capsule by mouth 2 (Two) Times a Day., Disp: , Rfl:     ESTRADIOL VA, PLACE fingertip amount (1/4gram) TO vaginal opening WITH fingertip AT bedtime every NIGHT 1/4 GRAM=1  "CLICK ON DISPENSER, Disp: , Rfl:     famotidine (PEPCID) 40 MG tablet, Take 1 tablet by mouth Daily As Needed for Heartburn or Indigestion., Disp: , Rfl:     fluticasone (FLONASE) 50 MCG/ACT nasal spray, 2 sprays into the nostril(s) as directed by provider 2 (Two) Times a Day., Disp: 48 g, Rfl: 3    montelukast (SINGULAIR) 10 MG tablet, Take 1 tablet by mouth Every Night., Disp: 90 tablet, Rfl: 3    promethazine-dextromethorphan (PROMETHAZINE-DM) 6.25-15 MG/5ML syrup, Take 5 mL by mouth 4 (Four) Times a Day As Needed for Cough., Disp: , Rfl:     spironolactone (ALDACTONE) 25 MG tablet, Take 1 tablet by mouth Daily., Disp: 30 tablet, Rfl: 11    azithromycin (Zithromax) 250 MG tablet, Take 2 tablets the first day, then 1 tablet daily for 4 days., Disp: 6 tablet, Rfl: 0    Ketoconazole-Hydrocortisone 2-2.5 % cream, Apply 1 application  topically 2 (Two) Times a Day. (Patient not taking: Reported on 5/1/2024), Disp: 30 g, Rfl: 0    ondansetron ODT (ZOFRAN-ODT) 4 MG disintegrating tablet, Take 1 tablet by mouth. (Patient not taking: Reported on 5/1/2024), Disp: , Rfl:     predniSONE (DELTASONE) 10 MG (21) dose pack, Use as directed on package, Disp: 21 tablet, Rfl: 0    Current Facility-Administered Medications:     triamcinolone acetonide (KENALOG-40) injection 40 mg, 40 mg, Intramuscular, Once, Jeanne Prescott, APRN    OBJECTIVE:  /75 (BP Location: Left arm, Patient Position: Sitting, Cuff Size: Adult)   Pulse 78   Temp 97.1 °F (36.2 °C) (Temporal)   Ht 157.5 cm (62\")   Wt 66.9 kg (147 lb 6.4 oz)   LMP  (LMP Unknown)   SpO2 94%   BMI 26.96 kg/m²    Physical Exam  Vitals reviewed.   Constitutional:       Appearance: She is well-developed.   Cardiovascular:      Rate and Rhythm: Normal rate and regular rhythm.      Heart sounds: Normal heart sounds.   Pulmonary:      Effort: Pulmonary effort is normal.      Breath sounds: Normal breath sounds.   Neurological:      Mental Status: She is alert and oriented to " person, place, and time.   Psychiatric:         Behavior: Behavior normal.         Assessment/Plan    Diagnoses and all orders for this visit:    1. Upper respiratory tract infection, unspecified type (Primary)  -     azithromycin (Zithromax) 250 MG tablet; Take 2 tablets the first day, then 1 tablet daily for 4 days.  Dispense: 6 tablet; Refill: 0  -     triamcinolone acetonide (KENALOG-40) injection 40 mg  -     predniSONE (DELTASONE) 10 MG (21) dose pack; Use as directed on package  Dispense: 21 tablet; Refill: 0        An After Visit Summary was printed and given to the patient at discharge.  Return if symptoms worsen or fail to improve, for Next scheduled follow up.       BAR Ortega 5/1/24    Electronically signed.

## 2024-05-27 NOTE — PROGRESS NOTES
"Background:  Pt with upper airway cough, dynamic airway collapse   Chief Complaint  Shortness of Breath    Subjective    History of Present Illness     Cindi Hill is here for follow up with [facility].  History of Present Illness  She is still concerned about shortness of breath when lying down and changing positions.       Tobacco Use: Low Risk  (5/28/2024)    Patient History     Smoking Tobacco Use: Never     Smokeless Tobacco Use: Never     Passive Exposure: Past      Current Outpatient Medications   Medication Instructions    atenolol (TENORMIN) 25 mg, Oral, Daily    atorvastatin (LIPITOR) 20 mg, Oral, Nightly    azelastine (ASTELIN) 0.1 % nasal spray 2 sprays, Nasal, 2 Times Daily, Use in each nostril as directed    azithromycin (Zithromax) 250 MG tablet Take 2 tablets the first day, then 1 tablet daily for 4 days.    calcium carbonate (OS-MARIANNE) 600 mg, Oral, Daily    cetirizine (ZYRTEC) 10 mg, Oral, As Needed    Cholecalciferol 5,000 Units, Oral, Daily    diphenhydrAMINE (BENADRYL) 25 mg, Oral, As Needed    docusate sodium (COLACE) 100 mg, Oral, 2 Times Daily    ESTRADIOL VA PLACE fingertip amount (1/4gram) TO vaginal opening WITH fingertip AT bedtime every NIGHT 1/4 GRAM=1 CLICK ON DISPENSER    famotidine (PEPCID) 40 mg, Oral, Daily PRN    fluticasone (FLONASE) 50 MCG/ACT nasal spray 2 sprays, Nasal, 2 Times Daily    Ketoconazole-Hydrocortisone 2-2.5 % cream 1 application , Apply externally, 2 Times Daily    montelukast (SINGULAIR) 10 mg, Oral, Nightly    ondansetron ODT (ZOFRAN-ODT) 4 mg, Oral    predniSONE (DELTASONE) 10 MG (21) dose pack Use as directed on package    promethazine-dextromethorphan (PROMETHAZINE-DM) 6.25-15 MG/5ML syrup 5 mL, Oral, 4 Times Daily PRN    spironolactone (ALDACTONE) 25 mg, Oral, Daily      Objective     Vital Signs:   /70   Pulse 66   Ht 157.5 cm (62\")   Wt 68 kg (150 lb)   SpO2 96% Comment: RA  BMI 27.44 kg/m² "   Physical Exam  Constitutional:       General: She is not in acute distress.     Appearance: She is well-developed. She is not ill-appearing or toxic-appearing.   HENT:      Head: Atraumatic.   Eyes:      General: No scleral icterus.     Conjunctiva/sclera: Conjunctivae normal.   Cardiovascular:      Rate and Rhythm: Normal rate and regular rhythm.      Heart sounds: S1 normal and S2 normal.   Pulmonary:      Effort: Pulmonary effort is normal.      Breath sounds: Normal breath sounds.   Abdominal:      General: There is no distension.   Musculoskeletal:         General: No deformity.      Cervical back: Neck supple.   Skin:     Coloration: Skin is not pale.      Findings: No rash.   Neurological:      Mental Status: She is alert.        Result Review  Data Reviewed:  CT Chest Hi Resolution Diagnostic (02/29/2024 10:19)    1. No evidence of pulmonary fibrosis.  2. Air trapping, more conspicuous at the lung bases.  2. Scattered coronary artery calcification.  4. Postoperative appearance of the bilateral breast with implants and  fat necrosis. Surgical clips at the RIGHT axilla.          Complete PFT - Pre & Post Bronchodilator (02/29/2024 08:43)   1.  Spirometry suggests a possible borderline restrictive ventilatory defect.  There is also a decrease in midflows and peak expiratory flow.  2.  Other than mild improvement in midflows postbronchodilator which are still diminished there is no significant change in spirometry postbronchodilator.  3.  Lung volumes reveal a decrease in expiratory reserve volume but otherwise are normal with no evidence of restriction by lung volume criteria.  4.  Diffusion capacity is borderline low but when corrected for alveolar volume is well within the normal range.  5.  When current studies are compared to studies from June 13, 2023, there is no significant change in the patient's current prebronchodilator spirometry compared to previous prebronchodilator values.  Likewise there is no  significant change in the patient's current postbronchodilator spirometry compared to previous postbronchodilator values.  There is no significant change in the patient's total lung capacity compared to previous.  When corrected for alveolar volume there is no significant change in diffusion capacity compared to previous.     Assessment and Plan    Diagnoses and all orders for this visit:    1. Bronchomalacia (Primary)    2. Mild persistent asthma without complication    Will give trials of breztri and also trelegy in case any of these might help  Cardiac workup noted.  Previous pulmonary hypertension concern appears resolved.    Follow Up   Return in about 4 months (around 9/28/2024).  Patient was given instructions and counseling regarding her condition or for health maintenance advice. Please see specific information pulled into the AVS if appropriate.    Electronically signed by Adi Anderson MD, 5/28/2024, 20:27 CDT

## 2024-05-28 ENCOUNTER — OFFICE VISIT (OUTPATIENT)
Dept: PULMONOLOGY | Facility: CLINIC | Age: 74
End: 2024-05-28
Payer: MEDICARE

## 2024-05-28 VITALS
WEIGHT: 150 LBS | HEART RATE: 66 BPM | BODY MASS INDEX: 27.6 KG/M2 | SYSTOLIC BLOOD PRESSURE: 114 MMHG | DIASTOLIC BLOOD PRESSURE: 70 MMHG | OXYGEN SATURATION: 96 % | HEIGHT: 62 IN

## 2024-05-28 DIAGNOSIS — J45.30 MILD PERSISTENT ASTHMA WITHOUT COMPLICATION: ICD-10-CM

## 2024-05-28 DIAGNOSIS — J98.09 BRONCHOMALACIA: Primary | ICD-10-CM

## 2024-05-28 PROCEDURE — 3074F SYST BP LT 130 MM HG: CPT | Performed by: INTERNAL MEDICINE

## 2024-05-28 PROCEDURE — 99214 OFFICE O/P EST MOD 30 MIN: CPT | Performed by: INTERNAL MEDICINE

## 2024-05-28 PROCEDURE — 3078F DIAST BP <80 MM HG: CPT | Performed by: INTERNAL MEDICINE

## 2024-06-10 RX ORDER — SPIRONOLACTONE 25 MG/1
25 TABLET ORAL DAILY
Qty: 30 TABLET | Refills: 11 | Status: SHIPPED | OUTPATIENT
Start: 2024-06-10

## 2024-06-24 ENCOUNTER — TELEPHONE (OUTPATIENT)
Dept: PULMONOLOGY | Facility: CLINIC | Age: 74
End: 2024-06-24
Payer: MEDICARE

## 2024-06-24 NOTE — TELEPHONE ENCOUNTER
Patient left voicemail stating that after taking both the Trelegy and Breztri she could tell no difference in her breathing.  She did state that the Breztri made her jittery after taking.

## 2024-06-25 ENCOUNTER — OFFICE VISIT (OUTPATIENT)
Dept: FAMILY MEDICINE CLINIC | Facility: CLINIC | Age: 74
End: 2024-06-25
Payer: MEDICARE

## 2024-06-25 VITALS
TEMPERATURE: 97.9 F | DIASTOLIC BLOOD PRESSURE: 70 MMHG | WEIGHT: 150.6 LBS | HEIGHT: 62 IN | OXYGEN SATURATION: 97 % | BODY MASS INDEX: 27.71 KG/M2 | RESPIRATION RATE: 16 BRPM | SYSTOLIC BLOOD PRESSURE: 114 MMHG | HEART RATE: 61 BPM

## 2024-06-25 DIAGNOSIS — E78.2 MIXED HYPERLIPIDEMIA: Primary | ICD-10-CM

## 2024-06-25 PROCEDURE — 3078F DIAST BP <80 MM HG: CPT | Performed by: FAMILY MEDICINE

## 2024-06-25 PROCEDURE — 1126F AMNT PAIN NOTED NONE PRSNT: CPT | Performed by: FAMILY MEDICINE

## 2024-06-25 PROCEDURE — 1159F MED LIST DOCD IN RCRD: CPT | Performed by: FAMILY MEDICINE

## 2024-06-25 PROCEDURE — 1160F RVW MEDS BY RX/DR IN RCRD: CPT | Performed by: FAMILY MEDICINE

## 2024-06-25 PROCEDURE — 3074F SYST BP LT 130 MM HG: CPT | Performed by: FAMILY MEDICINE

## 2024-06-25 PROCEDURE — 99213 OFFICE O/P EST LOW 20 MIN: CPT | Performed by: FAMILY MEDICINE

## 2024-06-25 RX ORDER — VIBEGRON 75 MG/1
75 TABLET, FILM COATED ORAL DAILY
COMMUNITY

## 2024-06-25 NOTE — PROGRESS NOTES
Subjective   Cindi Hill is a 73 y.o. female.     History of Present Illness  73-year-old female with follow-up for hyperlipidemia      The following portions of the patient's history were reviewed and updated as appropriate: allergies, current medications, past family history, past medical history, past social history, past surgical history, and problem list.    Review of Systems   Respiratory:  Positive for apnea. Negative for shortness of breath.         Compliant with CPAP-will need lifetime because of comorbidities   Cardiovascular:  Negative for chest pain and leg swelling.   Musculoskeletal:         No evidence of myositis or statin side effect   Neurological:  Positive for headaches.       Objective   Physical Exam  Vitals and nursing note reviewed.   Constitutional:       Appearance: Normal appearance.   HENT:      Mouth/Throat:      Mouth: Mucous membranes are moist.      Pharynx: Oropharynx is clear.   Eyes:      Extraocular Movements: Extraocular movements intact.   Cardiovascular:      Rate and Rhythm: Normal rate and regular rhythm.   Pulmonary:      Effort: Pulmonary effort is normal.      Breath sounds: Normal breath sounds.   Abdominal:      Palpations: Abdomen is soft.   Musculoskeletal:      Right lower leg: No edema.      Left lower leg: No edema.   Skin:     General: Skin is warm and dry.   Neurological:      General: No focal deficit present.      Mental Status: She is alert and oriented to person, place, and time.   Psychiatric:         Mood and Affect: Mood normal.         Behavior: Behavior normal.         Thought Content: Thought content normal.         Judgment: Judgment normal.         Assessment & Plan   Diagnoses and all orders for this visit:    1. Mixed hyperlipidemia (Primary)  -     Comprehensive Metabolic Panel  -     Lipid Panel    #2 TEERSA  Plan above-continue seeing specialist

## 2024-06-26 LAB
ALBUMIN SERPL-MCNC: 4.7 G/DL (ref 3.5–5.2)
ALBUMIN/GLOB SERPL: 2 G/DL
ALP SERPL-CCNC: 78 U/L (ref 39–117)
ALT SERPL-CCNC: 16 U/L (ref 1–33)
AST SERPL-CCNC: 15 U/L (ref 1–32)
BILIRUB SERPL-MCNC: 0.4 MG/DL (ref 0–1.2)
BUN SERPL-MCNC: 12 MG/DL (ref 8–23)
BUN/CREAT SERPL: 15.4 (ref 7–25)
CALCIUM SERPL-MCNC: 9.9 MG/DL (ref 8.6–10.5)
CHLORIDE SERPL-SCNC: 100 MMOL/L (ref 98–107)
CHOLEST SERPL-MCNC: 137 MG/DL (ref 0–200)
CO2 SERPL-SCNC: 30.2 MMOL/L (ref 22–29)
CREAT SERPL-MCNC: 0.78 MG/DL (ref 0.57–1)
EGFRCR SERPLBLD CKD-EPI 2021: 80.3 ML/MIN/1.73
GLOBULIN SER CALC-MCNC: 2.3 GM/DL
GLUCOSE SERPL-MCNC: 98 MG/DL (ref 65–99)
HDLC SERPL-MCNC: 62 MG/DL (ref 40–60)
LDLC SERPL CALC-MCNC: 60 MG/DL (ref 0–100)
POTASSIUM SERPL-SCNC: 4.6 MMOL/L (ref 3.5–5.2)
PROT SERPL-MCNC: 7 G/DL (ref 6–8.5)
SODIUM SERPL-SCNC: 140 MMOL/L (ref 136–145)
TRIGL SERPL-MCNC: 74 MG/DL (ref 0–150)
VLDLC SERPL CALC-MCNC: 15 MG/DL (ref 5–40)

## 2024-07-01 ENCOUNTER — OFFICE VISIT (OUTPATIENT)
Dept: OTOLARYNGOLOGY | Facility: CLINIC | Age: 74
End: 2024-07-01
Payer: MEDICARE

## 2024-07-01 VITALS
WEIGHT: 152.8 LBS | HEART RATE: 65 BPM | SYSTOLIC BLOOD PRESSURE: 112 MMHG | DIASTOLIC BLOOD PRESSURE: 58 MMHG | BODY MASS INDEX: 27.95 KG/M2

## 2024-07-01 DIAGNOSIS — M79.2 NEURALGIA: ICD-10-CM

## 2024-07-01 DIAGNOSIS — G50.0 TRIGEMINAL NEURALGIA OF LEFT SIDE OF FACE: ICD-10-CM

## 2024-07-01 DIAGNOSIS — J30.1 ALLERGIC RHINITIS DUE TO POLLEN, UNSPECIFIED SEASONALITY: Primary | ICD-10-CM

## 2024-07-01 DIAGNOSIS — J32.9 SINUSITIS, UNSPECIFIED CHRONICITY, UNSPECIFIED LOCATION: ICD-10-CM

## 2024-07-01 DIAGNOSIS — J34.2 ACQUIRED DEVIATED NASAL SEPTUM: ICD-10-CM

## 2024-07-01 DIAGNOSIS — J34.3 NASAL TURBINATE HYPERTROPHY: ICD-10-CM

## 2024-07-01 DIAGNOSIS — R51.9 FACIAL PAIN: ICD-10-CM

## 2024-07-01 DIAGNOSIS — G44.89 SLUDER'S NEURALGIA: ICD-10-CM

## 2024-07-01 NOTE — PATIENT INSTRUCTIONS
>NASAL SALINE:  Use 2 puffs each nostril 4-6 times daily and more frequently if possible.  You can buy saline spray or you can make your own and use an old spray bottle to administer  Use a humidifier at bedside  Recipe for saline:  Water                                 1 quart  Salt (table)                        1 tablespoon  Gylcerin (or Annalee Syrup)    1 teaspoon  Sodium bicarbonate           1 teaspoon  Sprays or Katie pots are recommended    Do not allow to stand for more than 24 hrs. Make new solution. There is no preservative in this solution.    Sinus irrigation and saline application can be enhanced with various over-the-counter products.  A WaterPik can be quite useful to irrigate, especially following sinus surgery.  Navage makes a product that irrigates the nose and some of the sinuses.  NeilMed makes a Sinu-Gator to irrigate the sinuses.  Neomed also has canned saline that we will come out under pressure.  A Katie pot can also be helpful.  All of these products help keep the nose clear of debris.  Please use as directed on the instructions that come with the particular device.     >NASAL STEROID use:  Using nasal steroids:  You will be prescribed one of the following nasal steroids: Flonase, Nasacort, Nasonex, Rhinocort, Qnasl, Zetonna  2 puffs each nostril 2 times daily  Start as soon as possible  If you are using Afrin for 3 days with the nasal steroid,  Use Afrin first and wait 10 minutes to allow the nose to open. Then administer nasal steroids.     Call for problems    CONTACT INFORMATION:  The main office phone number is [phone]. For emergencies after hours and on weekends, this number will convert over to our answering service and the on call provider will answer. Please try to keep non emergent phone calls/ questions to office hours 9am-5pm Monday through Friday.     [place]  As an alternative, you can sign up and use the Epic MyChart system for more direct and quicker access for non  emergent questions/ problems.  [facility] ClaimKit allows you to send messages to your doctor, view your test results, renew your prescriptions, schedule appointments, and more. To sign up, go to [employer] and click on the Sign Up Now link in the New User? box. Enter your ClaimKit Activation Code exactly as it appears below along with the last four digits of your Social Security Number and your Date of Birth () to complete the sign-up process. If you do not sign up before the expiration date, you must request a new code.    ClaimKit Activation Code: Activation code not generated  Current ClaimKit Status: Active    If you have questions, you can email [email or web address] or call [phone] to talk to our ClaimKit staff. Remember, ClaimKit is NOT to be used for urgent needs. For medical emergencies, dial 911.

## 2024-07-01 NOTE — PROGRESS NOTES
Nirav Eduardo Jr, MD  [facility] EAR NOSE & THROAT  [address], [address]  [facility] [facility] [place] [address]  Fax [phone]  Phone [phone]      Visit Type: FOLLOW UP   Chief Complaint   Patient presents with    follow up nose, nasal endoscopy           HPI  Accompanied by:No one  She presents for a follow up evaluation. She has had 4 episodes of mild pain., No long term or severe.  Using medications intermittently.  Overasll, better   Saw Dentist and teeth look good.    Past Medical History:   Diagnosis Date    Acute left-sided low back pain without sciatica 10/24/2017    Arthritis     Breast cancer     1997, right breast mastectomy    Drug therapy 1997    Essential hypertension 04/21/2022    GERD (gastroesophageal reflux disease)     Headache     Hypercholesteremia     Obesity     TERESA on CPAP 04/21/2022    KARUNA (stress urinary incontinence, female)     Ulcer, duodenal, with obstruction 02/28/2022    Urge incontinence     Uterovaginal prolapse     Vaginal atrophy        Past Surgical History:   Procedure Laterality Date    BLADDER SLING MODIFIED, ANTERIOR AND POSTERIOR VAGINAL REPAIR      BLADDER SURGERY      Pacemaker for the bladder to help with incontinence.    BREAST SURGERY  1997, 2021    BRONCHOSCOPY N/A 04/26/2017    Procedure: BRONCHOSCOPY BIOPSY WITH ANESTHESIA;  Surgeon: Adi Anderson MD;  Location:  [facility];  Service:     CARDIAC CATHETERIZATION Left 02/28/2017    Procedure: Cardiac Catheterization/Vascular Study;  Surgeon: Anuj Johnson MD;  Location: [facility] CATH INVASIVE LOCATION;  Service:     CARDIAC CATHETERIZATION  2/28/2017    COLONOSCOPY  2014    COSMETIC SURGERY  2021    ENDOSCOPY  2024    HYSTERECTOMY      HYSTERECTOMY      LAMINECTOMY  01/2016    LYMPH NODE BIOPSY  1997    MASTECTOMY      Right in 1997 and Left in 2021    ROTATOR CUFF REPAIR Right 2012    SPINE SURGERY  2016       Family History: Her family history includes  Arthritis in her mother; Cancer in her brother, father, and paternal grandfather; Dementia in her mother; Diabetes in her brother and maternal grandfather; Heart failure in her mother; No Known Problems in her paternal grandmother; Thyroid disease in her brother and maternal grandmother.     Social History: She  reports that she has never smoked. She has been exposed to tobacco smoke. She has never used smokeless tobacco. She reports that she does not drink alcohol and does not use drugs.    Home Medications:  Cholecalciferol, Estradiol, Vibegron, atenolol, atorvastatin, azelastine, calcium carbonate, cetirizine, diphenhydrAMINE, docusate sodium, famotidine, fluticasone, montelukast, ondansetron ODT, and spironolactone    Allergies:  She is allergic to lisinopril, lortab [hydrocodone-acetaminophen], percocet [oxycodone-acetaminophen], pregabalin, and adhesive tape.       Vital Signs:      ENT Physical Exam  Constitutional  Appearance: patient appears well-developed and well-nourished, patient is cooperative;  Communication/Voice: communication appropriate for developmental age; vocal quality normal;  Head and Face  Appearance: head appears normal, face appears normal and face appears atraumatic;  Palpation: facial palpation normal;  Salivary: glands normal;  Ear  Hearing: intact;  Auricles: bilateral auricles normal;  External Mastoids: right external mastoid normal; left external mastoid normal;  Ear Canals: bilateral ear canals normal;  Tympanic Membranes: bilateral tympanic membranes normal;  Nose  External Nose: nares patent bilaterally; external nose normal;  Internal Nose: nasal mucosa normal; Nasal mucosa comments: very dry   septum normal; Septum comments: [place] area dry but no lesion   bilateral inferior turbinates normal;  Oral Cavity/Oropharynx  Lips: normal;  Teeth: normal;  Gums: gingiva normal;  Tongue: normal;  Oral mucosa: normal;  Hard palate: normal;  Soft palate: normal;  Tonsils:  normal;  Base of Tongue: normal;  Posterior pharyngeal wall: normal;  Neck  Neck: neck normal; neck palpation normal;  Respiratory  Inspection: breathing unlabored; normal breathing rate;  Cardiovascular  Inspection: extremities are warm and well perfused; no peripheral edema present;  Neurovestibular  Mental Status: alert and oriented;  Psychiatric: mood normal; affect is appropriate;       Nasal Endoscopy    Date/Time: 7/1/2024 11:09 AM    Performed by: Nirav Eduardo Jr., MD  Authorized by: Nirav Eduardo Jr., MD    Consent:     Consent obtained:  Verbal    Consent given by:  Patient    Alternatives discussed:  No treatment  Anesthesia (see MAR for exact dosages):     Anesthesia method:  Topical application    Topical anesthetic:  Tetracaine  Procedure details:     Indications: sino-nasal symptoms      Medication:  Afrin    Instrument: rigid nasal endoscopy      Scope location: bilateral nare    Nasal cavity:     right nasal cavity not occluded and left nasal cavity not occluded      Right inferior turbinates: normal      Left inferior turbinates: normal    Septum:     Findings: normal    Sinus/ Nasopharynx:     Right middle meatus: normal and patent      Left middle meatus: normal and patent      Right nasopharynx: normal      patent      Left nasopharynx: normal      patent      Right eustachian tube: normal      patent      Left eustachian tube: normal      patent    Post-procedure details:     Patient tolerance of procedure:  Tolerated well  Comments:      Mild mucosal edema of the nasal tissues, otherwise fairly normal examination with no evidence of mucopus from the middle meatus.     Result Review       RESULTS REVIEW    I have reviewed the patients old records in the chart.   I have reviewed the patients old records in the chart.        Assessment & Plan  Allergic rhinitis due to pollen, unspecified seasonality    Neuralgia    Facial pain    Trigeminal neuralgia of left side of  face    Sinusitis, unspecified chronicity, unspecified location    Sluder's neuralgia    Acquired deviated nasal septum    Nasal turbinate hypertrophy       Orders Placed This Encounter   Procedures    Nasal endoscopy       Diagnosis Plan   1. Allergic rhinitis due to pollen, unspecified seasonality  Nasal endoscopy    Proved control      2. Neuralgia  Nasal endoscopy    Overall, improved control      3. Facial pain  Nasal endoscopy      4. Trigeminal neuralgia of left side of face  Nasal endoscopy      5. Sinusitis, unspecified chronicity, unspecified location  Nasal endoscopy      6. Sluder's neuralgia  Nasal endoscopy    Improved control      7. Acquired deviated nasal septum  Nasal endoscopy    R to L posteriro cartilage all levels moderate  L to R low moderate very anterior      8. Nasal turbinate hypertrophy  Nasal endoscopy    Slightly improved             Continue current management plan.  Patient notes overall improved control.  At this point in time I will have her continue her current regimen.  She will continue Flonase and nasal saline.  Should the pain worsen, I will set her further workup.  At this point in time she remains stable.  Flonase twice daily  Nasal saline  Call for problems    My Chart:  Patient is using My Chart    Patient understand(s) and agree(s) with the treatment plan as described.    Return in about 3 months (around 10/1/2024) for Recheck Nose.        Electronically signed by Nirav Eduardo Jr, MD 07/01/24 11:15 AM CDT.

## 2024-07-01 NOTE — LETTER
July 6, 2024     Jose Wolff MD  [address] [facility] [address] [address]    Patient: Cindi Hill   YOB: 1950   Date of Visit: 7/1/2024     Dear Jose Wolff MD:       Thank you for referring Cindi Hill to me for evaluation. Below are the relevant portions of my assessment and plan of care.    If you have questions, please do not hesitate to call me. I look forward to following Cindi along with you.         Sincerely,        Nirav Eduardo Jr, MD        CC: No Recipients    Nirav Eduardo Jr., MD  07/06/24 0949  Sign when Signing Visit      Nirav Eduardo Jr, MD  [facility]AR NOSE & THROAT  [address], [address]  [place] [address]  Fax [phone]  Phone [phone]      Visit Type: FOLLOW UP   Chief Complaint   Patient presents with   • follow up nose, nasal endoscopy           HPI  Accompanied by:No one  She presents for a follow up evaluation. She has had 4 episodes of mild pain., No long term or severe.  Using medications intermittently.  Overasll, better   Saw Dentist and teeth look good.    Past Medical History:   Diagnosis Date   • Acute left-sided low back pain without sciatica 10/24/2017   • Arthritis    • Breast cancer     1997, right breast mastectomy   • Drug therapy 1997   • Essential hypertension 04/21/2022   • GERD (gastroesophageal reflux disease)    • Headache    • Hypercholesteremia    • Obesity    • TERESA on CPAP 04/21/2022   • KARUNA (stress urinary incontinence, female)    • Ulcer, duodenal, with obstruction 02/28/2022   • Urge incontinence    • Uterovaginal prolapse    • Vaginal atrophy        Past Surgical History:   Procedure Laterality Date   • BLADDER SLING MODIFIED, ANTERIOR AND POSTERIOR VAGINAL REPAIR     • BLADDER SURGERY      Pacemaker for the bladder to help with incontinence.   • BREAST SURGERY  1997, 2021   • BRONCHOSCOPY N/A 04/26/2017    Procedure: BRONCHOSCOPY BIOPSY  WITH ANESTHESIA;  Surgeon: Adi Anderson MD;  Location: [facility]OSCOPY;  Service:    • CARDIAC CATHETERIZATION Left 02/28/2017    Procedure: Cardiac Catheterization/Vascular Study;  Surgeon: Anuj Johnson MD;  Location: [facility] CATH INVASIVE LOCATION;  Service:    • CARDIAC CATHETERIZATION  2/28/2017   • COLONOSCOPY  2014   • COSMETIC SURGERY  2021   • ENDOSCOPY  2024   • HYSTERECTOMY     • HYSTERECTOMY     • LAMINECTOMY  01/2016   • LYMPH NODE BIOPSY  1997   • MASTECTOMY      Right in 1997 and Left in 2021   • ROTATOR CUFF REPAIR Right 2012   • SPINE SURGERY  2016       Family History: Her family history includes Arthritis in her mother; Cancer in her brother, father, and paternal grandfather; Dementia in her mother; Diabetes in her brother and maternal grandfather; Heart failure in her mother; No Known Problems in her paternal grandmother; Thyroid disease in her brother and maternal grandmother.     Social History: She  reports that she has never smoked. She has been exposed to tobacco smoke. She has never used smokeless tobacco. She reports that she does not drink alcohol and does not use drugs.    Home Medications:  Cholecalciferol, Estradiol, Vibegron, atenolol, atorvastatin, azelastine, calcium carbonate, cetirizine, diphenhydrAMINE, docusate sodium, famotidine, fluticasone, montelukast, ondansetron ODT, and spironolactone    Allergies:  She is allergic to lisinopril, lortab [hydrocodone-acetaminophen], percocet [oxycodone-acetaminophen], pregabalin, and adhesive tape.       Vital Signs:      ENT Physical Exam  Constitutional  Appearance: patient appears well-developed and well-nourished, patient is cooperative;  Communication/Voice: communication appropriate for developmental age; vocal quality normal;  Head and Face  Appearance: head appears normal, face appears normal and face appears atraumatic;  Palpation: facial palpation normal;  Salivary: glands normal;  Ear  Hearing: intact;  Auricles: bilateral  auricles normal;  External Mastoids: right external mastoid normal; left external mastoid normal;  Ear Canals: bilateral ear canals normal;  Tympanic Membranes: bilateral tympanic membranes normal;  Nose  External Nose: nares patent bilaterally; external nose normal;  Internal Nose: nasal mucosa normal; Nasal mucosa comments: very dry   septum normal; Septum comments: [place] area dry but no lesion   bilateral inferior turbinates normal;  Oral Cavity/Oropharynx  Lips: normal;  Teeth: normal;  Gums: gingiva normal;  Tongue: normal;  Oral mucosa: normal;  Hard palate: normal;  Soft palate: normal;  Tonsils: normal;  Base of Tongue: normal;  Posterior pharyngeal wall: normal;  Neck  Neck: neck normal; neck palpation normal;  Respiratory  Inspection: breathing unlabored; normal breathing rate;  Cardiovascular  Inspection: extremities are warm and well perfused; no peripheral edema present;  Neurovestibular  Mental Status: alert and oriented;  Psychiatric: mood normal; affect is appropriate;       Nasal Endoscopy    Date/Time: 7/1/2024 11:09 AM    Performed by: Nirav Eduardo Jr., MD  Authorized by: Nirav Eduardo Jr., MD    Consent:     Consent obtained:  Verbal    Consent given by:  Patient    Alternatives discussed:  No treatment  Anesthesia (see MAR for exact dosages):     Anesthesia method:  Topical application    Topical anesthetic:  Tetracaine  Procedure details:     Indications: sino-nasal symptoms      Medication:  Afrin    Instrument: rigid nasal endoscopy      Scope location: bilateral nare    Nasal cavity:     right nasal cavity not occluded and left nasal cavity not occluded      Right inferior turbinates: normal      Left inferior turbinates: normal    Septum:     Findings: normal    Sinus/ Nasopharynx:     Right middle meatus: normal and patent      Left middle meatus: normal and patent      Right nasopharynx: normal      patent      Left nasopharynx: normal      patent      Right  eustachian tube: normal      patent      Left eustachian tube: normal      patent    Post-procedure details:     Patient tolerance of procedure:  Tolerated well  Comments:      Mild mucosal edema of the nasal tissues, otherwise fairly normal examination with no evidence of mucopus from the middle meatus.     Result Review      RESULTS REVIEW    I have reviewed the patients old records in the chart.   I have reviewed the patients old records in the chart.        Assessment & Plan  Allergic rhinitis due to pollen, unspecified seasonality    Neuralgia    Facial pain    Trigeminal neuralgia of left side of face    Sinusitis, unspecified chronicity, unspecified location    Sluder's neuralgia    Acquired deviated nasal septum    Nasal turbinate hypertrophy       Orders Placed This Encounter   Procedures   • Nasal endoscopy       Diagnosis Plan   1. Allergic rhinitis due to pollen, unspecified seasonality  Nasal endoscopy    Proved control      2. Neuralgia  Nasal endoscopy    Overall, improved control      3. Facial pain  Nasal endoscopy      4. Trigeminal neuralgia of left side of face  Nasal endoscopy      5. Sinusitis, unspecified chronicity, unspecified location  Nasal endoscopy      6. Sluder's neuralgia  Nasal endoscopy    Improved control      7. Acquired deviated nasal septum  Nasal endoscopy    R to L posteriro cartilage all levels moderate  L to R low moderate very anterior      8. Nasal turbinate hypertrophy  Nasal endoscopy    Slightly improved             Continue current management plan.  Patient notes overall improved control.  At this point in time I will have her continue her current regimen.  She will continue Flonase and nasal saline.  Should the pain worsen, I will set her further workup.  At this point in time she remains stable.  Flonase twice daily  Nasal saline  Call for problems    My Chart:  Patient is using My Chart    Patient understand(s) and agree(s) with the treatment plan as  described.    Return in about 3 months (around 10/1/2024) for Recheck Nose.        Electronically signed by Nirav Eduardo Jr, MD 07/01/24 11:15 AM CDT.

## 2024-07-08 DIAGNOSIS — J45.30 MILD PERSISTENT ASTHMA, UNSPECIFIED WHETHER COMPLICATED: ICD-10-CM

## 2024-07-08 DIAGNOSIS — J98.09 BRONCHOMALACIA: ICD-10-CM

## 2024-07-08 RX ORDER — MONTELUKAST SODIUM 10 MG/1
10 TABLET ORAL EVERY EVENING
Qty: 90 TABLET | Refills: 3 | Status: SHIPPED | OUTPATIENT
Start: 2024-07-08

## 2024-07-08 NOTE — TELEPHONE ENCOUNTER
Pharmacy sent a request for refills on Singulair. Refill request passed protocol and sent to the pharmacy.  Rx Refill Note  Requested Prescriptions     Pending Prescriptions Disp Refills    montelukast (SINGULAIR) 10 MG tablet [Pharmacy Med Name: MONTELUKAST SODIUM 10MG TABS] 90 tablet 3     Sig: TAKE ONE TABLET BY MOUTH EVERY EVENING      Last office visit with prescribing clinician: 5/28/2024   Last telemedicine visit with prescribing clinician: Visit date not found   Next office visit with prescribing clinician: 10/9/2024                         Would you like a call back once the refill request has been completed: [] Yes [] No    If the office needs to give you a call back, can they leave a voicemail: [] Yes [] No    Camilo Barajas, [facility]  07/08/24, 10:11 CDT

## 2024-07-15 ENCOUNTER — OFFICE VISIT (OUTPATIENT)
Dept: CARDIOLOGY | Facility: CLINIC | Age: 74
End: 2024-07-15
Payer: MEDICARE

## 2024-07-15 VITALS
DIASTOLIC BLOOD PRESSURE: 74 MMHG | WEIGHT: 150 LBS | HEART RATE: 72 BPM | SYSTOLIC BLOOD PRESSURE: 111 MMHG | BODY MASS INDEX: 27.6 KG/M2 | HEIGHT: 62 IN

## 2024-07-15 DIAGNOSIS — G47.33 OSA ON CPAP: ICD-10-CM

## 2024-07-15 DIAGNOSIS — E78.2 MIXED HYPERLIPIDEMIA: Chronic | ICD-10-CM

## 2024-07-15 DIAGNOSIS — R06.09 DOE (DYSPNEA ON EXERTION): ICD-10-CM

## 2024-07-15 DIAGNOSIS — I47.10 PSVT (PAROXYSMAL SUPRAVENTRICULAR TACHYCARDIA): Chronic | ICD-10-CM

## 2024-07-15 DIAGNOSIS — I44.7 LBBB (LEFT BUNDLE BRANCH BLOCK): Primary | Chronic | ICD-10-CM

## 2024-07-15 DIAGNOSIS — I10 ESSENTIAL HYPERTENSION: ICD-10-CM

## 2024-07-15 PROCEDURE — 1160F RVW MEDS BY RX/DR IN RCRD: CPT | Performed by: INTERNAL MEDICINE

## 2024-07-15 PROCEDURE — 3078F DIAST BP <80 MM HG: CPT | Performed by: INTERNAL MEDICINE

## 2024-07-15 PROCEDURE — 99214 OFFICE O/P EST MOD 30 MIN: CPT | Performed by: INTERNAL MEDICINE

## 2024-07-15 PROCEDURE — 1159F MED LIST DOCD IN RCRD: CPT | Performed by: INTERNAL MEDICINE

## 2024-07-15 PROCEDURE — 3074F SYST BP LT 130 MM HG: CPT | Performed by: INTERNAL MEDICINE

## 2024-07-15 NOTE — PROGRESS NOTES
Cindi Hill  [id number]  1950  73 y.o.  female    Referring Provider: Jose Wolff MD    Reason for  Visit:  Here for routine follow up   Cardiac workup test results as below: echo     Subjective      Overall feels the same   No new events or complaints since last visit   Overall the patient feels no major change from baseline symptoms   Similar symptoms as during last visit       Mild chronic exertional shortness of breath on exertion relieved with rest  No significant cough or wheezing    No sustained palpitations  Very rarely weeks apart she feels she skips a beat or two  No associated chest pain  No significant pedal edema    No fever or chills  No significant expectoration    No hemoptysis  No presyncope or syncope    Tolerating current medications well with no untoward side effects   Compliant with prescribed medication regimen. Tries to adhere to cardiac diet.     Rarely skipped beats   BP well controlled at home.     120s/70 s mm Hg usually at home    Good  effort tolerance with no cardiovascular limitations and at the patient's baseline   Can walk a mile      Has second left sided mastectomy prophylacticaly and had reconstruction     History of present illness:  Cindi Hill is a 73 y.o. yo female with essential hypertension   who presents today for   Chief Complaint   Patient presents with    Dyspnea on exertion     6 mo f/u   .    History  Past Medical History:   Diagnosis Date    Acute left-sided low back pain without sciatica 10/24/2017    Arthritis     Asthma     Breast cancer     1997, right breast mastectomy    Drug therapy 1997    Essential hypertension 04/21/2022    GERD (gastroesophageal reflux disease)     Headache     Heart valve disease     Hypercholesteremia     Obesity     TERESA on CPAP 04/21/2022    KARUNA (stress urinary incontinence, female)     Ulcer, duodenal, with obstruction 02/28/2022    Urge incontinence     Uterovaginal prolapse     Vaginal atrophy    ,   Past  Surgical History:   Procedure Laterality Date    BLADDER SLING MODIFIED, ANTERIOR AND POSTERIOR VAGINAL REPAIR      BLADDER SURGERY      Pacemaker for the bladder to help with incontinence.    BREAST SURGERY  1997, 2021    BRONCHOSCOPY N/A 04/26/2017    Procedure: BRONCHOSCOPY BIOPSY WITH ANESTHESIA;  Surgeon: Adi Anderson MD;  Location:  [facility];  Service:     CARDIAC CATHETERIZATION Left 02/28/2017    Procedure: Cardiac Catheterization/Vascular Study;  Surgeon: Anuj Johnson MD;  Location:  [facility];  Service:     CARDIAC CATHETERIZATION  2/28/2017    COLONOSCOPY  2014    COSMETIC SURGERY  2021    ENDOSCOPY  2024    HYSTERECTOMY      HYSTERECTOMY      LAMINECTOMY  01/2016    LYMPH NODE BIOPSY  1997    MASTECTOMY      Right in 1997 and Left in 2021    ROTATOR CUFF REPAIR Right 2012    SPINE SURGERY  2016   ,   Family History   Problem Relation Age of Onset    Dementia Mother     Heart failure Mother     Arthritis Mother     Cancer Father         liver    Cancer Brother         thyroid    Diabetes Brother     Thyroid disease Brother     Thyroid disease Maternal Grandmother     Diabetes Maternal Grandfather     No Known Problems Paternal Grandmother     Cancer Paternal Grandfather     Breast cancer Neg Hx    ,   Social History     Tobacco Use    Smoking status: Never     Passive exposure: Past    Smokeless tobacco: Never   Vaping Use    Vaping status: Never Used   Substance Use Topics    Alcohol use: Never    Drug use: Never   ,     Medications  Current Outpatient Medications   Medication Sig Dispense Refill    atenolol (TENORMIN) 25 MG tablet TAKE ONE TABLET BY MOUTH EVERY DAY 90 tablet 3    atorvastatin (LIPITOR) 20 MG tablet TAKE ONE TABLET BY MOUTH EVERY DAY AT NIGHT 90 tablet 3    azelastine (ASTELIN) 0.1 % nasal spray 2 sprays into the nostril(s) as directed by provider 2 (Two) Times a Day. Use in each nostril as directed (Patient taking differently: 2 sprays into the  nostril(s) as directed by provider As Needed. Use in each nostril as directed) 30 mL 12    calcium carbonate (OS-MARIANNE) 600 MG tablet Take 1 tablet by mouth Daily.      cetirizine (zyrTEC) 10 MG tablet Take 1 tablet by mouth As Needed.      Cholecalciferol 4000 units capsule Take 5,000 Units by mouth Daily.      diphenhydrAMINE (BENADRYL) 25 mg capsule Take 1 capsule by mouth As Needed for Itching.      docusate sodium (COLACE) 100 MG capsule Take 1 capsule by mouth 2 (Two) Times a Day.      ESTRADIOL VA PLACE fingertip amount (1/4gram) TO vaginal opening WITH fingertip AT bedtime every NIGHT 1/4 GRAM=1 CLICK ON DISPENSER      famotidine (PEPCID) 40 MG tablet Take 1 tablet by mouth Daily As Needed for Heartburn or Indigestion.      fluticasone (FLONASE) 50 MCG/ACT nasal spray 2 sprays into the nostril(s) as directed by provider 2 (Two) Times a Day. 48 g 3    montelukast (SINGULAIR) 10 MG tablet TAKE ONE TABLET BY MOUTH EVERY EVENING 90 tablet 3    ondansetron ODT (ZOFRAN-ODT) 4 MG disintegrating tablet Take 1 tablet by mouth As Needed.      spironolactone (ALDACTONE) 25 MG tablet TAKE ONE TABLET BY MOUTH EVERY DAY 30 tablet 11    Vibegron (Gemtesa) 75 MG tablet Take 1 tablet by mouth Daily.       No current facility-administered medications for this visit.       Allergies:  Lisinopril, Lortab [hydrocodone-acetaminophen], Percocet [oxycodone-acetaminophen], Pregabalin, and Adhesive tape    Review of Systems  Review of Systems   Constitutional: Positive for malaise/fatigue.   HENT: Negative.     Eyes: Negative.    Cardiovascular:  Positive for dyspnea on exertion and palpitations. Negative for chest pain, claudication, cyanosis, irregular heartbeat, leg swelling, near-syncope, orthopnea, paroxysmal nocturnal dyspnea and syncope.   Respiratory: Negative.     Endocrine: Negative.    Hematologic/Lymphatic: Negative.    Skin: Negative.    Gastrointestinal:  Negative for anorexia.   Genitourinary: Negative.    Neurological:  "Negative.    Psychiatric/Behavioral: Negative.         Objective     Physical Exam:  /74   Pulse 72   Ht 157.5 cm (62\")   Wt 68 kg (150 lb)   LMP  (LMP Unknown)   BMI 27.44 kg/m²     Physical Exam  Constitutional:       Appearance: She is well-developed.   HENT:      Head: Normocephalic.   Neck:      Vascular: Normal carotid pulses. No carotid bruit or JVD.      Trachea: No tracheal tenderness or tracheal deviation.   Cardiovascular:      Rate and Rhythm: Regular rhythm.      Pulses: Normal pulses.      Heart sounds: Murmur heard.      Systolic murmur is present with a grade of 2/6.   Pulmonary:      Effort: Pulmonary effort is normal.      Breath sounds: No stridor.   Abdominal:      Palpations: Abdomen is soft.   Musculoskeletal:      Cervical back: No edema.   Skin:     General: Skin is warm.   Neurological:      Mental Status: She is alert.      Cranial Nerves: No cranial nerve deficit.      Sensory: No sensory deficit.   Psychiatric:         Speech: Speech normal.         Behavior: Behavior normal.         Results Review:    Condition: stable         Conclusion   No stenosis of  epicardial coronary arteries with dominant RCA  Left circumflex arises from RCA and normal  Normal LVEF  LVEDP   9    mm Hg     Plan  Hydration   Observation  Workup for non cardiac chest pain  Acceptable cardiovascular risk of bladder surgery         Cindi Hill  Regadenoson Stress Test with Myocardial Perfusion SPECT (Multi Study)  Order# [id number]  Reading physician: Anuj Johnson MD Ordering physician: Sandie Chi APRN Study date: 21     Patient Information    Patient Name   Cindi Hill MRN   [id number] Legal Sex   Female  (Age)   1950 (71 y.o.)     Interpretation Summary       Small to moderate septal and apical-septal ischemia  Breast attenuation and GI artifacts are present.  Left ventricular ejection fraction is normal. (Calculated EF = 62%).        Cardiac CT angiography 10/13/2020   " "  Impression:      1. Total calcium score : 51  2.  Mild focal calcification of the proximal left anterior descending coronary artery without evidence of any obstructive coronary artery disease        ___________________________________________________________________________     Recommendations:     Ongoing risk factor modifications  Further evaluation if ongoing chest pain or high risk of suspicion of significant ischemic heart disease          Results for orders placed during the hospital encounter of 01/10/24    Adult Transthoracic Echo Complete w/ Color, Spectral and Contrast if necessary per protocol    Interpretation Summary    Left ventricular ejection fraction appears to be 56 - 60%.    Left ventricular diastolic function was normal.    Estimated right ventricular systolic pressure from tricuspid regurgitation is normal (<35 mmHg).      Complete Transthoracic Echocardiogram with Complete Doppler, Color Flow and Myocardial Strain Imaging    Accession Number: [id number]   Date of Study: 12/2/22   Ordering Provider: Anuj Johnson MD   Clinical Indications: Dyspnea        Reading Physicians  Performing Staff   Cardiology: Anuj Johnson MD    Tech: Marifer Crawford        Patient Hx Of Height, Weight, and Vitals    Height Weight BSA (Calculated - sq m) BMI (Calculated) Retired BMI (kg/m2) Pulse BP   157.5 cm (62.01\") 58.5 kg (129 lb) 1.59 sq meters 23.6   123/50      [facility] PACS Images     Show images for Adult Transthoracic Echo Complete w/ Color, Spectral and Contrast if necessary per protocol   Clinical Indication    Dyspnea   Dx: TRAN (dyspnea on exertion) [R06.09 (ICD-10-CM)]     Interpretation Summary         Left ventricular systolic function is normal. Left ventricular ejection fraction appears to be 56 - 60%.    Left ventricular diastolic function is consistent with (grade Ia w/high LAP) impaired relaxation.    Mild to moderate aortic valve regurgitation is present.    Mild pulmonary hypertension is " present.    There is a trivial pericardial effusion. The effusion is fluid filled. There is no evidence of cardiac tamponade.     ____________________________________________________________________________________________________________________________________________  Health maintenance and recommendations    Low salt/ HTN/ Heart healthy carbohydrate restricted cardiac diet   The patient is advised to reduce or avoid caffeine or other cardiac stimulants.   Minimize or avoid  NSAID-type medications      Monitor for any signs of bleeding including red or dark stools. Fall precautions.   Advised staying uptodate with immunizations per established standard guidelines.    Offered to give patient  a copy of my notes     Questions were encouraged, asked and answered to the patient's  understanding and satisfaction. Questions if any regarding current medications and side effects, need for refills and importance of compliance to medications stressed.    Reviewed available prior notes, consults, prior visits, laboratory findings, radiology and cardiology relevant reports. Updated chart as applicable. I have reviewed the patient's medical history in detail and updated the computerized patient record as relevant.      Updated patient regarding any new or relevant abnormalities on review of records or any new findings on physical exam. Mentioned to patient about purpose of visit and desirable health short and long term goals and objectives.    Primary to monitor CBC CMP Lipid panel and TSH as applicable    ___________________________________________________________________________________________________________________________________________   Procedures    Assessment & Plan   Diagnoses and all orders for this visit:    1. LBBB (left bundle branch block) (Primary)    2. Mixed hyperlipidemia    3. PSVT (paroxysmal supraventricular tachycardia)    4. Essential hypertension    5. TERESA on CPAP    6. TRAN (dyspnea on  exertion)          Plan    Continue beta blocker therapy      Check BP and heart rates twice daily initially till blood pressures and heart rates under good control and then at least 3x / week,   If blood pressures continue to be well-controlled then can check week a month  at home and bring a recording for review next visit  If BP >130/85 or < 100/60 persistently over 3 reading 30 mins apart or if heart rates persistently above 100 bpm or less than 55 bpm call sooner for evaluation and advise     Patient expressed understanding  Encouraged and answered all questions   Discussed with the patient and all questioned fully answered. She will call me if any problems arise.   Discussed results of prior testing with patient : echo, myocardial perfusion scan and prior coronary CT angiography    Most recent echo shows mild aortic insufficiency prior echo showed mild to moderate aortic insufficiency   Reviewed and summarized recent test results     Overall doing well no new cardiovascular symptoms and therefore no additional cardiac testing is required prior to next visit  If any interim issues arise will call me for further evaluation.            Return in about 6 months (around 1/15/2025).

## 2024-10-23 ENCOUNTER — OFFICE VISIT (OUTPATIENT)
Dept: OTOLARYNGOLOGY | Facility: CLINIC | Age: 74
End: 2024-10-23
Payer: MEDICARE

## 2024-10-23 VITALS
HEART RATE: 77 BPM | BODY MASS INDEX: 27.97 KG/M2 | HEIGHT: 62 IN | SYSTOLIC BLOOD PRESSURE: 108 MMHG | DIASTOLIC BLOOD PRESSURE: 46 MMHG | WEIGHT: 152 LBS

## 2024-10-23 DIAGNOSIS — G44.89 SLUDER'S NEURALGIA: ICD-10-CM

## 2024-10-23 DIAGNOSIS — M79.2 NEURALGIA: ICD-10-CM

## 2024-10-23 DIAGNOSIS — J30.1 ALLERGIC RHINITIS DUE TO POLLEN, UNSPECIFIED SEASONALITY: Primary | ICD-10-CM

## 2024-10-23 DIAGNOSIS — G50.0 TRIGEMINAL NEURALGIA OF LEFT SIDE OF FACE: ICD-10-CM

## 2024-10-23 DIAGNOSIS — R51.9 FACIAL PAIN: ICD-10-CM

## 2024-10-23 DIAGNOSIS — J32.9 SINUSITIS, UNSPECIFIED CHRONICITY, UNSPECIFIED LOCATION: ICD-10-CM

## 2024-10-23 NOTE — LETTER
October 24, 2024     Jose Wolff MD  [address] [facility] [address]    Patient: Cindi Hill   YOB: 1950   Date of Visit: 10/23/2024     Dear Jose Wolff MD:       Thank you for referring Cindi Hill to me for evaluation. Below are the relevant portions of my assessment and plan of care.    If you have questions, please do not hesitate to call me. I look forward to following Cindi along with you.         Sincerely,        Nirav Eduardo Jr, MD        CC: No Recipients    Nirav Eduardo Jr., MD  10/24/24 0902  Sign when Signing Visit      Nirav Eduardo Jr, MD  [facility] EAR NOSE & THROAT  [address], [address]  [facility] [address]  Fax [phone]  Phone [phone]      Visit Type: FOLLOW UP   Chief Complaint   Patient presents with   • Recheck sinuses     She states she is not having any rouble with her allergies. She has a runny nose occassionally           HPI  Accompanied by:No one  She presents for a follow up evaluation. Mild allergy symptoms. No facial pain.  She is on Flonase, saline, Zyrtec.   Dr Anderson treating for cough. Has had episodes. Last 2 years ago. She remains on Singulair for cough    Past Medical History:   Diagnosis Date   • Acute left-sided low back pain without sciatica 10/24/2017   • Arthritis    • Asthma    • Breast cancer     1997, right breast mastectomy   • Drug therapy 1997   • Essential hypertension 04/21/2022   • GERD (gastroesophageal reflux disease)    • Headache    • Heart valve disease    • Hypercholesteremia    • Obesity    • TERESA on CPAP 04/21/2022   • KARUNA (stress urinary incontinence, female)    • Ulcer, duodenal, with obstruction 02/28/2022   • Urge incontinence    • Uterovaginal prolapse    • Vaginal atrophy        Past Surgical History:   Procedure Laterality Date   • BLADDER SLING MODIFIED, ANTERIOR AND POSTERIOR VAGINAL REPAIR     • BLADDER SURGERY       Pacemaker for the bladder to help with incontinence.   • BREAST SURGERY  1997, 2021   • BRONCHOSCOPY N/A 04/26/2017    Procedure: BRONCHOSCOPY BIOPSY WITH ANESTHESIA;  Surgeon: Adi Anderson MD;  Location:  [facility];  Service:    • CARDIAC CATHETERIZATION Left 02/28/2017    Procedure: Cardiac Catheterization/Vascular Study;  Surgeon: Anuj Johnson MD;  Location:  [facility];  Service:    • CARDIAC CATHETERIZATION  2/28/2017   • COLONOSCOPY  2014   • COSMETIC SURGERY  2021   • ENDOSCOPY  2024   • HYSTERECTOMY     • HYSTERECTOMY     • LAMINECTOMY  01/2016   • LYMPH NODE BIOPSY  1997   • MASTECTOMY      Right in 1997 and Left in 2021   • ROTATOR CUFF REPAIR Right 2012   • SPINE SURGERY  2016       Family History: Her family history includes Arthritis in her mother; Cancer in her brother, father, and paternal grandfather; Dementia in her mother; Diabetes in her brother and maternal grandfather; Heart failure in her mother; No Known Problems in her paternal grandmother; Thyroid disease in her brother and maternal grandmother.     Social History: She  reports that she has never smoked. She has been exposed to tobacco smoke. She has never used smokeless tobacco. She reports that she does not drink alcohol and does not use drugs.    Home Medications:  Cholecalciferol, Estradiol, Vibegron, atenolol, atorvastatin, azelastine, calcium carbonate, cetirizine, diphenhydrAMINE, docusate sodium, famotidine, fluticasone, montelukast, ondansetron ODT, and spironolactone    Allergies:  She is allergic to lisinopril, lortab [hydrocodone-acetaminophen], percocet [oxycodone-acetaminophen], pregabalin, and adhesive tape.       Vital Signs:   Heart Rate:  [77] 77  BP: (108)/(46) 108/46  ENT Physical Exam  Constitutional  Appearance: patient appears well-developed and well-nourished, patient is cooperative;  Communication/Voice: communication appropriate for developmental age; vocal quality normal;  Head and  Face  Appearance: head appears normal, face appears normal and face appears atraumatic;  Palpation: facial palpation normal;  Salivary: glands normal;  Ear  Hearing: intact;  Auricles: bilateral auricles normal;  External Mastoids: right external mastoid normal; left external mastoid normal;  Ear Canals: bilateral ear canals normal;  Tympanic Membranes: bilateral tympanic membranes normal;  Nose  External Nose: nares patent bilaterally; external nose normal;  Internal Nose: nasal mucosa normal; Nasal mucosa comments: very dry   septum normal; Septum comments: [place] area dry but no lesion   bilateral inferior turbinates normal;  Oral Cavity/Oropharynx  Lips: normal;  Teeth: normal;  Gums: gingiva normal;  Tongue: normal;  Oral mucosa: normal;  Hard palate: normal;  Soft palate: normal;  Tonsils: normal;  Base of Tongue: normal;  Posterior pharyngeal wall: normal;  Neck  Neck: neck normal; neck palpation normal;  Respiratory  Inspection: breathing unlabored; normal breathing rate;  Cardiovascular  Inspection: extremities are warm and well perfused; no peripheral edema present;  Neurovestibular  Mental Status: alert and oriented;  Psychiatric: mood normal; affect is appropriate;           Result Review      RESULTS REVIEW    I have reviewed the patients old records in the chart.   I have reviewed the patients old records in the chart.        Assessment & Plan  Allergic rhinitis due to pollen, unspecified seasonality  Controlled mostly  Neuralgia  remission  Facial pain  remission  Trigeminal neuralgia of left side of face  Suspected sourve of pain  Sinusitis, unspecified chronicity, unspecified location  Sphenoid, stable  Sluder's neuralgia  Left side            Continue current management plan.  Patient remained stable on current medications.  She has had no recurrence of her left facial pain.  She is on Singulair for cough.  Overall, she is doing well.  I will have her continue current medications.  I will  follow her in during allergy season.  Flonase twice daily  Nasal saline  Continue Singulair  Continue Zyrtec  Call for problems    My Chart:  Patient is using My Chart    Patient understand(s) and agree(s) with the treatment plan as described.    Return in about 6 months (around 4/23/2025) for Recheck Sinusitis, facial pain, allergies.        Electronically signed by Nirav Eduardo Jr, MD 10/23/24 1:49 PM CDT.

## 2024-10-23 NOTE — PROGRESS NOTES
Nirav Eduardo Jr, MD  [facility] EAR NOSE & THROAT  [address], [address]  [place] [address]  Fax [phone]  Phone [phone]      Visit Type: FOLLOW UP   Chief Complaint   Patient presents with    Recheck sinuses     She states she is not having any rouble with her allergies. She has a runny nose occassionally           HPI  Accompanied by:No one  She presents for a follow up evaluation. Mild allergy symptoms. No facial pain.  She is on Flonase, saline, Zyrtec.   Dr Anderson treating for cough. Has had episodes. Last 2 years ago. She remains on Singulair for cough    Past Medical History:   Diagnosis Date    Acute left-sided low back pain without sciatica 10/24/2017    Arthritis     Asthma     Breast cancer     1997, right breast mastectomy    Drug therapy 1997    Essential hypertension 04/21/2022    GERD (gastroesophageal reflux disease)     Headache     Heart valve disease     Hypercholesteremia     Obesity     TERESA on CPAP 04/21/2022    KARUNA (stress urinary incontinence, female)     Ulcer, duodenal, with obstruction 02/28/2022    Urge incontinence     Uterovaginal prolapse     Vaginal atrophy        Past Surgical History:   Procedure Laterality Date    BLADDER SLING MODIFIED, ANTERIOR AND POSTERIOR VAGINAL REPAIR      BLADDER SURGERY      Pacemaker for the bladder to help with incontinence.    BREAST SURGERY  1997, 2021    BRONCHOSCOPY N/A 04/26/2017    Procedure: BRONCHOSCOPY BIOPSY WITH ANESTHESIA;  Surgeon: Adi Anderson MD;  Location: [facility] ENDOSCOPY;  Service:     CARDIAC CATHETERIZATION Left 02/28/2017    Procedure: Cardiac Catheterization/Vascular Study;  Surgeon: Anuj Johnson MD;  Location: [facility] CATH INVASIVE LOCATION;  Service:     CARDIAC CATHETERIZATION  2/28/2017    COLONOSCOPY  2014    COSMETIC SURGERY  2021    ENDOSCOPY  2024    HYSTERECTOMY      HYSTERECTOMY      LAMINECTOMY  01/2016    LYMPH NODE BIOPSY  1997     MASTECTOMY      Right in 1997 and Left in 2021    ROTATOR CUFF REPAIR Right 2012    SPINE SURGERY  2016       Family History: Her family history includes Arthritis in her mother; Cancer in her brother, father, and paternal grandfather; Dementia in her mother; Diabetes in her brother and maternal grandfather; Heart failure in her mother; No Known Problems in her paternal grandmother; Thyroid disease in her brother and maternal grandmother.     Social History: She  reports that she has never smoked. She has been exposed to tobacco smoke. She has never used smokeless tobacco. She reports that she does not drink alcohol and does not use drugs.    Home Medications:  Cholecalciferol, Estradiol, Vibegron, atenolol, atorvastatin, azelastine, calcium carbonate, cetirizine, diphenhydrAMINE, docusate sodium, famotidine, fluticasone, montelukast, ondansetron ODT, and spironolactone    Allergies:  She is allergic to lisinopril, lortab [hydrocodone-acetaminophen], percocet [oxycodone-acetaminophen], pregabalin, and adhesive tape.       Vital Signs:   Heart Rate:  [77] 77  BP: (108)/(46) 108/46  ENT Physical Exam  Constitutional  Appearance: patient appears well-developed and well-nourished, patient is cooperative;  Communication/Voice: communication appropriate for developmental age; vocal quality normal;  Head and Face  Appearance: head appears normal, face appears normal and face appears atraumatic;  Palpation: facial palpation normal;  Salivary: glands normal;  Ear  Hearing: intact;  Auricles: bilateral auricles normal;  External Mastoids: right external mastoid normal; left external mastoid normal;  Ear Canals: bilateral ear canals normal;  Tympanic Membranes: bilateral tympanic membranes normal;  Nose  External Nose: nares patent bilaterally; external nose normal;  Internal Nose: nasal mucosa normal; Nasal mucosa comments: very dry   septum normal; Septum comments: [place] area dry but no lesion   bilateral inferior  turbinates normal;  Oral Cavity/Oropharynx  Lips: normal;  Teeth: normal;  Gums: gingiva normal;  Tongue: normal;  Oral mucosa: normal;  Hard palate: normal;  Soft palate: normal;  Tonsils: normal;  Base of Tongue: normal;  Posterior pharyngeal wall: normal;  Neck  Neck: neck normal; neck palpation normal;  Respiratory  Inspection: breathing unlabored; normal breathing rate;  Cardiovascular  Inspection: extremities are warm and well perfused; no peripheral edema present;  Neurovestibular  Mental Status: alert and oriented;  Psychiatric: mood normal; affect is appropriate;           Result Review       RESULTS REVIEW    I have reviewed the patients old records in the chart.   I have reviewed the patients old records in the chart.        Assessment & Plan  Allergic rhinitis due to pollen, unspecified seasonality  Controlled mostly  Neuralgia  remission  Facial pain  remission  Trigeminal neuralgia of left side of face  Suspected sourve of pain  Sinusitis, unspecified chronicity, unspecified location  Sphenoid, stable  Sluder's neuralgia  Left side            Continue current management plan.  Patient remained stable on current medications.  She has had no recurrence of her left facial pain.  She is on Singulair for cough.  Overall, she is doing well.  I will have her continue current medications.  I will follow her in during allergy season.  Flonase twice daily  Nasal saline  Continue Singulair  Continue Zyrte  Call for problems    My Chart:  Patient is using My Chart    Patient understand(s) and agree(s) with the treatment plan as described.    Return in about 6 months (around 4/23/2025) for Recheck Sinusitis, facial pain, allergies.        Electronically signed by Nirav Eduardo Jr, MD 10/23/24 1:49 PM CDT.

## 2024-10-23 NOTE — PATIENT INSTRUCTIONS
Continue Zyrtec and Singulair    >NASAL SALINE:  Use 2 puffs each nostril 4-6 times daily and more frequently if possible.  You can buy saline spray or you can make your own and use an old spray bottle to administer  Use a humidifier at bedside  Recipe for saline:  Water                                 1 quart  Salt (table)                        1 tablespoon  Gylcerin (or Annalee Syrup)    1 teaspoon  Sodium bicarbonate           1 teaspoon  Sprays or Katie pots are recommended    Do not allow to stand for more than 24 hrs. Make new solution. There is no preservative in this solution.    Sinus irrigation and saline application can be enhanced with various over-the-counter products.  A WaterPik can be quite useful to irrigate, especially following sinus surgery.  Navage makes a product that irrigates the nose and some of the sinuses.  NeilMed makes a Sinu-Gator to irrigate the sinuses.  Neomed also has canned saline that we will come out under pressure.  A Glen pot can also be helpful.  All of these products help keep the nose clear of debris.  Please use as directed on the instructions that come with the particular device.     >NASAL STEROID use:  Using nasal steroids:  You will be prescribed one of the following nasal steroids: Flonase, Nasacort, Nasonex, Rhinocort, Qnasl, Zetonna  2 puffs each nostril 2 times daily  Start as soon as possible  If you are using Afrin for 3 days with the nasal steroid,  Use Afrin first and wait 10 minutes to allow the nose to open. Then administer nasal steroids.     Call for problems     CONTACT INFORMATION:  The main office phone number is [phone]. For emergencies after hours and on weekends, this number will convert over to our answering service and the on call provider will answer. Please try to keep non emergent phone calls/ questions to office hours 9am-5pm Monday through Friday.     [employer]  As an alternative, you can sign up and use the Epic MyChart system for more  direct and quicker access for non emergent questions/ problems.  [facility] Nimbit allows you to send messages to your doctor, view your test results, renew your prescriptions, schedule appointments, and more. To sign up, go to [email or web address] and click on the Sign Up Now link in the New User? box. Enter your Nimbit Activation Code exactly as it appears below along with the last four digits of your Social Security Number and your Date of Birth () to complete the sign-up process. If you do not sign up before the expiration date, you must request a new code.    Nimbit Activation Code: Activation code not generated  Current Nimbit Status: Active    If you have questions, you can email [email or web address] or call [phone] to talk to our Nimbit staff. Remember, Nimbit is NOT to be used for urgent needs. For medical emergencies, dial 911.

## 2024-11-04 ENCOUNTER — OFFICE VISIT (OUTPATIENT)
Dept: FAMILY MEDICINE CLINIC | Facility: CLINIC | Age: 74
End: 2024-11-04
Payer: MEDICARE

## 2024-11-04 VITALS
WEIGHT: 151 LBS | TEMPERATURE: 98.5 F | OXYGEN SATURATION: 98 % | HEIGHT: 62 IN | BODY MASS INDEX: 27.79 KG/M2 | HEART RATE: 78 BPM | DIASTOLIC BLOOD PRESSURE: 76 MMHG | SYSTOLIC BLOOD PRESSURE: 130 MMHG | RESPIRATION RATE: 16 BRPM

## 2024-11-04 DIAGNOSIS — R05.1 ACUTE COUGH: ICD-10-CM

## 2024-11-04 DIAGNOSIS — J01.40 ACUTE PANSINUSITIS, RECURRENCE NOT SPECIFIED: Primary | ICD-10-CM

## 2024-11-04 DIAGNOSIS — R50.9 FEVER, UNSPECIFIED FEVER CAUSE: ICD-10-CM

## 2024-11-04 LAB
EXPIRATION DATE: NORMAL
FLUAV AG UPPER RESP QL IA.RAPID: NOT DETECTED
FLUBV AG UPPER RESP QL IA.RAPID: NOT DETECTED
INTERNAL CONTROL: NORMAL
Lab: NORMAL
SARS-COV-2 AG UPPER RESP QL IA.RAPID: NOT DETECTED

## 2024-11-04 PROCEDURE — 87428 SARSCOV & INF VIR A&B AG IA: CPT | Performed by: NURSE PRACTITIONER

## 2024-11-04 PROCEDURE — 1160F RVW MEDS BY RX/DR IN RCRD: CPT | Performed by: NURSE PRACTITIONER

## 2024-11-04 PROCEDURE — 99213 OFFICE O/P EST LOW 20 MIN: CPT | Performed by: NURSE PRACTITIONER

## 2024-11-04 PROCEDURE — 1126F AMNT PAIN NOTED NONE PRSNT: CPT | Performed by: NURSE PRACTITIONER

## 2024-11-04 PROCEDURE — 3078F DIAST BP <80 MM HG: CPT | Performed by: NURSE PRACTITIONER

## 2024-11-04 PROCEDURE — 3075F SYST BP GE 130 - 139MM HG: CPT | Performed by: NURSE PRACTITIONER

## 2024-11-04 PROCEDURE — 1159F MED LIST DOCD IN RCRD: CPT | Performed by: NURSE PRACTITIONER

## 2024-11-04 RX ORDER — DEXTROMETHORPHAN HYDROBROMIDE AND PROMETHAZINE HYDROCHLORIDE 15; 6.25 MG/5ML; MG/5ML
5 SYRUP ORAL 4 TIMES DAILY PRN
Qty: 180 ML | Refills: 0 | Status: SHIPPED | OUTPATIENT
Start: 2024-11-04

## 2024-11-04 RX ORDER — PANTOPRAZOLE SODIUM 40 MG/1
40 TABLET, DELAYED RELEASE ORAL DAILY
COMMUNITY

## 2024-11-04 RX ORDER — DOXYCYCLINE 100 MG/1
100 TABLET ORAL 2 TIMES DAILY
Qty: 14 TABLET | Refills: 0 | Status: SHIPPED | OUTPATIENT
Start: 2024-11-04 | End: 2024-11-11

## 2024-11-04 NOTE — PROGRESS NOTES
CC:   Chief Complaint   Patient presents with    Cough     X4 days.  Ran 100.8 fever 3 days ago    Nasal Congestion    Headache        History:  Cindi Hill is a 73 y.o. female who presents today for evaluation of the above problems.      HPI  Symptoms started on Thursday with cough and nasal congestion. Fever over the weekend. Reports she has chronic cough and goes to multiple specialists for her chronic cough and sinus problems. Reports has tried lots of cough medications and none help her symptoms.       Allergies   Allergen Reactions    Lisinopril Cough    Lortab [Hydrocodone-Acetaminophen] Nausea Only    Percocet [Oxycodone-Acetaminophen] Nausea Only    Pregabalin Other (See Comments)     Made pt very sleepy and dizzy    Adhesive Tape Itching     Bandage tape caused itching and redness     Past Medical History:   Diagnosis Date    Acute left-sided low back pain without sciatica 10/24/2017    Arthritis     Asthma     Breast cancer     1997, right breast mastectomy    Drug therapy 1997    Essential hypertension 04/21/2022    GERD (gastroesophageal reflux disease)     Headache     Heart valve disease     Hypercholesteremia     Obesity     TERESA on CPAP 04/21/2022    KARUNA (stress urinary incontinence, female)     Ulcer, duodenal, with obstruction 02/28/2022    Urge incontinence     Uterovaginal prolapse     Vaginal atrophy      Past Surgical History:   Procedure Laterality Date    BLADDER SLING MODIFIED, ANTERIOR AND POSTERIOR VAGINAL REPAIR      BLADDER SURGERY      Pacemaker for the bladder to help with incontinence.    BREAST SURGERY  1997, 2021    BRONCHOSCOPY N/A 04/26/2017    Procedure: BRONCHOSCOPY BIOPSY WITH ANESTHESIA;  Surgeon: Adi Anderson MD;  Location:  [facility] ENDOSCOPY;  Service:     CARDIAC CATHETERIZATION Left 02/28/2017    Procedure: Cardiac Catheterization/Vascular Study;  Surgeon: Anuj Johnson MD;  Location:  [facility] CATH INVASIVE LOCATION;  Service:     CARDIAC CATHETERIZATION  2/28/2017     COLONOSCOPY  2014    COSMETIC SURGERY  2021    ENDOSCOPY  2024    HYSTERECTOMY      HYSTERECTOMY      LAMINECTOMY  01/2016    LYMPH NODE BIOPSY  1997    MASTECTOMY      Right in 1997 and Left in 2021    ROTATOR CUFF REPAIR Right 2012    SPINE SURGERY  2016     Family History   Problem Relation Age of Onset    Dementia Mother     Heart failure Mother     Arthritis Mother     Cancer Father         liver    Cancer Brother         thyroid    Diabetes Brother     Thyroid disease Brother     Thyroid disease Maternal Grandmother     Diabetes Maternal Grandfather     No Known Problems Paternal Grandmother     Cancer Paternal Grandfather     Breast cancer Neg Hx       reports that she has never smoked. She has been exposed to tobacco smoke. She has never used smokeless tobacco. She reports that she does not drink alcohol and does not use drugs.      Current Outpatient Medications:     atenolol (TENORMIN) 25 MG tablet, TAKE ONE TABLET BY MOUTH EVERY DAY, Disp: 90 tablet, Rfl: 3    atorvastatin (LIPITOR) 20 MG tablet, TAKE ONE TABLET BY MOUTH EVERY DAY AT NIGHT, Disp: 90 tablet, Rfl: 3    azelastine (ASTELIN) 0.1 % nasal spray, 2 sprays into the nostril(s) as directed by provider 2 (Two) Times a Day. Use in each nostril as directed (Patient taking differently: Administer 2 sprays into the nostril(s) as directed by provider As Needed. Use in each nostril as directed), Disp: 30 mL, Rfl: 12    calcium carbonate (OS-MARIANNE) 600 MG tablet, Take 1 tablet by mouth Daily., Disp: , Rfl:     cetirizine (zyrTEC) 10 MG tablet, Take 1 tablet by mouth As Needed., Disp: , Rfl:     Cholecalciferol 4000 units capsule, Take 5,000 Units by mouth Daily., Disp: , Rfl:     diphenhydrAMINE (BENADRYL) 25 mg capsule, Take 1 capsule by mouth As Needed for Itching., Disp: , Rfl:     docusate sodium (COLACE) 100 MG capsule, Take 1 capsule by mouth 2 (Two) Times a Day., Disp: , Rfl:     ESTRADIOL VA, PLACE fingertip amount (1/4gram) TO vaginal opening  "WITH fingertip AT bedtime every NIGHT 1/4 GRAM=1 CLICK ON DISPENSER, Disp: , Rfl:     famotidine (PEPCID) 40 MG tablet, Take 1 tablet by mouth Daily As Needed for Heartburn or Indigestion., Disp: , Rfl:     fluticasone (FLONASE) 50 MCG/ACT nasal spray, 2 sprays into the nostril(s) as directed by provider 2 (Two) Times a Day., Disp: 48 g, Rfl: 3    montelukast (SINGULAIR) 10 MG tablet, TAKE ONE TABLET BY MOUTH EVERY EVENING, Disp: 90 tablet, Rfl: 3    ondansetron ODT (ZOFRAN-ODT) 4 MG disintegrating tablet, Take 1 tablet by mouth As Needed., Disp: , Rfl:     pantoprazole (PROTONIX) 40 MG EC tablet, Take 1 tablet by mouth Daily., Disp: , Rfl:     spironolactone (ALDACTONE) 25 MG tablet, TAKE ONE TABLET BY MOUTH EVERY DAY, Disp: 30 tablet, Rfl: 11    Vibegron (Gemtesa) 75 MG tablet, Take 1 tablet by mouth Daily., Disp: , Rfl:     doxycycline (ADOXA) 100 MG tablet, Take 1 tablet by mouth 2 (Two) Times a Day for 7 days., Disp: 14 tablet, Rfl: 0    promethazine-dextromethorphan (PROMETHAZINE-DM) 6.25-15 MG/5ML syrup, Take 5 mL by mouth 4 (Four) Times a Day As Needed for Cough., Disp: 180 mL, Rfl: 0    OBJECTIVE:  /76 (BP Location: Left arm, Patient Position: Sitting, Cuff Size: Adult)   Pulse 78   Temp 98.5 °F (36.9 °C) (Oral)   Resp 16   Ht 157.5 cm (62\")   Wt 68.5 kg (151 lb)   LMP  (LMP Unknown)   SpO2 98%   BMI 27.62 kg/m²    Estimated body mass index is 27.62 kg/m² as calculated from the following:    Height as of this encounter: 157.5 cm (62\").    Weight as of this encounter: 68.5 kg (151 lb).                Physical Exam  Vitals reviewed.   Constitutional:       General: She is not in acute distress.     Appearance: Normal appearance.   HENT:      Head: Normocephalic and atraumatic.      Right Ear: External ear normal. Tympanic membrane is erythematous.      Left Ear: Tympanic membrane and external ear normal.      Ears:      Comments: Moderate amount of cerumen left ear canal     Nose:      Right " Sinus: No maxillary sinus tenderness or frontal sinus tenderness.      Left Sinus: No maxillary sinus tenderness or frontal sinus tenderness.      Mouth/Throat:      Comments: Cobblestoning present to posterior orophaynx  Cardiovascular:      Rate and Rhythm: Normal rate and regular rhythm.      Heart sounds: Normal heart sounds.   Pulmonary:      Effort: No respiratory distress.      Breath sounds: Normal breath sounds. No wheezing.   Musculoskeletal:         General: Normal range of motion.      Cervical back: Normal range of motion.   Lymphadenopathy:      Cervical: No cervical adenopathy.   Skin:     General: Skin is warm and dry.   Neurological:      Mental Status: She is alert and oriented to person, place, and time.   Psychiatric:         Mood and Affect: Mood normal.         Behavior: Behavior normal.         Thought Content: Thought content normal.              Assessment/Plan    Diagnoses and all orders for this visit:    1. Acute pansinusitis, recurrence not specified (Primary)  -     doxycycline (ADOXA) 100 MG tablet; Take 1 tablet by mouth 2 (Two) Times a Day for 7 days.  Dispense: 14 tablet; Refill: 0    2. Acute cough  -     promethazine-dextromethorphan (PROMETHAZINE-DM) 6.25-15 MG/5ML syrup; Take 5 mL by mouth 4 (Four) Times a Day As Needed for Cough.  Dispense: 180 mL; Refill: 0    3. Fever, unspecified fever cause    Negative for flu and covid.   With chronic sinus problems and cough, fever- will go ahead and put on antibiotic. Medication SE discussed. Continue cough suppressants- will refill her promethazine DM to help with cough at night. Discussed coricidin HBP for daytime use.   Also discussed using Afrin, saline rinse and Flonase over the next 3 days and then discontinue the Afrin.   If worse or no better by end of week- patient to let us know            An After Visit Summary was printed and given to the patient at discharge.  Return if symptoms worsen or fail to improve.

## 2024-12-01 NOTE — PROGRESS NOTES
Background:  Pt with upper airway cough, dynamic airway collapse   Chief Complaint  Bronchomalacia and Shortness of Breath    Subjective    History of Present Illness     Cindi Hill is here for follow up with [facility].  History of Present Illness  With low expectations, I gave her inhaler samples at the last visit with the hope that one of those could help.  She feels she is slightly worse.  She has had problems with fall allergies.  She just was in with Dr. Wolff with respiratory infection. Dr. Eduardo dealt with trigeminal neuralgia.  He has her using saline and Flonase.  She did very poorly with lyrica.  She was using zyrtic, benadryl and azelastine which ended up causing severe nasal dryness.      Tobacco Use: Low Risk  (12/2/2024)    Patient History     Smoking Tobacco Use: Never     Smokeless Tobacco Use: Never     Passive Exposure: Past      Current Outpatient Medications   Medication Instructions    atenolol (TENORMIN) 25 mg, Oral, Daily    atorvastatin (LIPITOR) 20 mg, Oral, Nightly    azelastine (ASTELIN) 0.1 % nasal spray 2 sprays, Nasal, 2 Times Daily, Use in each nostril as directed    calcium carbonate (OS-MARIANNE) 600 mg, Daily    cetirizine (ZYRTEC) 10 mg, As Needed    Cholecalciferol 5,000 Units, Daily    diphenhydrAMINE (BENADRYL) 25 mg, As Needed    docusate sodium (COLACE) 100 mg, 2 Times Daily    ESTRADIOL VA PLACE fingertip amount (1/4gram) TO vaginal opening WITH fingertip AT bedtime every NIGHT 1/4 GRAM=1 CLICK ON DISPENSER    famotidine (PEPCID) 40 mg, Daily PRN    fluticasone (FLONASE) 50 MCG/ACT nasal spray 2 sprays, Nasal, 2 Times Daily    Gemtesa 75 mg, Daily    montelukast (SINGULAIR) 10 mg, Oral, Every Evening    ondansetron ODT (ZOFRAN-ODT) 4 mg, As Needed    pantoprazole (PROTONIX) 40 mg, Daily    promethazine-dextromethorphan (PROMETHAZINE-DM) 6.25-15 MG/5ML syrup 5 mL, Oral, 4 Times Daily PRN    spironolactone (ALDACTONE) 25  "mg, Oral, Daily      Objective     Vital Signs:   /88   Pulse 69   Ht 157.5 cm (62.01\")   Wt 69.9 kg (154 lb)   SpO2 95% Comment: RA  BMI 28.16 kg/m²   Physical Exam  Constitutional:       General: She is not in acute distress.     Appearance: She is well-developed. She is not ill-appearing or toxic-appearing.   HENT:      Head: Atraumatic.   Eyes:      General: No scleral icterus.     Conjunctiva/sclera: Conjunctivae normal.   Cardiovascular:      Rate and Rhythm: Normal rate and regular rhythm.      Heart sounds: S1 normal and S2 normal.   Pulmonary:      Effort: Pulmonary effort is normal.      Breath sounds: Normal breath sounds.   Abdominal:      General: There is no distension.   Musculoskeletal:         General: No deformity.      Cervical back: Neck supple.   Skin:     Coloration: Skin is not pale.      Findings: No rash.   Neurological:      Mental Status: She is alert.      Result Review  Data Reviewed:                      Assessment and Plan    Diagnoses and all orders for this visit:    1. Bronchomalacia (Primary)    Continue ent tx with Dr. Eduardo  Flu shot  Hold off gabapentin for fear of adverse effect.  Did not tolerate lyrica in past  Continue GERD tx.  Inhalers have not helped.  No more inhalers  Could try flutter valve should she get worse with difficult to clear congestion  Use mucinex or mucinex dm for cough.    Follow Up   Return in about 6 months (around 6/2/2025) for spirometry.  Patient was given instructions and counseling regarding her condition or for health maintenance advice. Please see specific information pulled into the AVS if appropriate.    Electronically signed by Adi Anderson MD, 12/2/2024, 11:26 CST    "

## 2024-12-02 ENCOUNTER — OFFICE VISIT (OUTPATIENT)
Dept: PULMONOLOGY | Facility: CLINIC | Age: 74
End: 2024-12-02
Payer: MEDICARE

## 2024-12-02 VITALS
OXYGEN SATURATION: 95 % | DIASTOLIC BLOOD PRESSURE: 88 MMHG | SYSTOLIC BLOOD PRESSURE: 144 MMHG | HEART RATE: 69 BPM | WEIGHT: 154 LBS | BODY MASS INDEX: 28.34 KG/M2 | HEIGHT: 62 IN

## 2024-12-02 DIAGNOSIS — Z23 NEED FOR IMMUNIZATION AGAINST INFLUENZA: ICD-10-CM

## 2024-12-02 DIAGNOSIS — J98.09 BRONCHOMALACIA: Primary | ICD-10-CM

## 2024-12-02 PROCEDURE — 99214 OFFICE O/P EST MOD 30 MIN: CPT | Performed by: INTERNAL MEDICINE

## 2024-12-02 PROCEDURE — G0008 ADMIN INFLUENZA VIRUS VAC: HCPCS | Performed by: INTERNAL MEDICINE

## 2024-12-02 PROCEDURE — 90662 IIV NO PRSV INCREASED AG IM: CPT | Performed by: INTERNAL MEDICINE

## 2024-12-02 PROCEDURE — 3079F DIAST BP 80-89 MM HG: CPT | Performed by: INTERNAL MEDICINE

## 2024-12-02 PROCEDURE — 3077F SYST BP >= 140 MM HG: CPT | Performed by: INTERNAL MEDICINE

## 2024-12-10 RX ORDER — ATENOLOL 25 MG/1
25 TABLET ORAL DAILY
Qty: 90 TABLET | Refills: 3 | Status: SHIPPED | OUTPATIENT
Start: 2024-12-10

## 2025-01-15 NOTE — PROGRESS NOTES
Subjective:     Encounter Date: 1/16/2025      Patient ID: Cindi Hill is a 74 y.o. female with has paroxysmal supraventricular tachycardia, pulmonary hypertension, chronic left bundle branch block, hypercholesterolemia and GERD.    Chief Complaint: dyspnea on exertion   History of Present Illness  Patient presents today for management of  dyspnea on exertion. She reports that she has been battling a cough and cold for the past month. She reports that her dyspnea on exertion has continued to be a problem and is unchanged. She reports that she has a lot of issues with bronchomalacia. She reports compliance with CPAP. She reports continued shortness of breath with activity such as fixing her hair or going up a flight of stairs. She feels like she can't get a good breath when she lays down. She actually feels better when she wears her CPAP. She denies any chest pain. She denies any heart racing. She reports an occasional palpitations. She reports that they occur randomly and they just last a couple of seconds and then resolves on its own. She denies any chest pain. She denies any leg swelling. She denies any dizziness or near syncope. She denies monitoring her BP regularly but it remains well controlled. Patient follows with Dr Wolff as PCP.     The following portions of the patient's history were reviewed and updated as appropriate: allergies, current medications, past family history, past medical history, past social history, past surgical history and problem list.    Allergies   Allergen Reactions    Lisinopril Cough    Lortab [Hydrocodone-Acetaminophen] Nausea Only    Percocet [Oxycodone-Acetaminophen] Nausea Only    Pregabalin Other (See Comments)     Made pt very sleepy and dizzy    Adhesive Tape Itching     Bandage tape caused itching and redness       Current Outpatient Medications:     atenolol (TENORMIN) 25 MG tablet, TAKE ONE TABLET BY MOUTH EVERY DAY, Disp: 90 tablet, Rfl: 3    atorvastatin (LIPITOR)  20 MG tablet, TAKE ONE TABLET BY MOUTH EVERY DAY AT NIGHT, Disp: 90 tablet, Rfl: 3    azelastine (ASTELIN) 0.1 % nasal spray, 2 sprays into the nostril(s) as directed by provider 2 (Two) Times a Day. Use in each nostril as directed (Patient taking differently: Administer 2 sprays into the nostril(s) as directed by provider As Needed. Use in each nostril as directed), Disp: 30 mL, Rfl: 12    calcium carbonate (OS-MARIANNE) 600 MG tablet, Take 1 tablet by mouth Daily., Disp: , Rfl:     cetirizine (zyrTEC) 10 MG tablet, Take 1 tablet by mouth As Needed., Disp: , Rfl:     Cholecalciferol 4000 units capsule, Take 5,000 Units by mouth Daily., Disp: , Rfl:     diphenhydrAMINE (BENADRYL) 25 mg capsule, Take 1 capsule by mouth As Needed for Itching., Disp: , Rfl:     docusate sodium (COLACE) 100 MG capsule, Take 1 capsule by mouth 2 (Two) Times a Day., Disp: , Rfl:     ESTRADIOL VA, PLACE fingertip amount (1/4gram) TO vaginal opening WITH fingertip AT bedtime every NIGHT 1/4 GRAM=1 CLICK ON DISPENSER, Disp: , Rfl:     famotidine (PEPCID) 40 MG tablet, Take 1 tablet by mouth Daily As Needed for Heartburn or Indigestion., Disp: , Rfl:     fluticasone (FLONASE) 50 MCG/ACT nasal spray, 2 sprays into the nostril(s) as directed by provider 2 (Two) Times a Day., Disp: 48 g, Rfl: 3    montelukast (SINGULAIR) 10 MG tablet, TAKE ONE TABLET BY MOUTH EVERY EVENING, Disp: 90 tablet, Rfl: 3    ondansetron ODT (ZOFRAN-ODT) 4 MG disintegrating tablet, Take 1 tablet by mouth As Needed., Disp: , Rfl:     pantoprazole (PROTONIX) 40 MG EC tablet, Take 1 tablet by mouth Daily., Disp: , Rfl:     promethazine-dextromethorphan (PROMETHAZINE-DM) 6.25-15 MG/5ML syrup, Take 5 mL by mouth 4 (Four) Times a Day As Needed for Cough., Disp: 180 mL, Rfl: 0    spironolactone (ALDACTONE) 25 MG tablet, TAKE ONE TABLET BY MOUTH EVERY DAY, Disp: 30 tablet, Rfl: 11    Vibegron (Gemtesa) 75 MG tablet, Take 1 tablet by mouth Daily., Disp: , Rfl:   Past Medical History:    Diagnosis Date    Acute left-sided low back pain without sciatica 10/24/2017    Arthritis     Asthma     Breast cancer     1997, right breast mastectomy    Drug therapy 1997    Essential hypertension 04/21/2022    GERD (gastroesophageal reflux disease)     Headache     Heart valve disease     Hypercholesteremia     Obesity     TERESA on CPAP 04/21/2022    KARUNA (stress urinary incontinence, female)     Ulcer, duodenal, with obstruction 02/28/2022    Urge incontinence     Uterovaginal prolapse     Vaginal atrophy      Social History     Socioeconomic History    Marital status:    Tobacco Use    Smoking status: Never     Passive exposure: Past    Smokeless tobacco: Never   Vaping Use    Vaping status: Never Used   Substance and Sexual Activity    Alcohol use: Never    Drug use: Never    Sexual activity: Defer       Review of Systems   Constitutional: Negative for malaise/fatigue.   HENT:  Negative for nosebleeds.    Cardiovascular:  Positive for dyspnea on exertion. Negative for chest pain, irregular heartbeat, leg swelling, near-syncope, orthopnea, palpitations, paroxysmal nocturnal dyspnea and syncope.   Respiratory:  Positive for shortness of breath.    Hematologic/Lymphatic: Does not bruise/bleed easily.   Genitourinary:  Negative for hematuria.   Neurological:  Negative for dizziness and weakness.   All other systems reviewed and are negative.         Objective:     Vitals reviewed.   Constitutional:       General: Not in acute distress.     Appearance: Normal appearance. Well-developed.   Eyes:      Pupils: Pupils are equal, round, and reactive to light.   HENT:      Head: Normocephalic and atraumatic.      Nose: Nose normal.   Neck:      Vascular: No carotid bruit.   Pulmonary:      Effort: Pulmonary effort is normal. No respiratory distress.      Breath sounds: Normal breath sounds. No wheezing. No rales.   Cardiovascular:      Normal rate. Regular rhythm.   Abdominal:      General: There is no distension.  "     Palpations: Abdomen is soft.   Musculoskeletal: Normal range of motion.      Cervical back: Normal range of motion and neck supple. Skin:     General: Skin is warm.      Findings: No erythema or rash.   Neurological:      Mental Status: Alert and oriented to person, place, and time.   Psychiatric:         Speech: Speech normal.         Behavior: Behavior normal.         Thought Content: Thought content normal.         Judgment: Judgment normal.         /75   Pulse 83   Ht 154.9 cm (61\")   Wt 68.5 kg (151 lb)   LMP  (LMP Unknown)   SpO2 96%   BMI 28.53 kg/m²     Procedures    Lab Review:     Results for orders placed during the hospital encounter of 01/10/24    Adult Transthoracic Echo Complete w/ Color, Spectral and Contrast if necessary per protocol    Interpretation Summary    Left ventricular ejection fraction appears to be 56 - 60%.    Left ventricular diastolic function was normal.    Estimated right ventricular systolic pressure from tricuspid regurgitation is normal (<35 mmHg).    Nuclear stress test 7/29/2021:  Interpretation Summary  Small to moderate septal and apical-septal ischemia  Breast attenuation and GI artifacts are present.  Left ventricular ejection fraction is normal. (Calculated EF = 62%).      Refer to the perfusion abnormalities with rest and stress as well as summed score and percent abnormalities  in the bull's eye diagram below       Lab Results   Component Value Date    CHLPL 137 06/25/2024    TRIG 74 06/25/2024    HDL 62 (H) 06/25/2024    LDL 60 06/25/2024     Cardiac CT angiography 10/13/2020     Impression:      1. Total calcium score : 51  2.  Mild focal calcification of the proximal left anterior descending coronary artery without evidence of any obstructive coronary artery disease        ___________________________________________________________________________     Recommendations:     Ongoing risk factor modifications  Further evaluation if ongoing chest pain or " high risk of suspicion of significant ischemic heart disease    I have personally reviewed labs, CTA coronary, echo, stress test and past office notes prior to patients visit  Assessment:          Diagnosis Plan   1. Dyspnea on exertion        2. PSVT (paroxysmal supraventricular tachycardia)        3. Abnormal nuclear stress test        4. Chronic diastolic (congestive) heart failure        5. Nonrheumatic aortic valve insufficiency        6. Pulmonary hypertension        7. LBBB (left bundle branch block)        8. Mixed hyperlipidemia        9. Gastroesophageal reflux disease without esophagitis        10. TERESA on CPAP                     Plan:       1. Dyspnea on exertion: Chronic and unchanged. Patient has a history of bronchomalacia.She follows with pulmonology.     2. Paroxysmal SVT: 15 runs on holter 8/19/2019 with longest duration of 20 beats and max rate of 226 bpm.Continue atenolol. Discussed re-evaluating with a holter with patient but symptoms are pretty infrequent. If palpitations worsening then would reconsider holter monitor to further evaluate.     3. Abnormal nuclear stress test: Small to moderate septal and apical septal ischemia on Lexiscan 7/29/2021. Previous false positive lexiscan in 2017. Essentially normal CTA of the coronary arteries 10/20. Discussed with patient. Will continue to monitor as she denies any chest pain.     4. Chronic diastolic congestive heart failure: Echo 12/2022 showed diastolic dysfunction. She remains stable with chronic dyspnea on exertion. Continue spironolactone.  Reviewed signs and symptoms of CHF and what to report to office with patient. Patient instructed to restrict sodium and record daily weight. Patient is to report weight gain of greater than 2 lbs overnight or 5 lbs in 1 week. Patient verbalized understanding of instructions and plan of care.     5. Aortic valve regurgitation: mild on echo 1/2024.     6. Pulmonary hypertension: Follows with pulmonology.      7. LBBB: Chronic    8. Hyperlipidemia: managed and followed by PCP. Lipid panel 6/25/2024 LDL 60.  Continue atorvastatin    9. GERD    10. TERESA: Compliant with CPAP     I attest that all portions of this note reviewed and all information has been updated by myself to reflect the patient's current status.      I spent 36 minutes caring for Cindi on this date of service. This time includes time spent by me in the following activities:preparing for the visit, reviewing tests, obtaining and/or reviewing a separately obtained history, performing a medically appropriate examination and/or evaluation , counseling and educating the patient/family/caregiver, and documenting information in the medical record    Patient is to follow up in 6 months or sooner if needed

## 2025-01-16 ENCOUNTER — OFFICE VISIT (OUTPATIENT)
Dept: CARDIOLOGY | Facility: CLINIC | Age: 75
End: 2025-01-16
Payer: MEDICARE

## 2025-01-16 VITALS
DIASTOLIC BLOOD PRESSURE: 75 MMHG | BODY MASS INDEX: 28.51 KG/M2 | OXYGEN SATURATION: 96 % | SYSTOLIC BLOOD PRESSURE: 117 MMHG | HEIGHT: 61 IN | HEART RATE: 83 BPM | WEIGHT: 151 LBS

## 2025-01-16 DIAGNOSIS — I35.1 NONRHEUMATIC AORTIC VALVE INSUFFICIENCY: ICD-10-CM

## 2025-01-16 DIAGNOSIS — R06.09 DYSPNEA ON EXERTION: Primary | ICD-10-CM

## 2025-01-16 DIAGNOSIS — I50.32 CHRONIC DIASTOLIC (CONGESTIVE) HEART FAILURE: ICD-10-CM

## 2025-01-16 DIAGNOSIS — I47.10 PSVT (PAROXYSMAL SUPRAVENTRICULAR TACHYCARDIA): ICD-10-CM

## 2025-01-16 DIAGNOSIS — I44.7 LBBB (LEFT BUNDLE BRANCH BLOCK): ICD-10-CM

## 2025-01-16 DIAGNOSIS — E78.2 MIXED HYPERLIPIDEMIA: ICD-10-CM

## 2025-01-16 DIAGNOSIS — G47.33 OSA ON CPAP: ICD-10-CM

## 2025-01-16 DIAGNOSIS — R94.39 ABNORMAL NUCLEAR STRESS TEST: ICD-10-CM

## 2025-01-16 DIAGNOSIS — K21.9 GASTROESOPHAGEAL REFLUX DISEASE WITHOUT ESOPHAGITIS: ICD-10-CM

## 2025-01-16 DIAGNOSIS — I27.20 PULMONARY HYPERTENSION: ICD-10-CM

## 2025-01-16 PROCEDURE — 3078F DIAST BP <80 MM HG: CPT | Performed by: NURSE PRACTITIONER

## 2025-01-16 PROCEDURE — 99214 OFFICE O/P EST MOD 30 MIN: CPT | Performed by: NURSE PRACTITIONER

## 2025-01-16 PROCEDURE — 1160F RVW MEDS BY RX/DR IN RCRD: CPT | Performed by: NURSE PRACTITIONER

## 2025-01-16 PROCEDURE — 3074F SYST BP LT 130 MM HG: CPT | Performed by: NURSE PRACTITIONER

## 2025-01-16 PROCEDURE — 1159F MED LIST DOCD IN RCRD: CPT | Performed by: NURSE PRACTITIONER

## 2025-01-21 ENCOUNTER — OFFICE VISIT (OUTPATIENT)
Dept: FAMILY MEDICINE CLINIC | Facility: CLINIC | Age: 75
End: 2025-01-21
Payer: MEDICARE

## 2025-01-21 VITALS
OXYGEN SATURATION: 97 % | HEIGHT: 61 IN | SYSTOLIC BLOOD PRESSURE: 116 MMHG | HEART RATE: 80 BPM | RESPIRATION RATE: 18 BRPM | TEMPERATURE: 97.4 F | BODY MASS INDEX: 27.94 KG/M2 | WEIGHT: 148 LBS | DIASTOLIC BLOOD PRESSURE: 74 MMHG

## 2025-01-21 DIAGNOSIS — R41.3 MEMORY LOSS: ICD-10-CM

## 2025-01-21 DIAGNOSIS — Z00.00 MEDICARE ANNUAL WELLNESS VISIT, SUBSEQUENT: ICD-10-CM

## 2025-01-21 DIAGNOSIS — Z12.31 SCREENING MAMMOGRAM FOR BREAST CANCER: ICD-10-CM

## 2025-01-21 DIAGNOSIS — E78.2 MIXED HYPERLIPIDEMIA: Primary | ICD-10-CM

## 2025-01-21 DIAGNOSIS — Z78.0 POST-MENOPAUSAL: ICD-10-CM

## 2025-01-21 DIAGNOSIS — E55.9 VITAMIN D DEFICIENCY: ICD-10-CM

## 2025-01-21 DIAGNOSIS — I10 ESSENTIAL HYPERTENSION: ICD-10-CM

## 2025-01-21 DIAGNOSIS — R53.83 FATIGUE, UNSPECIFIED TYPE: ICD-10-CM

## 2025-01-21 LAB
BILIRUB BLD-MCNC: ABNORMAL MG/DL
CLARITY, POC: CLEAR
COLOR UR: YELLOW
GLUCOSE UR STRIP-MCNC: NEGATIVE MG/DL
KETONES UR QL: ABNORMAL
LEUKOCYTE EST, POC: ABNORMAL
NITRITE UR-MCNC: NEGATIVE MG/ML
PH UR: 6 [PH] (ref 5–8)
PROT UR STRIP-MCNC: ABNORMAL MG/DL
RBC # UR STRIP: ABNORMAL /UL
SP GR UR: 1.02 (ref 1–1.03)
UROBILINOGEN UR QL: NORMAL

## 2025-01-21 PROCEDURE — 81003 URINALYSIS AUTO W/O SCOPE: CPT | Performed by: FAMILY MEDICINE

## 2025-01-21 PROCEDURE — 3078F DIAST BP <80 MM HG: CPT | Performed by: FAMILY MEDICINE

## 2025-01-21 PROCEDURE — G0439 PPPS, SUBSEQ VISIT: HCPCS | Performed by: FAMILY MEDICINE

## 2025-01-21 PROCEDURE — 1126F AMNT PAIN NOTED NONE PRSNT: CPT | Performed by: FAMILY MEDICINE

## 2025-01-21 PROCEDURE — 3074F SYST BP LT 130 MM HG: CPT | Performed by: FAMILY MEDICINE

## 2025-01-21 PROCEDURE — 1159F MED LIST DOCD IN RCRD: CPT | Performed by: FAMILY MEDICINE

## 2025-01-21 PROCEDURE — 1170F FXNL STATUS ASSESSED: CPT | Performed by: FAMILY MEDICINE

## 2025-01-21 PROCEDURE — 1160F RVW MEDS BY RX/DR IN RCRD: CPT | Performed by: FAMILY MEDICINE

## 2025-01-21 RX ORDER — DEXTROMETHORPHAN HYDROBROMIDE AND PROMETHAZINE HYDROCHLORIDE 15; 6.25 MG/5ML; MG/5ML
5 SYRUP ORAL 4 TIMES DAILY PRN
Qty: 120 ML | Refills: 2 | Status: SHIPPED | OUTPATIENT
Start: 2025-01-21

## 2025-01-21 RX ORDER — PREDNISONE 20 MG/1
TABLET ORAL
Qty: 15 TABLET | Refills: 0 | Status: SHIPPED | OUTPATIENT
Start: 2025-01-21 | End: 2025-01-21

## 2025-01-21 NOTE — PATIENT INSTRUCTIONS
What is the Mediterranean Diet?  The Mediterranean Diet is a way of eating that emphasizes plant-based foods and healthy fats. You focus on overall eating patterns rather than following strict formulas or calculations.    In general, you’ll eat:    Lots of vegetables, fruit, beans, lentils and nuts.  A good amount of whole grains, like whole-wheat bread and brown rice.  Plenty of extra virgin olive oil (EVOO) as a source of healthy fat.  A good amount of fish, especially fish rich in omega-3 fatty acids.  A moderate amount of natural cheese and yogurt.  Little or no red meat, choosing poultry, fish or beans instead of red meat.  Little or no sweets, sugary drinks or butter.  A moderate amount of wine with meals (but if you don’t already drink, don’t start).  This is how people ate in certain Mediterranean countries in the mid-20th century. Researchers have linked these eating patterns with a reduced risk of coronary artery disease (CAD). Today, healthcare providers recommend this eating plan if you have risk factors for heart disease or to support other aspects of your health.    A dietitian can help you modify your approach as needed based on your medical history, underlying conditions, allergies and preferences.        What are the benefits of the Mediterranean Diet?  The mediterranean diet allows you to focus on overall eating patterns rather than following strict formulas or calculations.  The Mediterranean Diet has many benefits, including:    Lowering your risk of cardiovascular disease, including a heart attack or stroke.  Supporting a body weight that’s healthy for you.  Supporting healthy blood sugar levels, blood pressure and cholesterol.  Lowering your risk of metabolic syndrome.  Supporting a healthy balance of gut microbiota (bacteria and other microorganisms) in your digestive system.  Lowering your risk for certain types of cancer.  Slowing the decline of brain function as you age.  Helping you live  longer.        The Mediterranean Diet has these benefits because it:    Limits saturated fat and trans fat. You need some saturated fat, but only in small amounts. Eating too much saturated fat can raise your LDL (bad) cholesterol. A high LDL raises your risk of plaque buildup in your arteries (atherosclerosis). Trans fat has no health benefits. Both of these “unhealthy fats” can cause inflammation.  Encourages healthy unsaturated fats, including omega-3 fatty acids. Unsaturated fats promote healthy cholesterol levels, support brain health and combat inflammation. Plus, a diet high in unsaturated fats and low in saturated fat promotes healthy blood sugar levels.  Limits sodium. Eating foods high in sodium can raise your blood pressure, putting you at a greater risk for a heart attack or stroke.  Limits refined carbohydrates, including sugar. Foods high in refined carbs can cause your blood sugar to spike. Refined carbs also give you excess calories without much nutritional benefit. For example, such foods often have little or no fiber.  Favors foods high in fiber and antioxidants. These nutrients help reduce inflammation throughout your body. Fiber also helps keep waste moving through your large intestine and helps maintain healthy blood sugar levels. Antioxidants protect you against cancer by warding off free radicals.  The Mediterranean Diet includes many different nutrients that work together to help your body. There’s no single food or ingredient responsible for the Mediterranean Diet’s benefits. Instead, the diet is healthy for you because of the combination of nutrients it provides.    Think of a choir with many people singing. One voice alone might carry part of the tune, but you need all the voices to come together to achieve the full effect. Similarly, the Mediterranean Diet works by giving you an ideal blend of nutrients that harmonize to support your health.    Mediterranean Diet food list  The  Mediterranean Diet encourages you to eat plenty of some foods (like whole grains and vegetables) while limiting others. If you’re planning a grocery store trip, you might wonder which foods to buy. Here are some examples of foods to eat often with the Mediterranean Diet.         Advance Care Planning and Advance Directives     You make decisions on a daily basis - decisions about where you want to live, your career, your home, your life. Perhaps one of the most important decisions you face is your choice for future medical care. Take time to talk with your family and your healthcare team and start planning today.  Advance Care Planning is a process that can help you:  Understand possible future healthcare decisions in light of your own experiences  Reflect on those decision in light of your goals and values  Discuss your decisions with those closest to you and the healthcare professionals that care for you  Make a plan by creating a document that reflects your wishes    Surrogate Decision Maker  In the event of a medical emergency, which has left you unable to communicate or to make your own decisions, you would need someone to make decisions for you.  It is important to discuss your preferences for medical treatment with this person while you are in good health.     Qualities of a surrogate decision maker:  Willing to take on this role and responsibility  Knows what you want for future medical care  Willing to follow your wishes even if they don't agree with them  Able to make difficult medical decisions under stressful circumstances    Advance Directives  These are legal documents you can create that will guide your healthcare team and decision maker(s) when needed. These documents can be stored in the electronic medical record.    Living Will - a legal document to guide your care if you have a terminal condition or a serious illness and are unable to communicate. States vary by statute in document names/types,  but most forms may include one or more of the following:        -  Directions regarding life-prolonging treatments        -  Directions regarding artificially provided nutrition/hydration        -  Choosing a healthcare decision maker        -  Direction regarding organ/tissue donation    Durable Power of  for Healthcare - this document names an -in-fact to make medical decisions for you, but it may also allow this person to make personal and financial decisions for you. Please seek the advice of an  if you need this type of document.    **Advance Directives are not required and no one may discriminate against you if you do not sign one.    Medical Orders  Many states allow specific forms/orders signed by your physician to record your wishes for medical treatment in your current state of health. This form, signed in personal communication with your physician, addresses resuscitation and other medical interventions that you may or may not want.      For more information or to schedule a time with a [facility] Advance Care Planning Facilitator contact: [facility] or call [phone] and someone will contact you directly.    Medicare Wellness  Personal Prevention Plan of Service     Date of Office Visit:    Encounter Provider:  Jose Wolff MD  Place of Service:  [facility]  Patient Name: Cindi Hill  :  1950    As part of the Medicare Wellness portion of your visit today, we are providing you with this personalized preventive plan of services (PPPS). This plan is based upon recommendations of the [place] Preventive Services Task Force (USPSTF) and the Advisory Committee on Immunization Practices (ACIP).    This lists the preventive care services that should be considered, and provides dates of when you are due. Items listed as completed are up-to-date and do not require any further intervention.    Health Maintenance    Topic Date Due   • COLORECTAL CANCER SCREENING  12/07/2023   • DXA SCAN  08/05/2024   • BMI FOLLOWUP  09/13/2024   • ZOSTER VACCINE (2 of 3) 01/21/2025 (Originally 12/29/2017)   • COVID-19 Vaccine (4 - 2024-25 season) 01/23/2025 (Originally 9/1/2024)   • LIPID PANEL  06/25/2025   • ANNUAL WELLNESS VISIT  01/21/2026   • TDAP/TD VACCINES (2 - Td or Tdap) 11/03/2027   • HEPATITIS C SCREENING  Completed   • INFLUENZA VACCINE  Completed   • Pneumococcal Vaccine 65+  Completed   • MAMMOGRAM  Discontinued       Orders Placed This Encounter   Procedures   • DEXA Bone Density Axial     Standing Status:   Future     Standing Expiration Date:   1/21/2026     Order Specific Question:   Reason for Exam:     Answer:   Screening     Order Specific Question:   Does this patient have a diabetic monitoring/medication delivering device on?     Answer:   No     Order Specific Question:   Release to patient     Answer:   Routine Release [[id number]]     Order Specific Question:   Is patient taking or have taken long term Glucocorticoid (steroids)?     Answer:   No     Order Specific Question:   Does the patient have rheumatoid arthritis?     Answer:   No     Order Specific Question:   Does the patient have secondary osteoporosis?     Answer:   No   • TSH     Order Specific Question:   Release to patient     Answer:   Routine Release [[id number]]   • T4, free     Order Specific Question:   Release to patient     Answer:   Routine Release [[id number]]   • Comprehensive Metabolic Panel     Order Specific Question:   Release to patient     Answer:   Routine Release [[id number]]   • Lipid Panel     Order Specific Question:   Release to patient     Answer:   Routine Release [[id number]]   • Vitamin D,25-Hydroxy     Order Specific Question:   Release to patient     Answer:   Routine Release [[id number]]   • Vitamin B12     Order Specific Question:   Release to patient     Answer:   Routine Release [[id number]]   • Folate     Order Specific  Question:   Release to patient     Answer:   Routine Release [[id number]]   • POC Urinalysis Dipstick, Multipro     Order Specific Question:   Release to patient     Answer:   Routine Release [[id number]]   • CBC & Differential     Order Specific Question:   Manual Differential     Answer:   No     Order Specific Question:   Release to patient     Answer:   Routine Release [[id number]]       Return in about 6 months (around 7/21/2025), or if symptoms worsen or fail to improve.

## 2025-01-21 NOTE — PROGRESS NOTES
Subjective   The ABCs of the Annual Wellness Visit  Medicare Wellness Visit      Cindi Hill is a 74 y.o. patient who presents for a Medicare Wellness Visit.    The following portions of the patient's history were reviewed and   updated as appropriate: allergies, current medications, past family history, past medical history, past social history, past surgical history, and problem list.    Compared to one year ago, the patient's physical   health is the same.  Compared to one year ago, the patient's mental   health is the same.    Recent Hospitalizations:  She was not admitted to the hospital during the last year.     Current Medical Providers:  Patient Care Team:  Jose Wolff MD as PCP - General (Family Medicine)  Micki Jones APRN (Inactive)  Tyrell Dixon MD as Consulting Physician (Otolaryngology)  Anuj Johnson MD as Cardiologist (Cardiology)  Adi Anderson MD as Consulting Physician (Pulmonary Disease)  Yogesh Rehman APRN as Nurse Practitioner (Cardiology)  Sandie Chi APRN as Nurse Practitioner (Family Medicine)    Outpatient Medications Prior to Visit   Medication Sig Dispense Refill    atenolol (TENORMIN) 25 MG tablet TAKE ONE TABLET BY MOUTH EVERY DAY 90 tablet 3    atorvastatin (LIPITOR) 20 MG tablet TAKE ONE TABLET BY MOUTH EVERY DAY AT NIGHT 90 tablet 3    calcium carbonate (OS-MARIANNE) 600 MG tablet Take 1 tablet by mouth Daily.      cetirizine (zyrTEC) 10 MG tablet Take 1 tablet by mouth As Needed.      Cholecalciferol 4000 units capsule Take 5,000 Units by mouth Daily.      diphenhydrAMINE (BENADRYL) 25 mg capsule Take 1 capsule by mouth As Needed for Itching.      docusate sodium (COLACE) 100 MG capsule Take 1 capsule by mouth 2 (Two) Times a Day.      ESTRADIOL VA PLACE fingertip amount (1/4gram) TO vaginal opening WITH fingertip AT bedtime every NIGHT 1/4 GRAM=1 CLICK ON DISPENSER      famotidine (PEPCID) 40 MG tablet Take 1 tablet by mouth Daily As Needed  for Heartburn or Indigestion.      fluticasone (FLONASE) 50 MCG/ACT nasal spray 2 sprays into the nostril(s) as directed by provider 2 (Two) Times a Day. 48 g 3    montelukast (SINGULAIR) 10 MG tablet TAKE ONE TABLET BY MOUTH EVERY EVENING 90 tablet 3    ondansetron ODT (ZOFRAN-ODT) 4 MG disintegrating tablet Take 1 tablet by mouth As Needed.      pantoprazole (PROTONIX) 40 MG EC tablet Take 1 tablet by mouth Daily.      promethazine-dextromethorphan (PROMETHAZINE-DM) 6.25-15 MG/5ML syrup Take 5 mL by mouth 4 (Four) Times a Day As Needed for Cough. 180 mL 0    spironolactone (ALDACTONE) 25 MG tablet TAKE ONE TABLET BY MOUTH EVERY DAY 30 tablet 11    azelastine (ASTELIN) 0.1 % nasal spray 2 sprays into the nostril(s) as directed by provider 2 (Two) Times a Day. Use in each nostril as directed (Patient taking differently: Administer 2 sprays into the nostril(s) as directed by provider As Needed. Use in each nostril as directed) 30 mL 12    Vibegron (Gemtesa) 75 MG tablet Take 1 tablet by mouth Daily.       No facility-administered medications prior to visit.     No opioid medication identified on active medication list. I have reviewed chart for other potential  high risk medication/s and harmful drug interactions in the elderly.      Aspirin is not on active medication list.  Aspirin use is not indicated based on review of current medical condition/s. Risk of harm outweighs potential benefits.  .    Patient Active Problem List   Diagnosis    GERD (gastroesophageal reflux disease)    Deviated nasal septum    Allergic rhinitis due to pollen    Sicca laryngitis    Mixed hyperlipidemia    Breast cancer    Osteoarthritis of cervical spine    LBBB (left bundle branch block)    Anginal equivalent    History of bilateral mastectomy    PSVT (paroxysmal supraventricular tachycardia)    Abnormal nuclear stress test    Ulcer, duodenal, with obstruction    Essential hypertension    Gastroesophageal reflux disease with esophagitis  "without hemorrhage    Nausea and vomiting    TERESA on CPAP    TRAN (dyspnea on exertion)     Advance Care Planning Advance Directive is not on file.  ACP discussion was declined by the patient. Patient does not have an advance directive, declines further assistance.            Objective   Vitals:    25 1244   BP: 116/74   BP Location: Left arm   Patient Position: Sitting   Cuff Size: Adult   Pulse: 80   Resp: 18   Temp: 97.4 °F (36.3 °C)   TempSrc: Temporal   SpO2: 97%   Weight: 67.1 kg (148 lb)   Height: 154.9 cm (61\")   PainSc: 0-No pain       Estimated body mass index is 27.96 kg/m² as calculated from the following:    Height as of this encounter: 154.9 cm (61\").    Weight as of this encounter: 67.1 kg (148 lb).    BMI is >= 25 and <30. (Overweight) The following options were offered after discussion;: nutrition counseling/recommendations           Does the patient have evidence of cognitive impairment? No                                                                                                Health  Risk Assessment    Smoking Status:  Social History     Tobacco Use   Smoking Status Never    Passive exposure: Past   Smokeless Tobacco Never     Alcohol Consumption:  Social History     Substance and Sexual Activity   Alcohol Use Never       Fall Risk Screen  STEADI Fall Risk Assessment was completed, and patient is at LOW risk for falls.Assessment completed on:2025    Depression Screening   Little interest or pleasure in doing things? Not at all   Feeling down, depressed, or hopeless? Not at all   PHQ-2 Total Score 0      Health Habits and Functional and Cognitive Screenin/21/2025    12:49 PM   Functional & Cognitive Status   Do you have difficulty preparing food and eating? No   Do you have difficulty bathing yourself, getting dressed or grooming yourself? No   Do you have difficulty using the toilet? No   Do you have difficulty moving around from place to place? No   Do you have trouble " with steps or getting out of a bed or a chair? No   Current Diet Well Balanced Diet   Dental Exam Up to date   Eye Exam Up to date   Exercise (times per week) 3 times per week   Current Exercises Include No Regular Exercise   Do you need help using the phone?  No   Are you deaf or do you have serious difficulty hearing?  No   Do you need help to go to places out of walking distance? No   Do you need help shopping? No   Do you need help preparing meals?  No   Do you need help with housework?  No   Do you need help with laundry? No   Do you need help taking your medications? No   Do you need help managing money? No   Do you ever drive or ride in a car without wearing a seat belt? No   Have you felt unusual stress, anger or loneliness in the last month? No   Who do you live with? Spouse   If you need help, do you have trouble finding someone available to you? No   Have you been bothered in the last four weeks by sexual problems? No   Do you have difficulty concentrating, remembering or making decisions? No           Age-appropriate Screening Schedule:  Refer to the list below for future screening recommendations based on patient's age, sex and/or medical conditions. Orders for these recommended tests are listed in the plan section. The patient has been provided with a written plan.    Health Maintenance List  Health Maintenance   Topic Date Due    COLORECTAL CANCER SCREENING  12/07/2023    DXA SCAN  08/05/2024    BMI FOLLOWUP  09/13/2024    ZOSTER VACCINE (2 of 3) 01/21/2025 (Originally 12/29/2017)    COVID-19 Vaccine (4 - 2024-25 season) 01/23/2025 (Originally 9/1/2024)    LIPID PANEL  06/25/2025    ANNUAL WELLNESS VISIT  01/21/2026    TDAP/TD VACCINES (2 - Td or Tdap) 11/03/2027    HEPATITIS C SCREENING  Completed    INFLUENZA VACCINE  Completed    Pneumococcal Vaccine 65+  Completed    MAMMOGRAM  Discontinued                                                                                                               "                                  CMS Preventative Services Quick Reference  Risk Factors Identified During Encounter  Fall Risk-High or Moderate: Information on Fall Prevention Shared in After Visit Summary    The above risks/problems have been discussed with the patient.  Pertinent information has been shared with the patient in the After Visit Summary.  An After Visit Summary and PPPS were made available to the patient.    Follow Up:   Next Medicare Wellness visit to be scheduled in 1 year.         Additional E&M Note during same encounter follows:  Patient has additional, significant, and separately identifiable condition(s)/problem(s) that require work above and beyond the Medicare Wellness Visit     Chief Complaint  Medicare Wellness-subsequent (Fasting)    Subjective   74-year-old female for Medicare wellness exam      Mila is also being seen today for an annual adult preventative physical exam.     Review of Systems   HENT:  Positive for postnasal drip.    Respiratory:  Positive for apnea and cough. Negative for shortness of breath.         Patient compliant with CPAP and will need lifetime with comorbidities   Cardiovascular:  Negative for chest pain and leg swelling.   Gastrointestinal:         GERD   Genitourinary:         Breast cancer remote   All other systems reviewed and are negative.             Objective   Vital Signs:  /74 (BP Location: Left arm, Patient Position: Sitting, Cuff Size: Adult)   Pulse 80   Temp 97.4 °F (36.3 °C) (Temporal)   Resp 18   Ht 154.9 cm (61\")   Wt 67.1 kg (148 lb)   SpO2 97%   BMI 27.96 kg/m²   Physical Exam  Vitals and nursing note reviewed.   Constitutional:       Appearance: Normal appearance.   HENT:      Right Ear: Tympanic membrane and ear canal normal.      Left Ear: Tympanic membrane and ear canal normal.      Mouth/Throat:      Mouth: Mucous membranes are moist.      Pharynx: Oropharynx is clear.   Eyes:      Extraocular Movements: Extraocular " movements intact.      Pupils: Pupils are equal, round, and reactive to light.   Neck:      Vascular: No carotid bruit.   Cardiovascular:      Rate and Rhythm: Normal rate and regular rhythm.      Pulses: Normal pulses.      Heart sounds: Normal heart sounds.   Pulmonary:      Effort: Pulmonary effort is normal.      Breath sounds: Normal breath sounds.      Comments: Bilateral mastectomies with breast implants  Abdominal:      General: Abdomen is flat.      Palpations: Abdomen is soft. There is no mass.   Genitourinary:     Comments: Surgical past  Musculoskeletal:      Right lower leg: No edema.      Left lower leg: No edema.   Lymphadenopathy:      Cervical: No cervical adenopathy.   Neurological:      General: No focal deficit present.      Mental Status: She is alert and oriented to person, place, and time.   Psychiatric:         Mood and Affect: Mood normal.         Behavior: Behavior normal.         Thought Content: Thought content normal.         Judgment: Judgment normal.         The following data was reviewed by: Jose Wolff MD on 01/21/2025:              Assessment and Plan            Mixed hyperlipidemia       Orders:    Comprehensive Metabolic Panel    Lipid Panel    Fatigue, unspecified type    Orders:    TSH    T4, free    CBC & Differential    Essential hypertension      Orders:    POC Urinalysis Dipstick, Multipro    Vitamin D deficiency    Orders:    Vitamin D,25-Hydroxy    Memory loss    Orders:    Vitamin B12    Folate    Screening mammogram for breast cancer         Post-menopausal    Orders:    DEXA Bone Density Axial; Future  Plan above plus Zyrtec, Mucinex  Medicare annual wellness visit, subsequent                 Follow Up   Return in about 6 months (around 7/21/2025), or if symptoms worsen or fail to improve.  Patient was given instructions and counseling regarding her condition or for health maintenance advice. Please see specific information pulled into the AVS if  appropriate.

## 2025-01-22 LAB
25(OH)D3+25(OH)D2 SERPL-MCNC: 41.9 NG/ML (ref 30–100)
ALBUMIN SERPL-MCNC: 4.5 G/DL (ref 3.5–5.2)
ALBUMIN/GLOB SERPL: 1.4 G/DL
ALP SERPL-CCNC: 111 U/L (ref 39–117)
ALT SERPL-CCNC: 21 U/L (ref 1–33)
AST SERPL-CCNC: 19 U/L (ref 1–32)
BASOPHILS # BLD AUTO: 0.03 10*3/MM3 (ref 0–0.2)
BASOPHILS NFR BLD AUTO: 0.6 % (ref 0–1.5)
BILIRUB SERPL-MCNC: 0.4 MG/DL (ref 0–1.2)
BUN SERPL-MCNC: 14 MG/DL (ref 8–23)
BUN/CREAT SERPL: 16.3 (ref 7–25)
CALCIUM SERPL-MCNC: 10.2 MG/DL (ref 8.6–10.5)
CHLORIDE SERPL-SCNC: 99 MMOL/L (ref 98–107)
CHOLEST SERPL-MCNC: 136 MG/DL (ref 0–200)
CO2 SERPL-SCNC: 30 MMOL/L (ref 22–29)
CREAT SERPL-MCNC: 0.86 MG/DL (ref 0.57–1)
EGFRCR SERPLBLD CKD-EPI 2021: 71 ML/MIN/1.73
EOSINOPHIL # BLD AUTO: 0.19 10*3/MM3 (ref 0–0.4)
EOSINOPHIL NFR BLD AUTO: 3.6 % (ref 0.3–6.2)
ERYTHROCYTE [DISTWIDTH] IN BLOOD BY AUTOMATED COUNT: 12.9 % (ref 12.3–15.4)
FOLATE SERPL-MCNC: 13 NG/ML (ref 4.78–24.2)
GLOBULIN SER CALC-MCNC: 3.3 GM/DL
GLUCOSE SERPL-MCNC: 105 MG/DL (ref 65–99)
HCT VFR BLD AUTO: 41 % (ref 34–46.6)
HDLC SERPL-MCNC: 46 MG/DL (ref 40–60)
HGB BLD-MCNC: 13.6 G/DL (ref 12–15.9)
IMM GRANULOCYTES # BLD AUTO: 0.01 10*3/MM3 (ref 0–0.05)
IMM GRANULOCYTES NFR BLD AUTO: 0.2 % (ref 0–0.5)
LDLC SERPL CALC-MCNC: 71 MG/DL (ref 0–100)
LYMPHOCYTES # BLD AUTO: 1.2 10*3/MM3 (ref 0.7–3.1)
LYMPHOCYTES NFR BLD AUTO: 22.6 % (ref 19.6–45.3)
MCH RBC QN AUTO: 29.4 PG (ref 26.6–33)
MCHC RBC AUTO-ENTMCNC: 33.2 G/DL (ref 31.5–35.7)
MCV RBC AUTO: 88.6 FL (ref 79–97)
MONOCYTES # BLD AUTO: 0.48 10*3/MM3 (ref 0.1–0.9)
MONOCYTES NFR BLD AUTO: 9 % (ref 5–12)
NEUTROPHILS # BLD AUTO: 3.41 10*3/MM3 (ref 1.7–7)
NEUTROPHILS NFR BLD AUTO: 64 % (ref 42.7–76)
NRBC BLD AUTO-RTO: 0 /100 WBC (ref 0–0.2)
PLATELET # BLD AUTO: 329 10*3/MM3 (ref 140–450)
POTASSIUM SERPL-SCNC: 4.4 MMOL/L (ref 3.5–5.2)
PROT SERPL-MCNC: 7.8 G/DL (ref 6–8.5)
RBC # BLD AUTO: 4.63 10*6/MM3 (ref 3.77–5.28)
SODIUM SERPL-SCNC: 140 MMOL/L (ref 136–145)
T4 FREE SERPL-MCNC: 1.17 NG/DL (ref 0.92–1.68)
TRIGL SERPL-MCNC: 102 MG/DL (ref 0–150)
TSH SERPL DL<=0.005 MIU/L-ACNC: 2.1 UIU/ML (ref 0.27–4.2)
VIT B12 SERPL-MCNC: 543 PG/ML (ref 211–946)
VLDLC SERPL CALC-MCNC: 19 MG/DL (ref 5–40)
WBC # BLD AUTO: 5.32 10*3/MM3 (ref 3.4–10.8)

## 2025-01-23 DIAGNOSIS — E78.2 MIXED HYPERLIPIDEMIA: ICD-10-CM

## 2025-01-23 RX ORDER — ATORVASTATIN CALCIUM 20 MG/1
20 TABLET, FILM COATED ORAL
Qty: 90 TABLET | Refills: 3 | Status: SHIPPED | OUTPATIENT
Start: 2025-01-23

## 2025-01-23 NOTE — TELEPHONE ENCOUNTER
Rx Refill Note  Requested Prescriptions     Pending Prescriptions Disp Refills    atorvastatin (LIPITOR) 20 MG tablet [Pharmacy Med Name: ATORVASTATIN CALCIUM 20MG TABS] 90 tablet 3     Sig: TAKE ONE TABLET BY MOUTH EVERY NIGHT      Last office visit with prescribing clinician: 1/21/2025   Last telemedicine visit with prescribing clinician: Visit date not found   Next office visit with prescribing clinician: 7/23/2025       {TIP  Please add Last Relevant Lab 1/21/25    Leann Clark MA  01/23/25, 12:25 CST

## 2025-02-05 ENCOUNTER — HOSPITAL ENCOUNTER (OUTPATIENT)
Dept: BONE DENSITY | Facility: HOSPITAL | Age: 75
Discharge: HOME | End: 2025-02-05
Admitting: FAMILY MEDICINE
Payer: MEDICARE

## 2025-02-05 DIAGNOSIS — Z78.0 POST-MENOPAUSAL: ICD-10-CM

## 2025-02-05 PROCEDURE — 77080 DXA BONE DENSITY AXIAL: CPT

## 2025-04-23 ENCOUNTER — TELEPHONE (OUTPATIENT)
Dept: OTOLARYNGOLOGY | Facility: CLINIC | Age: 75
End: 2025-04-23

## 2025-04-23 ENCOUNTER — OFFICE VISIT (OUTPATIENT)
Dept: OTOLARYNGOLOGY | Facility: CLINIC | Age: 75
End: 2025-04-23
Payer: MEDICARE

## 2025-04-23 VITALS — HEIGHT: 61 IN | BODY MASS INDEX: 28.32 KG/M2 | WEIGHT: 150 LBS

## 2025-04-23 DIAGNOSIS — K21.9 LARYNGOPHARYNGEAL REFLUX (LPR): Chronic | ICD-10-CM

## 2025-04-23 DIAGNOSIS — R05.3 CHRONIC COUGH: Primary | Chronic | ICD-10-CM

## 2025-04-23 DIAGNOSIS — M79.2 NEURALGIA: Chronic | ICD-10-CM

## 2025-04-23 RX ORDER — PANTOPRAZOLE SODIUM 40 MG/1
40 TABLET, DELAYED RELEASE ORAL
Qty: 60 TABLET | Refills: 3 | Status: SHIPPED | OUTPATIENT
Start: 2025-04-23 | End: 2025-05-23

## 2025-04-23 RX ORDER — PANTOPRAZOLE SODIUM 40 MG/1
40 TABLET, DELAYED RELEASE ORAL DAILY
Status: CANCELLED | OUTPATIENT
Start: 2025-04-23

## 2025-04-23 RX ORDER — PREDNISONE 20 MG/1
TABLET ORAL
COMMUNITY
Start: 2025-01-21

## 2025-04-23 NOTE — TELEPHONE ENCOUNTER
Provider: SAROJ AGGARWAL    Caller: [facility] - [place], [place] - [address] - [phone] [facility] [phone]     Relationship to Patient: [facility]    Phone Number:[phone]    Reason for Call: PLEASE CALL TO CONFIRM INSTRUCTIONS ON MEDICATION    UNABLE TO WARM PEREZ TO CLINICAL

## 2025-04-23 NOTE — PATIENT INSTRUCTIONS
Gastroesophageal Reflux Disease (Laryngopharyngeal Reflux), Adult  Gastroesophageal reflux disease (GERD) and/or Laryngopharyngeal Reflux, (LPR) happens when acid from your stomach flows up into the esophagus and/or throat and voicebox or larynx. When acid comes in contact with the these organs, the acid can cause soreness (inflammation). Over time, GERD may create small holes (ulcers) in the lining of the esophagus and may lead to the development of hoarseness, difficulty swallowing,   feeling of something stuck in the throat, increased mucous or drainage and even predispose to the development of malignancies, (cancer).    CAUSES   Increased body weight. This puts pressure on the stomach, making acid rise from the stomach into the esophagus.  Smoking. This increases acid production in the stomach.  Drinking alcohol. This causes decreased pressure in the lower esophageal sphincter (valve or ring of muscle between the esophagus and stomach), allowing acid from the stomach into the esophagus.  Late evening meals and a full stomach. This increases pressure and acid production in the stomach.  A malformed lower esophageal sphincter  Diet which can include avoidance of gluten and dairy products  Age  SYMPTOMS   Burning pain in the lower part of the mid-chest behind the breastbone and in the mid-stomach area. This may occur twice a week or more often.  Trouble swallowing.  Sore throat.  Dry cough.  Asthma-like symptoms including chest tightness, shortness of breath, or wheezing.  Globus sensation-something stuck in the throat/fullness  Hoarseness  DIAGNOSIS   Your caregiver may be able to diagnose GERD based on your symptoms. In some cases, X-rays and other tests may be done to check for complications or to check the condition of your stomach and esophagus.  You may need to see another doctor.  TREATMENT   Over-the-counter or prescription medicines to help decrease acid production.   Dietary and behavioral modifications  or changes may be also recommended.  HOME CARE INSTRUCTIONS   Change the factors that you can control. Ask your caregiver for guidance concerning weight loss, quitting smoking, and alcohol consumption.  Avoid foods and drinks that make your symptoms worse, and MAY include such as:  Caffeine or alcoholic drinks.  Chocolate.  Gluten containing foods  Dairy  Peppermint or mint flavorings.  Garlic and onions.  Spicy foods.  Citrus fruits, such as oranges, naima, or limes.  Tomato-based foods such as sauce, chili, salsa, and pizza.  Fried and fatty foods.  Avoid lying down for the 3 hours prior to your bedtime or prior to taking a nap.  Eat small, frequent meals instead of large meals.  Wear loose-fitting clothing. Do not wear anything tight around your waist that causes pressure on your stomach.  Raise the head of your bed 6 to 8 inches with wood blocks to help you sleep. Extra pillows will not help.  Only take over-the-counter or prescription medicines for pain, discomfort, or fever as directed by your caregiver.  Do not take aspirin, ibuprofen, or other nonsteroidal anti-inflammatory drugs if possible (NSAIDs).  SEEK IMMEDIATE MEDICAL CARE IF:   You have pain in your arms, neck, jaw, teeth, or back.  Your pain increases or changes in intensity or duration.  You develop nausea, vomiting, or sweating (diaphoresis).  You develop shortness of breath, or you faint.  Your vomit is green, yellow, black, or looks like coffee grounds or blood.  Your stool is red, bloody, or black.  These symptoms could be signs of other problems, such as heart disease, gastric bleeding, or esophageal bleeding.  MAKE SURE YOU:   Understand these instructions.  Will watch your condition.  Will get help right away if you are not doing well or get worse.     This information is not intended to replace advice given to you by your physician. Make sure you discuss any questions you have with your health care provider.     Modified by Sterling Santos,  MD, FACS 9/8/2016.  Document Released: 09/27/2006 Document Revised: 01/08/2016 Document Reviewed: 04/13/2016  [employer] Interactive Patient Education ©2016 [employer]

## 2025-04-23 NOTE — PROGRESS NOTES
"    BAR Smith  [facility]  [address], [address]  [place] [address]  Fax [phone]  Phone [phone]      Visit Type: FOLLOW UP   Chief Complaint   Patient presents with    Allergic Rhinitis    Cough     Pt been coughing since December, throat clearing            HISTORY OBTAINED FROM: patient  HPI  She presents for a follow up evaluation. She has had improvement of her facial pain complaints. She does have a sensation that her cheek is wet or tears falling. She is also having cough. The cough got worse in December when she was sick. Patient has mild voice change, throat clearing and \"smoker's type cough\". She is on pantoprazole and pepcid but has not taken pantoprazole in 2 months and takes pepcid prn. She has history of ulcers.     Past Medical History:   Diagnosis Date    Acute left-sided low back pain without sciatica 10/24/2017    Arthritis     Asthma     Breast cancer     1997, right breast mastectomy    Drug therapy 1997    Essential hypertension 04/21/2022    GERD (gastroesophageal reflux disease)     Headache     Heart valve disease     Hypercholesteremia     Obesity     TERESA on CPAP 04/21/2022    KARUNA (stress urinary incontinence, female)     Ulcer, duodenal, with obstruction 02/28/2022    Urge incontinence     Uterovaginal prolapse     Vaginal atrophy        Past Surgical History:   Procedure Laterality Date    BLADDER SLING MODIFIED, ANTERIOR AND POSTERIOR VAGINAL REPAIR      BLADDER SURGERY      Pacemaker for the bladder to help with incontinence.    BREAST SURGERY  1997, 2021    BRONCHOSCOPY N/A 04/26/2017    Procedure: BRONCHOSCOPY BIOPSY WITH ANESTHESIA;  Surgeon: Adi Anderson MD;  Location: [facility] ENDOSCOPY;  Service:     CARDIAC CATHETERIZATION Left 02/28/2017    Procedure: Cardiac Catheterization/Vascular Study;  Surgeon: Anuj Johnson MD;  Location: [facility] CATH INVASIVE LOCATION;  Service:     CARDIAC " CATHETERIZATION  2/28/2017    COLONOSCOPY  2014    COSMETIC SURGERY  2021    ENDOSCOPY  2024    EYE SURGERY      HYSTERECTOMY      HYSTERECTOMY      LAMINECTOMY  01/2016    LYMPH NODE BIOPSY  1997    MASTECTOMY      Right in 1997 and Left in 2021    ROTATOR CUFF REPAIR Right 2012    SPINE SURGERY  2016       Family History: Her family history includes Arthritis in her mother; Cancer in her brother, father, and paternal grandfather; Dementia in her mother; Diabetes in her brother and maternal grandfather; Heart failure in her mother; No Known Problems in her paternal grandmother; Thyroid disease in her brother and maternal grandmother.     Social History: She  reports that she has never smoked. She has been exposed to tobacco smoke. She has never used smokeless tobacco. She reports that she does not drink alcohol and does not use drugs.    Home Medications:  Cholecalciferol, Estradiol, atenolol, atorvastatin, calcium carbonate, cetirizine, diphenhydrAMINE, docusate sodium, famotidine, fluticasone, montelukast, ondansetron ODT, pantoprazole, predniSONE, promethazine-dextromethorphan, and spironolactone    Allergies:  She is allergic to lisinopril, lortab [hydrocodone-acetaminophen], percocet [oxycodone-acetaminophen], pregabalin, and adhesive tape.       Vital Signs:      ENT Physical Exam  Constitutional  Appearance: patient appears well-developed,  Communication/Voice: communication appropriate for developmental age;  Head and Face  Appearance: head appears normal, face appears normal and face appears atraumatic;  Ear  Hearing: intact;  Auricles: right auricle normal; left auricle normal;  External Mastoids: right external mastoid normal; left external mastoid normal;  Ear Canals: right ear canal normal; left ear canal normal;  Tympanic Membranes: right tympanic membrane normal; left tympanic membrane normal;  Nose  External Nose: nares patent bilaterally; external nose normal;  Internal Nose: nasal mucosa  normal;  Oral Cavity/Oropharynx  Lips: normal;  Teeth: normal;  Gums: gingiva normal;  Tongue: normal;  Oral mucosa: normal;  Hard palate: normal;  Soft palate: normal;  Tonsils: normal;  Base of Tongue: normal;  Posterior pharyngeal wall: appearance is erythematous;  Neck  Neck: neck normal;  Respiratory  Inspection: breathing unlabored;  Cardiovascular  Inspection: extremities are warm and well perfused;  Neurovestibular  Mental Status: alert and oriented;  Psychiatric: mood normal;           Result Review       RESULTS REVIEW    I have reviewed the patients old records in the chart.         Assessment & Plan  Chronic cough    Laryngopharyngeal reflux (LPR)    Neuralgia         Conservative management. Start pantoprazole bid. Reflux precautions. Advised that she needs to see GI for endoscopy with history of ulcer. Will monitor neuralgia.   Return in about 3 months (around 7/23/2025).        Electronically signed by BAR Smith 04/23/25 11:55 AM CDT.

## 2025-04-25 ENCOUNTER — TELEPHONE (OUTPATIENT)
Dept: OTOLARYNGOLOGY | Facility: CLINIC | Age: 75
End: 2025-04-25
Payer: MEDICARE

## 2025-06-10 ENCOUNTER — OFFICE VISIT (OUTPATIENT)
Dept: PULMONOLOGY | Facility: CLINIC | Age: 75
End: 2025-06-10
Payer: MEDICARE

## 2025-06-10 VITALS
WEIGHT: 152 LBS | HEIGHT: 60 IN | DIASTOLIC BLOOD PRESSURE: 74 MMHG | HEART RATE: 66 BPM | BODY MASS INDEX: 29.84 KG/M2 | SYSTOLIC BLOOD PRESSURE: 122 MMHG | OXYGEN SATURATION: 93 %

## 2025-06-10 DIAGNOSIS — I27.20 PULMONARY HYPERTENSION: Primary | ICD-10-CM

## 2025-06-10 DIAGNOSIS — J98.09 BRONCHOMALACIA: ICD-10-CM

## 2025-06-10 RX ORDER — PANTOPRAZOLE SODIUM 40 MG/1
40 TABLET, DELAYED RELEASE ORAL DAILY
COMMUNITY

## 2025-06-10 NOTE — PROGRESS NOTES
"Background:  Pt with upper airway cough, dynamic airway collapse. No benefit from multiple combination inhalers   Chief Complaint  Shortness of Breath    Subjective    History of Present Illness     Cindi Hill is here for follow up with [facility].  History of Present Illness  She follows up with chronic refractory cough and findings of dynamic airway collapse.  She got better for a while and cough came back again.  She has hx bleeding ulcer treated with pantoprazole in the past.  She also had Singulair in the past and remains on that.  She has restarted patoprazole as of a month ago.  She sleeps with cpap.  She clears her throat a lot.     Tobacco Use: Low Risk  (6/10/2025)    Patient History     Smoking Tobacco Use: Never     Smokeless Tobacco Use: Never     Passive Exposure: Past      Current Outpatient Medications   Medication Instructions    atenolol (TENORMIN) 25 mg, Oral, Daily    atorvastatin (LIPITOR) 20 mg, Oral, Every Night at Bedtime    calcium carbonate (OS-MARIANNE) 600 mg, Daily    cetirizine (ZYRTEC) 10 mg, As Needed    Cholecalciferol 5,000 Units, Daily    diphenhydrAMINE (BENADRYL) 25 mg, As Needed    docusate sodium (COLACE) 100 mg, 2 Times Daily    ESTRADIOL VA PLACE fingertip amount (1/4gram) TO vaginal opening WITH fingertip AT bedtime every NIGHT 1/4 GRAM=1 CLICK ON DISPENSER    fluticasone (FLONASE) 50 MCG/ACT nasal spray 2 sprays, Nasal, 2 Times Daily    montelukast (SINGULAIR) 10 mg, Oral, Every Evening    ondansetron ODT (ZOFRAN-ODT) 4 mg, As Needed    pantoprazole (PROTONIX) 40 mg, Daily    promethazine-dextromethorphan (PROMETHAZINE-DM) 6.25-15 MG/5ML syrup 5 mL, Oral, 4 Times Daily PRN    spironolactone (ALDACTONE) 25 mg, Oral, Daily      Objective     Vital Signs:   /74   Pulse 66   Ht 152.4 cm (60\")   Wt 68.9 kg (152 lb)   SpO2 93% Comment: RA  BMI 29.69 kg/m²   Physical Exam  Constitutional:       General: She is in " acute distress.      Appearance: She is not ill-appearing.   HENT:      Head: Normocephalic.      Nose: Nose normal.   Pulmonary:      Breath sounds: Rhonchi present.   Abdominal:      General: There is no distension.        Result Review  Data Reviewed:         PFT Values          6/10/2025    11:30   Pre Drug PFT Results   FVC 81   FEV1 76   FEF 25-75% 58   FEV1/FVC 72                Assessment and Plan    Diagnoses and all orders for this visit:    1. Pulmonary hypertension (Primary)    2. Bronchomalacia  -     Spirometry     Begin trial of inhalers again in conjunction with the ppi starting in about a week, in event there actually is bronchodilator responsiveness, but with concomitant need for antireflux therapy.  Sample [id number] mcg 1 puff daily  Call with how that does.  Continue tx for reflux which will be chronic    Follow Up   Return in about 6 months (around 12/10/2025).  Patient was given instructions and counseling regarding her condition or for health maintenance advice. Please see specific information pulled into the AVS if appropriate.    Electronically signed by Adi Anderson MD, 6/10/2025, 17:49 CDT

## 2025-06-10 NOTE — PROCEDURES
Spirometry    Performed by: Yolanda Muir, RRT  Authorized by: Adi Anderson MD     Pre Drug % Predicted    FVC: 81%   FEV1: 76%   FEF 25-75%: 58%   FEV1/FVC: 72%    Interpretation   Spirometry   Spirometry shows non-specific results. There is reduced midflow suggesting small airway/airflow obstruction.   Electronically signed by Adi Anderson MD, 6/10/2025, 13:00 CDT

## 2025-07-23 ENCOUNTER — OFFICE VISIT (OUTPATIENT)
Dept: FAMILY MEDICINE CLINIC | Facility: CLINIC | Age: 75
End: 2025-07-23
Payer: MEDICARE

## 2025-07-23 VITALS
BODY MASS INDEX: 29.61 KG/M2 | HEART RATE: 66 BPM | SYSTOLIC BLOOD PRESSURE: 114 MMHG | OXYGEN SATURATION: 98 % | DIASTOLIC BLOOD PRESSURE: 60 MMHG | TEMPERATURE: 97.6 F | WEIGHT: 150.8 LBS | HEIGHT: 60 IN | RESPIRATION RATE: 18 BRPM

## 2025-07-23 DIAGNOSIS — M25.551 ACUTE RIGHT HIP PAIN: ICD-10-CM

## 2025-07-23 DIAGNOSIS — E78.2 MIXED HYPERLIPIDEMIA: Primary | ICD-10-CM

## 2025-07-23 NOTE — PROGRESS NOTES
Subjective   Cindi Hill is a 74 y.o. female.     History of Present Illness  74-year-old female follow-up on hyperlipidemia and complaining of right hip pain      The following portions of the patient's history were reviewed and updated as appropriate: allergies, current medications, past family history, past medical history, past social history, past surgical history, and problem list.    Review of Systems   Respiratory:  Negative for shortness of breath.    Cardiovascular:  Negative for chest pain and leg swelling.   Musculoskeletal:  Positive for arthralgias.        Right hip pain-progressive over the last 2 months       Objective   Physical Exam  Vitals and nursing note reviewed.   Constitutional:       Appearance: Normal appearance.   Eyes:      Extraocular Movements: Extraocular movements intact.      Pupils: Pupils are equal, round, and reactive to light.   Cardiovascular:      Rate and Rhythm: Normal rate and regular rhythm.   Pulmonary:      Effort: Pulmonary effort is normal.      Breath sounds: Normal breath sounds.   Musculoskeletal:      Right lower leg: No edema.      Left lower leg: No edema.      Comments: Internal/external rotation of right hip limited   Skin:     General: Skin is warm and dry.   Neurological:      General: No focal deficit present.      Mental Status: She is alert and oriented to person, place, and time.   Psychiatric:         Mood and Affect: Mood normal.         Behavior: Behavior normal.         Thought Content: Thought content normal.         Judgment: Judgment normal.         Assessment & Plan   Diagnoses and all orders for this visit:    1. Mixed hyperlipidemia (Primary)  -     Comprehensive Metabolic Panel  -     Lipid Panel    2. Acute right hip pain  -     Ambulatory Referral to Orthopedic Surgery           Plan above

## 2025-07-24 ENCOUNTER — OFFICE VISIT (OUTPATIENT)
Dept: CARDIOLOGY | Facility: CLINIC | Age: 75
End: 2025-07-24
Payer: MEDICARE

## 2025-07-24 VITALS
HEIGHT: 60 IN | WEIGHT: 151.6 LBS | DIASTOLIC BLOOD PRESSURE: 72 MMHG | HEART RATE: 61 BPM | SYSTOLIC BLOOD PRESSURE: 109 MMHG | BODY MASS INDEX: 29.76 KG/M2 | RESPIRATION RATE: 18 BRPM

## 2025-07-24 DIAGNOSIS — I50.32 CHRONIC DIASTOLIC (CONGESTIVE) HEART FAILURE: ICD-10-CM

## 2025-07-24 DIAGNOSIS — I35.1 NONRHEUMATIC AORTIC VALVE INSUFFICIENCY: ICD-10-CM

## 2025-07-24 DIAGNOSIS — I47.10 PSVT (PAROXYSMAL SUPRAVENTRICULAR TACHYCARDIA): ICD-10-CM

## 2025-07-24 DIAGNOSIS — I27.20 PULMONARY HYPERTENSION: ICD-10-CM

## 2025-07-24 DIAGNOSIS — G47.33 OSA ON CPAP: ICD-10-CM

## 2025-07-24 DIAGNOSIS — E78.2 MIXED HYPERLIPIDEMIA: ICD-10-CM

## 2025-07-24 DIAGNOSIS — K21.9 GASTROESOPHAGEAL REFLUX DISEASE WITHOUT ESOPHAGITIS: ICD-10-CM

## 2025-07-24 DIAGNOSIS — I44.7 LBBB (LEFT BUNDLE BRANCH BLOCK): ICD-10-CM

## 2025-07-24 DIAGNOSIS — R06.09 DYSPNEA ON EXERTION: Primary | ICD-10-CM

## 2025-07-24 DIAGNOSIS — R94.39 ABNORMAL NUCLEAR STRESS TEST: ICD-10-CM

## 2025-07-24 LAB
ALBUMIN SERPL-MCNC: 5 G/DL (ref 3.8–4.8)
ALP SERPL-CCNC: 101 IU/L (ref 44–121)
ALT SERPL-CCNC: 18 IU/L (ref 0–32)
AST SERPL-CCNC: 21 IU/L (ref 0–40)
BILIRUB SERPL-MCNC: 0.5 MG/DL (ref 0–1.2)
BUN SERPL-MCNC: 17 MG/DL (ref 8–27)
BUN/CREAT SERPL: 18 (ref 12–28)
CALCIUM SERPL-MCNC: 10.3 MG/DL (ref 8.7–10.3)
CHLORIDE SERPL-SCNC: 98 MMOL/L (ref 96–106)
CHOLEST SERPL-MCNC: 142 MG/DL (ref 100–199)
CO2 SERPL-SCNC: 25 MMOL/L (ref 20–29)
CREAT SERPL-MCNC: 0.94 MG/DL (ref 0.57–1)
EGFRCR SERPLBLD CKD-EPI 2021: 64 ML/MIN/1.73
GLOBULIN SER CALC-MCNC: 2.8 G/DL (ref 1.5–4.5)
GLUCOSE SERPL-MCNC: 89 MG/DL (ref 70–99)
HDLC SERPL-MCNC: 52 MG/DL
LDLC SERPL CALC-MCNC: 71 MG/DL (ref 0–99)
POTASSIUM SERPL-SCNC: 4.6 MMOL/L (ref 3.5–5.2)
PROT SERPL-MCNC: 7.8 G/DL (ref 6–8.5)
SODIUM SERPL-SCNC: 141 MMOL/L (ref 134–144)
TRIGL SERPL-MCNC: 106 MG/DL (ref 0–149)
VLDLC SERPL CALC-MCNC: 19 MG/DL (ref 5–40)

## 2025-07-24 PROCEDURE — 3078F DIAST BP <80 MM HG: CPT | Performed by: NURSE PRACTITIONER

## 2025-07-24 PROCEDURE — 93000 ELECTROCARDIOGRAM COMPLETE: CPT | Performed by: NURSE PRACTITIONER

## 2025-07-24 PROCEDURE — 3074F SYST BP LT 130 MM HG: CPT | Performed by: NURSE PRACTITIONER

## 2025-07-24 PROCEDURE — 1159F MED LIST DOCD IN RCRD: CPT | Performed by: NURSE PRACTITIONER

## 2025-07-24 PROCEDURE — 1160F RVW MEDS BY RX/DR IN RCRD: CPT | Performed by: NURSE PRACTITIONER

## 2025-07-24 PROCEDURE — 99214 OFFICE O/P EST MOD 30 MIN: CPT | Performed by: NURSE PRACTITIONER

## 2025-07-24 RX ORDER — SPIRONOLACTONE 25 MG/1
25 TABLET ORAL DAILY
Qty: 90 TABLET | Refills: 3 | Status: SHIPPED | OUTPATIENT
Start: 2025-07-24

## 2025-07-24 NOTE — PROGRESS NOTES
Subjective:     Encounter Date: 07/24/2025      Patient ID: Cindi Hill is a 74 y.o. female with has paroxysmal supraventricular tachycardia, pulmonary hypertension, chronic left bundle branch block, hypercholesterolemia and GERD.    Chief Complaint: dyspnea on exertion  History of Present Illness  Patient presents today for management of  dyspnea on exertion.  She reports taht she has some acute issues going on. She is having a referral to orthopedic. She reports that her dyspnea on exertion has continued to be a problem and is unchanged. She reports that she has a lot of issues with bronchomalacia. She reports compliance with CPAP. She actually feels better when she wears her CPAP. She denies any chest pain. She denies any heart racing. She reports an occasional palpitations. She reports that they occur randomly and they just last a couple of seconds and then resolves on its own.  She denies any leg swelling. She denies any dizziness or near syncope. She reports that she has had some dizziness at times. She feels like it is if she has gone awhile between eating. Patient reports that her BP has been well controlled. She saw her PCP yesterday and had labs done. Patient follows with Dr Wolff as PCP.     The following portions of the patient's history were reviewed and updated as appropriate: allergies, current medications, past family history, past medical history, past social history, past surgical history and problem list.    Allergies   Allergen Reactions    Lisinopril Cough    Lortab [Hydrocodone-Acetaminophen] Nausea Only    Percocet [Oxycodone-Acetaminophen] Nausea Only    Pregabalin Other (See Comments)     Made pt very sleepy and dizzy    Adhesive Tape Itching     Bandage tape caused itching and redness       Current Outpatient Medications:     atenolol (TENORMIN) 25 MG tablet, TAKE ONE TABLET BY MOUTH EVERY DAY, Disp: 90 tablet, Rfl: 3    atorvastatin (LIPITOR) 20 MG tablet, TAKE ONE TABLET BY MOUTH  EVERY NIGHT, Disp: 90 tablet, Rfl: 3    calcium carbonate (OS-MARIANNE) 600 MG tablet, Take 1 tablet by mouth Daily., Disp: , Rfl:     cetirizine (zyrTEC) 10 MG tablet, Take 1 tablet by mouth As Needed., Disp: , Rfl:     Cholecalciferol 4000 units capsule, Take 5,000 Units by mouth Daily., Disp: , Rfl:     diphenhydrAMINE (BENADRYL) 25 mg capsule, Take 1 capsule by mouth As Needed for Itching., Disp: , Rfl:     docusate sodium (COLACE) 100 MG capsule, Take 1 capsule by mouth 2 (Two) Times a Day., Disp: , Rfl:     ESTRADIOL VA, PLACE fingertip amount (1/4gram) TO vaginal opening WITH fingertip AT bedtime every NIGHT 1/4 GRAM=1 CLICK ON DISPENSER, Disp: , Rfl:     fluticasone (FLONASE) 50 MCG/ACT nasal spray, 2 sprays into the nostril(s) as directed by provider 2 (Two) Times a Day., Disp: 48 g, Rfl: 3    montelukast (SINGULAIR) 10 MG tablet, TAKE ONE TABLET BY MOUTH EVERY EVENING, Disp: 90 tablet, Rfl: 3    ondansetron ODT (ZOFRAN-ODT) 4 MG disintegrating tablet, Take 1 tablet by mouth As Needed., Disp: , Rfl:     pantoprazole (PROTONIX) 40 MG EC tablet, Take 1 tablet by mouth Daily., Disp: , Rfl:     spironolactone (ALDACTONE) 25 MG tablet, Take 1 tablet by mouth Daily., Disp: 90 tablet, Rfl: 3    Past Medical History:   Diagnosis Date    Acute left-sided low back pain without sciatica 10/24/2017    Arthritis     Asthma     Breast cancer     1997, right breast mastectomy    Drug therapy 1997    Essential hypertension 04/21/2022    GERD (gastroesophageal reflux disease)     Headache     Heart valve disease     Hypercholesteremia     Obesity     TERESA on CPAP 04/21/2022    KARUNA (stress urinary incontinence, female)     Ulcer, duodenal, with obstruction 02/28/2022    Urge incontinence     Uterovaginal prolapse     Vaginal atrophy      Social History     Socioeconomic History    Marital status:    Tobacco Use    Smoking status: Never     Passive exposure: Past    Smokeless tobacco: Never   Vaping Use    Vaping status:  "Never Used   Substance and Sexual Activity    Alcohol use: Never    Drug use: Never    Sexual activity: Defer       Review of Systems   Constitutional: Negative for malaise/fatigue.   HENT:  Negative for nosebleeds.    Cardiovascular:  Positive for dyspnea on exertion. Negative for chest pain, irregular heartbeat, leg swelling, near-syncope, orthopnea, palpitations, paroxysmal nocturnal dyspnea and syncope.   Respiratory:  Positive for shortness of breath.    Hematologic/Lymphatic: Does not bruise/bleed easily.   Genitourinary:  Negative for hematuria.   Neurological:  Negative for dizziness and weakness.   All other systems reviewed and are negative.         Objective:     Vitals reviewed.   Constitutional:       General: Not in acute distress.     Appearance: Normal appearance. Well-developed.   Eyes:      Pupils: Pupils are equal, round, and reactive to light.   HENT:      Head: Normocephalic and atraumatic.      Nose: Nose normal.   Neck:      Vascular: No carotid bruit.   Pulmonary:      Effort: Pulmonary effort is normal. No respiratory distress.      Breath sounds: Normal breath sounds. No wheezing. No rales.   Cardiovascular:      Normal rate. Regular rhythm.   Abdominal:      General: There is no distension.      Palpations: Abdomen is soft.   Musculoskeletal: Normal range of motion.      Cervical back: Normal range of motion and neck supple. Skin:     General: Skin is warm.      Findings: No erythema or rash.   Neurological:      Mental Status: Alert and oriented to person, place, and time.   Psychiatric:         Speech: Speech normal.         Behavior: Behavior normal.         Thought Content: Thought content normal.         Judgment: Judgment normal.         /72 (BP Location: Right arm, Patient Position: Sitting, Cuff Size: Adult)   Pulse 61   Resp 18   Ht 152.4 cm (60\")   Wt 68.8 kg (151 lb 9.6 oz)   LMP  (LMP Unknown)   BMI 29.61 kg/m²       ECG 12 Lead    Date/Time: 7/24/2025 10:28 " AM  Performed by: Yogesh Rehman APRN    Authorized by: Yogesh Rehman APRN  Comparison: compared with previous ECG from 7/15/2024  Similar to previous ECG  Rhythm: sinus rhythm  Rate: normal  BPM: 65  Conduction: left bundle branch block  QRS axis: left    Clinical impression: normal ECG          Lab Review:     Results for orders placed during the hospital encounter of 01/10/24    Adult Transthoracic Echo Complete w/ Color, Spectral and Contrast if necessary per protocol 01/12/2024  6:50 AM    Interpretation Summary    Left ventricular ejection fraction appears to be 56 - 60%.    Left ventricular diastolic function was normal.    Estimated right ventricular systolic pressure from tricuspid regurgitation is normal (<35 mmHg).    Nuclear stress test 7/29/2021:  Interpretation Summary  Small to moderate septal and apical-septal ischemia  Breast attenuation and GI artifacts are present.  Left ventricular ejection fraction is normal. (Calculated EF = 62%).      Refer to the perfusion abnormalities with rest and stress as well as summed score and percent abnormalities  in the bull's eye diagram below       Lab Results   Component Value Date    CHLPL 142 07/23/2025    TRIG 106 07/23/2025    HDL 52 07/23/2025    LDL 71 07/23/2025     Cardiac CT angiography 10/13/2020     Impression:      1. Total calcium score : 51  2.  Mild focal calcification of the proximal left anterior descending coronary artery without evidence of any obstructive coronary artery disease        ___________________________________________________________________________     Recommendations:     Ongoing risk factor modifications  Further evaluation if ongoing chest pain or high risk of suspicion of significant ischemic heart disease    I have personally reviewed labs, CTA coronary, echo, stress test and past office notes prior to patients visit  Assessment:          Diagnosis Plan   1. Dyspnea on exertion        2. PSVT (paroxysmal supraventricular  tachycardia)  ECG 12 Lead      3. Abnormal nuclear stress test        4. Chronic diastolic (congestive) heart failure        5. Nonrheumatic aortic valve insufficiency        6. Pulmonary hypertension        7. LBBB (left bundle branch block)        8. Mixed hyperlipidemia        9. Gastroesophageal reflux disease without esophagitis        10. TERESA on CPAP                       Plan:       1. Dyspnea on exertion: Chronic and unchanged. Patient has a history of bronchomalacia. She follows with pulmonology.     2. Paroxysmal SVT: 15 runs on holter 8/19/2019 with longest duration of 20 beats and max rate of 226 bpm.Continue atenolol. Discussed re-evaluating with a holter with patient but symptoms are pretty infrequent. If palpitations worsening then would reconsider holter monitor to further evaluate.     3. Abnormal nuclear stress test: Small to moderate septal and apical septal ischemia on Lexiscan 7/29/2021. Previous false positive lexiscan in 2017. Essentially normal CTA of the coronary arteries 10/20. Discussed with patient. Will continue to monitor as she denies any chest pain.     4. Chronic diastolic congestive heart failure: Echo 12/2022 showed diastolic dysfunction. She remains stable with chronic dyspnea on exertion. Continue spironolactone.  Reviewed signs and symptoms of CHF and what to report to office with patient. Patient instructed to restrict sodium and record daily weight. Patient is to report weight gain of greater than 2 lbs overnight or 5 lbs in 1 week. Patient verbalized understanding of instructions and plan of care.     5. Aortic valve regurgitation: mild on echo 1/2024.     6. Pulmonary hypertension: Follows with pulmonology.     7. LBBB: Chronic    8. Hyperlipidemia: managed and followed by PCP. Lipid panel 6/25/2024 LDL 60.  Continue atorvastatin    9. GERD    10. TERESA: Compliant with CPAP    I attest that all portions of this note reviewed and all information has been updated by myself to  reflect the patient's current status.      I spent 35 minutes caring for Cindi on this date of service. This time includes time spent by me in the following activities:preparing for the visit, reviewing tests, obtaining and/or reviewing a separately obtained history, performing a medically appropriate examination and/or evaluation , counseling and educating the patient/family/caregiver, ordering medications, tests, or procedures, and documenting information in the medical record    I spent 2 minutes on the separately reported service of EKG. This time is not included in the time used to support the E/M service also reported today.    Patient is to follow up in 6 months or sooner if needed

## 2025-07-30 RX ORDER — SPIRONOLACTONE 25 MG/1
25 TABLET ORAL DAILY
Qty: 30 TABLET | Refills: 11 | Status: SHIPPED | OUTPATIENT
Start: 2025-07-30

## 2025-08-19 ENCOUNTER — OFFICE VISIT (OUTPATIENT)
Dept: OTOLARYNGOLOGY | Facility: CLINIC | Age: 75
End: 2025-08-19
Payer: MEDICARE

## 2025-08-19 VITALS
RESPIRATION RATE: 20 BRPM | HEART RATE: 66 BPM | SYSTOLIC BLOOD PRESSURE: 118 MMHG | WEIGHT: 152 LBS | HEIGHT: 60 IN | BODY MASS INDEX: 29.84 KG/M2 | DIASTOLIC BLOOD PRESSURE: 67 MMHG

## 2025-08-19 DIAGNOSIS — J30.0 CHRONIC VASOMOTOR RHINITIS: ICD-10-CM

## 2025-08-19 DIAGNOSIS — G44.89 SLUDER'S NEURALGIA: Chronic | ICD-10-CM

## 2025-08-19 DIAGNOSIS — G50.0 TRIGEMINAL NEURALGIA OF LEFT SIDE OF FACE: Chronic | ICD-10-CM

## 2025-08-19 DIAGNOSIS — H61.23 BILATERAL IMPACTED CERUMEN: ICD-10-CM

## 2025-08-19 DIAGNOSIS — R05.3 CHRONIC COUGH: Chronic | ICD-10-CM

## 2025-08-19 DIAGNOSIS — J34.89 INTERNAL NASAL LESION: ICD-10-CM

## 2025-08-19 DIAGNOSIS — K21.9 LARYNGOPHARYNGEAL REFLUX (LPR): Primary | Chronic | ICD-10-CM

## 2025-08-19 RX ORDER — IPRATROPIUM BROMIDE 42 UG/1
2 SPRAY, METERED NASAL 3 TIMES DAILY
Qty: 15 ML | Refills: 5 | Status: SHIPPED | OUTPATIENT
Start: 2025-08-19

## (undated) DEVICE — ELECTRD PAD DEFIB A/

## (undated) DEVICE — MODEL BT2000 P/N 700287-012KIT CONTENTS: MANIFOLD WITH SALINE AND CONTRAST PORTS, SALINE TUBING WITH SPIKE AND HAND SYRINGE, TRANSDUCER: Brand: BT2000 AUTOMATED MANIFOLD KIT

## (undated) DEVICE — MODEL AT P65, P/N 701554-001KIT CONTENTS: HAND CONTROLLER, 3-WAY HIGH-PRESSURE STOPCOCK WITH ROTATING END AND PREMIUM HIGH-PRESSURE TUBING: Brand: ANGIOTOUCH® KIT

## (undated) DEVICE — MYNXGRIP 6F/7F: Brand: MYNXGRIP

## (undated) DEVICE — SINGLE USE SUCTION VALVE MAJ-209: Brand: SINGLE USE SUCTION VALVE (STERILE)

## (undated) DEVICE — SOLIDIFIER LIQUI LOC PLUS 2000CC

## (undated) DEVICE — THE CHANNEL CLEANING BRUSH IS A NYLON FLEXI BRUSH ATTACHED TO A FLEXIBLE PLASTIC SHEATH DESIGNED TO SAFELY REMOVE DEBRIS FROM FLEXIBLE ENDOSCOPES.

## (undated) DEVICE — SINGLE USE BIOPSY VALVE MAJ-210: Brand: SINGLE USE BIOPSY VALVE (STERILE)

## (undated) DEVICE — CANN CO2/O2 NASL A/

## (undated) DEVICE — SENSR O2 OXIMAX FNGR A/ 18IN NONSTR

## (undated) DEVICE — FR5 INFINITI MULTIPAC: Brand: INFINITI

## (undated) DEVICE — TRAP,MUCUS SPECIMEN, 80CC: Brand: MEDLINE

## (undated) DEVICE — PK CATH CARD 30

## (undated) DEVICE — GW STARTER FXD CORE J .035 3X150CM 3MM

## (undated) DEVICE — TBG SMPL FLTR LINE NASL 02/C02 A/ BX/100

## (undated) DEVICE — CATH F6INF TL 3DRC 100CM: Brand: INFINITI

## (undated) DEVICE — SOL IRR NACL 0.9PCT BT 1000ML

## (undated) DEVICE — CUFF,BP,DISP,1 TUBE,ADULT,HP: Brand: MEDLINE

## (undated) DEVICE — PINNACLE INTRODUCER SHEATH: Brand: PINNACLE